# Patient Record
Sex: FEMALE | Race: WHITE | NOT HISPANIC OR LATINO | Employment: OTHER | URBAN - METROPOLITAN AREA
[De-identification: names, ages, dates, MRNs, and addresses within clinical notes are randomized per-mention and may not be internally consistent; named-entity substitution may affect disease eponyms.]

---

## 2017-01-01 ENCOUNTER — APPOINTMENT (OUTPATIENT)
Dept: GENERAL RADIOLOGY | Facility: HOSPITAL | Age: 82
End: 2017-01-01

## 2017-01-01 ENCOUNTER — HOSPITAL ENCOUNTER (OUTPATIENT)
Dept: GENERAL RADIOLOGY | Facility: HOSPITAL | Age: 82
Discharge: HOME | End: 2017-11-30
Admitting: SURGERY

## 2017-01-01 ENCOUNTER — ANESTHESIA (OUTPATIENT)
Dept: GASTROENTEROLOGY | Facility: HOSPITAL | Age: 82
End: 2017-01-01

## 2017-01-01 ENCOUNTER — APPOINTMENT (OUTPATIENT)
Dept: GENERAL RADIOLOGY | Facility: HOSPITAL | Age: 82
End: 2017-01-01
Attending: THORACIC SURGERY (CARDIOTHORACIC VASCULAR SURGERY)

## 2017-01-01 ENCOUNTER — OFFICE VISIT (OUTPATIENT)
Dept: GASTROENTEROLOGY | Facility: CLINIC | Age: 82
End: 2017-01-01

## 2017-01-01 ENCOUNTER — APPOINTMENT (OUTPATIENT)
Dept: CARDIOLOGY | Facility: HOSPITAL | Age: 82
End: 2017-01-01
Attending: INTERNAL MEDICINE

## 2017-01-01 ENCOUNTER — ANESTHESIA (OUTPATIENT)
Dept: PERIOP | Facility: HOSPITAL | Age: 82
End: 2017-01-01

## 2017-01-01 ENCOUNTER — LAB REQUISITION (OUTPATIENT)
Dept: LAB | Facility: HOSPITAL | Age: 82
End: 2017-01-01

## 2017-01-01 ENCOUNTER — TELEPHONE (OUTPATIENT)
Dept: CARDIOLOGY | Facility: CLINIC | Age: 82
End: 2017-01-01

## 2017-01-01 ENCOUNTER — TRANSCRIBE ORDERS (OUTPATIENT)
Dept: ADMINISTRATIVE | Facility: HOSPITAL | Age: 82
End: 2017-01-01

## 2017-01-01 ENCOUNTER — APPOINTMENT (OUTPATIENT)
Dept: ULTRASOUND IMAGING | Facility: HOSPITAL | Age: 82
End: 2017-01-01

## 2017-01-01 ENCOUNTER — ANESTHESIA EVENT (OUTPATIENT)
Dept: GASTROENTEROLOGY | Facility: HOSPITAL | Age: 82
End: 2017-01-01

## 2017-01-01 ENCOUNTER — APPOINTMENT (OUTPATIENT)
Dept: PREADMISSION TESTING | Facility: HOSPITAL | Age: 82
End: 2017-01-01

## 2017-01-01 ENCOUNTER — HOSPITAL ENCOUNTER (INPATIENT)
Facility: HOSPITAL | Age: 82
LOS: 4 days | Discharge: HOME | End: 2017-12-11
Attending: SURGERY | Admitting: SURGERY

## 2017-01-01 ENCOUNTER — HOSPITAL ENCOUNTER (INPATIENT)
Facility: HOSPITAL | Age: 82
LOS: 3 days | Discharge: HOME HEALTH | End: 2017-02-03
Attending: EMERGENCY MEDICINE | Admitting: INTERNAL MEDICINE

## 2017-01-01 ENCOUNTER — TELEPHONE (OUTPATIENT)
Dept: GASTROENTEROLOGY | Facility: CLINIC | Age: 82
End: 2017-01-01

## 2017-01-01 ENCOUNTER — APPOINTMENT (OUTPATIENT)
Dept: CARDIOLOGY | Facility: HOSPITAL | Age: 82
End: 2017-01-01
Attending: SURGERY

## 2017-01-01 ENCOUNTER — OFFICE VISIT (OUTPATIENT)
Dept: CARDIOLOGY | Facility: CLINIC | Age: 82
End: 2017-01-01

## 2017-01-01 ENCOUNTER — HOSPITAL ENCOUNTER (INPATIENT)
Facility: HOSPITAL | Age: 82
LOS: 27 days | Discharge: NURSING, REHAB OR LONG-TERM CARE | End: 2017-10-03
Attending: FAMILY MEDICINE | Admitting: INTERNAL MEDICINE

## 2017-01-01 ENCOUNTER — CLINICAL SUPPORT NO REQUIREMENTS (OUTPATIENT)
Dept: CARDIOLOGY | Facility: CLINIC | Age: 82
End: 2017-01-01

## 2017-01-01 ENCOUNTER — APPOINTMENT (OUTPATIENT)
Dept: CT IMAGING | Facility: HOSPITAL | Age: 82
End: 2017-01-01

## 2017-01-01 ENCOUNTER — INPATIENT HOSPITAL (OUTPATIENT)
Dept: URBAN - METROPOLITAN AREA HOSPITAL 113 | Facility: HOSPITAL | Age: 82
End: 2017-01-01

## 2017-01-01 ENCOUNTER — HOSPITAL ENCOUNTER (OUTPATIENT)
Dept: INFUSION THERAPY | Facility: HOSPITAL | Age: 82
Discharge: HOME | End: 2017-10-11
Admitting: INTERNAL MEDICINE

## 2017-01-01 ENCOUNTER — HOSPITAL ENCOUNTER (OUTPATIENT)
Dept: INFUSION THERAPY | Facility: HOSPITAL | Age: 82
Discharge: HOME | End: 2017-10-26
Admitting: INTERNAL MEDICINE

## 2017-01-01 ENCOUNTER — ANESTHESIA EVENT (OUTPATIENT)
Dept: PERIOP | Facility: HOSPITAL | Age: 82
End: 2017-01-01

## 2017-01-01 ENCOUNTER — APPOINTMENT (OUTPATIENT)
Dept: CT IMAGING | Facility: HOSPITAL | Age: 82
End: 2017-01-01
Attending: SURGERY

## 2017-01-01 ENCOUNTER — HOSPITAL ENCOUNTER (OUTPATIENT)
Dept: CARDIOLOGY | Facility: HOSPITAL | Age: 82
Discharge: HOME | End: 2017-02-09
Admitting: INTERNAL MEDICINE

## 2017-01-01 ENCOUNTER — APPOINTMENT (OUTPATIENT)
Dept: PERIOP | Facility: HOSPITAL | Age: 82
End: 2017-01-01
Attending: ANESTHESIOLOGY

## 2017-01-01 ENCOUNTER — OFFICE VISIT (OUTPATIENT)
Dept: CARDIAC SURGERY | Facility: CLINIC | Age: 82
End: 2017-01-01

## 2017-01-01 ENCOUNTER — HOSPITAL ENCOUNTER (EMERGENCY)
Facility: HOSPITAL | Age: 82
Discharge: HOME | End: 2017-02-05
Attending: EMERGENCY MEDICINE | Admitting: EMERGENCY MEDICINE

## 2017-01-01 VITALS
DIASTOLIC BLOOD PRESSURE: 62 MMHG | SYSTOLIC BLOOD PRESSURE: 136 MMHG | HEIGHT: 64 IN | TEMPERATURE: 97.7 F | WEIGHT: 179.2 LBS | BODY MASS INDEX: 30.59 KG/M2

## 2017-01-01 VITALS
WEIGHT: 211 LBS | RESPIRATION RATE: 16 BRPM | OXYGEN SATURATION: 99 % | HEART RATE: 97 BPM | SYSTOLIC BLOOD PRESSURE: 155 MMHG | BODY MASS INDEX: 36.02 KG/M2 | DIASTOLIC BLOOD PRESSURE: 76 MMHG | TEMPERATURE: 97.7 F | HEIGHT: 64 IN

## 2017-01-01 VITALS
HEIGHT: 64 IN | BODY MASS INDEX: 32.78 KG/M2 | OXYGEN SATURATION: 91 % | SYSTOLIC BLOOD PRESSURE: 163 MMHG | TEMPERATURE: 97.7 F | RESPIRATION RATE: 20 BRPM | WEIGHT: 192 LBS | DIASTOLIC BLOOD PRESSURE: 65 MMHG | HEART RATE: 84 BPM

## 2017-01-01 VITALS
WEIGHT: 189 LBS | HEART RATE: 86 BPM | TEMPERATURE: 97.1 F | SYSTOLIC BLOOD PRESSURE: 186 MMHG | OXYGEN SATURATION: 93 % | HEIGHT: 64 IN | DIASTOLIC BLOOD PRESSURE: 64 MMHG | BODY MASS INDEX: 32.27 KG/M2 | RESPIRATION RATE: 24 BRPM

## 2017-01-01 VITALS
OXYGEN SATURATION: 91 % | WEIGHT: 182 LBS | TEMPERATURE: 98.1 F | RESPIRATION RATE: 20 BRPM | BODY MASS INDEX: 31.07 KG/M2 | DIASTOLIC BLOOD PRESSURE: 64 MMHG | HEART RATE: 78 BPM | HEIGHT: 64 IN | SYSTOLIC BLOOD PRESSURE: 133 MMHG

## 2017-01-01 VITALS
HEART RATE: 68 BPM | SYSTOLIC BLOOD PRESSURE: 164 MMHG | BODY MASS INDEX: 34.83 KG/M2 | WEIGHT: 204 LBS | HEIGHT: 64 IN | DIASTOLIC BLOOD PRESSURE: 52 MMHG

## 2017-01-01 VITALS
RESPIRATION RATE: 16 BRPM | HEART RATE: 84 BPM | TEMPERATURE: 98.2 F | WEIGHT: 186.5 LBS | OXYGEN SATURATION: 95 % | DIASTOLIC BLOOD PRESSURE: 53 MMHG | BODY MASS INDEX: 31.84 KG/M2 | HEIGHT: 64 IN | SYSTOLIC BLOOD PRESSURE: 157 MMHG

## 2017-01-01 VITALS
RESPIRATION RATE: 18 BRPM | HEART RATE: 100 BPM | SYSTOLIC BLOOD PRESSURE: 163 MMHG | TEMPERATURE: 98.2 F | OXYGEN SATURATION: 95 % | WEIGHT: 194 LBS | BODY MASS INDEX: 33.12 KG/M2 | HEIGHT: 64 IN | DIASTOLIC BLOOD PRESSURE: 82 MMHG

## 2017-01-01 VITALS
TEMPERATURE: 97.8 F | RESPIRATION RATE: 22 BRPM | HEART RATE: 90 BPM | DIASTOLIC BLOOD PRESSURE: 79 MMHG | SYSTOLIC BLOOD PRESSURE: 148 MMHG | OXYGEN SATURATION: 97 %

## 2017-01-01 VITALS
HEIGHT: 55 IN | SYSTOLIC BLOOD PRESSURE: 130 MMHG | WEIGHT: 189 LBS | DIASTOLIC BLOOD PRESSURE: 60 MMHG | BODY MASS INDEX: 43.74 KG/M2 | HEART RATE: 80 BPM

## 2017-01-01 VITALS
TEMPERATURE: 97.9 F | SYSTOLIC BLOOD PRESSURE: 171 MMHG | WEIGHT: 212 LBS | HEART RATE: 96 BPM | RESPIRATION RATE: 18 BRPM | BODY MASS INDEX: 36.19 KG/M2 | OXYGEN SATURATION: 96 % | HEIGHT: 64 IN | DIASTOLIC BLOOD PRESSURE: 72 MMHG

## 2017-01-01 VITALS
WEIGHT: 199.6 LBS | HEART RATE: 79 BPM | BODY MASS INDEX: 34.08 KG/M2 | SYSTOLIC BLOOD PRESSURE: 180 MMHG | DIASTOLIC BLOOD PRESSURE: 78 MMHG | HEIGHT: 64 IN

## 2017-01-01 DIAGNOSIS — I44.7 NEW ONSET LEFT BUNDLE BRANCH BLOCK (LBBB): ICD-10-CM

## 2017-01-01 DIAGNOSIS — R06.02 SHORTNESS OF BREATH: ICD-10-CM

## 2017-01-01 DIAGNOSIS — Z98.890 S/P MVR (MITRAL VALVE REPAIR): Primary | ICD-10-CM

## 2017-01-01 DIAGNOSIS — E87.5 HYPERKALEMIA: ICD-10-CM

## 2017-01-01 DIAGNOSIS — K55.20 ANGIODYSPLASIA OF COLON WITHOUT HEMORRHAGE: ICD-10-CM

## 2017-01-01 DIAGNOSIS — Z98.890 S/P TVR (TRICUSPID VALVE REPAIR): ICD-10-CM

## 2017-01-01 DIAGNOSIS — G44.209 ACUTE NON INTRACTABLE TENSION-TYPE HEADACHE: ICD-10-CM

## 2017-01-01 DIAGNOSIS — R53.1 WEAKNESS: ICD-10-CM

## 2017-01-01 DIAGNOSIS — D50.8 OTHER IRON DEFICIENCY ANEMIA: ICD-10-CM

## 2017-01-01 DIAGNOSIS — D50.9 IRON DEFICIENCY ANEMIA, UNSPECIFIED IRON DEFICIENCY ANEMIA TYPE: ICD-10-CM

## 2017-01-01 DIAGNOSIS — D64.9 ANEMIA, UNSPECIFIED TYPE: Primary | ICD-10-CM

## 2017-01-01 DIAGNOSIS — D50.9 IRON DEFICIENCY ANEMIA, UNSPECIFIED: ICD-10-CM

## 2017-01-01 DIAGNOSIS — K92.1 HEMATOCHEZIA: ICD-10-CM

## 2017-01-01 DIAGNOSIS — I65.21 CAROTID STENOSIS, RIGHT: ICD-10-CM

## 2017-01-01 DIAGNOSIS — I48.0 PAROXYSMAL ATRIAL FIBRILLATION (HCC): Primary | ICD-10-CM

## 2017-01-01 DIAGNOSIS — I48.0 PAROXYSMAL ATRIAL FIBRILLATION (HCC): ICD-10-CM

## 2017-01-01 DIAGNOSIS — I48.91 NEW ONSET ATRIAL FIBRILLATION (HCC): Primary | ICD-10-CM

## 2017-01-01 DIAGNOSIS — D64.9 ANEMIA, UNSPECIFIED TYPE: ICD-10-CM

## 2017-01-01 DIAGNOSIS — E87.1 HYPONATREMIA: Primary | ICD-10-CM

## 2017-01-01 DIAGNOSIS — I65.29 OCCLUSION AND STENOSIS OF UNSPECIFIED CAROTID ARTERY: ICD-10-CM

## 2017-01-01 DIAGNOSIS — K55.20 GI AVM (GASTROINTESTINAL ARTERIOVENOUS VASCULAR MALFORMATION): ICD-10-CM

## 2017-01-01 DIAGNOSIS — D64.9 ANEMIA: ICD-10-CM

## 2017-01-01 DIAGNOSIS — Z00.00 ROUTINE GENERAL MEDICAL EXAMINATION AT A HEALTH CARE FACILITY: ICD-10-CM

## 2017-01-01 DIAGNOSIS — K92.2 GASTROINTESTINAL HEMORRHAGE, UNSPECIFIED GASTROINTESTINAL HEMORRHAGE TYPE: ICD-10-CM

## 2017-01-01 DIAGNOSIS — R41.82 ALTERED MENTAL STATUS, UNSPECIFIED ALTERED MENTAL STATUS TYPE: ICD-10-CM

## 2017-01-01 DIAGNOSIS — K63.5 POLYP OF COLON, UNSPECIFIED PART OF COLON, UNSPECIFIED TYPE: ICD-10-CM

## 2017-01-01 DIAGNOSIS — R53.1 GENERALIZED WEAKNESS: ICD-10-CM

## 2017-01-01 DIAGNOSIS — I10 ESSENTIAL HYPERTENSION: ICD-10-CM

## 2017-01-01 DIAGNOSIS — Z86.79 S/P MAZE OPERATION FOR ATRIAL FIBRILLATION: ICD-10-CM

## 2017-01-01 DIAGNOSIS — Z74.09 DECREASED MOBILITY AND ENDURANCE: Primary | ICD-10-CM

## 2017-01-01 DIAGNOSIS — E86.0 DEHYDRATION: ICD-10-CM

## 2017-01-01 DIAGNOSIS — I10 ESSENTIAL HYPERTENSION: Primary | ICD-10-CM

## 2017-01-01 DIAGNOSIS — Z98.890 S/P MAZE OPERATION FOR ATRIAL FIBRILLATION: ICD-10-CM

## 2017-01-01 DIAGNOSIS — I34.0 MITRAL VALVE INSUFFICIENCY, UNSPECIFIED ETIOLOGY: Primary | ICD-10-CM

## 2017-01-01 DIAGNOSIS — D50.8 OTHER IRON DEFICIENCY ANEMIA: Primary | ICD-10-CM

## 2017-01-01 DIAGNOSIS — I89.0 LYMPHEDEMA: ICD-10-CM

## 2017-01-01 DIAGNOSIS — Z87.11 PERSONAL HISTORY OF PEPTIC ULCER DISEASE: ICD-10-CM

## 2017-01-01 DIAGNOSIS — J44.9 CHRONIC OBSTRUCTIVE PULMONARY DISEASE, UNSPECIFIED COPD TYPE (HCC): ICD-10-CM

## 2017-01-01 DIAGNOSIS — D50.0 BLOOD LOSS ANEMIA: ICD-10-CM

## 2017-01-01 DIAGNOSIS — D50.0 IRON DEFICIENCY ANEMIA DUE TO CHRONIC BLOOD LOSS: Primary | ICD-10-CM

## 2017-01-01 LAB
ABO + RH BLD: NORMAL
ABO GROUP BLD: NORMAL
ACT BLD: 109 SECONDS (ref 82–152)
ACT BLD: 109 SECONDS (ref 82–152)
ACT BLD: 120 SECONDS (ref 82–152)
ACT BLD: 125 SECONDS (ref 82–152)
ACT BLD: 202 SECONDS (ref 82–152)
ACT BLD: 213 SECONDS (ref 82–152)
ACT BLD: 340 SECONDS (ref 82–152)
ACT BLD: 351 SECONDS (ref 82–152)
ACT BLD: 373 SECONDS (ref 82–152)
ACT BLD: 384 SECONDS (ref 82–152)
ACT BLD: 417 SECONDS (ref 82–152)
ALBUMIN SERPL-MCNC: 2.7 G/DL (ref 3.5–5.2)
ALBUMIN SERPL-MCNC: 3 G/DL (ref 3.5–5.2)
ALBUMIN SERPL-MCNC: 3.1 G/DL (ref 3.5–5.2)
ALBUMIN SERPL-MCNC: 3.1 G/DL (ref 3.5–5.2)
ALBUMIN SERPL-MCNC: 3.2 G/DL (ref 3.5–5.2)
ALBUMIN SERPL-MCNC: 3.3 G/DL (ref 3.5–5.2)
ALBUMIN SERPL-MCNC: 3.4 G/DL (ref 3.5–5.2)
ALBUMIN SERPL-MCNC: 3.4 G/DL (ref 3.5–5.2)
ALBUMIN SERPL-MCNC: 3.5 G/DL (ref 3.5–5.2)
ALBUMIN SERPL-MCNC: 3.5 G/DL (ref 3.5–5.2)
ALBUMIN SERPL-MCNC: 3.6 G/DL (ref 3.5–5.2)
ALBUMIN SERPL-MCNC: 3.7 G/DL (ref 3.5–5.2)
ALBUMIN SERPL-MCNC: 3.8 G/DL (ref 3.5–5.2)
ALBUMIN SERPL-MCNC: 3.8 G/DL (ref 3.5–5.2)
ALBUMIN/GLOB SERPL: 0.9 G/DL
ALBUMIN/GLOB SERPL: 1 G/DL
ALBUMIN/GLOB SERPL: 1.1 G/DL
ALBUMIN/GLOB SERPL: 1.2 G/DL
ALBUMIN/GLOB SERPL: 1.3 G/DL
ALBUMIN/GLOB SERPL: 1.3 G/DL
ALBUMIN/GLOB SERPL: 1.7 G/DL
ALP SERPL-CCNC: 103 U/L (ref 39–117)
ALP SERPL-CCNC: 107 U/L (ref 39–117)
ALP SERPL-CCNC: 110 U/L (ref 39–117)
ALP SERPL-CCNC: 112 U/L (ref 39–117)
ALP SERPL-CCNC: 41 U/L (ref 39–117)
ALP SERPL-CCNC: 47 U/L (ref 39–117)
ALP SERPL-CCNC: 48 U/L (ref 39–117)
ALP SERPL-CCNC: 52 U/L (ref 39–117)
ALP SERPL-CCNC: 53 U/L (ref 39–117)
ALP SERPL-CCNC: 56 U/L (ref 39–117)
ALP SERPL-CCNC: 82 U/L (ref 39–117)
ALP SERPL-CCNC: 82 U/L (ref 39–117)
ALP SERPL-CCNC: 99 U/L (ref 39–117)
ALT SERPL W P-5'-P-CCNC: 11 U/L (ref 1–33)
ALT SERPL W P-5'-P-CCNC: 17 U/L (ref 1–33)
ALT SERPL W P-5'-P-CCNC: 19 U/L (ref 1–33)
ALT SERPL W P-5'-P-CCNC: 21 U/L (ref 1–33)
ALT SERPL W P-5'-P-CCNC: 25 U/L (ref 1–33)
ALT SERPL W P-5'-P-CCNC: 25 U/L (ref 1–33)
ALT SERPL W P-5'-P-CCNC: 26 U/L (ref 1–33)
ALT SERPL W P-5'-P-CCNC: 31 U/L (ref 1–33)
ALT SERPL W P-5'-P-CCNC: 31 U/L (ref 1–33)
ALT SERPL W P-5'-P-CCNC: 36 U/L (ref 1–33)
ALT SERPL W P-5'-P-CCNC: 54 U/L (ref 1–33)
ALT SERPL W P-5'-P-CCNC: 9 U/L (ref 1–33)
ALT SERPL W P-5'-P-CCNC: <5 U/L (ref 1–33)
ANION GAP SERPL CALCULATED.3IONS-SCNC: 10.9 MMOL/L
ANION GAP SERPL CALCULATED.3IONS-SCNC: 11.3 MMOL/L
ANION GAP SERPL CALCULATED.3IONS-SCNC: 11.5 MMOL/L
ANION GAP SERPL CALCULATED.3IONS-SCNC: 11.6 MMOL/L
ANION GAP SERPL CALCULATED.3IONS-SCNC: 11.7 MMOL/L
ANION GAP SERPL CALCULATED.3IONS-SCNC: 12.2 MMOL/L
ANION GAP SERPL CALCULATED.3IONS-SCNC: 12.5 MMOL/L
ANION GAP SERPL CALCULATED.3IONS-SCNC: 12.6 MMOL/L
ANION GAP SERPL CALCULATED.3IONS-SCNC: 12.6 MMOL/L
ANION GAP SERPL CALCULATED.3IONS-SCNC: 12.7 MMOL/L
ANION GAP SERPL CALCULATED.3IONS-SCNC: 13.2 MMOL/L
ANION GAP SERPL CALCULATED.3IONS-SCNC: 13.4 MMOL/L
ANION GAP SERPL CALCULATED.3IONS-SCNC: 13.4 MMOL/L
ANION GAP SERPL CALCULATED.3IONS-SCNC: 13.6 MMOL/L
ANION GAP SERPL CALCULATED.3IONS-SCNC: 13.7 MMOL/L
ANION GAP SERPL CALCULATED.3IONS-SCNC: 13.8 MMOL/L
ANION GAP SERPL CALCULATED.3IONS-SCNC: 13.8 MMOL/L
ANION GAP SERPL CALCULATED.3IONS-SCNC: 14.3 MMOL/L
ANION GAP SERPL CALCULATED.3IONS-SCNC: 14.4 MMOL/L
ANION GAP SERPL CALCULATED.3IONS-SCNC: 14.6 MMOL/L
ANION GAP SERPL CALCULATED.3IONS-SCNC: 14.6 MMOL/L
ANION GAP SERPL CALCULATED.3IONS-SCNC: 14.9 MMOL/L
ANION GAP SERPL CALCULATED.3IONS-SCNC: 15.1 MMOL/L
ANION GAP SERPL CALCULATED.3IONS-SCNC: 15.2 MMOL/L
ANION GAP SERPL CALCULATED.3IONS-SCNC: 15.2 MMOL/L
ANION GAP SERPL CALCULATED.3IONS-SCNC: 15.4 MMOL/L
ANION GAP SERPL CALCULATED.3IONS-SCNC: 15.6 MMOL/L
ANION GAP SERPL CALCULATED.3IONS-SCNC: 15.9 MMOL/L
ANION GAP SERPL CALCULATED.3IONS-SCNC: 16.1 MMOL/L
ANION GAP SERPL CALCULATED.3IONS-SCNC: 16.2 MMOL/L
ANION GAP SERPL CALCULATED.3IONS-SCNC: 16.3 MMOL/L
ANION GAP SERPL CALCULATED.3IONS-SCNC: 16.7 MMOL/L
ANION GAP SERPL CALCULATED.3IONS-SCNC: 17.1 MMOL/L
ANION GAP SERPL CALCULATED.3IONS-SCNC: 18.4 MMOL/L
AORTIC ARCH: 2.5 CM
APTT PPP: 24.1 SECONDS (ref 22.7–35.4)
APTT PPP: 27.5 SECONDS (ref 22.7–35.4)
APTT PPP: 29.5 SECONDS (ref 22.7–35.4)
APTT PPP: 30.9 SECONDS (ref 22.7–35.4)
APTT PPP: 31.6 SECONDS (ref 22.7–35.4)
ARTERIAL PATENCY WRIST A: ABNORMAL
ASCENDING AORTA: 2.6 CM
AST SERPL-CCNC: 17 U/L (ref 1–32)
AST SERPL-CCNC: 17 U/L (ref 1–32)
AST SERPL-CCNC: 20 U/L (ref 1–32)
AST SERPL-CCNC: 20 U/L (ref 1–32)
AST SERPL-CCNC: 21 U/L (ref 1–32)
AST SERPL-CCNC: 22 U/L (ref 1–32)
AST SERPL-CCNC: 22 U/L (ref 1–32)
AST SERPL-CCNC: 23 U/L (ref 1–32)
AST SERPL-CCNC: 24 U/L (ref 1–32)
AST SERPL-CCNC: 24 U/L (ref 1–32)
AST SERPL-CCNC: 28 U/L (ref 1–32)
AST SERPL-CCNC: 39 U/L (ref 1–32)
AST SERPL-CCNC: 70 U/L (ref 1–32)
ATMOSPHERIC PRESS: 746.8 MMHG
ATMOSPHERIC PRESS: 747 MMHG
ATMOSPHERIC PRESS: 747 MMHG
ATMOSPHERIC PRESS: 751.1 MMHG
BACTERIA SPEC AEROBE CULT: ABNORMAL
BACTERIA SPEC AEROBE CULT: NORMAL
BACTERIA UR QL AUTO: ABNORMAL /HPF
BASE EXCESS BLDA CALC-SCNC: -1 MMOL/L (ref 0–2)
BASE EXCESS BLDA CALC-SCNC: -4 MMOL/L (ref 0–2)
BASE EXCESS BLDA CALC-SCNC: -7 MMOL/L (ref 0–2)
BASE EXCESS BLDA CALC-SCNC: 3 MMOL/L (ref -5–5)
BASE EXCESS BLDA CALC-SCNC: 3 MMOL/L (ref -5–5)
BASE EXCESS BLDA CALC-SCNC: 3.6 MMOL/L (ref 0–2)
BASE EXCESS BLDA CALC-SCNC: 4 MMOL/L (ref -5–5)
BASE EXCESS BLDA CALC-SCNC: 5 MMOL/L (ref -5–5)
BASE EXCESS BLDA CALC-SCNC: 5 MMOL/L (ref -5–5)
BASE EXCESS BLDA CALC-SCNC: 6 MMOL/L (ref -5–5)
BASOPHILS # BLD AUTO: 0.02 10*3/MM3 (ref 0–0.2)
BASOPHILS # BLD AUTO: 0.03 10*3/MM3 (ref 0–0.2)
BASOPHILS # BLD AUTO: 0.04 10*3/MM3 (ref 0–0.2)
BASOPHILS # BLD AUTO: 0.05 10*3/MM3 (ref 0–0.2)
BASOPHILS # BLD AUTO: 0.06 10*3/MM3 (ref 0–0.2)
BASOPHILS # BLD AUTO: 0.06 10*3/MM3 (ref 0–0.2)
BASOPHILS # BLD AUTO: 0.07 10*3/MM3 (ref 0–0.2)
BASOPHILS # BLD AUTO: 0.08 10*3/MM3 (ref 0–0.2)
BASOPHILS NFR BLD AUTO: 0.1 % (ref 0–1.5)
BASOPHILS NFR BLD AUTO: 0.2 % (ref 0–1.5)
BASOPHILS NFR BLD AUTO: 0.3 % (ref 0–1.5)
BASOPHILS NFR BLD AUTO: 0.4 % (ref 0–1.5)
BASOPHILS NFR BLD AUTO: 0.5 % (ref 0–1.5)
BASOPHILS NFR BLD AUTO: 0.6 % (ref 0–1.5)
BASOPHILS NFR BLD AUTO: 0.7 % (ref 0–1.5)
BASOPHILS NFR BLD AUTO: 0.8 % (ref 0–1.5)
BDY SITE: ABNORMAL
BH BB BLOOD EXPIRATION DATE: NORMAL
BH BB BLOOD TYPE BARCODE: 6200
BH BB DISPENSE STATUS: NORMAL
BH BB PRODUCT CODE: NORMAL
BH BB UNIT NUMBER: NORMAL
BH CV ECHO MEAS - ACS: 1.4 CM
BH CV ECHO MEAS - ACS: 1.6 CM
BH CV ECHO MEAS - AI DEC SLOPE: 207 CM/SEC^2
BH CV ECHO MEAS - AI DEC SLOPE: 294 CM/SEC^2
BH CV ECHO MEAS - AI MAX PG: 58.1 MMHG
BH CV ECHO MEAS - AI MAX PG: 59.9 MMHG
BH CV ECHO MEAS - AI MAX VEL: 381 CM/SEC
BH CV ECHO MEAS - AI MAX VEL: 387 CM/SEC
BH CV ECHO MEAS - AI P1/2T: 385.5 MSEC
BH CV ECHO MEAS - AI P1/2T: 539.1 MSEC
BH CV ECHO MEAS - AO MAX PG (FULL): 3.5 MMHG
BH CV ECHO MEAS - AO MAX PG (FULL): 7.9 MMHG
BH CV ECHO MEAS - AO MAX PG: 9.5 MMHG
BH CV ECHO MEAS - AO MAX PG: 9.7 MMHG
BH CV ECHO MEAS - AO MEAN PG (FULL): 2 MMHG
BH CV ECHO MEAS - AO MEAN PG (FULL): 4 MMHG
BH CV ECHO MEAS - AO MEAN PG: 5 MMHG
BH CV ECHO MEAS - AO MEAN PG: 5 MMHG
BH CV ECHO MEAS - AO ROOT AREA (BSA CORRECTED): 1.1
BH CV ECHO MEAS - AO ROOT AREA (BSA CORRECTED): 1.3
BH CV ECHO MEAS - AO ROOT AREA: 3.5 CM^2
BH CV ECHO MEAS - AO ROOT AREA: 4.9 CM^2
BH CV ECHO MEAS - AO ROOT DIAM: 2.1 CM
BH CV ECHO MEAS - AO ROOT DIAM: 2.5 CM
BH CV ECHO MEAS - AO V2 MAX: 154 CM/SEC
BH CV ECHO MEAS - AO V2 MAX: 156 CM/SEC
BH CV ECHO MEAS - AO V2 MEAN: 109 CM/SEC
BH CV ECHO MEAS - AO V2 MEAN: 99.9 CM/SEC
BH CV ECHO MEAS - AO V2 VTI: 33 CM
BH CV ECHO MEAS - AO V2 VTI: 34.4 CM
BH CV ECHO MEAS - ASC AORTA: 2.6 CM
BH CV ECHO MEAS - AVA(I,A): 1.1 CM^2
BH CV ECHO MEAS - AVA(I,A): 2 CM^2
BH CV ECHO MEAS - AVA(I,D): 1.1 CM^2
BH CV ECHO MEAS - AVA(I,D): 2 CM^2
BH CV ECHO MEAS - AVA(V,A): 1.1 CM^2
BH CV ECHO MEAS - AVA(V,A): 2 CM^2
BH CV ECHO MEAS - AVA(V,D): 1.1 CM^2
BH CV ECHO MEAS - AVA(V,D): 2 CM^2
BH CV ECHO MEAS - BSA(HAYCOCK): 2 M^2
BH CV ECHO MEAS - BSA(HAYCOCK): 2 M^2
BH CV ECHO MEAS - BSA(HAYCOCK): 2.1 M^2
BH CV ECHO MEAS - BSA: 1.9 M^2
BH CV ECHO MEAS - BSA: 1.9 M^2
BH CV ECHO MEAS - BSA: 2 M^2
BH CV ECHO MEAS - BZI_BMI: 33.6 KILOGRAMS/M^2
BH CV ECHO MEAS - BZI_BMI: 33.6 KILOGRAMS/M^2
BH CV ECHO MEAS - BZI_BMI: 34.7 KILOGRAMS/M^2
BH CV ECHO MEAS - BZI_METRIC_HEIGHT: 162.6 CM
BH CV ECHO MEAS - BZI_METRIC_WEIGHT: 88.9 KG
BH CV ECHO MEAS - BZI_METRIC_WEIGHT: 88.9 KG
BH CV ECHO MEAS - BZI_METRIC_WEIGHT: 91.6 KG
BH CV ECHO MEAS - CONTRAST EF (2CH): 23.2 ML/M^2
BH CV ECHO MEAS - CONTRAST EF (2CH): 46.1 ML/M^2
BH CV ECHO MEAS - CONTRAST EF 4CH: 22.7 ML/M^2
BH CV ECHO MEAS - CONTRAST EF 4CH: 57.6 ML/M^2
BH CV ECHO MEAS - EDV(CUBED): 110.6 ML
BH CV ECHO MEAS - EDV(CUBED): 79.5 ML
BH CV ECHO MEAS - EDV(MOD-SP2): 102 ML
BH CV ECHO MEAS - EDV(MOD-SP2): 99 ML
BH CV ECHO MEAS - EDV(MOD-SP4): 125 ML
BH CV ECHO MEAS - EDV(MOD-SP4): 75 ML
BH CV ECHO MEAS - EDV(TEICH): 107.5 ML
BH CV ECHO MEAS - EDV(TEICH): 83.1 ML
BH CV ECHO MEAS - EF(CUBED): 46.1 %
BH CV ECHO MEAS - EF(CUBED): 80.2 %
BH CV ECHO MEAS - EF(MOD-SP2): 23.2 %
BH CV ECHO MEAS - EF(MOD-SP2): 46.1 %
BH CV ECHO MEAS - EF(MOD-SP4): 22.7 %
BH CV ECHO MEAS - EF(MOD-SP4): 57.6 %
BH CV ECHO MEAS - EF(TEICH): 38.8 %
BH CV ECHO MEAS - EF(TEICH): 72.5 %
BH CV ECHO MEAS - ESV(CUBED): 22 ML
BH CV ECHO MEAS - ESV(CUBED): 42.9 ML
BH CV ECHO MEAS - ESV(MOD-SP2): 55 ML
BH CV ECHO MEAS - ESV(MOD-SP2): 76 ML
BH CV ECHO MEAS - ESV(MOD-SP4): 53 ML
BH CV ECHO MEAS - ESV(MOD-SP4): 58 ML
BH CV ECHO MEAS - ESV(TEICH): 29.6 ML
BH CV ECHO MEAS - ESV(TEICH): 50.9 ML
BH CV ECHO MEAS - FS: 18.6 %
BH CV ECHO MEAS - FS: 41.7 %
BH CV ECHO MEAS - IVS/LVPW: 0.91
BH CV ECHO MEAS - IVS/LVPW: 1.4
BH CV ECHO MEAS - IVSD: 1 CM
BH CV ECHO MEAS - IVSD: 1.1 CM
BH CV ECHO MEAS - LAT PEAK E' VEL: 10 CM/SEC
BH CV ECHO MEAS - LAT PEAK E' VEL: 5 CM/SEC
BH CV ECHO MEAS - LV DIASTOLIC VOL/BSA (35-75): 38.7 ML/M^2
BH CV ECHO MEAS - LV DIASTOLIC VOL/BSA (35-75): 63.6 ML/M^2
BH CV ECHO MEAS - LV MASS(C)D: 132.7 GRAMS
BH CV ECHO MEAS - LV MASS(C)D: 181.9 GRAMS
BH CV ECHO MEAS - LV MASS(C)DI: 68.4 GRAMS/M^2
BH CV ECHO MEAS - LV MASS(C)DI: 92.6 GRAMS/M^2
BH CV ECHO MEAS - LV MAX PG: 1.9 MMHG
BH CV ECHO MEAS - LV MAX PG: 6 MMHG
BH CV ECHO MEAS - LV MEAN PG: 1 MMHG
BH CV ECHO MEAS - LV MEAN PG: 3 MMHG
BH CV ECHO MEAS - LV SYSTOLIC VOL/BSA (12-30): 27 ML/M^2
BH CV ECHO MEAS - LV SYSTOLIC VOL/BSA (12-30): 29.9 ML/M^2
BH CV ECHO MEAS - LV V1 MAX: 122 CM/SEC
BH CV ECHO MEAS - LV V1 MAX: 68.3 CM/SEC
BH CV ECHO MEAS - LV V1 MEAN: 44.4 CM/SEC
BH CV ECHO MEAS - LV V1 MEAN: 74.7 CM/SEC
BH CV ECHO MEAS - LV V1 VTI: 15.2 CM
BH CV ECHO MEAS - LV V1 VTI: 25.6 CM
BH CV ECHO MEAS - LVIDD: 4.3 CM
BH CV ECHO MEAS - LVIDD: 4.8 CM
BH CV ECHO MEAS - LVIDS: 2.8 CM
BH CV ECHO MEAS - LVIDS: 3.5 CM
BH CV ECHO MEAS - LVLD AP2: 8.6 CM
BH CV ECHO MEAS - LVLD AP2: 8.8 CM
BH CV ECHO MEAS - LVLD AP4: 8.1 CM
BH CV ECHO MEAS - LVLD AP4: 8.6 CM
BH CV ECHO MEAS - LVLS AP2: 7.8 CM
BH CV ECHO MEAS - LVLS AP2: 8.2 CM
BH CV ECHO MEAS - LVLS AP4: 7.6 CM
BH CV ECHO MEAS - LVLS AP4: 7.8 CM
BH CV ECHO MEAS - LVOT AREA (M): 2.5 CM^2
BH CV ECHO MEAS - LVOT AREA (M): 2.5 CM^2
BH CV ECHO MEAS - LVOT AREA: 2.5 CM^2
BH CV ECHO MEAS - LVOT AREA: 2.5 CM^2
BH CV ECHO MEAS - LVOT DIAM: 1.8 CM
BH CV ECHO MEAS - LVOT DIAM: 1.8 CM
BH CV ECHO MEAS - LVPWD: 0.8 CM
BH CV ECHO MEAS - LVPWD: 1.1 CM
BH CV ECHO MEAS - MED PEAK E' VEL: 5 CM/SEC
BH CV ECHO MEAS - MED PEAK E' VEL: 7 CM/SEC
BH CV ECHO MEAS - MR MAX PG: 109 MMHG
BH CV ECHO MEAS - MR MAX PG: 32.3 MMHG
BH CV ECHO MEAS - MR MAX VEL: 284 CM/SEC
BH CV ECHO MEAS - MR MAX VEL: 522 CM/SEC
BH CV ECHO MEAS - MV A DUR: 0.12 SEC
BH CV ECHO MEAS - MV A DUR: 0.13 SEC
BH CV ECHO MEAS - MV A MAX VEL: 122 CM/SEC
BH CV ECHO MEAS - MV A MAX VEL: 49.8 CM/SEC
BH CV ECHO MEAS - MV DEC SLOPE: 800 CM/SEC^2
BH CV ECHO MEAS - MV DEC SLOPE: 904 CM/SEC^2
BH CV ECHO MEAS - MV DEC TIME: 0.14 SEC
BH CV ECHO MEAS - MV DEC TIME: 0.16 SEC
BH CV ECHO MEAS - MV E MAX VEL: 153 CM/SEC
BH CV ECHO MEAS - MV E MAX VEL: 154 CM/SEC
BH CV ECHO MEAS - MV E/A: 1.3
BH CV ECHO MEAS - MV E/A: 3.1
BH CV ECHO MEAS - MV MAX PG: 11.3 MMHG
BH CV ECHO MEAS - MV MAX PG: 9.1 MMHG
BH CV ECHO MEAS - MV MEAN PG: 4 MMHG
BH CV ECHO MEAS - MV MEAN PG: 4 MMHG
BH CV ECHO MEAS - MV P1/2T MAX VEL: 144 CM/SEC
BH CV ECHO MEAS - MV P1/2T MAX VEL: 174 CM/SEC
BH CV ECHO MEAS - MV P1/2T: 52.7 MSEC
BH CV ECHO MEAS - MV P1/2T: 56.4 MSEC
BH CV ECHO MEAS - MV V2 MAX: 151 CM/SEC
BH CV ECHO MEAS - MV V2 MAX: 168 CM/SEC
BH CV ECHO MEAS - MV V2 MEAN: 83 CM/SEC
BH CV ECHO MEAS - MV V2 MEAN: 88 CM/SEC
BH CV ECHO MEAS - MV V2 VTI: 34 CM
BH CV ECHO MEAS - MV V2 VTI: 34.7 CM
BH CV ECHO MEAS - MVA P1/2T LCG: 1.3 CM^2
BH CV ECHO MEAS - MVA P1/2T LCG: 1.5 CM^2
BH CV ECHO MEAS - MVA(P1/2T): 3.9 CM^2
BH CV ECHO MEAS - MVA(P1/2T): 4.2 CM^2
BH CV ECHO MEAS - MVA(VTI): 1.1 CM^2
BH CV ECHO MEAS - MVA(VTI): 1.9 CM^2
BH CV ECHO MEAS - PA ACC TIME: 0.07 SEC
BH CV ECHO MEAS - PA ACC TIME: 0.11 SEC
BH CV ECHO MEAS - PA MAX PG (FULL): 1.9 MMHG
BH CV ECHO MEAS - PA MAX PG (FULL): 3.7 MMHG
BH CV ECHO MEAS - PA MAX PG: 4.4 MMHG
BH CV ECHO MEAS - PA MAX PG: 5.3 MMHG
BH CV ECHO MEAS - PA PR(ACCEL): 30 MMHG
BH CV ECHO MEAS - PA PR(ACCEL): 47.5 MMHG
BH CV ECHO MEAS - PA V2 MAX: 105 CM/SEC
BH CV ECHO MEAS - PA V2 MAX: 115 CM/SEC
BH CV ECHO MEAS - PULM A REVS DUR: 0.11 SEC
BH CV ECHO MEAS - PULM A REVS VEL: 24.3 CM/SEC
BH CV ECHO MEAS - PULM DIAS VEL: 74.9 CM/SEC
BH CV ECHO MEAS - PULM DIAS VEL: 85.1 CM/SEC
BH CV ECHO MEAS - PULM S/D: 0.39
BH CV ECHO MEAS - PULM S/D: 0.82
BH CV ECHO MEAS - PULM SYS VEL: 29.3 CM/SEC
BH CV ECHO MEAS - PULM SYS VEL: 69.6 CM/SEC
BH CV ECHO MEAS - PVA(V,A): 0.89 CM^2
BH CV ECHO MEAS - PVA(V,A): 2.3 CM^2
BH CV ECHO MEAS - PVA(V,D): 0.89 CM^2
BH CV ECHO MEAS - PVA(V,D): 2.3 CM^2
BH CV ECHO MEAS - QP/QS: 0.45
BH CV ECHO MEAS - QP/QS: 0.78
BH CV ECHO MEAS - RAP SYSTOLE: 15 MMHG
BH CV ECHO MEAS - RAP SYSTOLE: 8 MMHG
BH CV ECHO MEAS - RV MAX PG: 0.69 MMHG
BH CV ECHO MEAS - RV MAX PG: 3.4 MMHG
BH CV ECHO MEAS - RV MEAN PG: 0 MMHG
BH CV ECHO MEAS - RV MEAN PG: 1 MMHG
BH CV ECHO MEAS - RV V1 MAX: 41.4 CM/SEC
BH CV ECHO MEAS - RV V1 MAX: 92.4 CM/SEC
BH CV ECHO MEAS - RV V1 MEAN: 27.8 CM/SEC
BH CV ECHO MEAS - RV V1 MEAN: 52.7 CM/SEC
BH CV ECHO MEAS - RV V1 VTI: 17.9 CM
BH CV ECHO MEAS - RV V1 VTI: 7.6 CM
BH CV ECHO MEAS - RVOT AREA: 2.3 CM^2
BH CV ECHO MEAS - RVOT AREA: 2.8 CM^2
BH CV ECHO MEAS - RVOT DIAM: 1.7 CM
BH CV ECHO MEAS - RVOT DIAM: 1.9 CM
BH CV ECHO MEAS - RVSP: 55 MMHG
BH CV ECHO MEAS - RVSP: 57 MMHG
BH CV ECHO MEAS - SI(AO): 61.4 ML/M^2
BH CV ECHO MEAS - SI(AO): 82.5 ML/M^2
BH CV ECHO MEAS - SI(CUBED): 18.9 ML/M^2
BH CV ECHO MEAS - SI(CUBED): 45.1 ML/M^2
BH CV ECHO MEAS - SI(LVOT): 19.9 ML/M^2
BH CV ECHO MEAS - SI(LVOT): 33.2 ML/M^2
BH CV ECHO MEAS - SI(MOD-SP2): 11.9 ML/M^2
BH CV ECHO MEAS - SI(MOD-SP2): 23.9 ML/M^2
BH CV ECHO MEAS - SI(MOD-SP4): 36.7 ML/M^2
BH CV ECHO MEAS - SI(MOD-SP4): 8.8 ML/M^2
BH CV ECHO MEAS - SI(TEICH): 16.6 ML/M^2
BH CV ECHO MEAS - SI(TEICH): 39.7 ML/M^2
BH CV ECHO MEAS - SUP REN AO DIAM: 1.5 CM
BH CV ECHO MEAS - SV(AO): 119.1 ML
BH CV ECHO MEAS - SV(AO): 162 ML
BH CV ECHO MEAS - SV(CUBED): 36.6 ML
BH CV ECHO MEAS - SV(CUBED): 88.6 ML
BH CV ECHO MEAS - SV(LVOT): 38.7 ML
BH CV ECHO MEAS - SV(LVOT): 65.1 ML
BH CV ECHO MEAS - SV(MOD-SP2): 23 ML
BH CV ECHO MEAS - SV(MOD-SP2): 47 ML
BH CV ECHO MEAS - SV(MOD-SP4): 17 ML
BH CV ECHO MEAS - SV(MOD-SP4): 72 ML
BH CV ECHO MEAS - SV(RVOT): 17.2 ML
BH CV ECHO MEAS - SV(RVOT): 50.8 ML
BH CV ECHO MEAS - SV(TEICH): 32.2 ML
BH CV ECHO MEAS - SV(TEICH): 78 ML
BH CV ECHO MEAS - TAPSE (>1.6): 1.3 CM2
BH CV ECHO MEAS - TAPSE (>1.6): 2.7 CM2
BH CV ECHO MEAS - TR MAX VEL: 318 CM/SEC
BH CV ECHO MEAS - TR MAX VEL: 341 CM/SEC
BH CV ECHO MEAS - TR MAX VEL: 351 CM/SEC
BH CV LOWER VASCULAR LEFT COMMON FEMORAL AUGMENT: NORMAL
BH CV LOWER VASCULAR LEFT COMMON FEMORAL COMPETENT: NORMAL
BH CV LOWER VASCULAR LEFT COMMON FEMORAL COMPRESS: NORMAL
BH CV LOWER VASCULAR LEFT COMMON FEMORAL PHASIC: NORMAL
BH CV LOWER VASCULAR LEFT COMMON FEMORAL SPONT: NORMAL
BH CV LOWER VASCULAR LEFT DISTAL FEMORAL COMPRESS: NORMAL
BH CV LOWER VASCULAR LEFT GASTRONEMIUS COMPRESS: NORMAL
BH CV LOWER VASCULAR LEFT GREATER SAPH AK COMPRESS: NORMAL
BH CV LOWER VASCULAR LEFT GREATER SAPH BK COMPRESS: NORMAL
BH CV LOWER VASCULAR LEFT LESSER SAPH COMPRESS: NORMAL
BH CV LOWER VASCULAR LEFT MID FEMORAL AUGMENT: NORMAL
BH CV LOWER VASCULAR LEFT MID FEMORAL COMPETENT: NORMAL
BH CV LOWER VASCULAR LEFT MID FEMORAL COMPRESS: NORMAL
BH CV LOWER VASCULAR LEFT MID FEMORAL PHASIC: NORMAL
BH CV LOWER VASCULAR LEFT MID FEMORAL SPONT: NORMAL
BH CV LOWER VASCULAR LEFT PERONEAL COMPRESS: NORMAL
BH CV LOWER VASCULAR LEFT POPLITEAL AUGMENT: NORMAL
BH CV LOWER VASCULAR LEFT POPLITEAL COMPETENT: NORMAL
BH CV LOWER VASCULAR LEFT POPLITEAL COMPRESS: NORMAL
BH CV LOWER VASCULAR LEFT POPLITEAL PHASIC: NORMAL
BH CV LOWER VASCULAR LEFT POPLITEAL SPONT: NORMAL
BH CV LOWER VASCULAR LEFT POSTERIOR TIBIAL COMPRESS: NORMAL
BH CV LOWER VASCULAR LEFT PROXIMAL FEMORAL COMPRESS: NORMAL
BH CV LOWER VASCULAR LEFT SAPHENOFEMORAL JUNCTION AUGMENT: NORMAL
BH CV LOWER VASCULAR LEFT SAPHENOFEMORAL JUNCTION COMPETENT: NORMAL
BH CV LOWER VASCULAR LEFT SAPHENOFEMORAL JUNCTION COMPRESS: NORMAL
BH CV LOWER VASCULAR LEFT SAPHENOFEMORAL JUNCTION PHASIC: NORMAL
BH CV LOWER VASCULAR LEFT SAPHENOFEMORAL JUNCTION SPONT: NORMAL
BH CV LOWER VASCULAR RIGHT COMMON FEMORAL AUGMENT: NORMAL
BH CV LOWER VASCULAR RIGHT COMMON FEMORAL COMPETENT: NORMAL
BH CV LOWER VASCULAR RIGHT COMMON FEMORAL COMPRESS: NORMAL
BH CV LOWER VASCULAR RIGHT COMMON FEMORAL PHASIC: NORMAL
BH CV LOWER VASCULAR RIGHT COMMON FEMORAL SPONT: NORMAL
BH CV LOWER VASCULAR RIGHT DISTAL FEMORAL COMPRESS: NORMAL
BH CV LOWER VASCULAR RIGHT GASTRONEMIUS COMPRESS: NORMAL
BH CV LOWER VASCULAR RIGHT GREATER SAPH AK COMPRESS: NORMAL
BH CV LOWER VASCULAR RIGHT GREATER SAPH BK COMPRESS: NORMAL
BH CV LOWER VASCULAR RIGHT LESSER SAPH COMPRESS: NORMAL
BH CV LOWER VASCULAR RIGHT MID FEMORAL AUGMENT: NORMAL
BH CV LOWER VASCULAR RIGHT MID FEMORAL COMPETENT: NORMAL
BH CV LOWER VASCULAR RIGHT MID FEMORAL COMPRESS: NORMAL
BH CV LOWER VASCULAR RIGHT MID FEMORAL PHASIC: NORMAL
BH CV LOWER VASCULAR RIGHT MID FEMORAL SPONT: NORMAL
BH CV LOWER VASCULAR RIGHT PERONEAL COMPRESS: NORMAL
BH CV LOWER VASCULAR RIGHT POPLITEAL AUGMENT: NORMAL
BH CV LOWER VASCULAR RIGHT POPLITEAL COMPETENT: NORMAL
BH CV LOWER VASCULAR RIGHT POPLITEAL COMPRESS: NORMAL
BH CV LOWER VASCULAR RIGHT POPLITEAL PHASIC: NORMAL
BH CV LOWER VASCULAR RIGHT POPLITEAL SPONT: NORMAL
BH CV LOWER VASCULAR RIGHT POSTERIOR TIBIAL COMPRESS: NORMAL
BH CV LOWER VASCULAR RIGHT PROXIMAL FEMORAL COMPRESS: NORMAL
BH CV LOWER VASCULAR RIGHT SAPHENOFEMORAL JUNCTION AUGMENT: NORMAL
BH CV LOWER VASCULAR RIGHT SAPHENOFEMORAL JUNCTION COMPETENT: NORMAL
BH CV LOWER VASCULAR RIGHT SAPHENOFEMORAL JUNCTION COMPRESS: NORMAL
BH CV LOWER VASCULAR RIGHT SAPHENOFEMORAL JUNCTION PHASIC: NORMAL
BH CV LOWER VASCULAR RIGHT SAPHENOFEMORAL JUNCTION SPONT: NORMAL
BH CV VAS BP RIGHT ARM: NORMAL MMHG
BH CV XLRA - RV BASE: 3.2 CM
BH CV XLRA - RV BASE: 4.2 CM
BH CV XLRA - TDI S': 10 CM/SEC
BH CV XLRA - TDI S': 6 CM/SEC
BH CV XLRA MEAS LEFT CCA RATIO VEL: -102 CM/SEC
BH CV XLRA MEAS LEFT DIST CCA EDV: -11 CM/SEC
BH CV XLRA MEAS LEFT DIST CCA PSV: -102 CM/SEC
BH CV XLRA MEAS LEFT DIST ICA EDV: -18.1 CM/SEC
BH CV XLRA MEAS LEFT DIST ICA PSV: -117 CM/SEC
BH CV XLRA MEAS LEFT ICA RATIO VEL: -251 CM/SEC
BH CV XLRA MEAS LEFT ICA/CCA RATIO: 2.5
BH CV XLRA MEAS LEFT MID ICA EDV: -14.1 CM/SEC
BH CV XLRA MEAS LEFT MID ICA PSV: -112 CM/SEC
BH CV XLRA MEAS LEFT PROX CCA EDV: 7.9 CM/SEC
BH CV XLRA MEAS LEFT PROX CCA PSV: 108 CM/SEC
BH CV XLRA MEAS LEFT PROX ECA EDV: 11.8 CM/SEC
BH CV XLRA MEAS LEFT PROX ECA PSV: 267 CM/SEC
BH CV XLRA MEAS LEFT PROX ICA EDV: -18.9 CM/SEC
BH CV XLRA MEAS LEFT PROX ICA PSV: -251 CM/SEC
BH CV XLRA MEAS LEFT PROX SCLA PSV: 214 CM/SEC
BH CV XLRA MEAS LEFT VERTEBRAL A EDV: -15.8 CM/SEC
BH CV XLRA MEAS LEFT VERTEBRAL A PSV: -89.1 CM/SEC
BH CV XLRA MEAS RIGHT CCA RATIO VEL: 76.8 CM/SEC
BH CV XLRA MEAS RIGHT DIST CCA EDV: 9 CM/SEC
BH CV XLRA MEAS RIGHT DIST CCA PSV: 76.8 CM/SEC
BH CV XLRA MEAS RIGHT DIST CCA PSV: 95 CM/SEC
BH CV XLRA MEAS RIGHT DIST ICA EDV: -3.5 CM/SEC
BH CV XLRA MEAS RIGHT DIST ICA PSV: -27.9 CM/SEC
BH CV XLRA MEAS RIGHT ICA RATIO VEL: -449 CM/SEC
BH CV XLRA MEAS RIGHT ICA/CCA RATIO: -5.8
BH CV XLRA MEAS RIGHT MID ICA PSV: -31.1 CM/SEC
BH CV XLRA MEAS RIGHT PROX CCA PSV: 82.1 CM/SEC
BH CV XLRA MEAS RIGHT PROX ECA EDV: 0 CM/SEC
BH CV XLRA MEAS RIGHT PROX ECA PSV: 158 CM/SEC
BH CV XLRA MEAS RIGHT PROX ECA PSV: 311 CM/SEC
BH CV XLRA MEAS RIGHT PROX ICA EDV: -56.1 CM/SEC
BH CV XLRA MEAS RIGHT PROX ICA EDV: 11 CM/SEC
BH CV XLRA MEAS RIGHT PROX ICA PSV: -449 CM/SEC
BH CV XLRA MEAS RIGHT PROX ICA PSV: 51 CM/SEC
BH CV XLRA MEAS RIGHT PROX SCLA PSV: 215 CM/SEC
BH CV XLRA MEAS RIGHT VERTEBRAL A EDV: 9.4 CM/SEC
BH CV XLRA MEAS RIGHT VERTEBRAL A PSV: 91.5 CM/SEC
BILIRUB SERPL-MCNC: 0.2 MG/DL (ref 0.1–1.2)
BILIRUB SERPL-MCNC: 0.3 MG/DL (ref 0.1–1.2)
BILIRUB SERPL-MCNC: 0.4 MG/DL (ref 0.1–1.2)
BILIRUB SERPL-MCNC: 0.4 MG/DL (ref 0.1–1.2)
BILIRUB SERPL-MCNC: 0.5 MG/DL (ref 0.1–1.2)
BILIRUB SERPL-MCNC: 0.5 MG/DL (ref 0.1–1.2)
BILIRUB SERPL-MCNC: 0.6 MG/DL (ref 0.1–1.2)
BILIRUB SERPL-MCNC: 0.6 MG/DL (ref 0.1–1.2)
BILIRUB SERPL-MCNC: 0.7 MG/DL (ref 0.1–1.2)
BILIRUB SERPL-MCNC: 1.5 MG/DL (ref 0.1–1.2)
BILIRUB UR QL STRIP: NEGATIVE
BLD GP AB SCN SERPL QL: NEGATIVE
BUN BLD-MCNC: 12 MG/DL (ref 8–23)
BUN BLD-MCNC: 19 MG/DL (ref 8–23)
BUN BLD-MCNC: 20 MG/DL (ref 8–23)
BUN BLD-MCNC: 21 MG/DL (ref 8–23)
BUN BLD-MCNC: 22 MG/DL (ref 8–23)
BUN BLD-MCNC: 23 MG/DL (ref 8–23)
BUN BLD-MCNC: 25 MG/DL (ref 8–23)
BUN BLD-MCNC: 25 MG/DL (ref 8–23)
BUN BLD-MCNC: 26 MG/DL (ref 8–23)
BUN BLD-MCNC: 27 MG/DL (ref 8–23)
BUN BLD-MCNC: 28 MG/DL (ref 8–23)
BUN BLD-MCNC: 28 MG/DL (ref 8–23)
BUN BLD-MCNC: 31 MG/DL (ref 8–23)
BUN BLD-MCNC: 31 MG/DL (ref 8–23)
BUN BLD-MCNC: 32 MG/DL (ref 8–23)
BUN BLD-MCNC: 33 MG/DL (ref 8–23)
BUN BLD-MCNC: 34 MG/DL (ref 8–23)
BUN BLD-MCNC: 34 MG/DL (ref 8–23)
BUN BLD-MCNC: 35 MG/DL (ref 8–23)
BUN BLD-MCNC: 35 MG/DL (ref 8–23)
BUN BLD-MCNC: 36 MG/DL (ref 8–23)
BUN BLD-MCNC: 36 MG/DL (ref 8–23)
BUN BLD-MCNC: 37 MG/DL (ref 8–23)
BUN BLD-MCNC: 37 MG/DL (ref 8–23)
BUN BLD-MCNC: 38 MG/DL (ref 8–23)
BUN BLD-MCNC: 39 MG/DL (ref 8–23)
BUN BLD-MCNC: 40 MG/DL (ref 8–23)
BUN BLD-MCNC: 40 MG/DL (ref 8–23)
BUN BLD-MCNC: 41 MG/DL (ref 8–23)
BUN BLD-MCNC: 45 MG/DL (ref 8–23)
BUN BLD-MCNC: 51 MG/DL (ref 8–23)
BUN BLD-MCNC: 54 MG/DL (ref 8–23)
BUN BLD-MCNC: 56 MG/DL (ref 8–23)
BUN BLD-MCNC: 58 MG/DL (ref 8–23)
BUN BLD-MCNC: 59 MG/DL (ref 8–23)
BUN/CREAT SERPL: 10.6 (ref 7–25)
BUN/CREAT SERPL: 11.9 (ref 7–25)
BUN/CREAT SERPL: 13.2 (ref 7–25)
BUN/CREAT SERPL: 13.7 (ref 7–25)
BUN/CREAT SERPL: 13.8 (ref 7–25)
BUN/CREAT SERPL: 15 (ref 7–25)
BUN/CREAT SERPL: 15.7 (ref 7–25)
BUN/CREAT SERPL: 15.8 (ref 7–25)
BUN/CREAT SERPL: 16.8 (ref 7–25)
BUN/CREAT SERPL: 16.8 (ref 7–25)
BUN/CREAT SERPL: 17 (ref 7–25)
BUN/CREAT SERPL: 17.5 (ref 7–25)
BUN/CREAT SERPL: 17.6 (ref 7–25)
BUN/CREAT SERPL: 18.5 (ref 7–25)
BUN/CREAT SERPL: 18.7 (ref 7–25)
BUN/CREAT SERPL: 18.8 (ref 7–25)
BUN/CREAT SERPL: 18.9 (ref 7–25)
BUN/CREAT SERPL: 19.1 (ref 7–25)
BUN/CREAT SERPL: 19.4 (ref 7–25)
BUN/CREAT SERPL: 19.8 (ref 7–25)
BUN/CREAT SERPL: 19.8 (ref 7–25)
BUN/CREAT SERPL: 20.1 (ref 7–25)
BUN/CREAT SERPL: 20.2 (ref 7–25)
BUN/CREAT SERPL: 20.3 (ref 7–25)
BUN/CREAT SERPL: 20.3 (ref 7–25)
BUN/CREAT SERPL: 20.9 (ref 7–25)
BUN/CREAT SERPL: 21.3 (ref 7–25)
BUN/CREAT SERPL: 21.4 (ref 7–25)
BUN/CREAT SERPL: 21.4 (ref 7–25)
BUN/CREAT SERPL: 22 (ref 7–25)
BUN/CREAT SERPL: 22.1 (ref 7–25)
BUN/CREAT SERPL: 23.3 (ref 7–25)
BUN/CREAT SERPL: 24.5 (ref 7–25)
BUN/CREAT SERPL: 24.8 (ref 7–25)
BUN/CREAT SERPL: 25 (ref 7–25)
BUN/CREAT SERPL: 25.6 (ref 7–25)
BUN/CREAT SERPL: 26.2 (ref 7–25)
BUN/CREAT SERPL: 28 (ref 7–25)
BUN/CREAT SERPL: 30.1 (ref 7–25)
BUN/CREAT SERPL: 41.4 (ref 7–25)
BUN/CREAT SERPL: 42.1 (ref 7–25)
BUN/CREAT SERPL: 42.5 (ref 7–25)
BUN/CREAT SERPL: 44 (ref 7–25)
BUN/CREAT SERPL: 8.9 (ref 7–25)
BUN/CREAT SERPL: 9.7 (ref 7–25)
BUN/CREAT SERPL: 9.9 (ref 7–25)
BURR CELLS BLD QL SMEAR: NORMAL
CA-I BLD-MCNC: 5.2 MG/DL (ref 4.6–5.4)
CA-I SERPL ISE-MCNC: 1.31 MMOL/L (ref 1.1–1.35)
CALCIUM SPEC-SCNC: 7.7 MG/DL (ref 8.6–10.5)
CALCIUM SPEC-SCNC: 7.8 MG/DL (ref 8.6–10.5)
CALCIUM SPEC-SCNC: 8.1 MG/DL (ref 8.6–10.5)
CALCIUM SPEC-SCNC: 8.1 MG/DL (ref 8.6–10.5)
CALCIUM SPEC-SCNC: 8.2 MG/DL (ref 8.6–10.5)
CALCIUM SPEC-SCNC: 8.2 MG/DL (ref 8.6–10.5)
CALCIUM SPEC-SCNC: 8.3 MG/DL (ref 8.6–10.5)
CALCIUM SPEC-SCNC: 8.4 MG/DL (ref 8.6–10.5)
CALCIUM SPEC-SCNC: 8.5 MG/DL (ref 8.6–10.5)
CALCIUM SPEC-SCNC: 8.6 MG/DL (ref 8.6–10.5)
CALCIUM SPEC-SCNC: 8.7 MG/DL (ref 8.6–10.5)
CALCIUM SPEC-SCNC: 8.7 MG/DL (ref 8.6–10.5)
CALCIUM SPEC-SCNC: 8.8 MG/DL (ref 8.6–10.5)
CALCIUM SPEC-SCNC: 8.9 MG/DL (ref 8.6–10.5)
CALCIUM SPEC-SCNC: 9 MG/DL (ref 8.6–10.5)
CALCIUM SPEC-SCNC: 9 MG/DL (ref 8.6–10.5)
CALCIUM SPEC-SCNC: 9.1 MG/DL (ref 8.6–10.5)
CALCIUM SPEC-SCNC: 9.2 MG/DL (ref 8.6–10.5)
CALCIUM SPEC-SCNC: 9.2 MG/DL (ref 8.6–10.5)
CALCIUM SPEC-SCNC: 9.3 MG/DL (ref 8.6–10.5)
CALCIUM SPEC-SCNC: 9.4 MG/DL (ref 8.6–10.5)
CALCIUM SPEC-SCNC: 9.5 MG/DL (ref 8.6–10.5)
CALCIUM SPEC-SCNC: 9.5 MG/DL (ref 8.6–10.5)
CALCIUM SPEC-SCNC: 9.9 MG/DL (ref 8.6–10.5)
CHLORIDE SERPL-SCNC: 100 MMOL/L (ref 98–107)
CHLORIDE SERPL-SCNC: 85 MMOL/L (ref 98–107)
CHLORIDE SERPL-SCNC: 85 MMOL/L (ref 98–107)
CHLORIDE SERPL-SCNC: 86 MMOL/L (ref 98–107)
CHLORIDE SERPL-SCNC: 87 MMOL/L (ref 98–107)
CHLORIDE SERPL-SCNC: 90 MMOL/L (ref 98–107)
CHLORIDE SERPL-SCNC: 91 MMOL/L (ref 98–107)
CHLORIDE SERPL-SCNC: 92 MMOL/L (ref 98–107)
CHLORIDE SERPL-SCNC: 93 MMOL/L (ref 98–107)
CHLORIDE SERPL-SCNC: 94 MMOL/L (ref 98–107)
CHLORIDE SERPL-SCNC: 95 MMOL/L (ref 98–107)
CHLORIDE SERPL-SCNC: 96 MMOL/L (ref 98–107)
CHLORIDE SERPL-SCNC: 97 MMOL/L (ref 98–107)
CHLORIDE SERPL-SCNC: 98 MMOL/L (ref 98–107)
CHLORIDE SERPL-SCNC: 99 MMOL/L (ref 98–107)
CHLORIDE UR-SCNC: 26 MMOL/L
CHLORIDE UR-SCNC: <20 MMOL/L
CHLORIDE UR-SCNC: <20 MMOL/L
CHOLEST SERPL-MCNC: 126 MG/DL (ref 0–200)
CHOLEST SERPL-MCNC: 162 MG/DL (ref 0–200)
CK SERPL-CCNC: 36 U/L (ref 20–180)
CLARITY UR: ABNORMAL
CLOSE TME COLL+ADP + EPINEP PNL BLD: 93 % (ref 86–100)
CLOSE TME COLL+ADP + EPINEP PNL BLD: 96 % (ref 86–100)
CO2 BLDA-SCNC: 28 MMOL/L (ref 24–29)
CO2 BLDA-SCNC: 30 MMOL/L (ref 24–29)
CO2 BLDA-SCNC: 31 MMOL/L (ref 24–29)
CO2 BLDA-SCNC: 32 MMOL/L (ref 24–29)
CO2 BLDA-SCNC: 32 MMOL/L (ref 24–29)
CO2 BLDA-SCNC: 33 MMOL/L (ref 24–29)
CO2 SERPL-SCNC: 18.9 MMOL/L (ref 22–29)
CO2 SERPL-SCNC: 18.9 MMOL/L (ref 22–29)
CO2 SERPL-SCNC: 19.8 MMOL/L (ref 22–29)
CO2 SERPL-SCNC: 20.4 MMOL/L (ref 22–29)
CO2 SERPL-SCNC: 20.6 MMOL/L (ref 22–29)
CO2 SERPL-SCNC: 20.9 MMOL/L (ref 22–29)
CO2 SERPL-SCNC: 21.2 MMOL/L (ref 22–29)
CO2 SERPL-SCNC: 21.3 MMOL/L (ref 22–29)
CO2 SERPL-SCNC: 21.6 MMOL/L (ref 22–29)
CO2 SERPL-SCNC: 21.6 MMOL/L (ref 22–29)
CO2 SERPL-SCNC: 21.7 MMOL/L (ref 22–29)
CO2 SERPL-SCNC: 21.9 MMOL/L (ref 22–29)
CO2 SERPL-SCNC: 22.6 MMOL/L (ref 22–29)
CO2 SERPL-SCNC: 22.8 MMOL/L (ref 22–29)
CO2 SERPL-SCNC: 22.8 MMOL/L (ref 22–29)
CO2 SERPL-SCNC: 22.9 MMOL/L (ref 22–29)
CO2 SERPL-SCNC: 23.6 MMOL/L (ref 22–29)
CO2 SERPL-SCNC: 23.9 MMOL/L (ref 22–29)
CO2 SERPL-SCNC: 23.9 MMOL/L (ref 22–29)
CO2 SERPL-SCNC: 24.4 MMOL/L (ref 22–29)
CO2 SERPL-SCNC: 24.6 MMOL/L (ref 22–29)
CO2 SERPL-SCNC: 24.6 MMOL/L (ref 22–29)
CO2 SERPL-SCNC: 24.7 MMOL/L (ref 22–29)
CO2 SERPL-SCNC: 24.8 MMOL/L (ref 22–29)
CO2 SERPL-SCNC: 24.8 MMOL/L (ref 22–29)
CO2 SERPL-SCNC: 25.2 MMOL/L (ref 22–29)
CO2 SERPL-SCNC: 25.4 MMOL/L (ref 22–29)
CO2 SERPL-SCNC: 25.4 MMOL/L (ref 22–29)
CO2 SERPL-SCNC: 25.5 MMOL/L (ref 22–29)
CO2 SERPL-SCNC: 25.8 MMOL/L (ref 22–29)
CO2 SERPL-SCNC: 25.8 MMOL/L (ref 22–29)
CO2 SERPL-SCNC: 25.9 MMOL/L (ref 22–29)
CO2 SERPL-SCNC: 25.9 MMOL/L (ref 22–29)
CO2 SERPL-SCNC: 26.1 MMOL/L (ref 22–29)
CO2 SERPL-SCNC: 26.4 MMOL/L (ref 22–29)
CO2 SERPL-SCNC: 26.7 MMOL/L (ref 22–29)
CO2 SERPL-SCNC: 26.9 MMOL/L (ref 22–29)
CO2 SERPL-SCNC: 27.1 MMOL/L (ref 22–29)
CO2 SERPL-SCNC: 27.3 MMOL/L (ref 22–29)
CO2 SERPL-SCNC: 27.4 MMOL/L (ref 22–29)
CO2 SERPL-SCNC: 29.3 MMOL/L (ref 22–29)
CO2 SERPL-SCNC: 32.4 MMOL/L (ref 22–29)
CO2 SERPL-SCNC: 32.6 MMOL/L (ref 22–29)
CO2 SERPL-SCNC: 34.5 MMOL/L (ref 22–29)
CO2 SERPL-SCNC: 36.3 MMOL/L (ref 22–29)
CO2 SERPL-SCNC: 37.1 MMOL/L (ref 22–29)
COLOR UR: ABNORMAL
COLOR UR: YELLOW
COLOR UR: YELLOW
CORTIS SERPL-MCNC: 15.82 MCG/DL
CREAT BLD-MCNC: 1.01 MG/DL (ref 0.57–1)
CREAT BLD-MCNC: 1.1 MG/DL (ref 0.57–1)
CREAT BLD-MCNC: 1.11 MG/DL (ref 0.57–1)
CREAT BLD-MCNC: 1.12 MG/DL (ref 0.57–1)
CREAT BLD-MCNC: 1.13 MG/DL (ref 0.57–1)
CREAT BLD-MCNC: 1.14 MG/DL (ref 0.57–1)
CREAT BLD-MCNC: 1.17 MG/DL (ref 0.57–1)
CREAT BLD-MCNC: 1.21 MG/DL (ref 0.57–1)
CREAT BLD-MCNC: 1.26 MG/DL (ref 0.57–1)
CREAT BLD-MCNC: 1.27 MG/DL (ref 0.57–1)
CREAT BLD-MCNC: 1.28 MG/DL (ref 0.57–1)
CREAT BLD-MCNC: 1.3 MG/DL (ref 0.57–1)
CREAT BLD-MCNC: 1.34 MG/DL (ref 0.57–1)
CREAT BLD-MCNC: 1.36 MG/DL (ref 0.57–1)
CREAT BLD-MCNC: 1.37 MG/DL (ref 0.57–1)
CREAT BLD-MCNC: 1.38 MG/DL (ref 0.57–1)
CREAT BLD-MCNC: 1.39 MG/DL (ref 0.57–1)
CREAT BLD-MCNC: 1.4 MG/DL (ref 0.57–1)
CREAT BLD-MCNC: 1.43 MG/DL (ref 0.57–1)
CREAT BLD-MCNC: 1.49 MG/DL (ref 0.57–1)
CREAT BLD-MCNC: 1.53 MG/DL (ref 0.57–1)
CREAT BLD-MCNC: 1.54 MG/DL (ref 0.57–1)
CREAT BLD-MCNC: 1.58 MG/DL (ref 0.57–1)
CREAT BLD-MCNC: 1.59 MG/DL (ref 0.57–1)
CREAT BLD-MCNC: 1.61 MG/DL (ref 0.57–1)
CREAT BLD-MCNC: 1.64 MG/DL (ref 0.57–1)
CREAT BLD-MCNC: 1.68 MG/DL (ref 0.57–1)
CREAT BLD-MCNC: 1.73 MG/DL (ref 0.57–1)
CREAT BLD-MCNC: 1.73 MG/DL (ref 0.57–1)
CREAT BLD-MCNC: 1.77 MG/DL (ref 0.57–1)
CREAT BLD-MCNC: 1.78 MG/DL (ref 0.57–1)
CREAT BLD-MCNC: 1.81 MG/DL (ref 0.57–1)
CREAT BLD-MCNC: 1.84 MG/DL (ref 0.57–1)
CREAT BLD-MCNC: 1.86 MG/DL (ref 0.57–1)
CREAT BLD-MCNC: 1.86 MG/DL (ref 0.57–1)
CREAT BLD-MCNC: 1.93 MG/DL (ref 0.57–1)
CREAT BLD-MCNC: 2.03 MG/DL (ref 0.57–1)
CREAT BLD-MCNC: 2.04 MG/DL (ref 0.57–1)
CREAT BLD-MCNC: 2.22 MG/DL (ref 0.57–1)
CREAT BLD-MCNC: 2.54 MG/DL (ref 0.57–1)
CREAT BLD-MCNC: 3.11 MG/DL (ref 0.57–1)
CREAT BLD-MCNC: 3.12 MG/DL (ref 0.57–1)
CREAT BLD-MCNC: 4.01 MG/DL (ref 0.57–1)
CREAT BLD-MCNC: 4.05 MG/DL (ref 0.57–1)
CREAT UR-MCNC: 119.7 MG/DL
CREAT UR-MCNC: 139 MG/DL
CREAT UR-MCNC: 176.5 MG/DL
CREAT UR-MCNC: 69.3 MG/DL
CRP SERPL-MCNC: 1.65 MG/DL (ref 0–0.5)
CYTO UR: NORMAL
CYTO UR: NORMAL
DEPRECATED RDW RBC AUTO: 47 FL (ref 37–54)
DEPRECATED RDW RBC AUTO: 48.2 FL (ref 37–54)
DEPRECATED RDW RBC AUTO: 48.4 FL (ref 37–54)
DEPRECATED RDW RBC AUTO: 50.3 FL (ref 37–54)
DEPRECATED RDW RBC AUTO: 52.4 FL (ref 37–54)
DEPRECATED RDW RBC AUTO: 53.8 FL (ref 37–54)
DEPRECATED RDW RBC AUTO: 53.9 FL (ref 37–54)
DEPRECATED RDW RBC AUTO: 55 FL (ref 37–54)
DEPRECATED RDW RBC AUTO: 55.2 FL (ref 37–54)
DEPRECATED RDW RBC AUTO: 57.2 FL (ref 37–54)
DEPRECATED RDW RBC AUTO: 58.3 FL (ref 37–54)
DEPRECATED RDW RBC AUTO: 59 FL (ref 37–54)
DEPRECATED RDW RBC AUTO: 59.3 FL (ref 37–54)
DEPRECATED RDW RBC AUTO: 59.5 FL (ref 37–54)
DEPRECATED RDW RBC AUTO: 59.6 FL (ref 37–54)
DEPRECATED RDW RBC AUTO: 60.2 FL (ref 37–54)
DEPRECATED RDW RBC AUTO: 60.2 FL (ref 37–54)
DEPRECATED RDW RBC AUTO: 60.3 FL (ref 37–54)
DEPRECATED RDW RBC AUTO: 60.4 FL (ref 37–54)
DEPRECATED RDW RBC AUTO: 60.9 FL (ref 37–54)
DEPRECATED RDW RBC AUTO: 60.9 FL (ref 37–54)
DEPRECATED RDW RBC AUTO: 61 FL (ref 37–54)
DEPRECATED RDW RBC AUTO: 61.1 FL (ref 37–54)
DEPRECATED RDW RBC AUTO: 61.1 FL (ref 37–54)
DEPRECATED RDW RBC AUTO: 62.1 FL (ref 37–54)
DEPRECATED RDW RBC AUTO: 62.3 FL (ref 37–54)
DEPRECATED RDW RBC AUTO: 62.6 FL (ref 37–54)
DEPRECATED RDW RBC AUTO: 62.7 FL (ref 37–54)
DEPRECATED RDW RBC AUTO: 63.4 FL (ref 37–54)
DEPRECATED RDW RBC AUTO: 63.4 FL (ref 37–54)
DEPRECATED RDW RBC AUTO: 63.5 FL (ref 37–54)
DEPRECATED RDW RBC AUTO: 63.8 FL (ref 37–54)
DEPRECATED RDW RBC AUTO: 63.8 FL (ref 37–54)
DEPRECATED RDW RBC AUTO: 63.9 FL (ref 37–54)
DEPRECATED RDW RBC AUTO: 64 FL (ref 37–54)
DEPRECATED RDW RBC AUTO: 64.3 FL (ref 37–54)
DEPRECATED RDW RBC AUTO: 64.4 FL (ref 37–54)
DEPRECATED RDW RBC AUTO: 64.4 FL (ref 37–54)
DEPRECATED RDW RBC AUTO: 64.7 FL (ref 37–54)
DEPRECATED RDW RBC AUTO: 64.9 FL (ref 37–54)
E/E' RATIO: 23
E/E' RATIO: 32
ELLIPTOCYTES BLD QL SMEAR: NORMAL
EOSINOPHIL # BLD AUTO: 0 10*3/MM3 (ref 0–0.7)
EOSINOPHIL # BLD AUTO: 0.06 10*3/MM3 (ref 0–0.7)
EOSINOPHIL # BLD AUTO: 0.13 10*3/MM3 (ref 0–0.7)
EOSINOPHIL # BLD AUTO: 0.13 10*3/MM3 (ref 0–0.7)
EOSINOPHIL # BLD AUTO: 0.18 10*3/MM3 (ref 0–0.7)
EOSINOPHIL # BLD AUTO: 0.18 10*3/MM3 (ref 0–0.7)
EOSINOPHIL # BLD AUTO: 0.21 10*3/MM3 (ref 0–0.7)
EOSINOPHIL # BLD AUTO: 0.24 10*3/MM3 (ref 0–0.7)
EOSINOPHIL # BLD AUTO: 0.26 10*3/MM3 (ref 0–0.7)
EOSINOPHIL # BLD AUTO: 0.29 10*3/MM3 (ref 0–0.7)
EOSINOPHIL # BLD AUTO: 0.29 10*3/MM3 (ref 0–0.7)
EOSINOPHIL # BLD AUTO: 0.3 10*3/MM3 (ref 0–0.7)
EOSINOPHIL # BLD AUTO: 0.31 10*3/MM3 (ref 0–0.7)
EOSINOPHIL # BLD AUTO: 0.32 10*3/MM3 (ref 0–0.7)
EOSINOPHIL # BLD AUTO: 0.33 10*3/MM3 (ref 0–0.7)
EOSINOPHIL # BLD AUTO: 0.34 10*3/MM3 (ref 0–0.7)
EOSINOPHIL # BLD AUTO: 0.34 10*3/MM3 (ref 0–0.7)
EOSINOPHIL # BLD AUTO: 0.35 10*3/MM3 (ref 0–0.7)
EOSINOPHIL # BLD AUTO: 0.35 10*3/MM3 (ref 0–0.7)
EOSINOPHIL # BLD AUTO: 0.37 10*3/MM3 (ref 0–0.7)
EOSINOPHIL # BLD AUTO: 0.38 10*3/MM3 (ref 0–0.7)
EOSINOPHIL # BLD AUTO: 0.43 10*3/MM3 (ref 0–0.7)
EOSINOPHIL # BLD AUTO: 0.45 10*3/MM3 (ref 0–0.7)
EOSINOPHIL # BLD AUTO: 0.45 10*3/MM3 (ref 0–0.7)
EOSINOPHIL # BLD AUTO: 0.51 10*3/MM3 (ref 0–0.7)
EOSINOPHIL # BLD AUTO: 0.56 10*3/MM3 (ref 0–0.7)
EOSINOPHIL # BLD AUTO: 0.61 10*3/MM3 (ref 0–0.7)
EOSINOPHIL NFR BLD AUTO: 0 % (ref 0.3–6.2)
EOSINOPHIL NFR BLD AUTO: 0.4 % (ref 0.3–6.2)
EOSINOPHIL NFR BLD AUTO: 0.6 % (ref 0.3–6.2)
EOSINOPHIL NFR BLD AUTO: 1 % (ref 0.3–6.2)
EOSINOPHIL NFR BLD AUTO: 1.2 % (ref 0.3–6.2)
EOSINOPHIL NFR BLD AUTO: 2 % (ref 0.3–6.2)
EOSINOPHIL NFR BLD AUTO: 2.3 % (ref 0.3–6.2)
EOSINOPHIL NFR BLD AUTO: 2.6 % (ref 0.3–6.2)
EOSINOPHIL NFR BLD AUTO: 2.7 % (ref 0.3–6.2)
EOSINOPHIL NFR BLD AUTO: 2.9 % (ref 0.3–6.2)
EOSINOPHIL NFR BLD AUTO: 3.1 % (ref 0.3–6.2)
EOSINOPHIL NFR BLD AUTO: 3.2 % (ref 0.3–6.2)
EOSINOPHIL NFR BLD AUTO: 3.3 % (ref 0.3–6.2)
EOSINOPHIL NFR BLD AUTO: 3.3 % (ref 0.3–6.2)
EOSINOPHIL NFR BLD AUTO: 3.4 % (ref 0.3–6.2)
EOSINOPHIL NFR BLD AUTO: 3.5 % (ref 0.3–6.2)
EOSINOPHIL NFR BLD AUTO: 3.8 % (ref 0.3–6.2)
EOSINOPHIL NFR BLD AUTO: 3.9 % (ref 0.3–6.2)
EOSINOPHIL NFR BLD AUTO: 4 % (ref 0.3–6.2)
EOSINOPHIL NFR BLD AUTO: 4 % (ref 0.3–6.2)
EOSINOPHIL NFR BLD AUTO: 4.1 % (ref 0.3–6.2)
EOSINOPHIL NFR BLD AUTO: 4.2 % (ref 0.3–6.2)
EOSINOPHIL NFR BLD AUTO: 4.4 % (ref 0.3–6.2)
EOSINOPHIL NFR BLD AUTO: 4.7 % (ref 0.3–6.2)
EOSINOPHIL NFR BLD AUTO: 4.7 % (ref 0.3–6.2)
EOSINOPHIL NFR BLD AUTO: 5 % (ref 0.3–6.2)
EOSINOPHIL NFR BLD AUTO: 5.1 % (ref 0.3–6.2)
EOSINOPHIL NFR BLD AUTO: 5.1 % (ref 0.3–6.2)
EOSINOPHIL NFR BLD AUTO: 5.3 % (ref 0.3–6.2)
ERYTHROCYTE [DISTWIDTH] IN BLOOD BY AUTOMATED COUNT: 15.2 % (ref 11.7–13)
ERYTHROCYTE [DISTWIDTH] IN BLOOD BY AUTOMATED COUNT: 16.4 % (ref 11.7–13)
ERYTHROCYTE [DISTWIDTH] IN BLOOD BY AUTOMATED COUNT: 16.7 % (ref 11.7–13)
ERYTHROCYTE [DISTWIDTH] IN BLOOD BY AUTOMATED COUNT: 17.1 % (ref 11.7–13)
ERYTHROCYTE [DISTWIDTH] IN BLOOD BY AUTOMATED COUNT: 17.7 % (ref 11.7–13)
ERYTHROCYTE [DISTWIDTH] IN BLOOD BY AUTOMATED COUNT: 18.2 % (ref 11.7–13)
ERYTHROCYTE [DISTWIDTH] IN BLOOD BY AUTOMATED COUNT: 18.6 % (ref 11.7–13)
ERYTHROCYTE [DISTWIDTH] IN BLOOD BY AUTOMATED COUNT: 19.2 % (ref 11.7–13)
ERYTHROCYTE [DISTWIDTH] IN BLOOD BY AUTOMATED COUNT: 19.3 % (ref 11.7–13)
ERYTHROCYTE [DISTWIDTH] IN BLOOD BY AUTOMATED COUNT: 19.6 % (ref 11.7–13)
ERYTHROCYTE [DISTWIDTH] IN BLOOD BY AUTOMATED COUNT: 19.7 % (ref 11.7–13)
ERYTHROCYTE [DISTWIDTH] IN BLOOD BY AUTOMATED COUNT: 19.9 % (ref 11.7–13)
ERYTHROCYTE [DISTWIDTH] IN BLOOD BY AUTOMATED COUNT: 20.5 % (ref 11.7–13)
ERYTHROCYTE [DISTWIDTH] IN BLOOD BY AUTOMATED COUNT: 20.6 % (ref 11.7–13)
ERYTHROCYTE [DISTWIDTH] IN BLOOD BY AUTOMATED COUNT: 20.7 % (ref 11.7–13)
ERYTHROCYTE [DISTWIDTH] IN BLOOD BY AUTOMATED COUNT: 20.8 % (ref 11.7–13)
ERYTHROCYTE [DISTWIDTH] IN BLOOD BY AUTOMATED COUNT: 20.9 % (ref 11.7–13)
ERYTHROCYTE [DISTWIDTH] IN BLOOD BY AUTOMATED COUNT: 21 % (ref 11.7–13)
ERYTHROCYTE [DISTWIDTH] IN BLOOD BY AUTOMATED COUNT: 21.1 % (ref 11.7–13)
ERYTHROCYTE [DISTWIDTH] IN BLOOD BY AUTOMATED COUNT: 21.1 % (ref 11.7–13)
ERYTHROCYTE [DISTWIDTH] IN BLOOD BY AUTOMATED COUNT: 21.2 % (ref 11.7–13)
ERYTHROCYTE [DISTWIDTH] IN BLOOD BY AUTOMATED COUNT: 21.3 % (ref 11.7–13)
ERYTHROCYTE [DISTWIDTH] IN BLOOD BY AUTOMATED COUNT: 21.4 % (ref 11.7–13)
ERYTHROCYTE [SEDIMENTATION RATE] IN BLOOD: 49 MM/HR (ref 0–30)
FERRITIN SERPL-MCNC: 302.5 NG/ML (ref 13–150)
FERRITIN SERPL-MCNC: 510.9 NG/ML (ref 13–150)
FERRITIN SERPL-MCNC: 53.89 NG/ML (ref 13–150)
GFR SERPL CREATININE-BSD FRML MDRD: 11 ML/MIN/1.73
GFR SERPL CREATININE-BSD FRML MDRD: 11 ML/MIN/1.73
GFR SERPL CREATININE-BSD FRML MDRD: 14 ML/MIN/1.73
GFR SERPL CREATININE-BSD FRML MDRD: 14 ML/MIN/1.73
GFR SERPL CREATININE-BSD FRML MDRD: 18 ML/MIN/1.73
GFR SERPL CREATININE-BSD FRML MDRD: 21 ML/MIN/1.73
GFR SERPL CREATININE-BSD FRML MDRD: 23 ML/MIN/1.73
GFR SERPL CREATININE-BSD FRML MDRD: 24 ML/MIN/1.73
GFR SERPL CREATININE-BSD FRML MDRD: 25 ML/MIN/1.73
GFR SERPL CREATININE-BSD FRML MDRD: 26 ML/MIN/1.73
GFR SERPL CREATININE-BSD FRML MDRD: 27 ML/MIN/1.73
GFR SERPL CREATININE-BSD FRML MDRD: 27 ML/MIN/1.73
GFR SERPL CREATININE-BSD FRML MDRD: 28 ML/MIN/1.73
GFR SERPL CREATININE-BSD FRML MDRD: 29 ML/MIN/1.73
GFR SERPL CREATININE-BSD FRML MDRD: 30 ML/MIN/1.73
GFR SERPL CREATININE-BSD FRML MDRD: 31 ML/MIN/1.73
GFR SERPL CREATININE-BSD FRML MDRD: 32 ML/MIN/1.73
GFR SERPL CREATININE-BSD FRML MDRD: 33 ML/MIN/1.73
GFR SERPL CREATININE-BSD FRML MDRD: 34 ML/MIN/1.73
GFR SERPL CREATININE-BSD FRML MDRD: 35 ML/MIN/1.73
GFR SERPL CREATININE-BSD FRML MDRD: 36 ML/MIN/1.73
GFR SERPL CREATININE-BSD FRML MDRD: 36 ML/MIN/1.73
GFR SERPL CREATININE-BSD FRML MDRD: 37 ML/MIN/1.73
GFR SERPL CREATININE-BSD FRML MDRD: 38 ML/MIN/1.73
GFR SERPL CREATININE-BSD FRML MDRD: 40 ML/MIN/1.73
GFR SERPL CREATININE-BSD FRML MDRD: 41 ML/MIN/1.73
GFR SERPL CREATININE-BSD FRML MDRD: 43 ML/MIN/1.73
GFR SERPL CREATININE-BSD FRML MDRD: 45 ML/MIN/1.73
GFR SERPL CREATININE-BSD FRML MDRD: 46 ML/MIN/1.73
GFR SERPL CREATININE-BSD FRML MDRD: 46 ML/MIN/1.73
GFR SERPL CREATININE-BSD FRML MDRD: 47 ML/MIN/1.73
GFR SERPL CREATININE-BSD FRML MDRD: 47 ML/MIN/1.73
GFR SERPL CREATININE-BSD FRML MDRD: 48 ML/MIN/1.73
GFR SERPL CREATININE-BSD FRML MDRD: 53 ML/MIN/1.73
GLOBULIN UR ELPH-MCNC: 2.2 GM/DL
GLOBULIN UR ELPH-MCNC: 2.3 GM/DL
GLOBULIN UR ELPH-MCNC: 2.5 GM/DL
GLOBULIN UR ELPH-MCNC: 2.6 GM/DL
GLOBULIN UR ELPH-MCNC: 2.7 GM/DL
GLOBULIN UR ELPH-MCNC: 2.7 GM/DL
GLOBULIN UR ELPH-MCNC: 2.8 GM/DL
GLOBULIN UR ELPH-MCNC: 3 GM/DL
GLOBULIN UR ELPH-MCNC: 3.1 GM/DL
GLOBULIN UR ELPH-MCNC: 3.3 GM/DL
GLOBULIN UR ELPH-MCNC: 3.5 GM/DL
GLOBULIN UR ELPH-MCNC: 3.5 GM/DL
GLOBULIN UR ELPH-MCNC: 3.6 GM/DL
GLUCOSE BLD-MCNC: 100 MG/DL (ref 65–99)
GLUCOSE BLD-MCNC: 101 MG/DL (ref 65–99)
GLUCOSE BLD-MCNC: 102 MG/DL (ref 65–99)
GLUCOSE BLD-MCNC: 103 MG/DL (ref 65–99)
GLUCOSE BLD-MCNC: 104 MG/DL (ref 65–99)
GLUCOSE BLD-MCNC: 105 MG/DL (ref 65–99)
GLUCOSE BLD-MCNC: 105 MG/DL (ref 65–99)
GLUCOSE BLD-MCNC: 107 MG/DL (ref 65–99)
GLUCOSE BLD-MCNC: 107 MG/DL (ref 65–99)
GLUCOSE BLD-MCNC: 108 MG/DL (ref 65–99)
GLUCOSE BLD-MCNC: 108 MG/DL (ref 65–99)
GLUCOSE BLD-MCNC: 109 MG/DL (ref 65–99)
GLUCOSE BLD-MCNC: 109 MG/DL (ref 65–99)
GLUCOSE BLD-MCNC: 111 MG/DL (ref 65–99)
GLUCOSE BLD-MCNC: 113 MG/DL (ref 65–99)
GLUCOSE BLD-MCNC: 114 MG/DL (ref 65–99)
GLUCOSE BLD-MCNC: 115 MG/DL (ref 65–99)
GLUCOSE BLD-MCNC: 117 MG/DL (ref 65–99)
GLUCOSE BLD-MCNC: 117 MG/DL (ref 65–99)
GLUCOSE BLD-MCNC: 119 MG/DL (ref 65–99)
GLUCOSE BLD-MCNC: 121 MG/DL (ref 65–99)
GLUCOSE BLD-MCNC: 123 MG/DL (ref 65–99)
GLUCOSE BLD-MCNC: 124 MG/DL (ref 65–99)
GLUCOSE BLD-MCNC: 125 MG/DL (ref 65–99)
GLUCOSE BLD-MCNC: 126 MG/DL (ref 65–99)
GLUCOSE BLD-MCNC: 126 MG/DL (ref 65–99)
GLUCOSE BLD-MCNC: 127 MG/DL (ref 65–99)
GLUCOSE BLD-MCNC: 127 MG/DL (ref 65–99)
GLUCOSE BLD-MCNC: 128 MG/DL (ref 65–99)
GLUCOSE BLD-MCNC: 128 MG/DL (ref 65–99)
GLUCOSE BLD-MCNC: 132 MG/DL (ref 65–99)
GLUCOSE BLD-MCNC: 255 MG/DL (ref 65–99)
GLUCOSE BLD-MCNC: 277 MG/DL (ref 65–99)
GLUCOSE BLD-MCNC: 91 MG/DL (ref 65–99)
GLUCOSE BLD-MCNC: 92 MG/DL (ref 65–99)
GLUCOSE BLD-MCNC: 96 MG/DL (ref 65–99)
GLUCOSE BLD-MCNC: 99 MG/DL (ref 65–99)
GLUCOSE BLD-MCNC: 99 MG/DL (ref 65–99)
GLUCOSE BLDC GLUCOMTR-MCNC: 104 MG/DL (ref 70–130)
GLUCOSE BLDC GLUCOMTR-MCNC: 104 MG/DL (ref 70–130)
GLUCOSE BLDC GLUCOMTR-MCNC: 108 MG/DL (ref 70–130)
GLUCOSE BLDC GLUCOMTR-MCNC: 112 MG/DL (ref 70–130)
GLUCOSE BLDC GLUCOMTR-MCNC: 112 MG/DL (ref 70–130)
GLUCOSE BLDC GLUCOMTR-MCNC: 114 MG/DL (ref 70–130)
GLUCOSE BLDC GLUCOMTR-MCNC: 115 MG/DL (ref 70–130)
GLUCOSE BLDC GLUCOMTR-MCNC: 117 MG/DL (ref 70–130)
GLUCOSE BLDC GLUCOMTR-MCNC: 117 MG/DL (ref 70–130)
GLUCOSE BLDC GLUCOMTR-MCNC: 119 MG/DL (ref 70–130)
GLUCOSE BLDC GLUCOMTR-MCNC: 120 MG/DL (ref 70–130)
GLUCOSE BLDC GLUCOMTR-MCNC: 121 MG/DL (ref 70–130)
GLUCOSE BLDC GLUCOMTR-MCNC: 121 MG/DL (ref 70–130)
GLUCOSE BLDC GLUCOMTR-MCNC: 122 MG/DL (ref 70–130)
GLUCOSE BLDC GLUCOMTR-MCNC: 123 MG/DL (ref 70–130)
GLUCOSE BLDC GLUCOMTR-MCNC: 124 MG/DL (ref 70–130)
GLUCOSE BLDC GLUCOMTR-MCNC: 125 MG/DL (ref 70–130)
GLUCOSE BLDC GLUCOMTR-MCNC: 126 MG/DL (ref 70–130)
GLUCOSE BLDC GLUCOMTR-MCNC: 126 MG/DL (ref 70–130)
GLUCOSE BLDC GLUCOMTR-MCNC: 127 MG/DL (ref 70–130)
GLUCOSE BLDC GLUCOMTR-MCNC: 128 MG/DL (ref 70–130)
GLUCOSE BLDC GLUCOMTR-MCNC: 128 MG/DL (ref 70–130)
GLUCOSE BLDC GLUCOMTR-MCNC: 129 MG/DL (ref 70–130)
GLUCOSE BLDC GLUCOMTR-MCNC: 130 MG/DL (ref 70–130)
GLUCOSE BLDC GLUCOMTR-MCNC: 132 MG/DL (ref 70–130)
GLUCOSE BLDC GLUCOMTR-MCNC: 132 MG/DL (ref 70–130)
GLUCOSE BLDC GLUCOMTR-MCNC: 135 MG/DL (ref 70–130)
GLUCOSE BLDC GLUCOMTR-MCNC: 137 MG/DL (ref 70–130)
GLUCOSE BLDC GLUCOMTR-MCNC: 139 MG/DL (ref 70–130)
GLUCOSE BLDC GLUCOMTR-MCNC: 139 MG/DL (ref 70–130)
GLUCOSE BLDC GLUCOMTR-MCNC: 140 MG/DL (ref 70–130)
GLUCOSE BLDC GLUCOMTR-MCNC: 141 MG/DL (ref 70–130)
GLUCOSE BLDC GLUCOMTR-MCNC: 144 MG/DL (ref 70–130)
GLUCOSE BLDC GLUCOMTR-MCNC: 145 MG/DL (ref 70–130)
GLUCOSE BLDC GLUCOMTR-MCNC: 157 MG/DL (ref 70–130)
GLUCOSE BLDC GLUCOMTR-MCNC: 157 MG/DL (ref 70–130)
GLUCOSE BLDC GLUCOMTR-MCNC: 162 MG/DL (ref 70–130)
GLUCOSE BLDC GLUCOMTR-MCNC: 165 MG/DL (ref 70–130)
GLUCOSE BLDC GLUCOMTR-MCNC: 166 MG/DL (ref 70–130)
GLUCOSE BLDC GLUCOMTR-MCNC: 167 MG/DL (ref 70–130)
GLUCOSE BLDC GLUCOMTR-MCNC: 182 MG/DL (ref 70–130)
GLUCOSE BLDC GLUCOMTR-MCNC: 188 MG/DL (ref 70–130)
GLUCOSE BLDC GLUCOMTR-MCNC: 195 MG/DL (ref 70–130)
GLUCOSE BLDC GLUCOMTR-MCNC: 218 MG/DL (ref 70–130)
GLUCOSE BLDC GLUCOMTR-MCNC: 219 MG/DL (ref 70–130)
GLUCOSE BLDC GLUCOMTR-MCNC: 252 MG/DL (ref 70–130)
GLUCOSE BLDC GLUCOMTR-MCNC: 271 MG/DL (ref 70–130)
GLUCOSE BLDC GLUCOMTR-MCNC: 281 MG/DL (ref 70–130)
GLUCOSE BLDC GLUCOMTR-MCNC: 73 MG/DL (ref 70–130)
GLUCOSE UR STRIP-MCNC: NEGATIVE MG/DL
HBA1C MFR BLD: 5.69 % (ref 4.8–5.6)
HCO3 BLDA-SCNC: 18.8 MMOL/L (ref 22–28)
HCO3 BLDA-SCNC: 22.3 MMOL/L (ref 22–28)
HCO3 BLDA-SCNC: 22.9 MMOL/L (ref 22–28)
HCO3 BLDA-SCNC: 27.2 MMOL/L (ref 22–26)
HCO3 BLDA-SCNC: 28.1 MMOL/L (ref 22–28)
HCO3 BLDA-SCNC: 28.7 MMOL/L (ref 22–26)
HCO3 BLDA-SCNC: 29.2 MMOL/L (ref 22–26)
HCO3 BLDA-SCNC: 30.3 MMOL/L (ref 22–26)
HCO3 BLDA-SCNC: 30.4 MMOL/L (ref 22–26)
HCO3 BLDA-SCNC: 31.4 MMOL/L (ref 22–26)
HCT VFR BLD AUTO: 14.7 % (ref 35.6–45.5)
HCT VFR BLD AUTO: 22.3 % (ref 35.6–45.5)
HCT VFR BLD AUTO: 22.4 % (ref 35.6–45.5)
HCT VFR BLD AUTO: 22.5 % (ref 35.6–45.5)
HCT VFR BLD AUTO: 23 % (ref 35.6–45.5)
HCT VFR BLD AUTO: 23.3 % (ref 35.6–45.5)
HCT VFR BLD AUTO: 23.4 % (ref 35.6–45.5)
HCT VFR BLD AUTO: 24.8 % (ref 35.6–45.5)
HCT VFR BLD AUTO: 24.8 % (ref 35.6–45.5)
HCT VFR BLD AUTO: 24.9 % (ref 35.6–45.5)
HCT VFR BLD AUTO: 26 % (ref 35.6–45.5)
HCT VFR BLD AUTO: 26.2 % (ref 35.6–45.5)
HCT VFR BLD AUTO: 26.6 % (ref 35.6–45.5)
HCT VFR BLD AUTO: 26.9 % (ref 35.6–45.5)
HCT VFR BLD AUTO: 27.1 % (ref 35.6–45.5)
HCT VFR BLD AUTO: 27.8 % (ref 35.6–45.5)
HCT VFR BLD AUTO: 27.8 % (ref 35.6–45.5)
HCT VFR BLD AUTO: 28.5 % (ref 35.6–45.5)
HCT VFR BLD AUTO: 28.7 % (ref 35.6–45.5)
HCT VFR BLD AUTO: 28.8 % (ref 35.6–45.5)
HCT VFR BLD AUTO: 29.1 % (ref 35.6–45.5)
HCT VFR BLD AUTO: 29.1 % (ref 35.6–45.5)
HCT VFR BLD AUTO: 29.4 % (ref 35.6–45.5)
HCT VFR BLD AUTO: 29.5 % (ref 35.6–45.5)
HCT VFR BLD AUTO: 29.5 % (ref 35.6–45.5)
HCT VFR BLD AUTO: 29.6 % (ref 35.6–45.5)
HCT VFR BLD AUTO: 29.6 % (ref 35.6–45.5)
HCT VFR BLD AUTO: 29.8 % (ref 35.6–45.5)
HCT VFR BLD AUTO: 30 % (ref 35.6–45.5)
HCT VFR BLD AUTO: 30.2 % (ref 35.6–45.5)
HCT VFR BLD AUTO: 30.4 % (ref 35.6–45.5)
HCT VFR BLD AUTO: 30.4 % (ref 35.6–45.5)
HCT VFR BLD AUTO: 30.6 % (ref 35.6–45.5)
HCT VFR BLD AUTO: 31.1 % (ref 35.6–45.5)
HCT VFR BLD AUTO: 31.4 % (ref 35.6–45.5)
HCT VFR BLD AUTO: 31.8 % (ref 35.6–45.5)
HCT VFR BLD AUTO: 32.1 % (ref 35.6–45.5)
HCT VFR BLD AUTO: 32.6 % (ref 35.6–45.5)
HCT VFR BLD AUTO: 32.7 % (ref 35.6–45.5)
HCT VFR BLD AUTO: 33.2 % (ref 35.6–45.5)
HCT VFR BLD AUTO: 37.1 % (ref 35.6–45.5)
HCT VFR BLDA CALC: 20 % (ref 38–51)
HCT VFR BLDA CALC: 20 % (ref 38–51)
HCT VFR BLDA CALC: 22 % (ref 38–51)
HCT VFR BLDA CALC: 22 % (ref 38–51)
HCT VFR BLDA CALC: 23 % (ref 38–51)
HCT VFR BLDA CALC: 26 % (ref 38–51)
HCT VFR BLDA CALC: 30 % (ref 38–51)
HDLC SERPL-MCNC: 42 MG/DL (ref 40–60)
HDLC SERPL-MCNC: 50 MG/DL (ref 40–60)
HEMOCCULT STL QL: POSITIVE
HGB BLD-MCNC: 10 G/DL (ref 11.9–15.5)
HGB BLD-MCNC: 10 G/DL (ref 11.9–15.5)
HGB BLD-MCNC: 10.1 G/DL (ref 11.9–15.5)
HGB BLD-MCNC: 10.4 G/DL (ref 11.9–15.5)
HGB BLD-MCNC: 10.4 G/DL (ref 11.9–15.5)
HGB BLD-MCNC: 11.9 G/DL (ref 11.9–15.5)
HGB BLD-MCNC: 4.4 G/DL (ref 11.9–15.5)
HGB BLD-MCNC: 7 G/DL (ref 11.9–15.5)
HGB BLD-MCNC: 7 G/DL (ref 11.9–15.5)
HGB BLD-MCNC: 7.3 G/DL (ref 11.9–15.5)
HGB BLD-MCNC: 7.4 G/DL (ref 11.9–15.5)
HGB BLD-MCNC: 7.4 G/DL (ref 11.9–15.5)
HGB BLD-MCNC: 7.5 G/DL (ref 11.9–15.5)
HGB BLD-MCNC: 7.6 G/DL (ref 11.9–15.5)
HGB BLD-MCNC: 7.8 G/DL (ref 11.9–15.5)
HGB BLD-MCNC: 8 G/DL (ref 11.9–15.5)
HGB BLD-MCNC: 8.2 G/DL (ref 11.9–15.5)
HGB BLD-MCNC: 8.3 G/DL (ref 11.9–15.5)
HGB BLD-MCNC: 8.4 G/DL (ref 11.9–15.5)
HGB BLD-MCNC: 8.4 G/DL (ref 11.9–15.5)
HGB BLD-MCNC: 8.6 G/DL (ref 11.9–15.5)
HGB BLD-MCNC: 8.7 G/DL (ref 11.9–15.5)
HGB BLD-MCNC: 8.9 G/DL (ref 11.9–15.5)
HGB BLD-MCNC: 8.9 G/DL (ref 11.9–15.5)
HGB BLD-MCNC: 9 G/DL (ref 11.9–15.5)
HGB BLD-MCNC: 9 G/DL (ref 11.9–15.5)
HGB BLD-MCNC: 9.1 G/DL (ref 11.9–15.5)
HGB BLD-MCNC: 9.2 G/DL (ref 11.9–15.5)
HGB BLD-MCNC: 9.3 G/DL (ref 11.9–15.5)
HGB BLD-MCNC: 9.4 G/DL (ref 11.9–15.5)
HGB BLD-MCNC: 9.5 G/DL (ref 11.9–15.5)
HGB BLD-MCNC: 9.6 G/DL (ref 11.9–15.5)
HGB BLD-MCNC: 9.8 G/DL (ref 11.9–15.5)
HGB BLD-MCNC: 9.8 G/DL (ref 11.9–15.5)
HGB BLDA-MCNC: 10.2 G/DL (ref 12–17)
HGB BLDA-MCNC: 6.8 G/DL (ref 12–17)
HGB BLDA-MCNC: 6.8 G/DL (ref 12–17)
HGB BLDA-MCNC: 7.5 G/DL (ref 12–17)
HGB BLDA-MCNC: 7.5 G/DL (ref 12–17)
HGB BLDA-MCNC: 7.8 G/DL (ref 12–17)
HGB BLDA-MCNC: 8.8 G/DL (ref 12–17)
HGB UR QL STRIP.AUTO: ABNORMAL
HGB UR QL STRIP.AUTO: NEGATIVE
HGB UR QL STRIP.AUTO: NEGATIVE
HOLD SPECIMEN: NORMAL
HOROWITZ INDEX BLD+IHG-RTO: 100 %
HOROWITZ INDEX BLD+IHG-RTO: 40 %
HOROWITZ INDEX BLD+IHG-RTO: 40 %
HYALINE CASTS UR QL AUTO: ABNORMAL /LPF
HYPOCHROMIA BLD QL: NORMAL
IMM GRANULOCYTES # BLD: 0.02 10*3/MM3 (ref 0–0.03)
IMM GRANULOCYTES # BLD: 0.03 10*3/MM3 (ref 0–0.03)
IMM GRANULOCYTES # BLD: 0.04 10*3/MM3 (ref 0–0.03)
IMM GRANULOCYTES # BLD: 0.05 10*3/MM3 (ref 0–0.03)
IMM GRANULOCYTES # BLD: 0.05 10*3/MM3 (ref 0–0.03)
IMM GRANULOCYTES # BLD: 0.06 10*3/MM3 (ref 0–0.03)
IMM GRANULOCYTES # BLD: 0.06 10*3/MM3 (ref 0–0.03)
IMM GRANULOCYTES # BLD: 0.07 10*3/MM3 (ref 0–0.03)
IMM GRANULOCYTES # BLD: 0.08 10*3/MM3 (ref 0–0.03)
IMM GRANULOCYTES # BLD: 0.08 10*3/MM3 (ref 0–0.03)
IMM GRANULOCYTES # BLD: 0.16 10*3/MM3 (ref 0–0.03)
IMM GRANULOCYTES # BLD: 0.16 10*3/MM3 (ref 0–0.03)
IMM GRANULOCYTES # BLD: 0.21 10*3/MM3 (ref 0–0.03)
IMM GRANULOCYTES # BLD: 0.32 10*3/MM3 (ref 0–0.03)
IMM GRANULOCYTES # BLD: 0.41 10*3/MM3 (ref 0–0.03)
IMM GRANULOCYTES NFR BLD: 0.2 % (ref 0–0.5)
IMM GRANULOCYTES NFR BLD: 0.3 % (ref 0–0.5)
IMM GRANULOCYTES NFR BLD: 0.4 % (ref 0–0.5)
IMM GRANULOCYTES NFR BLD: 0.5 % (ref 0–0.5)
IMM GRANULOCYTES NFR BLD: 0.7 % (ref 0–0.5)
IMM GRANULOCYTES NFR BLD: 0.7 % (ref 0–0.5)
IMM GRANULOCYTES NFR BLD: 0.8 % (ref 0–0.5)
IMM GRANULOCYTES NFR BLD: 1.1 % (ref 0–0.5)
IMM GRANULOCYTES NFR BLD: 1.1 % (ref 0–0.5)
IMM GRANULOCYTES NFR BLD: 1.6 % (ref 0–0.5)
IMM GRANULOCYTES NFR BLD: 2.1 % (ref 0–0.5)
IMM GRANULOCYTES NFR BLD: 3.6 % (ref 0–0.5)
INR PPP: 1.11 (ref 0.9–1.1)
INR PPP: 1.16 (ref 0.9–1.1)
INR PPP: 1.18 (ref 0.9–1.1)
INR PPP: 1.2 (ref 0.9–1.1)
INR PPP: 1.21 (ref 0.9–1.1)
INR PPP: 1.23 (ref 0.9–1.1)
INR PPP: 1.24 (ref 0.9–1.1)
INR PPP: 1.37 (ref 0.9–1.1)
INR PPP: 1.38 (ref 0.9–1.1)
INR PPP: 1.39 (ref 0.9–1.1)
INR PPP: 1.63 (ref 0.9–1.1)
IRON 24H UR-MRATE: 19 MCG/DL (ref 37–145)
IRON 24H UR-MRATE: 29 MCG/DL (ref 37–145)
IRON 24H UR-MRATE: 40 MCG/DL (ref 37–145)
IRON 24H UR-MRATE: 56 MCG/DL (ref 37–145)
IRON SATN MFR SERPL: 12 % (ref 20–50)
IRON SATN MFR SERPL: 13 % (ref 20–50)
IRON SATN MFR SERPL: 4 % (ref 20–50)
KETONES UR QL STRIP: NEGATIVE
LAB AP CASE REPORT: NORMAL
LAB AP CASE REPORT: NORMAL
LDLC SERPL CALC-MCNC: 62 MG/DL (ref 0–100)
LDLC SERPL CALC-MCNC: 90 MG/DL (ref 0–100)
LDLC/HDLC SERPL: 1.48 {RATIO}
LDLC/HDLC SERPL: 1.8 {RATIO}
LEFT ARM BP: NORMAL MMHG
LEFT ATRIUM VOLUME INDEX: 22 ML/M2
LEFT ATRIUM VOLUME INDEX: 32 ML/M2
LEUKOCYTE ESTERASE UR QL STRIP.AUTO: ABNORMAL
LV EF 2D ECHO EST: 25 %
LYMPHOCYTES # BLD AUTO: 0.43 10*3/MM3 (ref 0.9–4.8)
LYMPHOCYTES # BLD AUTO: 0.65 10*3/MM3 (ref 0.9–4.8)
LYMPHOCYTES # BLD AUTO: 0.66 10*3/MM3 (ref 0.9–4.8)
LYMPHOCYTES # BLD AUTO: 0.7 10*3/MM3 (ref 0.9–4.8)
LYMPHOCYTES # BLD AUTO: 0.71 10*3/MM3 (ref 0.9–4.8)
LYMPHOCYTES # BLD AUTO: 0.72 10*3/MM3 (ref 0.9–4.8)
LYMPHOCYTES # BLD AUTO: 0.73 10*3/MM3 (ref 0.9–4.8)
LYMPHOCYTES # BLD AUTO: 0.73 10*3/MM3 (ref 0.9–4.8)
LYMPHOCYTES # BLD AUTO: 0.74 10*3/MM3 (ref 0.9–4.8)
LYMPHOCYTES # BLD AUTO: 0.77 10*3/MM3 (ref 0.9–4.8)
LYMPHOCYTES # BLD AUTO: 0.81 10*3/MM3 (ref 0.9–4.8)
LYMPHOCYTES # BLD AUTO: 0.82 10*3/MM3 (ref 0.9–4.8)
LYMPHOCYTES # BLD AUTO: 0.84 10*3/MM3 (ref 0.9–4.8)
LYMPHOCYTES # BLD AUTO: 0.87 10*3/MM3 (ref 0.9–4.8)
LYMPHOCYTES # BLD AUTO: 0.89 10*3/MM3 (ref 0.9–4.8)
LYMPHOCYTES # BLD AUTO: 0.91 10*3/MM3 (ref 0.9–4.8)
LYMPHOCYTES # BLD AUTO: 0.93 10*3/MM3 (ref 0.9–4.8)
LYMPHOCYTES # BLD AUTO: 0.96 10*3/MM3 (ref 0.9–4.8)
LYMPHOCYTES # BLD AUTO: 1 10*3/MM3 (ref 0.9–4.8)
LYMPHOCYTES # BLD AUTO: 1.03 10*3/MM3 (ref 0.9–4.8)
LYMPHOCYTES # BLD AUTO: 1.1 10*3/MM3 (ref 0.9–4.8)
LYMPHOCYTES # BLD AUTO: 1.12 10*3/MM3 (ref 0.9–4.8)
LYMPHOCYTES # BLD AUTO: 1.16 10*3/MM3 (ref 0.9–4.8)
LYMPHOCYTES # BLD AUTO: 1.18 10*3/MM3 (ref 0.9–4.8)
LYMPHOCYTES # BLD AUTO: 1.2 10*3/MM3 (ref 0.9–4.8)
LYMPHOCYTES # BLD AUTO: 1.21 10*3/MM3 (ref 0.9–4.8)
LYMPHOCYTES # BLD AUTO: 1.38 10*3/MM3 (ref 0.9–4.8)
LYMPHOCYTES NFR BLD AUTO: 10.7 % (ref 19.6–45.3)
LYMPHOCYTES NFR BLD AUTO: 10.7 % (ref 19.6–45.3)
LYMPHOCYTES NFR BLD AUTO: 10.9 % (ref 19.6–45.3)
LYMPHOCYTES NFR BLD AUTO: 11.1 % (ref 19.6–45.3)
LYMPHOCYTES NFR BLD AUTO: 11.1 % (ref 19.6–45.3)
LYMPHOCYTES NFR BLD AUTO: 11.2 % (ref 19.6–45.3)
LYMPHOCYTES NFR BLD AUTO: 11.5 % (ref 19.6–45.3)
LYMPHOCYTES NFR BLD AUTO: 12.4 % (ref 19.6–45.3)
LYMPHOCYTES NFR BLD AUTO: 12.8 % (ref 19.6–45.3)
LYMPHOCYTES NFR BLD AUTO: 14.4 % (ref 19.6–45.3)
LYMPHOCYTES NFR BLD AUTO: 19.3 % (ref 19.6–45.3)
LYMPHOCYTES NFR BLD AUTO: 3 % (ref 19.6–45.3)
LYMPHOCYTES NFR BLD AUTO: 3.3 % (ref 19.6–45.3)
LYMPHOCYTES NFR BLD AUTO: 5.4 % (ref 19.6–45.3)
LYMPHOCYTES NFR BLD AUTO: 6.4 % (ref 19.6–45.3)
LYMPHOCYTES NFR BLD AUTO: 6.7 % (ref 19.6–45.3)
LYMPHOCYTES NFR BLD AUTO: 6.8 % (ref 19.6–45.3)
LYMPHOCYTES NFR BLD AUTO: 7.2 % (ref 19.6–45.3)
LYMPHOCYTES NFR BLD AUTO: 7.7 % (ref 19.6–45.3)
LYMPHOCYTES NFR BLD AUTO: 7.7 % (ref 19.6–45.3)
LYMPHOCYTES NFR BLD AUTO: 7.9 % (ref 19.6–45.3)
LYMPHOCYTES NFR BLD AUTO: 8 % (ref 19.6–45.3)
LYMPHOCYTES NFR BLD AUTO: 8.1 % (ref 19.6–45.3)
LYMPHOCYTES NFR BLD AUTO: 8.3 % (ref 19.6–45.3)
LYMPHOCYTES NFR BLD AUTO: 8.3 % (ref 19.6–45.3)
LYMPHOCYTES NFR BLD AUTO: 8.7 % (ref 19.6–45.3)
LYMPHOCYTES NFR BLD AUTO: 9.6 % (ref 19.6–45.3)
Lab: NORMAL
Lab: NORMAL
MAGNESIUM SERPL-MCNC: 1.4 MG/DL (ref 1.6–2.4)
MAGNESIUM SERPL-MCNC: 1.6 MG/DL (ref 1.6–2.4)
MAGNESIUM SERPL-MCNC: 1.6 MG/DL (ref 1.6–2.4)
MAGNESIUM SERPL-MCNC: 1.7 MG/DL (ref 1.6–2.4)
MAGNESIUM SERPL-MCNC: 1.7 MG/DL (ref 1.6–2.4)
MAGNESIUM SERPL-MCNC: 1.8 MG/DL (ref 1.6–2.4)
MAGNESIUM SERPL-MCNC: 1.8 MG/DL (ref 1.6–2.4)
MAGNESIUM SERPL-MCNC: 1.9 MG/DL (ref 1.6–2.4)
MAGNESIUM SERPL-MCNC: 2 MG/DL (ref 1.6–2.4)
MAGNESIUM SERPL-MCNC: 2.3 MG/DL (ref 1.6–2.4)
MAGNESIUM SERPL-MCNC: 2.5 MG/DL (ref 1.6–2.4)
MAXIMAL PREDICTED HEART RATE: 140 BPM
MCH RBC QN AUTO: 23.3 PG (ref 26.9–32)
MCH RBC QN AUTO: 25.1 PG (ref 26.9–32)
MCH RBC QN AUTO: 25.5 PG (ref 26.9–32)
MCH RBC QN AUTO: 25.6 PG (ref 26.9–32)
MCH RBC QN AUTO: 25.8 PG (ref 26.9–32)
MCH RBC QN AUTO: 25.9 PG (ref 26.9–32)
MCH RBC QN AUTO: 26 PG (ref 26.9–32)
MCH RBC QN AUTO: 26 PG (ref 26.9–32)
MCH RBC QN AUTO: 26.2 PG (ref 26.9–32)
MCH RBC QN AUTO: 26.3 PG (ref 26.9–32)
MCH RBC QN AUTO: 26.4 PG (ref 26.9–32)
MCH RBC QN AUTO: 26.4 PG (ref 26.9–32)
MCH RBC QN AUTO: 26.5 PG (ref 26.9–32)
MCH RBC QN AUTO: 26.7 PG (ref 26.9–32)
MCH RBC QN AUTO: 26.7 PG (ref 26.9–32)
MCH RBC QN AUTO: 26.8 PG (ref 26.9–32)
MCH RBC QN AUTO: 26.9 PG (ref 26.9–32)
MCH RBC QN AUTO: 27 PG (ref 26.9–32)
MCH RBC QN AUTO: 27 PG (ref 26.9–32)
MCH RBC QN AUTO: 27.2 PG (ref 26.9–32)
MCH RBC QN AUTO: 27.6 PG (ref 26.9–32)
MCH RBC QN AUTO: 27.6 PG (ref 26.9–32)
MCH RBC QN AUTO: 28.4 PG (ref 26.9–32)
MCH RBC QN AUTO: 28.5 PG (ref 26.9–32)
MCH RBC QN AUTO: 28.6 PG (ref 26.9–32)
MCH RBC QN AUTO: 30.9 PG (ref 26.9–32)
MCH RBC QN AUTO: 30.9 PG (ref 26.9–32)
MCH RBC QN AUTO: 31.6 PG (ref 26.9–32)
MCHC RBC AUTO-ENTMCNC: 29.9 G/DL (ref 32.4–36.3)
MCHC RBC AUTO-ENTMCNC: 30 G/DL (ref 32.4–36.3)
MCHC RBC AUTO-ENTMCNC: 30.1 G/DL (ref 32.4–36.3)
MCHC RBC AUTO-ENTMCNC: 30.2 G/DL (ref 32.4–36.3)
MCHC RBC AUTO-ENTMCNC: 30.5 G/DL (ref 32.4–36.3)
MCHC RBC AUTO-ENTMCNC: 30.6 G/DL (ref 32.4–36.3)
MCHC RBC AUTO-ENTMCNC: 30.6 G/DL (ref 32.4–36.3)
MCHC RBC AUTO-ENTMCNC: 31 G/DL (ref 32.4–36.3)
MCHC RBC AUTO-ENTMCNC: 31 G/DL (ref 32.4–36.3)
MCHC RBC AUTO-ENTMCNC: 31.3 G/DL (ref 32.4–36.3)
MCHC RBC AUTO-ENTMCNC: 31.4 G/DL (ref 32.4–36.3)
MCHC RBC AUTO-ENTMCNC: 31.4 G/DL (ref 32.4–36.3)
MCHC RBC AUTO-ENTMCNC: 31.5 G/DL (ref 32.4–36.3)
MCHC RBC AUTO-ENTMCNC: 31.6 G/DL (ref 32.4–36.3)
MCHC RBC AUTO-ENTMCNC: 31.7 G/DL (ref 32.4–36.3)
MCHC RBC AUTO-ENTMCNC: 31.8 G/DL (ref 32.4–36.3)
MCHC RBC AUTO-ENTMCNC: 31.9 G/DL (ref 32.4–36.3)
MCHC RBC AUTO-ENTMCNC: 32 G/DL (ref 32.4–36.3)
MCHC RBC AUTO-ENTMCNC: 32.1 G/DL (ref 32.4–36.3)
MCHC RBC AUTO-ENTMCNC: 32.2 G/DL (ref 32.4–36.3)
MCHC RBC AUTO-ENTMCNC: 33 G/DL (ref 32.4–36.3)
MCHC RBC AUTO-ENTMCNC: 33.1 G/DL (ref 32.4–36.3)
MCHC RBC AUTO-ENTMCNC: 33.9 G/DL (ref 32.4–36.3)
MCV RBC AUTO: 100.8 FL (ref 80.5–98.2)
MCV RBC AUTO: 101.9 FL (ref 80.5–98.2)
MCV RBC AUTO: 77.8 FL (ref 80.5–98.2)
MCV RBC AUTO: 82.2 FL (ref 80.5–98.2)
MCV RBC AUTO: 82.4 FL (ref 80.5–98.2)
MCV RBC AUTO: 82.4 FL (ref 80.5–98.2)
MCV RBC AUTO: 82.5 FL (ref 80.5–98.2)
MCV RBC AUTO: 82.6 FL (ref 80.5–98.2)
MCV RBC AUTO: 82.9 FL (ref 80.5–98.2)
MCV RBC AUTO: 83.1 FL (ref 80.5–98.2)
MCV RBC AUTO: 83.1 FL (ref 80.5–98.2)
MCV RBC AUTO: 83.7 FL (ref 80.5–98.2)
MCV RBC AUTO: 83.8 FL (ref 80.5–98.2)
MCV RBC AUTO: 83.8 FL (ref 80.5–98.2)
MCV RBC AUTO: 84 FL (ref 80.5–98.2)
MCV RBC AUTO: 84.1 FL (ref 80.5–98.2)
MCV RBC AUTO: 84.2 FL (ref 80.5–98.2)
MCV RBC AUTO: 84.4 FL (ref 80.5–98.2)
MCV RBC AUTO: 84.5 FL (ref 80.5–98.2)
MCV RBC AUTO: 84.6 FL (ref 80.5–98.2)
MCV RBC AUTO: 84.7 FL (ref 80.5–98.2)
MCV RBC AUTO: 84.9 FL (ref 80.5–98.2)
MCV RBC AUTO: 85 FL (ref 80.5–98.2)
MCV RBC AUTO: 85.1 FL (ref 80.5–98.2)
MCV RBC AUTO: 85.1 FL (ref 80.5–98.2)
MCV RBC AUTO: 85.2 FL (ref 80.5–98.2)
MCV RBC AUTO: 85.2 FL (ref 80.5–98.2)
MCV RBC AUTO: 85.7 FL (ref 80.5–98.2)
MCV RBC AUTO: 85.8 FL (ref 80.5–98.2)
MCV RBC AUTO: 86 FL (ref 80.5–98.2)
MCV RBC AUTO: 86.1 FL (ref 80.5–98.2)
MCV RBC AUTO: 88.1 FL (ref 80.5–98.2)
MCV RBC AUTO: 90 FL (ref 80.5–98.2)
MCV RBC AUTO: 96.7 FL (ref 80.5–98.2)
MODALITY: ABNORMAL
MONOCYTES # BLD AUTO: 0.6 10*3/MM3 (ref 0.2–1.2)
MONOCYTES # BLD AUTO: 0.67 10*3/MM3 (ref 0.2–1.2)
MONOCYTES # BLD AUTO: 0.77 10*3/MM3 (ref 0.2–1.2)
MONOCYTES # BLD AUTO: 0.86 10*3/MM3 (ref 0.2–1.2)
MONOCYTES # BLD AUTO: 0.88 10*3/MM3 (ref 0.2–1.2)
MONOCYTES # BLD AUTO: 0.89 10*3/MM3 (ref 0.2–1.2)
MONOCYTES # BLD AUTO: 0.89 10*3/MM3 (ref 0.2–1.2)
MONOCYTES # BLD AUTO: 0.9 10*3/MM3 (ref 0.2–1.2)
MONOCYTES # BLD AUTO: 0.93 10*3/MM3 (ref 0.2–1.2)
MONOCYTES # BLD AUTO: 0.93 10*3/MM3 (ref 0.2–1.2)
MONOCYTES # BLD AUTO: 0.95 10*3/MM3 (ref 0.2–1.2)
MONOCYTES # BLD AUTO: 0.96 10*3/MM3 (ref 0.2–1.2)
MONOCYTES # BLD AUTO: 1 10*3/MM3 (ref 0.2–1.2)
MONOCYTES # BLD AUTO: 1.01 10*3/MM3 (ref 0.2–1.2)
MONOCYTES # BLD AUTO: 1.03 10*3/MM3 (ref 0.2–1.2)
MONOCYTES # BLD AUTO: 1.05 10*3/MM3 (ref 0.2–1.2)
MONOCYTES # BLD AUTO: 1.1 10*3/MM3 (ref 0.2–1.2)
MONOCYTES # BLD AUTO: 1.19 10*3/MM3 (ref 0.2–1.2)
MONOCYTES # BLD AUTO: 1.21 10*3/MM3 (ref 0.2–1.2)
MONOCYTES # BLD AUTO: 1.23 10*3/MM3 (ref 0.2–1.2)
MONOCYTES # BLD AUTO: 1.29 10*3/MM3 (ref 0.2–1.2)
MONOCYTES # BLD AUTO: 1.34 10*3/MM3 (ref 0.2–1.2)
MONOCYTES # BLD AUTO: 1.42 10*3/MM3 (ref 0.2–1.2)
MONOCYTES # BLD AUTO: 1.46 10*3/MM3 (ref 0.2–1.2)
MONOCYTES # BLD AUTO: 1.48 10*3/MM3 (ref 0.2–1.2)
MONOCYTES # BLD AUTO: 1.52 10*3/MM3 (ref 0.2–1.2)
MONOCYTES # BLD AUTO: 1.73 10*3/MM3 (ref 0.2–1.2)
MONOCYTES NFR BLD AUTO: 10 % (ref 5–12)
MONOCYTES NFR BLD AUTO: 10.6 % (ref 5–12)
MONOCYTES NFR BLD AUTO: 10.8 % (ref 5–12)
MONOCYTES NFR BLD AUTO: 11.4 % (ref 5–12)
MONOCYTES NFR BLD AUTO: 11.5 % (ref 5–12)
MONOCYTES NFR BLD AUTO: 11.5 % (ref 5–12)
MONOCYTES NFR BLD AUTO: 11.7 % (ref 5–12)
MONOCYTES NFR BLD AUTO: 11.8 % (ref 5–12)
MONOCYTES NFR BLD AUTO: 11.9 % (ref 5–12)
MONOCYTES NFR BLD AUTO: 12 % (ref 5–12)
MONOCYTES NFR BLD AUTO: 12.6 % (ref 5–12)
MONOCYTES NFR BLD AUTO: 12.7 % (ref 5–12)
MONOCYTES NFR BLD AUTO: 13 % (ref 5–12)
MONOCYTES NFR BLD AUTO: 13 % (ref 5–12)
MONOCYTES NFR BLD AUTO: 13.3 % (ref 5–12)
MONOCYTES NFR BLD AUTO: 14.3 % (ref 5–12)
MONOCYTES NFR BLD AUTO: 14.7 % (ref 5–12)
MONOCYTES NFR BLD AUTO: 16.3 % (ref 5–12)
MONOCYTES NFR BLD AUTO: 3.5 % (ref 5–12)
MONOCYTES NFR BLD AUTO: 4.8 % (ref 5–12)
MONOCYTES NFR BLD AUTO: 6.5 % (ref 5–12)
MONOCYTES NFR BLD AUTO: 8.1 % (ref 5–12)
MONOCYTES NFR BLD AUTO: 8.3 % (ref 5–12)
MONOCYTES NFR BLD AUTO: 8.6 % (ref 5–12)
MONOCYTES NFR BLD AUTO: 9 % (ref 5–12)
MONOCYTES NFR BLD AUTO: 9.1 % (ref 5–12)
MONOCYTES NFR BLD AUTO: 9.2 % (ref 5–12)
MONOCYTES NFR BLD AUTO: 9.4 % (ref 5–12)
MONOCYTES NFR BLD AUTO: 9.5 % (ref 5–12)
MONOCYTES NFR BLD AUTO: 9.8 % (ref 5–12)
MONOCYTES NFR BLD AUTO: 9.8 % (ref 5–12)
NEUTROPHILS # BLD AUTO: 10.82 10*3/MM3 (ref 1.9–8.1)
NEUTROPHILS # BLD AUTO: 12 10*3/MM3 (ref 1.9–8.1)
NEUTROPHILS # BLD AUTO: 12.78 10*3/MM3 (ref 1.9–8.1)
NEUTROPHILS # BLD AUTO: 25.78 10*3/MM3 (ref 1.9–8.1)
NEUTROPHILS # BLD AUTO: 3.97 10*3/MM3 (ref 1.9–8.1)
NEUTROPHILS # BLD AUTO: 5.23 10*3/MM3 (ref 1.9–8.1)
NEUTROPHILS # BLD AUTO: 5.68 10*3/MM3 (ref 1.9–8.1)
NEUTROPHILS # BLD AUTO: 5.86 10*3/MM3 (ref 1.9–8.1)
NEUTROPHILS # BLD AUTO: 6.37 10*3/MM3 (ref 1.9–8.1)
NEUTROPHILS # BLD AUTO: 6.41 10*3/MM3 (ref 1.9–8.1)
NEUTROPHILS # BLD AUTO: 6.83 10*3/MM3 (ref 1.9–8.1)
NEUTROPHILS # BLD AUTO: 7.05 10*3/MM3 (ref 1.9–8.1)
NEUTROPHILS # BLD AUTO: 7.08 10*3/MM3 (ref 1.9–8.1)
NEUTROPHILS # BLD AUTO: 7.13 10*3/MM3 (ref 1.9–8.1)
NEUTROPHILS # BLD AUTO: 7.17 10*3/MM3 (ref 1.9–8.1)
NEUTROPHILS # BLD AUTO: 7.24 10*3/MM3 (ref 1.9–8.1)
NEUTROPHILS # BLD AUTO: 7.53 10*3/MM3 (ref 1.9–8.1)
NEUTROPHILS # BLD AUTO: 7.58 10*3/MM3 (ref 1.9–8.1)
NEUTROPHILS # BLD AUTO: 7.63 10*3/MM3 (ref 1.9–8.1)
NEUTROPHILS # BLD AUTO: 7.77 10*3/MM3 (ref 1.9–8.1)
NEUTROPHILS # BLD AUTO: 7.82 10*3/MM3 (ref 1.9–8.1)
NEUTROPHILS # BLD AUTO: 7.83 10*3/MM3 (ref 1.9–8.1)
NEUTROPHILS # BLD AUTO: 7.9 10*3/MM3 (ref 1.9–8.1)
NEUTROPHILS # BLD AUTO: 7.98 10*3/MM3 (ref 1.9–8.1)
NEUTROPHILS # BLD AUTO: 8.05 10*3/MM3 (ref 1.9–8.1)
NEUTROPHILS # BLD AUTO: 8.07 10*3/MM3 (ref 1.9–8.1)
NEUTROPHILS # BLD AUTO: 8.13 10*3/MM3 (ref 1.9–8.1)
NEUTROPHILS # BLD AUTO: 8.22 10*3/MM3 (ref 1.9–8.1)
NEUTROPHILS # BLD AUTO: 8.44 10*3/MM3 (ref 1.9–8.1)
NEUTROPHILS # BLD AUTO: 8.66 10*3/MM3 (ref 1.9–8.1)
NEUTROPHILS # BLD AUTO: 8.69 10*3/MM3 (ref 1.9–8.1)
NEUTROPHILS NFR BLD AUTO: 64.9 % (ref 42.7–76)
NEUTROPHILS NFR BLD AUTO: 67.3 % (ref 42.7–76)
NEUTROPHILS NFR BLD AUTO: 69.4 % (ref 42.7–76)
NEUTROPHILS NFR BLD AUTO: 69.6 % (ref 42.7–76)
NEUTROPHILS NFR BLD AUTO: 70.7 % (ref 42.7–76)
NEUTROPHILS NFR BLD AUTO: 70.9 % (ref 42.7–76)
NEUTROPHILS NFR BLD AUTO: 71.2 % (ref 42.7–76)
NEUTROPHILS NFR BLD AUTO: 71.7 % (ref 42.7–76)
NEUTROPHILS NFR BLD AUTO: 71.8 % (ref 42.7–76)
NEUTROPHILS NFR BLD AUTO: 71.8 % (ref 42.7–76)
NEUTROPHILS NFR BLD AUTO: 72.3 % (ref 42.7–76)
NEUTROPHILS NFR BLD AUTO: 72.7 % (ref 42.7–76)
NEUTROPHILS NFR BLD AUTO: 73.2 % (ref 42.7–76)
NEUTROPHILS NFR BLD AUTO: 75 % (ref 42.7–76)
NEUTROPHILS NFR BLD AUTO: 75 % (ref 42.7–76)
NEUTROPHILS NFR BLD AUTO: 75.4 % (ref 42.7–76)
NEUTROPHILS NFR BLD AUTO: 75.9 % (ref 42.7–76)
NEUTROPHILS NFR BLD AUTO: 76 % (ref 42.7–76)
NEUTROPHILS NFR BLD AUTO: 77.1 % (ref 42.7–76)
NEUTROPHILS NFR BLD AUTO: 77.9 % (ref 42.7–76)
NEUTROPHILS NFR BLD AUTO: 78.6 % (ref 42.7–76)
NEUTROPHILS NFR BLD AUTO: 78.8 % (ref 42.7–76)
NEUTROPHILS NFR BLD AUTO: 79.1 % (ref 42.7–76)
NEUTROPHILS NFR BLD AUTO: 79.4 % (ref 42.7–76)
NEUTROPHILS NFR BLD AUTO: 79.4 % (ref 42.7–76)
NEUTROPHILS NFR BLD AUTO: 80.7 % (ref 42.7–76)
NEUTROPHILS NFR BLD AUTO: 81.4 % (ref 42.7–76)
NEUTROPHILS NFR BLD AUTO: 82.2 % (ref 42.7–76)
NEUTROPHILS NFR BLD AUTO: 86.1 % (ref 42.7–76)
NEUTROPHILS NFR BLD AUTO: 90 % (ref 42.7–76)
NEUTROPHILS NFR BLD AUTO: 91.3 % (ref 42.7–76)
NITRITE UR QL STRIP: NEGATIVE
NRBC BLD MANUAL-RTO: 0 /100 WBC (ref 0–0)
NRBC BLD MANUAL-RTO: 0.8 /100 WBC (ref 0–0)
NT-PROBNP SERPL-MCNC: 4201 PG/ML (ref 0–1800)
NT-PROBNP SERPL-MCNC: 4218 PG/ML (ref 0–1800)
NT-PROBNP SERPL-MCNC: ABNORMAL PG/ML (ref 0–1800)
O2 A-A PPRESDIFF RESPIRATORY: 0.7 MMHG
O2 A-A PPRESDIFF RESPIRATORY: 0.8 MMHG
O2 A-A PPRESDIFF RESPIRATORY: 0.8 MMHG
OSMOLALITY UR: 322 MOSM/KG (ref 250–1200)
OSMOLALITY UR: 342 MOSM/KG (ref 250–1200)
PATH REPORT.FINAL DX SPEC: NORMAL
PATH REPORT.FINAL DX SPEC: NORMAL
PATH REPORT.GROSS SPEC: NORMAL
PATH REPORT.GROSS SPEC: NORMAL
PCO2 BLDA: 34 MM HG (ref 35–45)
PCO2 BLDA: 38.3 MM HG (ref 35–45)
PCO2 BLDA: 39.9 MM HG (ref 35–45)
PCO2 BLDA: 41.1 MM HG (ref 35–45)
PCO2 BLDA: 45.4 MM HG (ref 35–45)
PCO2 BLDA: 46.1 MM HG (ref 35–45)
PCO2 BLDA: 52.5 MM HG (ref 35–45)
PCO2 BLDA: 52.8 MM HG (ref 35–45)
PCO2 BLDA: 54 MM HG (ref 35–45)
PCO2 BLDA: 58.3 MM HG (ref 35–45)
PEEP RESPIRATORY: 5 CM[H2O]
PEEP RESPIRATORY: 5 CM[H2O]
PEEP RESPIRATORY: 7.5 CM[H2O]
PH BLDA: 7.3 PH UNITS (ref 7.35–7.45)
PH BLDA: 7.3 PH UNITS (ref 7.35–7.45)
PH BLDA: 7.31 PH UNITS (ref 7.35–7.6)
PH BLDA: 7.36 PH UNITS (ref 7.35–7.6)
PH BLDA: 7.37 PH UNITS (ref 7.35–7.6)
PH BLDA: 7.38 PH UNITS (ref 7.35–7.6)
PH BLDA: 7.4 PH UNITS (ref 7.35–7.6)
PH BLDA: 7.44 PH UNITS (ref 7.35–7.45)
PH BLDA: 7.44 PH UNITS (ref 7.35–7.45)
PH BLDA: 7.44 PH UNITS (ref 7.35–7.6)
PH UR STRIP.AUTO: 5.5 [PH] (ref 5–8)
PH UR STRIP.AUTO: 5.5 [PH] (ref 5–8)
PH UR STRIP.AUTO: <=5 [PH] (ref 5–8)
PHOSPHATE SERPL-MCNC: 2.5 MG/DL (ref 2.5–4.5)
PHOSPHATE SERPL-MCNC: 2.5 MG/DL (ref 2.5–4.5)
PHOSPHATE SERPL-MCNC: 2.7 MG/DL (ref 2.5–4.5)
PHOSPHATE SERPL-MCNC: 2.7 MG/DL (ref 2.5–4.5)
PHOSPHATE SERPL-MCNC: 3 MG/DL (ref 2.5–4.5)
PHOSPHATE SERPL-MCNC: 3.4 MG/DL (ref 2.5–4.5)
PHOSPHATE SERPL-MCNC: 3.6 MG/DL (ref 2.5–4.5)
PHOSPHATE SERPL-MCNC: 4.2 MG/DL (ref 2.5–4.5)
PHOSPHATE SERPL-MCNC: 4.6 MG/DL (ref 2.5–4.5)
PHOSPHATE SERPL-MCNC: 5 MG/DL (ref 2.5–4.5)
PHOSPHATE SERPL-MCNC: 5.2 MG/DL (ref 2.5–4.5)
PLAT MORPH BLD: NORMAL
PLATELET # BLD AUTO: 140 10*3/MM3 (ref 140–500)
PLATELET # BLD AUTO: 157 10*3/MM3 (ref 140–500)
PLATELET # BLD AUTO: 173 10*3/MM3 (ref 140–500)
PLATELET # BLD AUTO: 197 10*3/MM3 (ref 140–500)
PLATELET # BLD AUTO: 198 10*3/MM3 (ref 140–500)
PLATELET # BLD AUTO: 201 10*3/MM3 (ref 140–500)
PLATELET # BLD AUTO: 250 10*3/MM3 (ref 140–500)
PLATELET # BLD AUTO: 265 10*3/MM3 (ref 140–500)
PLATELET # BLD AUTO: 268 10*3/MM3 (ref 140–500)
PLATELET # BLD AUTO: 298 10*3/MM3 (ref 140–500)
PLATELET # BLD AUTO: 303 10*3/MM3 (ref 140–500)
PLATELET # BLD AUTO: 305 10*3/MM3 (ref 140–500)
PLATELET # BLD AUTO: 305 10*3/MM3 (ref 140–500)
PLATELET # BLD AUTO: 308 10*3/MM3 (ref 140–500)
PLATELET # BLD AUTO: 310 10*3/MM3 (ref 140–500)
PLATELET # BLD AUTO: 313 10*3/MM3 (ref 140–500)
PLATELET # BLD AUTO: 314 10*3/MM3 (ref 140–500)
PLATELET # BLD AUTO: 315 10*3/MM3 (ref 140–500)
PLATELET # BLD AUTO: 317 10*3/MM3 (ref 140–500)
PLATELET # BLD AUTO: 320 10*3/MM3 (ref 140–500)
PLATELET # BLD AUTO: 324 10*3/MM3 (ref 140–500)
PLATELET # BLD AUTO: 324 10*3/MM3 (ref 140–500)
PLATELET # BLD AUTO: 333 10*3/MM3 (ref 140–500)
PLATELET # BLD AUTO: 336 10*3/MM3 (ref 140–500)
PLATELET # BLD AUTO: 342 10*3/MM3 (ref 140–500)
PLATELET # BLD AUTO: 345 10*3/MM3 (ref 140–500)
PLATELET # BLD AUTO: 357 10*3/MM3 (ref 140–500)
PLATELET # BLD AUTO: 359 10*3/MM3 (ref 140–500)
PLATELET # BLD AUTO: 371 10*3/MM3 (ref 140–500)
PLATELET # BLD AUTO: 372 10*3/MM3 (ref 140–500)
PLATELET # BLD AUTO: 374 10*3/MM3 (ref 140–500)
PLATELET # BLD AUTO: 377 10*3/MM3 (ref 140–500)
PLATELET # BLD AUTO: 381 10*3/MM3 (ref 140–500)
PLATELET # BLD AUTO: 385 10*3/MM3 (ref 140–500)
PLATELET # BLD AUTO: 391 10*3/MM3 (ref 140–500)
PLATELET # BLD AUTO: 396 10*3/MM3 (ref 140–500)
PLATELET # BLD AUTO: 398 10*3/MM3 (ref 140–500)
PLATELET # BLD AUTO: 410 10*3/MM3 (ref 140–500)
PLATELET # BLD AUTO: 418 10*3/MM3 (ref 140–500)
PLATELET # BLD AUTO: 587 10*3/MM3 (ref 140–500)
PMV BLD AUTO: 10 FL (ref 6–12)
PMV BLD AUTO: 10.1 FL (ref 6–12)
PMV BLD AUTO: 8 FL (ref 6–12)
PMV BLD AUTO: 8.1 FL (ref 6–12)
PMV BLD AUTO: 8.4 FL (ref 6–12)
PMV BLD AUTO: 8.6 FL (ref 6–12)
PMV BLD AUTO: 8.7 FL (ref 6–12)
PMV BLD AUTO: 8.7 FL (ref 6–12)
PMV BLD AUTO: 8.9 FL (ref 6–12)
PMV BLD AUTO: 8.9 FL (ref 6–12)
PMV BLD AUTO: 9 FL (ref 6–12)
PMV BLD AUTO: 9.1 FL (ref 6–12)
PMV BLD AUTO: 9.2 FL (ref 6–12)
PMV BLD AUTO: 9.3 FL (ref 6–12)
PMV BLD AUTO: 9.4 FL (ref 6–12)
PMV BLD AUTO: 9.5 FL (ref 6–12)
PMV BLD AUTO: 9.5 FL (ref 6–12)
PMV BLD AUTO: 9.7 FL (ref 6–12)
PMV BLD AUTO: 9.8 FL (ref 6–12)
PO2 BLDA: 160.6 MM HG (ref 80–100)
PO2 BLDA: 204.9 MM HG (ref 80–100)
PO2 BLDA: 461 MMHG (ref 80–105)
PO2 BLDA: 489 MMHG (ref 80–105)
PO2 BLDA: 502 MMHG (ref 80–105)
PO2 BLDA: 514 MMHG (ref 80–105)
PO2 BLDA: 515 MMHG (ref 80–105)
PO2 BLDA: 579.3 MM HG (ref 80–100)
PO2 BLDA: 59 MMHG (ref 80–105)
PO2 BLDA: 67.3 MM HG (ref 80–100)
POTASSIUM BLD-SCNC: 3 MMOL/L (ref 3.5–5.2)
POTASSIUM BLD-SCNC: 3 MMOL/L (ref 3.5–5.2)
POTASSIUM BLD-SCNC: 3.1 MMOL/L (ref 3.5–5.2)
POTASSIUM BLD-SCNC: 3.2 MMOL/L (ref 3.5–5.2)
POTASSIUM BLD-SCNC: 3.3 MMOL/L (ref 3.5–5.2)
POTASSIUM BLD-SCNC: 3.4 MMOL/L (ref 3.5–5.2)
POTASSIUM BLD-SCNC: 3.5 MMOL/L (ref 3.5–5.2)
POTASSIUM BLD-SCNC: 3.5 MMOL/L (ref 3.5–5.2)
POTASSIUM BLD-SCNC: 3.6 MMOL/L (ref 3.5–5.2)
POTASSIUM BLD-SCNC: 3.7 MMOL/L (ref 3.5–5.2)
POTASSIUM BLD-SCNC: 3.8 MMOL/L (ref 3.5–5.2)
POTASSIUM BLD-SCNC: 3.9 MMOL/L (ref 3.5–5.2)
POTASSIUM BLD-SCNC: 3.9 MMOL/L (ref 3.5–5.2)
POTASSIUM BLD-SCNC: 4 MMOL/L (ref 3.5–5.2)
POTASSIUM BLD-SCNC: 4.1 MMOL/L (ref 3.5–5.2)
POTASSIUM BLD-SCNC: 4.2 MMOL/L (ref 3.5–5.2)
POTASSIUM BLD-SCNC: 4.2 MMOL/L (ref 3.5–5.2)
POTASSIUM BLD-SCNC: 4.3 MMOL/L (ref 3.5–5.2)
POTASSIUM BLD-SCNC: 4.3 MMOL/L (ref 3.5–5.2)
POTASSIUM BLD-SCNC: 4.4 MMOL/L (ref 3.5–5.2)
POTASSIUM BLD-SCNC: 4.5 MMOL/L (ref 3.5–5.2)
POTASSIUM BLD-SCNC: 4.6 MMOL/L (ref 3.5–5.2)
POTASSIUM BLD-SCNC: 4.6 MMOL/L (ref 3.5–5.2)
POTASSIUM BLD-SCNC: 4.7 MMOL/L (ref 3.5–5.2)
POTASSIUM BLD-SCNC: 4.8 MMOL/L (ref 3.5–5.2)
POTASSIUM BLD-SCNC: 5.1 MMOL/L (ref 3.5–5.2)
POTASSIUM BLD-SCNC: 5.1 MMOL/L (ref 3.5–5.2)
POTASSIUM BLD-SCNC: 5.2 MMOL/L (ref 3.5–5.2)
POTASSIUM BLD-SCNC: 5.5 MMOL/L (ref 3.5–5.2)
POTASSIUM BLD-SCNC: 6.2 MMOL/L (ref 3.5–5.2)
POTASSIUM BLDA-SCNC: 3.5 MMOL/L (ref 3.5–4.9)
POTASSIUM BLDA-SCNC: 3.7 MMOL/L (ref 3.5–4.9)
POTASSIUM BLDA-SCNC: 3.7 MMOL/L (ref 3.5–4.9)
POTASSIUM BLDA-SCNC: 3.9 MMOL/L (ref 3.5–4.9)
POTASSIUM BLDA-SCNC: 3.9 MMOL/L (ref 3.5–4.9)
POTASSIUM BLDA-SCNC: 4.5 MMOL/L (ref 3.5–4.9)
PROT SERPL-MCNC: 5.2 G/DL (ref 6–8.5)
PROT SERPL-MCNC: 5.4 G/DL (ref 6–8.5)
PROT SERPL-MCNC: 5.8 G/DL (ref 6–8.5)
PROT SERPL-MCNC: 5.8 G/DL (ref 6–8.5)
PROT SERPL-MCNC: 5.9 G/DL (ref 6–8.5)
PROT SERPL-MCNC: 5.9 G/DL (ref 6–8.5)
PROT SERPL-MCNC: 6 G/DL (ref 6–8.5)
PROT SERPL-MCNC: 6.1 G/DL (ref 6–8.5)
PROT SERPL-MCNC: 6.7 G/DL (ref 6–8.5)
PROT SERPL-MCNC: 6.7 G/DL (ref 6–8.5)
PROT SERPL-MCNC: 6.8 G/DL (ref 6–8.5)
PROT SERPL-MCNC: 6.9 G/DL (ref 6–8.5)
PROT SERPL-MCNC: 6.9 G/DL (ref 6–8.5)
PROT UR QL STRIP: ABNORMAL
PROT UR-MCNC: 45 MG/DL
PROT/CREAT UR: 649.4 MG/G CREA
PROTHROMBIN TIME: 13.8 SECONDS (ref 11.7–14.2)
PROTHROMBIN TIME: 14.4 SECONDS (ref 11.7–14.2)
PROTHROMBIN TIME: 14.5 SECONDS (ref 11.7–14.2)
PROTHROMBIN TIME: 14.7 SECONDS (ref 11.7–14.2)
PROTHROMBIN TIME: 14.8 SECONDS (ref 11.7–14.2)
PROTHROMBIN TIME: 15 SECONDS (ref 11.7–14.2)
PROTHROMBIN TIME: 15.2 SECONDS (ref 11.7–14.2)
PROTHROMBIN TIME: 16.4 SECONDS (ref 11.7–14.2)
PROTHROMBIN TIME: 16.5 SECONDS (ref 11.7–14.2)
PROTHROMBIN TIME: 16.5 SECONDS (ref 11.7–14.2)
PROTHROMBIN TIME: 18.7 SECONDS (ref 11.7–14.2)
PSV: 8 CMH2O
RBC # BLD AUTO: 1.89 10*6/MM3 (ref 3.9–5.2)
RBC # BLD AUTO: 2.46 10*6/MM3 (ref 3.9–5.2)
RBC # BLD AUTO: 2.66 10*6/MM3 (ref 3.9–5.2)
RBC # BLD AUTO: 2.72 10*6/MM3 (ref 3.9–5.2)
RBC # BLD AUTO: 2.73 10*6/MM3 (ref 3.9–5.2)
RBC # BLD AUTO: 2.75 10*6/MM3 (ref 3.9–5.2)
RBC # BLD AUTO: 2.78 10*6/MM3 (ref 3.9–5.2)
RBC # BLD AUTO: 2.91 10*6/MM3 (ref 3.9–5.2)
RBC # BLD AUTO: 3.02 10*6/MM3 (ref 3.9–5.2)
RBC # BLD AUTO: 3.07 10*6/MM3 (ref 3.9–5.2)
RBC # BLD AUTO: 3.13 10*6/MM3 (ref 3.9–5.2)
RBC # BLD AUTO: 3.18 10*6/MM3 (ref 3.9–5.2)
RBC # BLD AUTO: 3.2 10*6/MM3 (ref 3.9–5.2)
RBC # BLD AUTO: 3.29 10*6/MM3 (ref 3.9–5.2)
RBC # BLD AUTO: 3.37 10*6/MM3 (ref 3.9–5.2)
RBC # BLD AUTO: 3.38 10*6/MM3 (ref 3.9–5.2)
RBC # BLD AUTO: 3.39 10*6/MM3 (ref 3.9–5.2)
RBC # BLD AUTO: 3.43 10*6/MM3 (ref 3.9–5.2)
RBC # BLD AUTO: 3.43 10*6/MM3 (ref 3.9–5.2)
RBC # BLD AUTO: 3.44 10*6/MM3 (ref 3.9–5.2)
RBC # BLD AUTO: 3.45 10*6/MM3 (ref 3.9–5.2)
RBC # BLD AUTO: 3.46 10*6/MM3 (ref 3.9–5.2)
RBC # BLD AUTO: 3.5 10*6/MM3 (ref 3.9–5.2)
RBC # BLD AUTO: 3.51 10*6/MM3 (ref 3.9–5.2)
RBC # BLD AUTO: 3.51 10*6/MM3 (ref 3.9–5.2)
RBC # BLD AUTO: 3.54 10*6/MM3 (ref 3.9–5.2)
RBC # BLD AUTO: 3.55 10*6/MM3 (ref 3.9–5.2)
RBC # BLD AUTO: 3.6 10*6/MM3 (ref 3.9–5.2)
RBC # BLD AUTO: 3.62 10*6/MM3 (ref 3.9–5.2)
RBC # BLD AUTO: 3.62 10*6/MM3 (ref 3.9–5.2)
RBC # BLD AUTO: 3.63 10*6/MM3 (ref 3.9–5.2)
RBC # BLD AUTO: 3.63 10*6/MM3 (ref 3.9–5.2)
RBC # BLD AUTO: 3.64 10*6/MM3 (ref 3.9–5.2)
RBC # BLD AUTO: 3.65 10*6/MM3 (ref 3.9–5.2)
RBC # BLD AUTO: 3.77 10*6/MM3 (ref 3.9–5.2)
RBC # BLD AUTO: 3.82 10*6/MM3 (ref 3.9–5.2)
RBC # BLD AUTO: 3.83 10*6/MM3 (ref 3.9–5.2)
RBC # BLD AUTO: 3.84 10*6/MM3 (ref 3.9–5.2)
RBC # BLD AUTO: 3.85 10*6/MM3 (ref 3.9–5.2)
RBC # BLD AUTO: 4.31 10*6/MM3 (ref 3.9–5.2)
RBC # UR: ABNORMAL /HPF
REF LAB TEST METHOD: ABNORMAL
RH BLD: POSITIVE
RIGHT ARM BP: NORMAL MMHG
SAO2 % BLDA: 100 % (ref 95–98)
SAO2 % BLDA: 54 % (ref 95–98)
SAO2 % BLDA: 61 % (ref 95–98)
SAO2 % BLDA: 87 % (ref 95–98)
SAO2 % BLDA: 95 % (ref 95–98)
SAO2 % BLDCOA: 100 % (ref 92–99)
SAO2 % BLDCOA: 93.8 % (ref 92–99)
SAO2 % BLDCOA: 99.2 % (ref 92–99)
SAO2 % BLDCOA: 99.6 % (ref 92–99)
SET MECH RESP RATE: 12
SET MECH RESP RATE: 14
SET MECH RESP RATE: 16
SODIUM BLD-SCNC: 122 MMOL/L (ref 136–145)
SODIUM BLD-SCNC: 123 MMOL/L (ref 136–145)
SODIUM BLD-SCNC: 125 MMOL/L (ref 136–145)
SODIUM BLD-SCNC: 129 MMOL/L (ref 136–145)
SODIUM BLD-SCNC: 130 MMOL/L (ref 136–145)
SODIUM BLD-SCNC: 130 MMOL/L (ref 136–145)
SODIUM BLD-SCNC: 131 MMOL/L (ref 136–145)
SODIUM BLD-SCNC: 131 MMOL/L (ref 136–145)
SODIUM BLD-SCNC: 132 MMOL/L (ref 136–145)
SODIUM BLD-SCNC: 133 MMOL/L (ref 136–145)
SODIUM BLD-SCNC: 134 MMOL/L (ref 136–145)
SODIUM BLD-SCNC: 135 MMOL/L (ref 136–145)
SODIUM BLD-SCNC: 136 MMOL/L (ref 136–145)
SODIUM BLD-SCNC: 137 MMOL/L (ref 136–145)
SODIUM BLD-SCNC: 139 MMOL/L (ref 136–145)
SODIUM BLD-SCNC: 141 MMOL/L (ref 136–145)
SODIUM UR-SCNC: 23 MMOL/L
SODIUM UR-SCNC: 24 MMOL/L
SODIUM UR-SCNC: 51 MMOL/L
SP GR UR STRIP: 1.02 (ref 1–1.03)
SP GR UR STRIP: 1.02 (ref 1–1.03)
SP GR UR STRIP: >=1.03 (ref 1–1.03)
SQUAMOUS #/AREA URNS HPF: ABNORMAL /HPF
STRESS TARGET HR: 119 BPM
TIBC SERPL-MCNC: 237 MCG/DL (ref 298–536)
TIBC SERPL-MCNC: 425 MCG/DL
TIBC SERPL-MCNC: 435 MCG/DL (ref 298–536)
TOTAL RATE: 12 BREATHS/MINUTE
TOTAL RATE: 14 BREATHS/MINUTE
TOTAL RATE: 16 BREATHS/MINUTE
TOTAL RATE: 18 BREATHS/MINUTE
TRANS CELLS #/AREA URNS HPF: ABNORMAL /HPF
TRANSFERRIN SERPL-MCNC: 159 MG/DL (ref 200–360)
TRANSFERRIN SERPL-MCNC: 285 MG/DL (ref 200–360)
TRANSFERRIN SERPL-MCNC: 292 MG/DL (ref 200–360)
TRIGL SERPL-MCNC: 109 MG/DL (ref 0–150)
TRIGL SERPL-MCNC: 109 MG/DL (ref 0–150)
TROPONIN T SERPL-MCNC: <0.01 NG/ML (ref 0–0.03)
TSH SERPL DL<=0.05 MIU/L-ACNC: 1.25 MIU/ML (ref 0.27–4.2)
TSH SERPL DL<=0.05 MIU/L-ACNC: 1.79 MIU/ML (ref 0.27–4.2)
UNIT  ABO: NORMAL
UNIT  RH: NORMAL
URATE SERPL-MCNC: 10.9 MG/DL (ref 2.4–5.7)
URATE SERPL-MCNC: 11.1 MG/DL (ref 2.4–5.7)
URATE SERPL-MCNC: 11.2 MG/DL (ref 2.4–5.7)
URATE SERPL-MCNC: 9.7 MG/DL (ref 2.4–5.7)
UROBILINOGEN UR QL STRIP: ABNORMAL
UUN 24H UR-MCNC: 519 MG/DL
VENTILATOR MODE: ABNORMAL
VENTILATOR MODE: ABNORMAL
VENTILATOR MODE: AC
VLDLC SERPL-MCNC: 21.8 MG/DL (ref 5–40)
VLDLC SERPL-MCNC: 21.8 MG/DL (ref 5–40)
VT ON VENT VENT: 490 ML
VT ON VENT VENT: 500 ML
VT ON VENT VENT: 550 ML
WBC MORPH BLD: NORMAL
WBC NRBC COR # BLD: 10.02 10*3/MM3 (ref 4.5–10.7)
WBC NRBC COR # BLD: 10.13 10*3/MM3 (ref 4.5–10.7)
WBC NRBC COR # BLD: 10.28 10*3/MM3 (ref 4.5–10.7)
WBC NRBC COR # BLD: 10.31 10*3/MM3 (ref 4.5–10.7)
WBC NRBC COR # BLD: 10.32 10*3/MM3 (ref 4.5–10.7)
WBC NRBC COR # BLD: 10.34 10*3/MM3 (ref 4.5–10.7)
WBC NRBC COR # BLD: 10.4 10*3/MM3 (ref 4.5–10.7)
WBC NRBC COR # BLD: 10.42 10*3/MM3 (ref 4.5–10.7)
WBC NRBC COR # BLD: 10.42 10*3/MM3 (ref 4.5–10.7)
WBC NRBC COR # BLD: 10.46 10*3/MM3 (ref 4.5–10.7)
WBC NRBC COR # BLD: 10.57 10*3/MM3 (ref 4.5–10.7)
WBC NRBC COR # BLD: 10.62 10*3/MM3 (ref 4.5–10.7)
WBC NRBC COR # BLD: 10.65 10*3/MM3 (ref 4.5–10.7)
WBC NRBC COR # BLD: 10.66 10*3/MM3 (ref 4.5–10.7)
WBC NRBC COR # BLD: 11.13 10*3/MM3 (ref 4.5–10.7)
WBC NRBC COR # BLD: 11.4 10*3/MM3 (ref 4.5–10.7)
WBC NRBC COR # BLD: 11.48 10*3/MM3 (ref 4.5–10.7)
WBC NRBC COR # BLD: 12.93 10*3/MM3 (ref 4.5–10.7)
WBC NRBC COR # BLD: 13.29 10*3/MM3 (ref 4.5–10.7)
WBC NRBC COR # BLD: 13.89 10*3/MM3 (ref 4.5–10.7)
WBC NRBC COR # BLD: 13.91 10*3/MM3 (ref 4.5–10.7)
WBC NRBC COR # BLD: 13.95 10*3/MM3 (ref 4.5–10.7)
WBC NRBC COR # BLD: 14.22 10*3/MM3 (ref 4.5–10.7)
WBC NRBC COR # BLD: 16.86 10*3/MM3 (ref 4.5–10.7)
WBC NRBC COR # BLD: 21.7 10*3/MM3 (ref 4.5–10.7)
WBC NRBC COR # BLD: 28.28 10*3/MM3 (ref 4.5–10.7)
WBC NRBC COR # BLD: 5.9 10*3/MM3 (ref 4.5–10.7)
WBC NRBC COR # BLD: 6.12 10*3/MM3 (ref 4.5–10.7)
WBC NRBC COR # BLD: 7.51 10*3/MM3 (ref 4.5–10.7)
WBC NRBC COR # BLD: 7.93 10*3/MM3 (ref 4.5–10.7)
WBC NRBC COR # BLD: 8.1 10*3/MM3 (ref 4.5–10.7)
WBC NRBC COR # BLD: 8.22 10*3/MM3 (ref 4.5–10.7)
WBC NRBC COR # BLD: 8.43 10*3/MM3 (ref 4.5–10.7)
WBC NRBC COR # BLD: 8.97 10*3/MM3 (ref 4.5–10.7)
WBC NRBC COR # BLD: 8.98 10*3/MM3 (ref 4.5–10.7)
WBC NRBC COR # BLD: 9.45 10*3/MM3 (ref 4.5–10.7)
WBC NRBC COR # BLD: 9.47 10*3/MM3 (ref 4.5–10.7)
WBC NRBC COR # BLD: 9.66 10*3/MM3 (ref 4.5–10.7)
WBC NRBC COR # BLD: 9.79 10*3/MM3 (ref 4.5–10.7)
WBC NRBC COR # BLD: 9.91 10*3/MM3 (ref 4.5–10.7)
WBC UR QL AUTO: ABNORMAL /HPF
WHOLE BLOOD HOLD SPECIMEN: NORMAL

## 2017-01-01 PROCEDURE — 25010000003 CEFAZOLIN PER 500 MG: Performed by: INTERNAL MEDICINE

## 2017-01-01 PROCEDURE — 85014 HEMATOCRIT: CPT

## 2017-01-01 PROCEDURE — 82330 ASSAY OF CALCIUM: CPT | Performed by: THORACIC SURGERY (CARDIOTHORACIC VASCULAR SURGERY)

## 2017-01-01 PROCEDURE — 25010000002 FUROSEMIDE PER 20 MG: Performed by: SURGERY

## 2017-01-01 PROCEDURE — 97110 THERAPEUTIC EXERCISES: CPT

## 2017-01-01 PROCEDURE — 85025 COMPLETE CBC W/AUTO DIFF WBC: CPT | Performed by: THORACIC SURGERY (CARDIOTHORACIC VASCULAR SURGERY)

## 2017-01-01 PROCEDURE — 93005 ELECTROCARDIOGRAM TRACING: CPT | Performed by: THORACIC SURGERY (CARDIOTHORACIC VASCULAR SURGERY)

## 2017-01-01 PROCEDURE — 71010 HC CHEST PA OR AP: CPT

## 2017-01-01 PROCEDURE — 36430 TRANSFUSION BLD/BLD COMPNT: CPT

## 2017-01-01 PROCEDURE — 93325 DOPPLER ECHO COLOR FLOW MAPG: CPT

## 2017-01-01 PROCEDURE — P9016 RBC LEUKOCYTES REDUCED: HCPCS

## 2017-01-01 PROCEDURE — 93010 ELECTROCARDIOGRAM REPORT: CPT | Performed by: INTERNAL MEDICINE

## 2017-01-01 PROCEDURE — 93005 ELECTROCARDIOGRAM TRACING: CPT | Performed by: EMERGENCY MEDICINE

## 2017-01-01 PROCEDURE — 25010000002 IRON SUCROSE PER 1 MG: Performed by: HOSPITALIST

## 2017-01-01 PROCEDURE — 70498 CT ANGIOGRAPHY NECK: CPT

## 2017-01-01 PROCEDURE — 80048 BASIC METABOLIC PNL TOTAL CA: CPT | Performed by: INTERNAL MEDICINE

## 2017-01-01 PROCEDURE — 99152 MOD SED SAME PHYS/QHP 5/>YRS: CPT | Performed by: INTERNAL MEDICINE

## 2017-01-01 PROCEDURE — 84466 ASSAY OF TRANSFERRIN: CPT | Performed by: NURSE PRACTITIONER

## 2017-01-01 PROCEDURE — 02HK3JZ INSERTION OF PACEMAKER LEAD INTO RIGHT VENTRICLE, PERCUTANEOUS APPROACH: ICD-10-PCS | Performed by: INTERNAL MEDICINE

## 2017-01-01 PROCEDURE — 85652 RBC SED RATE AUTOMATED: CPT | Performed by: FAMILY MEDICINE

## 2017-01-01 PROCEDURE — 99214 OFFICE O/P EST MOD 30 MIN: CPT | Performed by: INTERNAL MEDICINE

## 2017-01-01 PROCEDURE — 94799 UNLISTED PULMONARY SVC/PX: CPT

## 2017-01-01 PROCEDURE — 25010000002 ENOXAPARIN PER 10 MG: Performed by: INTERNAL MEDICINE

## 2017-01-01 PROCEDURE — 99222 1ST HOSP IP/OBS MODERATE 55: CPT

## 2017-01-01 PROCEDURE — 85018 HEMOGLOBIN: CPT

## 2017-01-01 PROCEDURE — 99232 SBSQ HOSP IP/OBS MODERATE 35: CPT | Performed by: INTERNAL MEDICINE

## 2017-01-01 PROCEDURE — 25010000002 MIDAZOLAM PER 1 MG: Performed by: INTERNAL MEDICINE

## 2017-01-01 PROCEDURE — 0JH606Z INSERTION OF PACEMAKER, DUAL CHAMBER INTO CHEST SUBCUTANEOUS TISSUE AND FASCIA, OPEN APPROACH: ICD-10-PCS | Performed by: INTERNAL MEDICINE

## 2017-01-01 PROCEDURE — 02H63JZ INSERTION OF PACEMAKER LEAD INTO RIGHT ATRIUM, PERCUTANEOUS APPROACH: ICD-10-PCS | Performed by: INTERNAL MEDICINE

## 2017-01-01 PROCEDURE — 93318 ECHO TRANSESOPHAGEAL INTRAOP: CPT | Performed by: ANESTHESIOLOGY

## 2017-01-01 PROCEDURE — 85027 COMPLETE CBC AUTOMATED: CPT | Performed by: INTERNAL MEDICINE

## 2017-01-01 PROCEDURE — 25010000002 MAGNESIUM SULFATE IN D5W 1G/100ML (PREMIX) 1-5 GM/100ML-% SOLUTION: Performed by: NURSE PRACTITIONER

## 2017-01-01 PROCEDURE — 86901 BLOOD TYPING SEROLOGIC RH(D): CPT | Performed by: NURSE PRACTITIONER

## 2017-01-01 PROCEDURE — 85025 COMPLETE CBC W/AUTO DIFF WBC: CPT | Performed by: FAMILY MEDICINE

## 2017-01-01 PROCEDURE — 85610 PROTHROMBIN TIME: CPT | Performed by: THORACIC SURGERY (CARDIOTHORACIC VASCULAR SURGERY)

## 2017-01-01 PROCEDURE — 85025 COMPLETE CBC W/AUTO DIFF WBC: CPT | Performed by: INTERNAL MEDICINE

## 2017-01-01 PROCEDURE — 0DB68ZX EXCISION OF STOMACH, VIA NATURAL OR ARTIFICIAL OPENING ENDOSCOPIC, DIAGNOSTIC: ICD-10-PCS | Performed by: INTERNAL MEDICINE

## 2017-01-01 PROCEDURE — 87086 URINE CULTURE/COLONY COUNT: CPT | Performed by: FAMILY MEDICINE

## 2017-01-01 PROCEDURE — 25010000002 ALBUMIN HUMAN 25% PER 50 ML

## 2017-01-01 PROCEDURE — 93460 R&L HRT ART/VENTRICLE ANGIO: CPT | Performed by: INTERNAL MEDICINE

## 2017-01-01 PROCEDURE — 90661 CCIIV3 VAC ABX FR 0.5 ML IM: CPT | Performed by: THORACIC SURGERY (CARDIOTHORACIC VASCULAR SURGERY)

## 2017-01-01 PROCEDURE — 25010000002 IRON SUCROSE PER 1 MG: Performed by: INTERNAL MEDICINE

## 2017-01-01 PROCEDURE — 84100 ASSAY OF PHOSPHORUS: CPT | Performed by: INTERNAL MEDICINE

## 2017-01-01 PROCEDURE — 99284 EMERGENCY DEPT VISIT MOD MDM: CPT

## 2017-01-01 PROCEDURE — 25010000002 ENOXAPARIN PER 10 MG: Performed by: THORACIC SURGERY (CARDIOTHORACIC VASCULAR SURGERY)

## 2017-01-01 PROCEDURE — B2111ZZ FLUOROSCOPY OF MULTIPLE CORONARY ARTERIES USING LOW OSMOLAR CONTRAST: ICD-10-PCS | Performed by: INTERNAL MEDICINE

## 2017-01-01 PROCEDURE — 25010000002 PROPOFOL 10 MG/ML EMULSION: Performed by: ANESTHESIOLOGY

## 2017-01-01 PROCEDURE — 81001 URINALYSIS AUTO W/SCOPE: CPT | Performed by: SURGERY

## 2017-01-01 PROCEDURE — 25010000002 CALCIUM GLUCONATE: Performed by: EMERGENCY MEDICINE

## 2017-01-01 PROCEDURE — 82570 ASSAY OF URINE CREATININE: CPT | Performed by: INTERNAL MEDICINE

## 2017-01-01 PROCEDURE — 25010000003 CEFAZOLIN IN DEXTROSE 2-4 GM/100ML-% SOLUTION: Performed by: SURGERY

## 2017-01-01 PROCEDURE — 99232 SBSQ HOSP IP/OBS MODERATE 35: CPT | Performed by: NURSE PRACTITIONER

## 2017-01-01 PROCEDURE — 86850 RBC ANTIBODY SCREEN: CPT | Performed by: THORACIC SURGERY (CARDIOTHORACIC VASCULAR SURGERY)

## 2017-01-01 PROCEDURE — 83880 ASSAY OF NATRIURETIC PEPTIDE: CPT | Performed by: INTERNAL MEDICINE

## 2017-01-01 PROCEDURE — 83735 ASSAY OF MAGNESIUM: CPT | Performed by: INTERNAL MEDICINE

## 2017-01-01 PROCEDURE — 80053 COMPREHEN METABOLIC PANEL: CPT | Performed by: INTERNAL MEDICINE

## 2017-01-01 PROCEDURE — 25010000002 MIDAZOLAM PER 1 MG: Performed by: ANESTHESIOLOGY

## 2017-01-01 PROCEDURE — 81001 URINALYSIS AUTO W/SCOPE: CPT | Performed by: FAMILY MEDICINE

## 2017-01-01 PROCEDURE — 99231 SBSQ HOSP IP/OBS SF/LOW 25: CPT | Performed by: NURSE PRACTITIONER

## 2017-01-01 PROCEDURE — 99233 SBSQ HOSP IP/OBS HIGH 50: CPT | Performed by: INTERNAL MEDICINE

## 2017-01-01 PROCEDURE — 83540 ASSAY OF IRON: CPT | Performed by: NURSE PRACTITIONER

## 2017-01-01 PROCEDURE — 25010000002 HEPARIN (PORCINE) PER 1000 UNITS: Performed by: INTERNAL MEDICINE

## 2017-01-01 PROCEDURE — 25010000002 FENTANYL CITRATE (PF) 100 MCG/2ML SOLUTION: Performed by: INTERNAL MEDICINE

## 2017-01-01 PROCEDURE — 83735 ASSAY OF MAGNESIUM: CPT | Performed by: THORACIC SURGERY (CARDIOTHORACIC VASCULAR SURGERY)

## 2017-01-01 PROCEDURE — 25010000002 ONDANSETRON PER 1 MG: Performed by: ANESTHESIOLOGY

## 2017-01-01 PROCEDURE — 86923 COMPATIBILITY TEST ELECTRIC: CPT

## 2017-01-01 PROCEDURE — 85347 COAGULATION TIME ACTIVATED: CPT

## 2017-01-01 PROCEDURE — 84550 ASSAY OF BLOOD/URIC ACID: CPT | Performed by: INTERNAL MEDICINE

## 2017-01-01 PROCEDURE — 84100 ASSAY OF PHOSPHORUS: CPT | Performed by: FAMILY MEDICINE

## 2017-01-01 PROCEDURE — 85025 COMPLETE CBC W/AUTO DIFF WBC: CPT | Performed by: SURGERY

## 2017-01-01 PROCEDURE — 84132 ASSAY OF SERUM POTASSIUM: CPT | Performed by: INTERNAL MEDICINE

## 2017-01-01 PROCEDURE — 84300 ASSAY OF URINE SODIUM: CPT | Performed by: INTERNAL MEDICINE

## 2017-01-01 PROCEDURE — 87186 SC STD MICRODIL/AGAR DIL: CPT | Performed by: THORACIC SURGERY (CARDIOTHORACIC VASCULAR SURGERY)

## 2017-01-01 PROCEDURE — 63710000001 CARVEDILOL 25 MG TABLET: Performed by: NURSE PRACTITIONER

## 2017-01-01 PROCEDURE — 86900 BLOOD TYPING SEROLOGIC ABO: CPT

## 2017-01-01 PROCEDURE — 82962 GLUCOSE BLOOD TEST: CPT

## 2017-01-01 PROCEDURE — 25010000002 FUROSEMIDE PER 20 MG: Performed by: INTERNAL MEDICINE

## 2017-01-01 PROCEDURE — 80069 RENAL FUNCTION PANEL: CPT | Performed by: INTERNAL MEDICINE

## 2017-01-01 PROCEDURE — 85027 COMPLETE CBC AUTOMATED: CPT | Performed by: THORACIC SURGERY (CARDIOTHORACIC VASCULAR SURGERY)

## 2017-01-01 PROCEDURE — 80053 COMPREHEN METABOLIC PANEL: CPT | Performed by: FAMILY MEDICINE

## 2017-01-01 PROCEDURE — 97116 GAIT TRAINING THERAPY: CPT | Performed by: PHYSICAL THERAPIST

## 2017-01-01 PROCEDURE — 36600 WITHDRAWAL OF ARTERIAL BLOOD: CPT

## 2017-01-01 PROCEDURE — 99204 OFFICE O/P NEW MOD 45 MIN: CPT | Performed by: INTERNAL MEDICINE

## 2017-01-01 PROCEDURE — 93005 ELECTROCARDIOGRAM TRACING: CPT | Performed by: INTERNAL MEDICINE

## 2017-01-01 PROCEDURE — 80053 COMPREHEN METABOLIC PANEL: CPT | Performed by: NURSE PRACTITIONER

## 2017-01-01 PROCEDURE — 86901 BLOOD TYPING SEROLOGIC RH(D): CPT | Performed by: THORACIC SURGERY (CARDIOTHORACIC VASCULAR SURGERY)

## 2017-01-01 PROCEDURE — 4A023N8 MEASUREMENT OF CARDIAC SAMPLING AND PRESSURE, BILATERAL, PERCUTANEOUS APPROACH: ICD-10-PCS | Performed by: INTERNAL MEDICINE

## 2017-01-01 PROCEDURE — C1713 ANCHOR/SCREW BN/BN,TIS/BN: HCPCS | Performed by: THORACIC SURGERY (CARDIOTHORACIC VASCULAR SURGERY)

## 2017-01-01 PROCEDURE — 93325 DOPPLER ECHO COLOR FLOW MAPG: CPT | Performed by: INTERNAL MEDICINE

## 2017-01-01 PROCEDURE — 93880 EXTRACRANIAL BILAT STUDY: CPT

## 2017-01-01 PROCEDURE — 25010000002 NEOSTIGMINE PER 0.5 MG: Performed by: NURSE ANESTHETIST, CERTIFIED REGISTERED

## 2017-01-01 PROCEDURE — 93306 TTE W/DOPPLER COMPLETE: CPT

## 2017-01-01 PROCEDURE — C1751 CATH, INF, PER/CENT/MIDLINE: HCPCS | Performed by: ANESTHESIOLOGY

## 2017-01-01 PROCEDURE — C1785 PMKR, DUAL, RATE-RESP: HCPCS | Performed by: INTERNAL MEDICINE

## 2017-01-01 PROCEDURE — 84466 ASSAY OF TRANSFERRIN: CPT | Performed by: HOSPITALIST

## 2017-01-01 PROCEDURE — 84443 ASSAY THYROID STIM HORMONE: CPT | Performed by: FAMILY MEDICINE

## 2017-01-01 PROCEDURE — 93312 ECHO TRANSESOPHAGEAL: CPT | Performed by: INTERNAL MEDICINE

## 2017-01-01 PROCEDURE — 88312 SPECIAL STAINS GROUP 1: CPT | Performed by: INTERNAL MEDICINE

## 2017-01-01 PROCEDURE — 36415 COLL VENOUS BLD VENIPUNCTURE: CPT | Performed by: FAMILY MEDICINE

## 2017-01-01 PROCEDURE — 99153 MOD SED SAME PHYS/QHP EA: CPT | Performed by: INTERNAL MEDICINE

## 2017-01-01 PROCEDURE — G0463 HOSPITAL OUTPT CLINIC VISIT: HCPCS

## 2017-01-01 PROCEDURE — 80048 BASIC METABOLIC PNL TOTAL CA: CPT | Performed by: NURSE PRACTITIONER

## 2017-01-01 PROCEDURE — 25010000002 ALBUMIN HUMAN 5% PER 50 ML: Performed by: THORACIC SURGERY (CARDIOTHORACIC VASCULAR SURGERY)

## 2017-01-01 PROCEDURE — 80053 COMPREHEN METABOLIC PANEL: CPT | Performed by: EMERGENCY MEDICINE

## 2017-01-01 PROCEDURE — 85610 PROTHROMBIN TIME: CPT | Performed by: INTERNAL MEDICINE

## 2017-01-01 PROCEDURE — 25010000002 HEPARIN (PORCINE) PER 1000 UNITS: Performed by: NURSE ANESTHETIST, CERTIFIED REGISTERED

## 2017-01-01 PROCEDURE — 25010000002 PROPOFOL 10 MG/ML EMULSION: Performed by: NURSE ANESTHETIST, CERTIFIED REGISTERED

## 2017-01-01 PROCEDURE — 94640 AIRWAY INHALATION TREATMENT: CPT

## 2017-01-01 PROCEDURE — 0DBE8ZX EXCISION OF LARGE INTESTINE, VIA NATURAL OR ARTIFICIAL OPENING ENDOSCOPIC, DIAGNOSTIC: ICD-10-PCS | Performed by: INTERNAL MEDICINE

## 2017-01-01 PROCEDURE — 86900 BLOOD TYPING SEROLOGIC ABO: CPT | Performed by: SURGERY

## 2017-01-01 PROCEDURE — 99214 OFFICE O/P EST MOD 30 MIN: CPT | Performed by: NURSE PRACTITIONER

## 2017-01-01 PROCEDURE — 36415 COLL VENOUS BLD VENIPUNCTURE: CPT

## 2017-01-01 PROCEDURE — 86850 RBC ANTIBODY SCREEN: CPT | Performed by: SURGERY

## 2017-01-01 PROCEDURE — C1898 LEAD, PMKR, OTHER THAN TRANS: HCPCS | Performed by: INTERNAL MEDICINE

## 2017-01-01 PROCEDURE — 80053 COMPREHEN METABOLIC PANEL: CPT | Performed by: THORACIC SURGERY (CARDIOTHORACIC VASCULAR SURGERY)

## 2017-01-01 PROCEDURE — 0 IOPAMIDOL 61 % SOLUTION: Performed by: INTERNAL MEDICINE

## 2017-01-01 PROCEDURE — 63710000001 INSULIN REGULAR HUMAN PER 5 UNITS: Performed by: EMERGENCY MEDICINE

## 2017-01-01 PROCEDURE — C1769 GUIDE WIRE: HCPCS | Performed by: INTERNAL MEDICINE

## 2017-01-01 PROCEDURE — 5A1221Z PERFORMANCE OF CARDIAC OUTPUT, CONTINUOUS: ICD-10-PCS | Performed by: THORACIC SURGERY (CARDIOTHORACIC VASCULAR SURGERY)

## 2017-01-01 PROCEDURE — 97110 THERAPEUTIC EXERCISES: CPT | Performed by: PHYSICAL THERAPIST

## 2017-01-01 PROCEDURE — P9046 ALBUMIN (HUMAN), 25%, 20 ML: HCPCS

## 2017-01-01 PROCEDURE — 25010000002 HYDRALAZINE PER 20 MG: Performed by: THORACIC SURGERY (CARDIOTHORACIC VASCULAR SURGERY)

## 2017-01-01 PROCEDURE — 82436 ASSAY OF URINE CHLORIDE: CPT | Performed by: INTERNAL MEDICINE

## 2017-01-01 PROCEDURE — 97162 PT EVAL MOD COMPLEX 30 MIN: CPT

## 2017-01-01 PROCEDURE — 25010000002 HEPARIN (PORCINE) PER 1000 UNITS: Performed by: ANESTHESIOLOGY

## 2017-01-01 PROCEDURE — 82803 BLOOD GASES ANY COMBINATION: CPT

## 2017-01-01 PROCEDURE — 80069 RENAL FUNCTION PANEL: CPT | Performed by: THORACIC SURGERY (CARDIOTHORACIC VASCULAR SURGERY)

## 2017-01-01 PROCEDURE — 85610 PROTHROMBIN TIME: CPT | Performed by: NURSE PRACTITIONER

## 2017-01-01 PROCEDURE — 4A10X4G MONITORING OF CENTRAL NERVOUS ELECTRICAL ACTIVITY, INTRAOPERATIVE, EXTERNAL APPROACH: ICD-10-PCS | Performed by: ANESTHESIOLOGY

## 2017-01-01 PROCEDURE — A9270 NON-COVERED ITEM OR SERVICE: HCPCS | Performed by: NURSE PRACTITIONER

## 2017-01-01 PROCEDURE — 82272 OCCULT BLD FECES 1-3 TESTS: CPT

## 2017-01-01 PROCEDURE — 25010000002 AMIODARONE IN DEXTROSE 5% 360-4.14 MG/200ML-% SOLUTION: Performed by: ANESTHESIOLOGY

## 2017-01-01 PROCEDURE — 86140 C-REACTIVE PROTEIN: CPT | Performed by: FAMILY MEDICINE

## 2017-01-01 PROCEDURE — 93000 ELECTROCARDIOGRAM COMPLETE: CPT | Performed by: INTERNAL MEDICINE

## 2017-01-01 PROCEDURE — G0008 ADMIN INFLUENZA VIRUS VAC: HCPCS | Performed by: THORACIC SURGERY (CARDIOTHORACIC VASCULAR SURGERY)

## 2017-01-01 PROCEDURE — 94002 VENT MGMT INPAT INIT DAY: CPT

## 2017-01-01 PROCEDURE — 76536 US EXAM OF HEAD AND NECK: CPT

## 2017-01-01 PROCEDURE — 25010000002 HEPARIN (PORCINE) PER 1000 UNITS

## 2017-01-01 PROCEDURE — 33426 REPAIR OF MITRAL VALVE: CPT | Performed by: THORACIC SURGERY (CARDIOTHORACIC VASCULAR SURGERY)

## 2017-01-01 PROCEDURE — 93005 ELECTROCARDIOGRAM TRACING: CPT | Performed by: FAMILY MEDICINE

## 2017-01-01 PROCEDURE — 25010000002 PROTAMINE SULFATE PER 10 MG: Performed by: NURSE ANESTHETIST, CERTIFIED REGISTERED

## 2017-01-01 PROCEDURE — 83880 ASSAY OF NATRIURETIC PEPTIDE: CPT | Performed by: EMERGENCY MEDICINE

## 2017-01-01 PROCEDURE — 86900 BLOOD TYPING SEROLOGIC ABO: CPT | Performed by: NURSE PRACTITIONER

## 2017-01-01 PROCEDURE — 93312 ECHO TRANSESOPHAGEAL: CPT

## 2017-01-01 PROCEDURE — 80061 LIPID PANEL: CPT | Performed by: INTERNAL MEDICINE

## 2017-01-01 PROCEDURE — 85007 BL SMEAR W/DIFF WBC COUNT: CPT | Performed by: THORACIC SURGERY (CARDIOTHORACIC VASCULAR SURGERY)

## 2017-01-01 PROCEDURE — 85730 THROMBOPLASTIN TIME PARTIAL: CPT | Performed by: NURSE PRACTITIONER

## 2017-01-01 PROCEDURE — 84484 ASSAY OF TROPONIN QUANT: CPT | Performed by: EMERGENCY MEDICINE

## 2017-01-01 PROCEDURE — 99239 HOSP IP/OBS DSCHRG MGMT >30: CPT | Performed by: INTERNAL MEDICINE

## 2017-01-01 PROCEDURE — 81001 URINALYSIS AUTO W/SCOPE: CPT | Performed by: THORACIC SURGERY (CARDIOTHORACIC VASCULAR SURGERY)

## 2017-01-01 PROCEDURE — 93005 ELECTROCARDIOGRAM TRACING: CPT

## 2017-01-01 PROCEDURE — 02UG0JZ SUPPLEMENT MITRAL VALVE WITH SYNTHETIC SUBSTITUTE, OPEN APPROACH: ICD-10-PCS | Performed by: THORACIC SURGERY (CARDIOTHORACIC VASCULAR SURGERY)

## 2017-01-01 PROCEDURE — 85610 PROTHROMBIN TIME: CPT | Performed by: EMERGENCY MEDICINE

## 2017-01-01 PROCEDURE — 86900 BLOOD TYPING SEROLOGIC ABO: CPT | Performed by: THORACIC SURGERY (CARDIOTHORACIC VASCULAR SURGERY)

## 2017-01-01 PROCEDURE — C1894 INTRO/SHEATH, NON-LASER: HCPCS | Performed by: INTERNAL MEDICINE

## 2017-01-01 PROCEDURE — 25010000002 PERFLUTREN (DEFINITY) 8.476 MG IN SODIUM CHLORIDE 0.9 % 10 ML INJECTION: Performed by: SURGERY

## 2017-01-01 PROCEDURE — 99153 MOD SED SAME PHYS/QHP EA: CPT

## 2017-01-01 PROCEDURE — 99231 SBSQ HOSP IP/OBS SF/LOW 25: CPT | Performed by: INTERNAL MEDICINE

## 2017-01-01 PROCEDURE — 71020 HC CHEST PA AND LATERAL: CPT

## 2017-01-01 PROCEDURE — 87077 CULTURE AEROBIC IDENTIFY: CPT | Performed by: THORACIC SURGERY (CARDIOTHORACIC VASCULAR SURGERY)

## 2017-01-01 PROCEDURE — 82947 ASSAY GLUCOSE BLOOD QUANT: CPT

## 2017-01-01 PROCEDURE — 82728 ASSAY OF FERRITIN: CPT | Performed by: NURSE PRACTITIONER

## 2017-01-01 PROCEDURE — 85025 COMPLETE CBC W/AUTO DIFF WBC: CPT | Performed by: NURSE PRACTITIONER

## 2017-01-01 PROCEDURE — C8929 TTE W OR WO FOL WCON,DOPPLER: HCPCS

## 2017-01-01 PROCEDURE — 25010000002 INSULIN REGULAR HUMAN PER 5 UNITS: Performed by: THORACIC SURGERY (CARDIOTHORACIC VASCULAR SURGERY)

## 2017-01-01 PROCEDURE — 63710000001 HYDRALAZINE 25 MG TABLET: Performed by: NURSE PRACTITIONER

## 2017-01-01 PROCEDURE — 85025 COMPLETE CBC W/AUTO DIFF WBC: CPT | Performed by: EMERGENCY MEDICINE

## 2017-01-01 PROCEDURE — 03CM0ZZ EXTIRPATION OF MATTER FROM RIGHT EXTERNAL CAROTID ARTERY, OPEN APPROACH: ICD-10-PCS | Performed by: SURGERY

## 2017-01-01 PROCEDURE — C1729 CATH, DRAINAGE: HCPCS | Performed by: THORACIC SURGERY (CARDIOTHORACIC VASCULAR SURGERY)

## 2017-01-01 PROCEDURE — 87086 URINE CULTURE/COLONY COUNT: CPT | Performed by: THORACIC SURGERY (CARDIOTHORACIC VASCULAR SURGERY)

## 2017-01-01 PROCEDURE — 93882 EXTRACRANIAL UNI/LTD STUDY: CPT

## 2017-01-01 PROCEDURE — 84132 ASSAY OF SERUM POTASSIUM: CPT | Performed by: THORACIC SURGERY (CARDIOTHORACIC VASCULAR SURGERY)

## 2017-01-01 PROCEDURE — 94760 N-INVAS EAR/PLS OXIMETRY 1: CPT

## 2017-01-01 PROCEDURE — 86850 RBC ANTIBODY SCREEN: CPT

## 2017-01-01 PROCEDURE — 80061 LIPID PANEL: CPT | Performed by: NURSE PRACTITIONER

## 2017-01-01 PROCEDURE — 25010000002 FENTANYL CITRATE (PF) 100 MCG/2ML SOLUTION: Performed by: NURSE ANESTHETIST, CERTIFIED REGISTERED

## 2017-01-01 PROCEDURE — 33208 INSRT HEART PM ATRIAL & VENT: CPT | Performed by: INTERNAL MEDICINE

## 2017-01-01 PROCEDURE — 86901 BLOOD TYPING SEROLOGIC RH(D): CPT

## 2017-01-01 PROCEDURE — 25010000002 HEPARIN (PORCINE) PER 1000 UNITS: Performed by: SURGERY

## 2017-01-01 PROCEDURE — 86900 BLOOD TYPING SEROLOGIC ABO: CPT | Performed by: INTERNAL MEDICINE

## 2017-01-01 PROCEDURE — 45385 COLONOSCOPY W/LESION REMOVAL: CPT | Performed by: INTERNAL MEDICINE

## 2017-01-01 PROCEDURE — 25010000002 PHENYLEPHRINE PER 1 ML: Performed by: NURSE ANESTHETIST, CERTIFIED REGISTERED

## 2017-01-01 PROCEDURE — 25010000002 PERFLUTREN (DEFINITY) 8.476 MG IN SODIUM CHLORIDE 10 ML INJECTION: Performed by: INTERNAL MEDICINE

## 2017-01-01 PROCEDURE — 63710000001 BUMETANIDE 1 MG TABLET: Performed by: NURSE PRACTITIONER

## 2017-01-01 PROCEDURE — 84484 ASSAY OF TROPONIN QUANT: CPT | Performed by: INTERNAL MEDICINE

## 2017-01-01 PROCEDURE — 84540 ASSAY OF URINE/UREA-N: CPT | Performed by: INTERNAL MEDICINE

## 2017-01-01 PROCEDURE — 84156 ASSAY OF PROTEIN URINE: CPT | Performed by: INTERNAL MEDICINE

## 2017-01-01 PROCEDURE — 25010000003 CEFAZOLIN PER 500 MG: Performed by: SURGERY

## 2017-01-01 PROCEDURE — 80048 BASIC METABOLIC PNL TOTAL CA: CPT | Performed by: HOSPITALIST

## 2017-01-01 PROCEDURE — 82728 ASSAY OF FERRITIN: CPT | Performed by: HOSPITALIST

## 2017-01-01 PROCEDURE — 85730 THROMBOPLASTIN TIME PARTIAL: CPT | Performed by: THORACIC SURGERY (CARDIOTHORACIC VASCULAR SURGERY)

## 2017-01-01 PROCEDURE — 25010000002 CEFTRIAXONE PER 250 MG: Performed by: INTERNAL MEDICINE

## 2017-01-01 PROCEDURE — 93320 DOPPLER ECHO COMPLETE: CPT

## 2017-01-01 PROCEDURE — 86850 RBC ANTIBODY SCREEN: CPT | Performed by: NURSE PRACTITIONER

## 2017-01-01 PROCEDURE — 85018 HEMOGLOBIN: CPT | Performed by: SURGERY

## 2017-01-01 PROCEDURE — 25010000003 CEFAZOLIN IN DEXTROSE 2-4 GM/100ML-% SOLUTION: Performed by: ANESTHESIOLOGY

## 2017-01-01 PROCEDURE — B2151ZZ FLUOROSCOPY OF LEFT HEART USING LOW OSMOLAR CONTRAST: ICD-10-PCS | Performed by: INTERNAL MEDICINE

## 2017-01-01 PROCEDURE — 33464 VALVULOPLASTY TRICUSPID: CPT | Performed by: THORACIC SURGERY (CARDIOTHORACIC VASCULAR SURGERY)

## 2017-01-01 PROCEDURE — 88305 TISSUE EXAM BY PATHOLOGIST: CPT | Performed by: INTERNAL MEDICINE

## 2017-01-01 PROCEDURE — 83540 ASSAY OF IRON: CPT | Performed by: INTERNAL MEDICINE

## 2017-01-01 PROCEDURE — 86901 BLOOD TYPING SEROLOGIC RH(D): CPT | Performed by: INTERNAL MEDICINE

## 2017-01-01 PROCEDURE — 25010000003 CEFAZOLIN IN DEXTROSE 2-4 GM/100ML-% SOLUTION: Performed by: THORACIC SURGERY (CARDIOTHORACIC VASCULAR SURGERY)

## 2017-01-01 PROCEDURE — 85027 COMPLETE CBC AUTOMATED: CPT | Performed by: SURGERY

## 2017-01-01 PROCEDURE — 82728 ASSAY OF FERRITIN: CPT | Performed by: INTERNAL MEDICINE

## 2017-01-01 PROCEDURE — 83880 ASSAY OF NATRIURETIC PEPTIDE: CPT | Performed by: NURSE PRACTITIONER

## 2017-01-01 PROCEDURE — 93970 EXTREMITY STUDY: CPT

## 2017-01-01 PROCEDURE — 85027 COMPLETE CBC AUTOMATED: CPT | Performed by: HOSPITALIST

## 2017-01-01 PROCEDURE — 25010000002 DEXAMETHASONE PER 1 MG: Performed by: ANESTHESIOLOGY

## 2017-01-01 PROCEDURE — 5A09357 ASSISTANCE WITH RESPIRATORY VENTILATION, LESS THAN 24 CONSECUTIVE HOURS, CONTINUOUS POSITIVE AIRWAY PRESSURE: ICD-10-PCS | Performed by: INTERNAL MEDICINE

## 2017-01-01 PROCEDURE — 99024 POSTOP FOLLOW-UP VISIT: CPT | Performed by: THORACIC SURGERY (CARDIOTHORACIC VASCULAR SURGERY)

## 2017-01-01 PROCEDURE — 80048 BASIC METABOLIC PNL TOTAL CA: CPT | Performed by: SURGERY

## 2017-01-01 PROCEDURE — 25010000002 FENTANYL CITRATE (PF) 100 MCG/2ML SOLUTION: Performed by: ANESTHESIOLOGY

## 2017-01-01 PROCEDURE — 63710000001 INSULIN ASPART PER 5 UNITS: Performed by: THORACIC SURGERY (CARDIOTHORACIC VASCULAR SURGERY)

## 2017-01-01 PROCEDURE — 43239 EGD BIOPSY SINGLE/MULTIPLE: CPT | Performed by: INTERNAL MEDICINE

## 2017-01-01 PROCEDURE — 83735 ASSAY OF MAGNESIUM: CPT | Performed by: NURSE PRACTITIONER

## 2017-01-01 PROCEDURE — 25010000002 METOCLOPRAMIDE PER 10 MG: Performed by: THORACIC SURGERY (CARDIOTHORACIC VASCULAR SURGERY)

## 2017-01-01 PROCEDURE — 02QJ0ZZ REPAIR TRICUSPID VALVE, OPEN APPROACH: ICD-10-PCS | Performed by: THORACIC SURGERY (CARDIOTHORACIC VASCULAR SURGERY)

## 2017-01-01 PROCEDURE — 93000 ELECTROCARDIOGRAM COMPLETE: CPT | Performed by: NURSE PRACTITIONER

## 2017-01-01 PROCEDURE — 85610 PROTHROMBIN TIME: CPT | Performed by: SURGERY

## 2017-01-01 PROCEDURE — 33259 ABLATE ATRIA W/BYPASS ADD-ON: CPT | Performed by: THORACIC SURGERY (CARDIOTHORACIC VASCULAR SURGERY)

## 2017-01-01 PROCEDURE — 82272 OCCULT BLD FECES 1-3 TESTS: CPT | Performed by: INTERNAL MEDICINE

## 2017-01-01 PROCEDURE — 84466 ASSAY OF TRANSFERRIN: CPT | Performed by: INTERNAL MEDICINE

## 2017-01-01 PROCEDURE — 25010000003 POTASSIUM CHLORIDE PER 2 MEQ: Performed by: THORACIC SURGERY (CARDIOTHORACIC VASCULAR SURGERY)

## 2017-01-01 PROCEDURE — 83935 ASSAY OF URINE OSMOLALITY: CPT | Performed by: INTERNAL MEDICINE

## 2017-01-01 PROCEDURE — 99152 MOD SED SAME PHYS/QHP 5/>YRS: CPT

## 2017-01-01 PROCEDURE — 99024 POSTOP FOLLOW-UP VISIT: CPT | Performed by: NURSE PRACTITIONER

## 2017-01-01 PROCEDURE — 03CH0ZZ EXTIRPATION OF MATTER FROM RIGHT COMMON CAROTID ARTERY, OPEN APPROACH: ICD-10-PCS | Performed by: SURGERY

## 2017-01-01 PROCEDURE — 25010000002 INFLUENZA VAC SUBUNIT QUAD 0.5 ML SUSPENSION PREFILLED SYRINGE: Performed by: THORACIC SURGERY (CARDIOTHORACIC VASCULAR SURGERY)

## 2017-01-01 PROCEDURE — 86850 RBC ANTIBODY SCREEN: CPT | Performed by: INTERNAL MEDICINE

## 2017-01-01 PROCEDURE — 82533 TOTAL CORTISOL: CPT | Performed by: INTERNAL MEDICINE

## 2017-01-01 PROCEDURE — C1892 INTRO/SHEATH,FIXED,PEEL-AWAY: HCPCS | Performed by: INTERNAL MEDICINE

## 2017-01-01 PROCEDURE — 93320 DOPPLER ECHO COMPLETE: CPT | Performed by: INTERNAL MEDICINE

## 2017-01-01 PROCEDURE — 4A033BC MEASUREMENT OF ARTERIAL PRESSURE, CORONARY, PERCUTANEOUS APPROACH: ICD-10-PCS | Performed by: INTERNAL MEDICINE

## 2017-01-01 PROCEDURE — 25010000003 PROTAMINE SULFATE PER 10 MG: Performed by: THORACIC SURGERY (CARDIOTHORACIC VASCULAR SURGERY)

## 2017-01-01 PROCEDURE — 25010000002 MAGNESIUM SULFATE IN D5W 1G/100ML (PREMIX) 1-5 GM/100ML-% SOLUTION: Performed by: INTERNAL MEDICINE

## 2017-01-01 PROCEDURE — 85576 BLOOD PLATELET AGGREGATION: CPT | Performed by: THORACIC SURGERY (CARDIOTHORACIC VASCULAR SURGERY)

## 2017-01-01 PROCEDURE — 25010000002 FUROSEMIDE PER 20 MG: Performed by: THORACIC SURGERY (CARDIOTHORACIC VASCULAR SURGERY)

## 2017-01-01 PROCEDURE — 93280 PM DEVICE PROGR EVAL DUAL: CPT | Performed by: INTERNAL MEDICINE

## 2017-01-01 PROCEDURE — 94620 HC PULMONARY STRESS TEST SIMPLE: CPT

## 2017-01-01 PROCEDURE — 70496 CT ANGIOGRAPHY HEAD: CPT

## 2017-01-01 PROCEDURE — 85730 THROMBOPLASTIN TIME PARTIAL: CPT | Performed by: EMERGENCY MEDICINE

## 2017-01-01 PROCEDURE — 0 IOPAMIDOL PER 1 ML: Performed by: INTERNAL MEDICINE

## 2017-01-01 PROCEDURE — 02580ZZ DESTRUCTION OF CONDUCTION MECHANISM, OPEN APPROACH: ICD-10-PCS | Performed by: THORACIC SURGERY (CARDIOTHORACIC VASCULAR SURGERY)

## 2017-01-01 PROCEDURE — 36415 COLL VENOUS BLD VENIPUNCTURE: CPT | Performed by: EMERGENCY MEDICINE

## 2017-01-01 PROCEDURE — 80053 COMPREHEN METABOLIC PANEL: CPT | Performed by: SURGERY

## 2017-01-01 PROCEDURE — 25010000002 ONDANSETRON PER 1 MG: Performed by: NURSE ANESTHETIST, CERTIFIED REGISTERED

## 2017-01-01 PROCEDURE — 25010000002 MAGNESIUM SULFATE IN D5W 1G/100ML (PREMIX) 1-5 GM/100ML-% SOLUTION: Performed by: THORACIC SURGERY (CARDIOTHORACIC VASCULAR SURGERY)

## 2017-01-01 PROCEDURE — 85576 BLOOD PLATELET AGGREGATION: CPT | Performed by: NURSE PRACTITIONER

## 2017-01-01 PROCEDURE — 99222 1ST HOSP IP/OBS MODERATE 55: CPT | Performed by: INTERNAL MEDICINE

## 2017-01-01 PROCEDURE — P9041 ALBUMIN (HUMAN),5%, 50ML: HCPCS | Performed by: THORACIC SURGERY (CARDIOTHORACIC VASCULAR SURGERY)

## 2017-01-01 PROCEDURE — 86901 BLOOD TYPING SEROLOGIC RH(D): CPT | Performed by: SURGERY

## 2017-01-01 PROCEDURE — 93306 TTE W/DOPPLER COMPLETE: CPT | Performed by: INTERNAL MEDICINE

## 2017-01-01 PROCEDURE — 82550 ASSAY OF CK (CPK): CPT | Performed by: INTERNAL MEDICINE

## 2017-01-01 PROCEDURE — 85730 THROMBOPLASTIN TIME PARTIAL: CPT | Performed by: SURGERY

## 2017-01-01 PROCEDURE — 85014 HEMATOCRIT: CPT | Performed by: SURGERY

## 2017-01-01 PROCEDURE — 84443 ASSAY THYROID STIM HORMONE: CPT | Performed by: INTERNAL MEDICINE

## 2017-01-01 PROCEDURE — 25010000002 PHENYLEPHRINE PER 1 ML: Performed by: ANESTHESIOLOGY

## 2017-01-01 PROCEDURE — C1768 GRAFT, VASCULAR: HCPCS | Performed by: SURGERY

## 2017-01-01 PROCEDURE — 83036 HEMOGLOBIN GLYCOSYLATED A1C: CPT | Performed by: THORACIC SURGERY (CARDIOTHORACIC VASCULAR SURGERY)

## 2017-01-01 PROCEDURE — 83540 ASSAY OF IRON: CPT | Performed by: HOSPITALIST

## 2017-01-01 PROCEDURE — 70450 CT HEAD/BRAIN W/O DYE: CPT

## 2017-01-01 PROCEDURE — 25010000002 HYDRALAZINE PER 20 MG

## 2017-01-01 PROCEDURE — 83735 ASSAY OF MAGNESIUM: CPT | Performed by: FAMILY MEDICINE

## 2017-01-01 PROCEDURE — 84484 ASSAY OF TROPONIN QUANT: CPT | Performed by: FAMILY MEDICINE

## 2017-01-01 PROCEDURE — 99223 1ST HOSP IP/OBS HIGH 75: CPT | Performed by: INTERNAL MEDICINE

## 2017-01-01 PROCEDURE — B246ZZ4 ULTRASONOGRAPHY OF RIGHT AND LEFT HEART, TRANSESOPHAGEAL: ICD-10-PCS | Performed by: THORACIC SURGERY (CARDIOTHORACIC VASCULAR SURGERY)

## 2017-01-01 DEVICE — GEN PM ASSURITY DR RF PM2240 MERLIN: Type: IMPLANTABLE DEVICE | Status: FUNCTIONAL

## 2017-01-01 DEVICE — VASCU-GUARD PERIPHERAL VASCULAR PATCH IS PREPARED FROM BOVINE PERICARDIUM WHICH IS CROSS-LINKED WITH GLUTARALDEHYDE. THE PERICARDIUM IS PROCURED FROM CATTLE ORIGINATING IN THE UNITED STATES. VASCU-GUARD PERIPHERAL VASCULAR PATCH IS CHEMICALLY STERILIZED USING ETHANOL AND PROPYLENE OXIDE. VASCU-GUARD PERIPHERAL VASCULAR PATCH HAS BEEN TREATED WITH 1 MOLAR SODIUM HYDROXIDE FOR A MINIMUM OF 60 MINUTES AT 20 - 25°C.  VASCU-GUARD PERIPHERAL VASCULAR PATCH IS PACKAGED IN A CONTAINER FILLED WITH STERILE, NON-PYROGENIC WATER CONTAINING PROPYLENE OXIDE. THE CONTENTS OF THE UNOPENED, UNDAMAGED CONTAINER ARE STERILE.
Type: IMPLANTABLE DEVICE | Site: CAROTID | Status: FUNCTIONAL
Brand: VASCU-GUARD PERIPHERAL VASCULAR PATCH

## 2017-01-01 DEVICE — RNG ANULPLSTY PROFILE3D 26MM: Type: IMPLANTABLE DEVICE | Site: HEART | Status: FUNCTIONAL

## 2017-01-01 DEVICE — IMPLANTABLE DEVICE: Type: IMPLANTABLE DEVICE | Site: HEART | Status: FUNCTIONAL

## 2017-01-01 DEVICE — LD PM TENDRIL STS 6F46CM 2088TC46: Type: IMPLANTABLE DEVICE | Status: FUNCTIONAL

## 2017-01-01 DEVICE — IMPLANTABLE DEVICE: Type: IMPLANTABLE DEVICE | Status: FUNCTIONAL

## 2017-01-01 RX ORDER — IRON POLYSACCHARIDE COMPLEX 150 MG
150 CAPSULE ORAL DAILY
Status: DISCONTINUED | OUTPATIENT
Start: 2017-01-01 | End: 2017-01-01

## 2017-01-01 RX ORDER — MORPHINE SULFATE 2 MG/ML
1 INJECTION, SOLUTION INTRAMUSCULAR; INTRAVENOUS EVERY 4 HOURS PRN
Status: DISCONTINUED | OUTPATIENT
Start: 2017-01-01 | End: 2017-01-01 | Stop reason: HOSPADM

## 2017-01-01 RX ORDER — FUROSEMIDE 10 MG/ML
20 INJECTION INTRAMUSCULAR; INTRAVENOUS EVERY 6 HOURS
Status: COMPLETED | OUTPATIENT
Start: 2017-01-01 | End: 2017-01-01

## 2017-01-01 RX ORDER — SODIUM CHLORIDE 0.9 % (FLUSH) 0.9 %
10 SYRINGE (ML) INJECTION AS NEEDED
Status: DISCONTINUED | OUTPATIENT
Start: 2017-01-01 | End: 2017-01-01

## 2017-01-01 RX ORDER — BISACODYL 5 MG/1
10 TABLET, DELAYED RELEASE ORAL DAILY PRN
Qty: 30 TABLET | Refills: 5 | Status: ON HOLD | OUTPATIENT
Start: 2017-01-01 | End: 2017-01-01

## 2017-01-01 RX ORDER — CEFAZOLIN SODIUM 500 MG/2.2ML
2 INJECTION, POWDER, FOR SOLUTION INTRAMUSCULAR; INTRAVENOUS ONCE
Status: DISCONTINUED | OUTPATIENT
Start: 2017-01-01 | End: 2017-01-01 | Stop reason: SDUPTHER

## 2017-01-01 RX ORDER — AMLODIPINE BESYLATE 5 MG/1
5 TABLET ORAL
Status: DISCONTINUED | OUTPATIENT
Start: 2017-01-01 | End: 2017-01-01

## 2017-01-01 RX ORDER — SODIUM CHLORIDE, SODIUM LACTATE, POTASSIUM CHLORIDE, CALCIUM CHLORIDE 600; 310; 30; 20 MG/100ML; MG/100ML; MG/100ML; MG/100ML
30 INJECTION, SOLUTION INTRAVENOUS CONTINUOUS PRN
Status: DISCONTINUED | OUTPATIENT
Start: 2017-01-01 | End: 2017-01-01 | Stop reason: HOSPADM

## 2017-01-01 RX ORDER — HEPARIN SODIUM 1000 [USP'U]/ML
INJECTION, SOLUTION INTRAVENOUS; SUBCUTANEOUS AS NEEDED
Status: DISCONTINUED | OUTPATIENT
Start: 2017-01-01 | End: 2017-01-01 | Stop reason: SURG

## 2017-01-01 RX ORDER — TRIAMTERENE AND HYDROCHLOROTHIAZIDE 37.5; 25 MG/1; MG/1
1 TABLET ORAL DAILY
Status: DISCONTINUED | OUTPATIENT
Start: 2017-01-01 | End: 2017-01-01

## 2017-01-01 RX ORDER — AMLODIPINE BESYLATE 2.5 MG/1
2.5 TABLET ORAL
Status: DISCONTINUED | OUTPATIENT
Start: 2017-01-01 | End: 2017-01-01

## 2017-01-01 RX ORDER — POTASSIUM CHLORIDE 29.8 MG/ML
20 INJECTION INTRAVENOUS
Status: COMPLETED | OUTPATIENT
Start: 2017-01-01 | End: 2017-01-01

## 2017-01-01 RX ORDER — PROPOFOL 10 MG/ML
VIAL (ML) INTRAVENOUS AS NEEDED
Status: DISCONTINUED | OUTPATIENT
Start: 2017-01-01 | End: 2017-01-01 | Stop reason: SURG

## 2017-01-01 RX ORDER — SODIUM CHLORIDE 0.9 % (FLUSH) 0.9 %
1-10 SYRINGE (ML) INJECTION AS NEEDED
Status: DISCONTINUED | OUTPATIENT
Start: 2017-01-01 | End: 2017-01-01 | Stop reason: HOSPADM

## 2017-01-01 RX ORDER — HYDROCODONE BITARTRATE AND ACETAMINOPHEN 7.5; 325 MG/1; MG/1
1 TABLET ORAL ONCE AS NEEDED
Status: DISCONTINUED | OUTPATIENT
Start: 2017-01-01 | End: 2017-01-01

## 2017-01-01 RX ORDER — POTASSIUM CHLORIDE 750 MG/1
20 CAPSULE, EXTENDED RELEASE ORAL 2 TIMES DAILY WITH MEALS
Qty: 120 CAPSULE | Refills: 5 | Status: SHIPPED | OUTPATIENT
Start: 2017-01-01

## 2017-01-01 RX ORDER — NITROGLYCERIN 0.4 MG/1
0.4 TABLET SUBLINGUAL
Status: DISCONTINUED | OUTPATIENT
Start: 2017-01-01 | End: 2017-01-01

## 2017-01-01 RX ORDER — FAMOTIDINE 10 MG/ML
20 INJECTION, SOLUTION INTRAVENOUS DAILY
Status: DISCONTINUED | OUTPATIENT
Start: 2017-01-01 | End: 2017-01-01 | Stop reason: HOSPADM

## 2017-01-01 RX ORDER — HYDROCODONE BITARTRATE AND ACETAMINOPHEN 5; 325 MG/1; MG/1
2 TABLET ORAL EVERY 4 HOURS PRN
Status: DISCONTINUED | OUTPATIENT
Start: 2017-01-01 | End: 2017-01-01 | Stop reason: HOSPADM

## 2017-01-01 RX ORDER — DEXAMETHASONE SODIUM PHOSPHATE 4 MG/ML
INJECTION, SOLUTION INTRA-ARTICULAR; INTRALESIONAL; INTRAMUSCULAR; INTRAVENOUS; SOFT TISSUE AS NEEDED
Status: DISCONTINUED | OUTPATIENT
Start: 2017-01-01 | End: 2017-01-01 | Stop reason: SURG

## 2017-01-01 RX ORDER — CHLORHEXIDINE GLUCONATE 0.12 MG/ML
15 RINSE ORAL EVERY 12 HOURS
Status: DISCONTINUED | OUTPATIENT
Start: 2017-01-01 | End: 2017-01-01

## 2017-01-01 RX ORDER — NALOXONE HCL 0.4 MG/ML
0.2 VIAL (ML) INJECTION AS NEEDED
Status: DISCONTINUED | OUTPATIENT
Start: 2017-01-01 | End: 2017-01-01 | Stop reason: HOSPADM

## 2017-01-01 RX ORDER — ROCURONIUM BROMIDE 10 MG/ML
INJECTION, SOLUTION INTRAVENOUS AS NEEDED
Status: DISCONTINUED | OUTPATIENT
Start: 2017-01-01 | End: 2017-01-01 | Stop reason: SURG

## 2017-01-01 RX ORDER — ECHINACEA PURPUREA EXTRACT 125 MG
2 TABLET ORAL AS NEEDED
Status: DISCONTINUED | OUTPATIENT
Start: 2017-01-01 | End: 2017-01-01

## 2017-01-01 RX ORDER — CARVEDILOL 25 MG/1
25 TABLET ORAL EVERY 12 HOURS SCHEDULED
Status: DISCONTINUED | OUTPATIENT
Start: 2017-01-01 | End: 2017-01-01

## 2017-01-01 RX ORDER — FENTANYL CITRATE 50 UG/ML
INJECTION, SOLUTION INTRAMUSCULAR; INTRAVENOUS AS NEEDED
Status: DISCONTINUED | OUTPATIENT
Start: 2017-01-01 | End: 2017-01-01 | Stop reason: SURG

## 2017-01-01 RX ORDER — DILTIAZEM HYDROCHLORIDE 60 MG/1
60 CAPSULE, EXTENDED RELEASE ORAL EVERY 12 HOURS SCHEDULED
Status: DISCONTINUED | OUTPATIENT
Start: 2017-01-01 | End: 2017-01-01 | Stop reason: HOSPADM

## 2017-01-01 RX ORDER — CLOTRIMAZOLE AND BETAMETHASONE DIPROPIONATE 10; .64 MG/G; MG/G
CREAM TOPICAL EVERY 12 HOURS SCHEDULED
Status: DISCONTINUED | OUTPATIENT
Start: 2017-01-01 | End: 2017-01-01

## 2017-01-01 RX ORDER — LIDOCAINE HYDROCHLORIDE 20 MG/ML
INJECTION, SOLUTION INFILTRATION; PERINEURAL AS NEEDED
Status: DISCONTINUED | OUTPATIENT
Start: 2017-01-01 | End: 2017-01-01 | Stop reason: SURG

## 2017-01-01 RX ORDER — SODIUM CHLORIDE 0.9 % (FLUSH) 0.9 %
10 SYRINGE (ML) INJECTION AS NEEDED
Status: DISCONTINUED | OUTPATIENT
Start: 2017-01-01 | End: 2017-01-01 | Stop reason: HOSPADM

## 2017-01-01 RX ORDER — AMLODIPINE BESYLATE 2.5 MG/1
2.5 TABLET ORAL ONCE
Status: COMPLETED | OUTPATIENT
Start: 2017-01-01 | End: 2017-01-01

## 2017-01-01 RX ORDER — POTASSIUM CHLORIDE 750 MG/1
20 CAPSULE, EXTENDED RELEASE ORAL 2 TIMES DAILY WITH MEALS
Status: DISCONTINUED | OUTPATIENT
Start: 2017-01-01 | End: 2017-01-01 | Stop reason: HOSPADM

## 2017-01-01 RX ORDER — FUROSEMIDE 10 MG/ML
40 INJECTION INTRAMUSCULAR; INTRAVENOUS EVERY 8 HOURS
Status: DISCONTINUED | OUTPATIENT
Start: 2017-01-01 | End: 2017-01-01

## 2017-01-01 RX ORDER — POTASSIUM CHLORIDE 7.45 MG/ML
10 INJECTION INTRAVENOUS
Status: DISCONTINUED | OUTPATIENT
Start: 2017-01-01 | End: 2017-01-01 | Stop reason: HOSPADM

## 2017-01-01 RX ORDER — HYDROCODONE BITARTRATE AND ACETAMINOPHEN 5; 325 MG/1; MG/1
1 TABLET ORAL EVERY 4 HOURS PRN
Qty: 60 TABLET | Refills: 0 | Status: SHIPPED | OUTPATIENT
Start: 2017-01-01 | End: 2017-01-01

## 2017-01-01 RX ORDER — NALOXONE HCL 0.4 MG/ML
0.4 VIAL (ML) INJECTION
Status: DISCONTINUED | OUTPATIENT
Start: 2017-01-01 | End: 2017-01-01 | Stop reason: HOSPADM

## 2017-01-01 RX ORDER — ONDANSETRON 2 MG/ML
4 INJECTION INTRAMUSCULAR; INTRAVENOUS EVERY 6 HOURS PRN
Status: DISCONTINUED | OUTPATIENT
Start: 2017-01-01 | End: 2017-01-01 | Stop reason: HOSPADM

## 2017-01-01 RX ORDER — CARVEDILOL 25 MG/1
25 TABLET ORAL EVERY 12 HOURS
Status: DISCONTINUED | OUTPATIENT
Start: 2017-01-01 | End: 2017-01-01 | Stop reason: HOSPADM

## 2017-01-01 RX ORDER — NICOTINE POLACRILEX 4 MG
15 LOZENGE BUCCAL
Status: DISCONTINUED | OUTPATIENT
Start: 2017-01-01 | End: 2017-01-01 | Stop reason: HOSPADM

## 2017-01-01 RX ORDER — CARVEDILOL 25 MG/1
25 TABLET ORAL ONCE
Status: COMPLETED | OUTPATIENT
Start: 2017-01-01 | End: 2017-01-01

## 2017-01-01 RX ORDER — AMLODIPINE BESYLATE 10 MG/1
10 TABLET ORAL
Status: DISCONTINUED | OUTPATIENT
Start: 2017-01-01 | End: 2017-01-01 | Stop reason: HOSPADM

## 2017-01-01 RX ORDER — FUROSEMIDE 10 MG/ML
40 INJECTION INTRAMUSCULAR; INTRAVENOUS EVERY 12 HOURS
Status: DISCONTINUED | OUTPATIENT
Start: 2017-01-01 | End: 2017-01-01 | Stop reason: DRUGHIGH

## 2017-01-01 RX ORDER — BUPIVACAINE HYDROCHLORIDE AND EPINEPHRINE 2.5; 5 MG/ML; UG/ML
INJECTION, SOLUTION INFILTRATION; PERINEURAL AS NEEDED
Status: DISCONTINUED | OUTPATIENT
Start: 2017-01-01 | End: 2017-01-01 | Stop reason: HOSPADM

## 2017-01-01 RX ORDER — CHLORHEXIDINE GLUCONATE 500 MG/1
1 CLOTH TOPICAL EVERY 12 HOURS
Status: DISCONTINUED | OUTPATIENT
Start: 2017-01-01 | End: 2017-01-01

## 2017-01-01 RX ORDER — BUDESONIDE AND FORMOTEROL FUMARATE DIHYDRATE 160; 4.5 UG/1; UG/1
2 AEROSOL RESPIRATORY (INHALATION)
Status: DISCONTINUED | OUTPATIENT
Start: 2017-01-01 | End: 2017-01-01 | Stop reason: HOSPADM

## 2017-01-01 RX ORDER — FENTANYL CITRATE 50 UG/ML
INJECTION, SOLUTION INTRAMUSCULAR; INTRAVENOUS AS NEEDED
Status: DISCONTINUED | OUTPATIENT
Start: 2017-01-01 | End: 2017-01-01 | Stop reason: HOSPADM

## 2017-01-01 RX ORDER — SODIUM CHLORIDE 9 MG/ML
INJECTION, SOLUTION INTRAVENOUS
Status: COMPLETED | OUTPATIENT
Start: 2017-01-01 | End: 2017-01-01

## 2017-01-01 RX ORDER — HYDRALAZINE HYDROCHLORIDE 25 MG/1
25 TABLET, FILM COATED ORAL ONCE
Status: COMPLETED | OUTPATIENT
Start: 2017-01-01 | End: 2017-01-01

## 2017-01-01 RX ORDER — ASPIRIN 81 MG/1
81 TABLET ORAL DAILY
Qty: 30 TABLET | Refills: 5 | Status: SHIPPED | OUTPATIENT
Start: 2017-01-01

## 2017-01-01 RX ORDER — DIPHENHYDRAMINE HYDROCHLORIDE 50 MG/ML
12.5 INJECTION INTRAMUSCULAR; INTRAVENOUS
Status: DISCONTINUED | OUTPATIENT
Start: 2017-01-01 | End: 2017-01-01 | Stop reason: HOSPADM

## 2017-01-01 RX ORDER — PROMETHAZINE HYDROCHLORIDE 25 MG/1
25 TABLET ORAL ONCE AS NEEDED
Status: DISCONTINUED | OUTPATIENT
Start: 2017-01-01 | End: 2017-01-01

## 2017-01-01 RX ORDER — ONDANSETRON 4 MG/1
4 TABLET, ORALLY DISINTEGRATING ORAL EVERY 6 HOURS PRN
Status: DISCONTINUED | OUTPATIENT
Start: 2017-01-01 | End: 2017-01-01 | Stop reason: HOSPADM

## 2017-01-01 RX ORDER — LABETALOL HYDROCHLORIDE 5 MG/ML
5 INJECTION, SOLUTION INTRAVENOUS
Status: DISCONTINUED | OUTPATIENT
Start: 2017-01-01 | End: 2017-01-01

## 2017-01-01 RX ORDER — FUROSEMIDE 10 MG/ML
20 INJECTION INTRAMUSCULAR; INTRAVENOUS EVERY 6 HOURS
Status: DISCONTINUED | OUTPATIENT
Start: 2017-01-01 | End: 2017-01-01

## 2017-01-01 RX ORDER — MIDAZOLAM HYDROCHLORIDE 1 MG/ML
1 INJECTION INTRAMUSCULAR; INTRAVENOUS
Status: DISCONTINUED | OUTPATIENT
Start: 2017-01-01 | End: 2017-01-01

## 2017-01-01 RX ORDER — HYDRALAZINE HYDROCHLORIDE 10 MG/1
10 TABLET, FILM COATED ORAL EVERY 8 HOURS SCHEDULED
Status: DISCONTINUED | OUTPATIENT
Start: 2017-01-01 | End: 2017-01-01

## 2017-01-01 RX ORDER — DEXTROSE MONOHYDRATE 25 G/50ML
25 INJECTION, SOLUTION INTRAVENOUS
Status: DISCONTINUED | OUTPATIENT
Start: 2017-01-01 | End: 2017-01-01 | Stop reason: HOSPADM

## 2017-01-01 RX ORDER — ALPRAZOLAM 0.25 MG/1
0.25 TABLET ORAL EVERY 8 HOURS PRN
Status: DISCONTINUED | OUTPATIENT
Start: 2017-01-01 | End: 2017-01-01 | Stop reason: HOSPADM

## 2017-01-01 RX ORDER — BUMETANIDE 0.25 MG/ML
2 INJECTION INTRAMUSCULAR; INTRAVENOUS ONCE
Status: COMPLETED | OUTPATIENT
Start: 2017-01-01 | End: 2017-01-01

## 2017-01-01 RX ORDER — SODIUM CHLORIDE 0.9 % (FLUSH) 0.9 %
1-10 SYRINGE (ML) INJECTION AS NEEDED
Status: DISCONTINUED | OUTPATIENT
Start: 2017-01-01 | End: 2017-01-01

## 2017-01-01 RX ORDER — CARVEDILOL 6.25 MG/1
6.25 TABLET ORAL EVERY 12 HOURS
Status: DISCONTINUED | OUTPATIENT
Start: 2017-01-01 | End: 2017-01-01

## 2017-01-01 RX ORDER — POLYETHYLENE GLYCOL 3350, SODIUM CHLORIDE, POTASSIUM CHLORIDE, SODIUM BICARBONATE, AND SODIUM SULFATE 240; 5.84; 2.98; 6.72; 22.72 G/4L; G/4L; G/4L; G/4L; G/4L
2000 POWDER, FOR SOLUTION ORAL ONCE
Status: COMPLETED | OUTPATIENT
Start: 2017-01-01 | End: 2017-01-01

## 2017-01-01 RX ORDER — PANTOPRAZOLE SODIUM 40 MG/1
40 TABLET, DELAYED RELEASE ORAL
Status: DISCONTINUED | OUTPATIENT
Start: 2017-01-01 | End: 2017-01-01

## 2017-01-01 RX ORDER — HYDROMORPHONE HYDROCHLORIDE 1 MG/ML
0.25 INJECTION, SOLUTION INTRAMUSCULAR; INTRAVENOUS; SUBCUTANEOUS
Status: DISCONTINUED | OUTPATIENT
Start: 2017-01-01 | End: 2017-01-01 | Stop reason: HOSPADM

## 2017-01-01 RX ORDER — HYDROMORPHONE HYDROCHLORIDE 1 MG/ML
0.5 INJECTION, SOLUTION INTRAMUSCULAR; INTRAVENOUS; SUBCUTANEOUS
Status: DISCONTINUED | OUTPATIENT
Start: 2017-01-01 | End: 2017-01-01

## 2017-01-01 RX ORDER — ASPIRIN 81 MG/1
81 TABLET ORAL DAILY
Status: DISCONTINUED | OUTPATIENT
Start: 2017-01-01 | End: 2017-01-01 | Stop reason: HOSPADM

## 2017-01-01 RX ORDER — POTASSIUM CHLORIDE 1.5 G/1.77G
40 POWDER, FOR SOLUTION ORAL AS NEEDED
Status: DISCONTINUED | OUTPATIENT
Start: 2017-01-01 | End: 2017-01-01

## 2017-01-01 RX ORDER — MIDAZOLAM HYDROCHLORIDE 1 MG/ML
2 INJECTION INTRAMUSCULAR; INTRAVENOUS
Status: DISCONTINUED | OUTPATIENT
Start: 2017-01-01 | End: 2017-01-01 | Stop reason: HOSPADM

## 2017-01-01 RX ORDER — POTASSIUM CHLORIDE 750 MG/1
20 CAPSULE, EXTENDED RELEASE ORAL DAILY
Status: DISCONTINUED | OUTPATIENT
Start: 2017-01-01 | End: 2017-01-01

## 2017-01-01 RX ORDER — PANTOPRAZOLE SODIUM 40 MG/1
40 TABLET, DELAYED RELEASE ORAL EVERY MORNING
Status: DISCONTINUED | OUTPATIENT
Start: 2017-01-01 | End: 2017-01-01 | Stop reason: HOSPADM

## 2017-01-01 RX ORDER — MAGNESIUM SULFATE HEPTAHYDRATE 40 MG/ML
2 INJECTION, SOLUTION INTRAVENOUS AS NEEDED
Status: DISCONTINUED | OUTPATIENT
Start: 2017-01-01 | End: 2017-01-01

## 2017-01-01 RX ORDER — MAGNESIUM SULFATE 1 G/100ML
1 INJECTION INTRAVENOUS ONCE
Status: COMPLETED | OUTPATIENT
Start: 2017-01-01 | End: 2017-01-01

## 2017-01-01 RX ORDER — SODIUM CHLORIDE 9 MG/ML
INJECTION, SOLUTION INTRAVENOUS CONTINUOUS PRN
Status: DISCONTINUED | OUTPATIENT
Start: 2017-01-01 | End: 2017-01-01 | Stop reason: HOSPADM

## 2017-01-01 RX ORDER — SODIUM CHLORIDE, SODIUM LACTATE, POTASSIUM CHLORIDE, CALCIUM CHLORIDE 600; 310; 30; 20 MG/100ML; MG/100ML; MG/100ML; MG/100ML
9 INJECTION, SOLUTION INTRAVENOUS CONTINUOUS
Status: DISCONTINUED | OUTPATIENT
Start: 2017-01-01 | End: 2017-01-01 | Stop reason: HOSPADM

## 2017-01-01 RX ORDER — NALOXONE HCL 0.4 MG/ML
0.2 VIAL (ML) INJECTION AS NEEDED
Status: DISCONTINUED | OUTPATIENT
Start: 2017-01-01 | End: 2017-01-01

## 2017-01-01 RX ORDER — POTASSIUM CHLORIDE 750 MG/1
40 CAPSULE, EXTENDED RELEASE ORAL ONCE
Status: COMPLETED | OUTPATIENT
Start: 2017-01-01 | End: 2017-01-01

## 2017-01-01 RX ORDER — POLYETHYLENE GLYCOL 3350, SODIUM CHLORIDE, POTASSIUM CHLORIDE, SODIUM BICARBONATE, AND SODIUM SULFATE 240; 5.84; 2.98; 6.72; 22.72 G/4L; G/4L; G/4L; G/4L; G/4L
2000 POWDER, FOR SOLUTION ORAL EVERY 8 HOURS SCHEDULED
Status: DISCONTINUED | OUTPATIENT
Start: 2017-01-01 | End: 2017-01-01

## 2017-01-01 RX ORDER — CEFTRIAXONE SODIUM 1 G/50ML
1 INJECTION, SOLUTION INTRAVENOUS EVERY 24 HOURS
Status: DISCONTINUED | OUTPATIENT
Start: 2017-01-01 | End: 2017-01-01

## 2017-01-01 RX ORDER — POTASSIUM CHLORIDE 1.5 G/1.77G
40 POWDER, FOR SOLUTION ORAL AS NEEDED
Status: DISCONTINUED | OUTPATIENT
Start: 2017-01-01 | End: 2017-01-01 | Stop reason: HOSPADM

## 2017-01-01 RX ORDER — LIDOCAINE HYDROCHLORIDE 10 MG/ML
INJECTION, SOLUTION INFILTRATION; PERINEURAL AS NEEDED
Status: DISCONTINUED | OUTPATIENT
Start: 2017-01-01 | End: 2017-01-01 | Stop reason: HOSPADM

## 2017-01-01 RX ORDER — OXYCODONE AND ACETAMINOPHEN 7.5; 325 MG/1; MG/1
1 TABLET ORAL ONCE AS NEEDED
Status: DISCONTINUED | OUTPATIENT
Start: 2017-01-01 | End: 2017-01-01

## 2017-01-01 RX ORDER — MIDAZOLAM HYDROCHLORIDE 1 MG/ML
1 INJECTION INTRAMUSCULAR; INTRAVENOUS
Status: DISCONTINUED | OUTPATIENT
Start: 2017-01-01 | End: 2017-01-01 | Stop reason: HOSPADM

## 2017-01-01 RX ORDER — CITALOPRAM 40 MG/1
40 TABLET ORAL NIGHTLY
Status: DISCONTINUED | OUTPATIENT
Start: 2017-01-01 | End: 2017-01-01 | Stop reason: HOSPADM

## 2017-01-01 RX ORDER — BUMETANIDE 2 MG/1
2 TABLET ORAL 2 TIMES DAILY
Status: DISCONTINUED | OUTPATIENT
Start: 2017-01-01 | End: 2017-01-01 | Stop reason: HOSPADM

## 2017-01-01 RX ORDER — SODIUM CHLORIDE 9 MG/ML
30 INJECTION, SOLUTION INTRAVENOUS CONTINUOUS PRN
Status: DISCONTINUED | OUTPATIENT
Start: 2017-01-01 | End: 2017-01-01 | Stop reason: HOSPADM

## 2017-01-01 RX ORDER — CARVEDILOL 3.12 MG/1
3.12 TABLET ORAL EVERY 12 HOURS SCHEDULED
Status: DISCONTINUED | OUTPATIENT
Start: 2017-01-01 | End: 2017-01-01

## 2017-01-01 RX ORDER — NITROGLYCERIN 0.4 MG/1
0.4 TABLET SUBLINGUAL
Status: DISCONTINUED | OUTPATIENT
Start: 2017-01-01 | End: 2017-01-01 | Stop reason: HOSPADM

## 2017-01-01 RX ORDER — BUMETANIDE 1 MG/1
1 TABLET ORAL ONCE
Status: COMPLETED | OUTPATIENT
Start: 2017-01-01 | End: 2017-01-01

## 2017-01-01 RX ORDER — HYDRALAZINE HYDROCHLORIDE 50 MG/1
50 TABLET, FILM COATED ORAL EVERY 8 HOURS SCHEDULED
Status: DISCONTINUED | OUTPATIENT
Start: 2017-01-01 | End: 2017-01-01 | Stop reason: HOSPADM

## 2017-01-01 RX ORDER — FUROSEMIDE 10 MG/ML
40 INJECTION INTRAMUSCULAR; INTRAVENOUS EVERY 6 HOURS PRN
Status: DISCONTINUED | OUTPATIENT
Start: 2017-01-01 | End: 2017-01-01 | Stop reason: HOSPADM

## 2017-01-01 RX ORDER — MIDAZOLAM HYDROCHLORIDE 1 MG/ML
INJECTION INTRAMUSCULAR; INTRAVENOUS AS NEEDED
Status: DISCONTINUED | OUTPATIENT
Start: 2017-01-01 | End: 2017-01-01 | Stop reason: HOSPADM

## 2017-01-01 RX ORDER — POTASSIUM CHLORIDE 750 MG/1
40 CAPSULE, EXTENDED RELEASE ORAL AS NEEDED
Status: DISCONTINUED | OUTPATIENT
Start: 2017-01-01 | End: 2017-01-01

## 2017-01-01 RX ORDER — CEFAZOLIN SODIUM 2 G/100ML
2 INJECTION, SOLUTION INTRAVENOUS EVERY 8 HOURS
Status: COMPLETED | OUTPATIENT
Start: 2017-01-01 | End: 2017-01-01

## 2017-01-01 RX ORDER — CEFAZOLIN SODIUM 2 G/100ML
2 INJECTION, SOLUTION INTRAVENOUS
Status: DISCONTINUED | OUTPATIENT
Start: 2017-01-01 | End: 2017-01-01 | Stop reason: HOSPADM

## 2017-01-01 RX ORDER — CEFAZOLIN SODIUM 1 G/3ML
INJECTION, POWDER, FOR SOLUTION INTRAMUSCULAR; INTRAVENOUS AS NEEDED
Status: DISCONTINUED | OUTPATIENT
Start: 2017-01-01 | End: 2017-01-01 | Stop reason: HOSPADM

## 2017-01-01 RX ORDER — FAMOTIDINE 10 MG/ML
20 INJECTION, SOLUTION INTRAVENOUS ONCE
Status: COMPLETED | OUTPATIENT
Start: 2017-01-01 | End: 2017-01-01

## 2017-01-01 RX ORDER — LIDOCAINE HYDROCHLORIDE 20 MG/ML
INJECTION, SOLUTION INFILTRATION; PERINEURAL AS NEEDED
Status: DISCONTINUED | OUTPATIENT
Start: 2017-01-01 | End: 2017-01-01 | Stop reason: HOSPADM

## 2017-01-01 RX ORDER — DIPHENHYDRAMINE HYDROCHLORIDE 50 MG/ML
12.5 INJECTION INTRAMUSCULAR; INTRAVENOUS
Status: DISCONTINUED | OUTPATIENT
Start: 2017-01-01 | End: 2017-01-01

## 2017-01-01 RX ORDER — PROMETHAZINE HYDROCHLORIDE 25 MG/ML
12.5 INJECTION, SOLUTION INTRAMUSCULAR; INTRAVENOUS EVERY 6 HOURS PRN
Status: DISCONTINUED | OUTPATIENT
Start: 2017-01-01 | End: 2017-01-01 | Stop reason: HOSPADM

## 2017-01-01 RX ORDER — MELOXICAM 15 MG/1
15 TABLET ORAL DAILY
COMMUNITY
End: 2017-01-01 | Stop reason: HOSPADM

## 2017-01-01 RX ORDER — OXYCODONE HYDROCHLORIDE 5 MG/1
10 TABLET ORAL EVERY 4 HOURS PRN
Status: DISCONTINUED | OUTPATIENT
Start: 2017-01-01 | End: 2017-01-01 | Stop reason: HOSPADM

## 2017-01-01 RX ORDER — ONDANSETRON 4 MG/1
4 TABLET, FILM COATED ORAL EVERY 6 HOURS PRN
Status: DISCONTINUED | OUTPATIENT
Start: 2017-01-01 | End: 2017-01-01 | Stop reason: HOSPADM

## 2017-01-01 RX ORDER — HYDROCODONE BITARTRATE AND ACETAMINOPHEN 5; 325 MG/1; MG/1
1 TABLET ORAL EVERY 4 HOURS PRN
Status: DISCONTINUED | OUTPATIENT
Start: 2017-01-01 | End: 2017-01-01 | Stop reason: HOSPADM

## 2017-01-01 RX ORDER — ALBUTEROL SULFATE 2.5 MG/3ML
2.5 SOLUTION RESPIRATORY (INHALATION) ONCE
Status: COMPLETED | OUTPATIENT
Start: 2017-01-01 | End: 2017-01-01

## 2017-01-01 RX ORDER — MILRINONE LACTATE 0.2 MG/ML
.25-.75 INJECTION, SOLUTION INTRAVENOUS CONTINUOUS PRN
Status: DISCONTINUED | OUTPATIENT
Start: 2017-01-01 | End: 2017-01-01

## 2017-01-01 RX ORDER — POTASSIUM CHLORIDE 750 MG/1
40 CAPSULE, EXTENDED RELEASE ORAL 2 TIMES DAILY WITH MEALS
Status: DISCONTINUED | OUTPATIENT
Start: 2017-01-01 | End: 2017-01-01

## 2017-01-01 RX ORDER — HYDRALAZINE HYDROCHLORIDE 20 MG/ML
5 INJECTION INTRAMUSCULAR; INTRAVENOUS
Status: DISCONTINUED | OUTPATIENT
Start: 2017-01-01 | End: 2017-01-01 | Stop reason: HOSPADM

## 2017-01-01 RX ORDER — ATORVASTATIN CALCIUM 20 MG/1
40 TABLET, FILM COATED ORAL NIGHTLY
Status: DISCONTINUED | OUTPATIENT
Start: 2017-01-01 | End: 2017-01-01 | Stop reason: HOSPADM

## 2017-01-01 RX ORDER — CITALOPRAM 40 MG/1
40 TABLET ORAL DAILY
Status: DISCONTINUED | OUTPATIENT
Start: 2017-01-01 | End: 2017-01-01 | Stop reason: HOSPADM

## 2017-01-01 RX ORDER — FUROSEMIDE 10 MG/ML
40 INJECTION INTRAMUSCULAR; INTRAVENOUS
Status: DISCONTINUED | OUTPATIENT
Start: 2017-01-01 | End: 2017-01-01

## 2017-01-01 RX ORDER — BISACODYL 5 MG/1
10 TABLET, DELAYED RELEASE ORAL DAILY PRN
Status: DISCONTINUED | OUTPATIENT
Start: 2017-01-01 | End: 2017-01-01 | Stop reason: HOSPADM

## 2017-01-01 RX ORDER — POTASSIUM CHLORIDE 1.5 G/1.77G
40 POWDER, FOR SOLUTION ORAL AS NEEDED
Status: DISCONTINUED | OUTPATIENT
Start: 2017-01-01 | End: 2017-01-01 | Stop reason: CLARIF

## 2017-01-01 RX ORDER — ALBUMIN, HUMAN INJ 5% 5 %
1500 SOLUTION INTRAVENOUS AS NEEDED
Status: DISPENSED | OUTPATIENT
Start: 2017-01-01 | End: 2017-01-01

## 2017-01-01 RX ORDER — CARVEDILOL 12.5 MG/1
12.5 TABLET ORAL EVERY 12 HOURS SCHEDULED
Status: DISCONTINUED | OUTPATIENT
Start: 2017-01-01 | End: 2017-01-01

## 2017-01-01 RX ORDER — BUMETANIDE 0.25 MG/ML
2 INJECTION INTRAMUSCULAR; INTRAVENOUS 2 TIMES DAILY
Status: DISCONTINUED | OUTPATIENT
Start: 2017-01-01 | End: 2017-01-01

## 2017-01-01 RX ORDER — FUROSEMIDE 20 MG/1
20 TABLET ORAL DAILY
Status: DISCONTINUED | OUTPATIENT
Start: 2017-01-01 | End: 2017-01-01

## 2017-01-01 RX ORDER — ACETAMINOPHEN 325 MG/1
650 TABLET ORAL EVERY 4 HOURS PRN
Status: DISCONTINUED | OUTPATIENT
Start: 2017-01-01 | End: 2017-01-01 | Stop reason: SDUPTHER

## 2017-01-01 RX ORDER — TEMAZEPAM 7.5 MG/1
7.5 CAPSULE ORAL NIGHTLY PRN
Status: DISCONTINUED | OUTPATIENT
Start: 2017-01-01 | End: 2017-01-01

## 2017-01-01 RX ORDER — SODIUM CHLORIDE 9 MG/ML
125 INJECTION, SOLUTION INTRAVENOUS CONTINUOUS
Status: DISCONTINUED | OUTPATIENT
Start: 2017-01-01 | End: 2017-01-01

## 2017-01-01 RX ORDER — BUMETANIDE 0.25 MG/ML
4 INJECTION INTRAMUSCULAR; INTRAVENOUS EVERY 8 HOURS
Status: COMPLETED | OUTPATIENT
Start: 2017-01-01 | End: 2017-01-01

## 2017-01-01 RX ORDER — IRON ASPGLY,PS/C/B12/FA/CA/SUC 150-25-1
1 CAPSULE ORAL 2 TIMES DAILY WITH MEALS
Status: DISCONTINUED | OUTPATIENT
Start: 2017-01-01 | End: 2017-01-01 | Stop reason: HOSPADM

## 2017-01-01 RX ORDER — SENNA AND DOCUSATE SODIUM 50; 8.6 MG/1; MG/1
2 TABLET, FILM COATED ORAL NIGHTLY
Status: DISCONTINUED | OUTPATIENT
Start: 2017-01-01 | End: 2017-01-01 | Stop reason: HOSPADM

## 2017-01-01 RX ORDER — CHLORHEXIDINE GLUCONATE 0.12 MG/ML
15 RINSE ORAL EVERY 12 HOURS SCHEDULED
Status: DISCONTINUED | OUTPATIENT
Start: 2017-01-01 | End: 2017-01-01

## 2017-01-01 RX ORDER — METOCLOPRAMIDE HYDROCHLORIDE 5 MG/ML
10 INJECTION INTRAMUSCULAR; INTRAVENOUS EVERY 6 HOURS
Status: DISCONTINUED | OUTPATIENT
Start: 2017-01-01 | End: 2017-01-01

## 2017-01-01 RX ORDER — NYSTATIN 100000 [USP'U]/G
POWDER TOPICAL EVERY 8 HOURS SCHEDULED
Status: DISCONTINUED | OUTPATIENT
Start: 2017-01-01 | End: 2017-01-01

## 2017-01-01 RX ORDER — HYDROMORPHONE HYDROCHLORIDE 1 MG/ML
0.25 INJECTION, SOLUTION INTRAMUSCULAR; INTRAVENOUS; SUBCUTANEOUS
Status: DISCONTINUED | OUTPATIENT
Start: 2017-01-01 | End: 2017-01-01

## 2017-01-01 RX ORDER — FUROSEMIDE 20 MG/1
20 TABLET ORAL DAILY
Qty: 30 TABLET | Refills: 3 | Status: SHIPPED | OUTPATIENT
Start: 2017-01-01 | End: 2017-01-01 | Stop reason: HOSPADM

## 2017-01-01 RX ORDER — METOPROLOL TARTRATE 50 MG/1
50 TABLET, FILM COATED ORAL EVERY 12 HOURS SCHEDULED
Status: DISCONTINUED | OUTPATIENT
Start: 2017-01-01 | End: 2017-01-01

## 2017-01-01 RX ORDER — ACETAMINOPHEN 325 MG/1
650 TABLET ORAL EVERY 4 HOURS PRN
Status: DISCONTINUED | OUTPATIENT
Start: 2017-01-01 | End: 2017-01-01 | Stop reason: HOSPADM

## 2017-01-01 RX ORDER — ONDANSETRON 2 MG/ML
4 INJECTION INTRAMUSCULAR; INTRAVENOUS ONCE AS NEEDED
Status: DISCONTINUED | OUTPATIENT
Start: 2017-01-01 | End: 2017-01-01 | Stop reason: HOSPADM

## 2017-01-01 RX ORDER — HYDRALAZINE HYDROCHLORIDE 25 MG/1
25 TABLET, FILM COATED ORAL EVERY 8 HOURS SCHEDULED
Status: DISCONTINUED | OUTPATIENT
Start: 2017-01-01 | End: 2017-01-01 | Stop reason: HOSPADM

## 2017-01-01 RX ORDER — ATORVASTATIN CALCIUM 20 MG/1
20 TABLET, FILM COATED ORAL DAILY
Refills: 4 | COMMUNITY
Start: 2017-01-01

## 2017-01-01 RX ORDER — IPRATROPIUM BROMIDE AND ALBUTEROL SULFATE 2.5; .5 MG/3ML; MG/3ML
3 SOLUTION RESPIRATORY (INHALATION)
Status: DISCONTINUED | OUTPATIENT
Start: 2017-01-01 | End: 2017-01-01 | Stop reason: HOSPADM

## 2017-01-01 RX ORDER — LABETALOL HYDROCHLORIDE 5 MG/ML
5 INJECTION, SOLUTION INTRAVENOUS
Status: DISCONTINUED | OUTPATIENT
Start: 2017-01-01 | End: 2017-01-01 | Stop reason: HOSPADM

## 2017-01-01 RX ORDER — DEXMEDETOMIDINE HYDROCHLORIDE 4 UG/ML
.2-1.5 INJECTION, SOLUTION INTRAVENOUS
Status: DISCONTINUED | OUTPATIENT
Start: 2017-01-01 | End: 2017-01-01

## 2017-01-01 RX ORDER — POTASSIUM CHLORIDE 750 MG/1
40 CAPSULE, EXTENDED RELEASE ORAL AS NEEDED
Status: DISCONTINUED | OUTPATIENT
Start: 2017-01-01 | End: 2017-01-01 | Stop reason: HOSPADM

## 2017-01-01 RX ORDER — PROPOFOL 10 MG/ML
VIAL (ML) INTRAVENOUS CONTINUOUS PRN
Status: DISCONTINUED | OUTPATIENT
Start: 2017-01-01 | End: 2017-01-01 | Stop reason: SURG

## 2017-01-01 RX ORDER — MIDAZOLAM HYDROCHLORIDE 1 MG/ML
2 INJECTION INTRAMUSCULAR; INTRAVENOUS
Status: DISCONTINUED | OUTPATIENT
Start: 2017-01-01 | End: 2017-01-01

## 2017-01-01 RX ORDER — PANTOPRAZOLE SODIUM 40 MG/1
40 TABLET, DELAYED RELEASE ORAL DAILY
Status: DISCONTINUED | OUTPATIENT
Start: 2017-01-01 | End: 2017-01-01 | Stop reason: HOSPADM

## 2017-01-01 RX ORDER — METOPROLOL TARTRATE 100 MG/1
50 TABLET ORAL 2 TIMES DAILY
Qty: 60 TABLET | Refills: 11 | COMMUNITY
Start: 2017-01-01 | End: 2017-01-01 | Stop reason: HOSPADM

## 2017-01-01 RX ORDER — CEFAZOLIN SODIUM 2 G/100ML
2 INJECTION, SOLUTION INTRAVENOUS ONCE
Status: COMPLETED | OUTPATIENT
Start: 2017-01-01 | End: 2017-01-01

## 2017-01-01 RX ORDER — LANOLIN ALCOHOL/MO/W.PET/CERES
50 CREAM (GRAM) TOPICAL DAILY
Status: DISCONTINUED | OUTPATIENT
Start: 2017-01-01 | End: 2017-01-01 | Stop reason: CLARIF

## 2017-01-01 RX ORDER — MAGNESIUM SULFATE 1 G/100ML
1 INJECTION INTRAVENOUS EVERY 8 HOURS
Status: DISCONTINUED | OUTPATIENT
Start: 2017-01-01 | End: 2017-01-01

## 2017-01-01 RX ORDER — NITROGLYCERIN 20 MG/100ML
5-200 INJECTION INTRAVENOUS CONTINUOUS PRN
Status: DISCONTINUED | OUTPATIENT
Start: 2017-01-01 | End: 2017-01-01

## 2017-01-01 RX ORDER — MIDAZOLAM HYDROCHLORIDE 1 MG/ML
INJECTION INTRAMUSCULAR; INTRAVENOUS AS NEEDED
Status: DISCONTINUED | OUTPATIENT
Start: 2017-01-01 | End: 2017-01-01 | Stop reason: SURG

## 2017-01-01 RX ORDER — MORPHINE SULFATE 2 MG/ML
2 INJECTION, SOLUTION INTRAMUSCULAR; INTRAVENOUS EVERY 4 HOURS PRN
Status: ACTIVE | OUTPATIENT
Start: 2017-01-01 | End: 2017-01-01

## 2017-01-01 RX ORDER — PROMETHAZINE HYDROCHLORIDE 25 MG/ML
12.5 INJECTION, SOLUTION INTRAMUSCULAR; INTRAVENOUS ONCE AS NEEDED
Status: DISCONTINUED | OUTPATIENT
Start: 2017-01-01 | End: 2017-01-01

## 2017-01-01 RX ORDER — DOPAMINE HYDROCHLORIDE 160 MG/100ML
2-20 INJECTION, SOLUTION INTRAVENOUS CONTINUOUS PRN
Status: DISCONTINUED | OUTPATIENT
Start: 2017-01-01 | End: 2017-01-01

## 2017-01-01 RX ORDER — HYDROXYZINE HYDROCHLORIDE 25 MG/1
25 TABLET, FILM COATED ORAL 3 TIMES DAILY PRN
Status: DISCONTINUED | OUTPATIENT
Start: 2017-01-01 | End: 2017-01-01

## 2017-01-01 RX ORDER — CHLORHEXIDINE GLUCONATE 0.12 MG/ML
15 RINSE ORAL EVERY 12 HOURS SCHEDULED
Status: DISPENSED | OUTPATIENT
Start: 2017-01-01 | End: 2017-01-01

## 2017-01-01 RX ORDER — ATORVASTATIN CALCIUM 20 MG/1
20 TABLET, FILM COATED ORAL DAILY
Status: DISCONTINUED | OUTPATIENT
Start: 2017-01-01 | End: 2017-01-01 | Stop reason: HOSPADM

## 2017-01-01 RX ORDER — SODIUM CHLORIDE 9 MG/ML
75 INJECTION, SOLUTION INTRAVENOUS CONTINUOUS
Status: DISCONTINUED | OUTPATIENT
Start: 2017-01-01 | End: 2017-01-01

## 2017-01-01 RX ORDER — SODIUM CHLORIDE, SODIUM LACTATE, POTASSIUM CHLORIDE, CALCIUM CHLORIDE 600; 310; 30; 20 MG/100ML; MG/100ML; MG/100ML; MG/100ML
9 INJECTION, SOLUTION INTRAVENOUS CONTINUOUS
Status: DISCONTINUED | OUTPATIENT
Start: 2017-01-01 | End: 2017-01-01

## 2017-01-01 RX ORDER — SODIUM CHLORIDE 9 MG/ML
100 INJECTION, SOLUTION INTRAVENOUS CONTINUOUS
Status: DISCONTINUED | OUTPATIENT
Start: 2017-01-01 | End: 2017-01-01

## 2017-01-01 RX ORDER — LANOLIN ALCOHOL/MO/W.PET/CERES
100 CREAM (GRAM) TOPICAL DAILY
Status: DISCONTINUED | OUTPATIENT
Start: 2017-01-01 | End: 2017-01-01 | Stop reason: CLARIF

## 2017-01-01 RX ORDER — BUMETANIDE 0.25 MG/ML
2 INJECTION INTRAMUSCULAR; INTRAVENOUS 3 TIMES DAILY
Status: DISCONTINUED | OUTPATIENT
Start: 2017-01-01 | End: 2017-01-01

## 2017-01-01 RX ORDER — ACETAMINOPHEN 650 MG/1
650 SUPPOSITORY RECTAL EVERY 4 HOURS PRN
Status: DISCONTINUED | OUTPATIENT
Start: 2017-01-01 | End: 2017-01-01 | Stop reason: HOSPADM

## 2017-01-01 RX ORDER — POTASSIUM CHLORIDE 29.8 MG/ML
20 INJECTION INTRAVENOUS
Status: DISCONTINUED | OUTPATIENT
Start: 2017-01-01 | End: 2017-01-01 | Stop reason: HOSPADM

## 2017-01-01 RX ORDER — SODIUM CHLORIDE 9 MG/ML
30 INJECTION, SOLUTION INTRAVENOUS CONTINUOUS
Status: DISCONTINUED | OUTPATIENT
Start: 2017-01-01 | End: 2017-01-01

## 2017-01-01 RX ORDER — IRON ASPGLY,PS/C/B12/FA/CA/SUC 150-25-1
1 CAPSULE ORAL 2 TIMES DAILY WITH MEALS
Qty: 60 EACH | Refills: 5 | Status: SHIPPED | OUTPATIENT
Start: 2017-01-01

## 2017-01-01 RX ORDER — CARVEDILOL 3.12 MG/1
3.12 TABLET ORAL EVERY 12 HOURS
Status: DISCONTINUED | OUTPATIENT
Start: 2017-01-01 | End: 2017-01-01

## 2017-01-01 RX ORDER — LOSARTAN POTASSIUM 50 MG/1
50 TABLET ORAL DAILY
Status: DISCONTINUED | OUTPATIENT
Start: 2017-01-01 | End: 2017-01-01

## 2017-01-01 RX ORDER — BUDESONIDE AND FORMOTEROL FUMARATE DIHYDRATE 160; 4.5 UG/1; UG/1
2 AEROSOL RESPIRATORY (INHALATION)
Status: DISCONTINUED | OUTPATIENT
Start: 2017-01-01 | End: 2017-01-01

## 2017-01-01 RX ORDER — POTASSIUM CHLORIDE 750 MG/1
80 CAPSULE, EXTENDED RELEASE ORAL 2 TIMES DAILY WITH MEALS
Status: DISCONTINUED | OUTPATIENT
Start: 2017-01-01 | End: 2017-01-01

## 2017-01-01 RX ORDER — MULTIVITAMIN WITH IRON
100 TABLET ORAL DAILY
COMMUNITY

## 2017-01-01 RX ORDER — FENTANYL CITRATE 50 UG/ML
12.5 INJECTION, SOLUTION INTRAMUSCULAR; INTRAVENOUS
Status: DISCONTINUED | OUTPATIENT
Start: 2017-01-01 | End: 2017-01-01 | Stop reason: HOSPADM

## 2017-01-01 RX ORDER — CARVEDILOL 12.5 MG/1
25 TABLET ORAL EVERY 12 HOURS
Qty: 60 TABLET | Refills: 5 | Status: SHIPPED | OUTPATIENT
Start: 2017-01-01

## 2017-01-01 RX ORDER — PROTAMINE SULFATE 10 MG/ML
INJECTION, SOLUTION INTRAVENOUS AS NEEDED
Status: DISCONTINUED | OUTPATIENT
Start: 2017-01-01 | End: 2017-01-01 | Stop reason: HOSPADM

## 2017-01-01 RX ORDER — SODIUM CHLORIDE 0.9 % (FLUSH) 0.9 %
30 SYRINGE (ML) INJECTION ONCE AS NEEDED
Status: DISCONTINUED | OUTPATIENT
Start: 2017-01-01 | End: 2017-01-01 | Stop reason: HOSPADM

## 2017-01-01 RX ORDER — LABETALOL HYDROCHLORIDE 5 MG/ML
20 INJECTION, SOLUTION INTRAVENOUS EVERY 4 HOURS PRN
Status: DISCONTINUED | OUTPATIENT
Start: 2017-01-01 | End: 2017-01-01 | Stop reason: HOSPADM

## 2017-01-01 RX ORDER — POTASSIUM CHLORIDE 750 MG/1
40 CAPSULE, EXTENDED RELEASE ORAL DAILY
Status: DISCONTINUED | OUTPATIENT
Start: 2017-01-01 | End: 2017-01-01

## 2017-01-01 RX ORDER — ASPIRIN 325 MG
325 TABLET ORAL ONCE
Status: DISCONTINUED | OUTPATIENT
Start: 2017-01-01 | End: 2017-01-01

## 2017-01-01 RX ORDER — GLYCOPYRROLATE 0.2 MG/ML
INJECTION INTRAMUSCULAR; INTRAVENOUS AS NEEDED
Status: DISCONTINUED | OUTPATIENT
Start: 2017-01-01 | End: 2017-01-01 | Stop reason: SURG

## 2017-01-01 RX ORDER — ALBUTEROL SULFATE 2.5 MG/3ML
2.5 SOLUTION RESPIRATORY (INHALATION) ONCE AS NEEDED
Status: DISCONTINUED | OUTPATIENT
Start: 2017-01-01 | End: 2017-01-01 | Stop reason: HOSPADM

## 2017-01-01 RX ORDER — LISINOPRIL 5 MG/1
5 TABLET ORAL
Status: DISCONTINUED | OUTPATIENT
Start: 2017-01-01 | End: 2017-01-01

## 2017-01-01 RX ORDER — ONDANSETRON 2 MG/ML
4 INJECTION INTRAMUSCULAR; INTRAVENOUS EVERY 4 HOURS PRN
Status: DISCONTINUED | OUTPATIENT
Start: 2017-01-01 | End: 2017-01-01

## 2017-01-01 RX ORDER — BUMETANIDE 2 MG/1
3 TABLET ORAL 2 TIMES DAILY
Qty: 270 TABLET | Refills: 3 | Status: SHIPPED | OUTPATIENT
Start: 2017-01-01

## 2017-01-01 RX ORDER — ONDANSETRON 2 MG/ML
INJECTION INTRAMUSCULAR; INTRAVENOUS AS NEEDED
Status: DISCONTINUED | OUTPATIENT
Start: 2017-01-01 | End: 2017-01-01 | Stop reason: SURG

## 2017-01-01 RX ORDER — ATORVASTATIN CALCIUM 20 MG/1
20 TABLET, FILM COATED ORAL DAILY
COMMUNITY
End: 2017-01-01 | Stop reason: HOSPADM

## 2017-01-01 RX ORDER — FUROSEMIDE 20 MG/1
20 TABLET ORAL 2 TIMES DAILY
Status: DISCONTINUED | OUTPATIENT
Start: 2017-01-01 | End: 2017-01-01 | Stop reason: HOSPADM

## 2017-01-01 RX ORDER — FLUMAZENIL 0.1 MG/ML
0.2 INJECTION INTRAVENOUS AS NEEDED
Status: DISCONTINUED | OUTPATIENT
Start: 2017-01-01 | End: 2017-01-01 | Stop reason: HOSPADM

## 2017-01-01 RX ORDER — BUMETANIDE 0.25 MG/ML
4 INJECTION INTRAMUSCULAR; INTRAVENOUS EVERY 8 HOURS
Status: DISCONTINUED | OUTPATIENT
Start: 2017-01-01 | End: 2017-01-01 | Stop reason: SDUPTHER

## 2017-01-01 RX ORDER — FUROSEMIDE 10 MG/ML
40 INJECTION INTRAMUSCULAR; INTRAVENOUS ONCE
Status: COMPLETED | OUTPATIENT
Start: 2017-01-01 | End: 2017-01-01

## 2017-01-01 RX ORDER — SODIUM CHLORIDE, SODIUM LACTATE, POTASSIUM CHLORIDE, CALCIUM CHLORIDE 600; 310; 30; 20 MG/100ML; MG/100ML; MG/100ML; MG/100ML
30 INJECTION, SOLUTION INTRAVENOUS CONTINUOUS
Status: CANCELLED | OUTPATIENT
Start: 2017-01-01

## 2017-01-01 RX ORDER — BUMETANIDE 0.25 MG/ML
4 INJECTION INTRAMUSCULAR; INTRAVENOUS EVERY 6 HOURS
Status: COMPLETED | OUTPATIENT
Start: 2017-01-01 | End: 2017-01-01

## 2017-01-01 RX ORDER — SUFENTANIL CITRATE 50 UG/ML
INJECTION EPIDURAL; INTRAVENOUS AS NEEDED
Status: DISCONTINUED | OUTPATIENT
Start: 2017-01-01 | End: 2017-01-01 | Stop reason: SURG

## 2017-01-01 RX ORDER — POTASSIUM CHLORIDE 750 MG/1
20 CAPSULE, EXTENDED RELEASE ORAL ONCE
Status: COMPLETED | OUTPATIENT
Start: 2017-01-01 | End: 2017-01-01

## 2017-01-01 RX ORDER — FENTANYL CITRATE 50 UG/ML
50 INJECTION, SOLUTION INTRAMUSCULAR; INTRAVENOUS
Status: DISCONTINUED | OUTPATIENT
Start: 2017-01-01 | End: 2017-01-01 | Stop reason: HOSPADM

## 2017-01-01 RX ORDER — METOLAZONE 5 MG/1
10 TABLET ORAL ONCE
Status: COMPLETED | OUTPATIENT
Start: 2017-01-01 | End: 2017-01-01

## 2017-01-01 RX ORDER — FUROSEMIDE 10 MG/ML
20 INJECTION INTRAMUSCULAR; INTRAVENOUS ONCE
Status: COMPLETED | OUTPATIENT
Start: 2017-01-01 | End: 2017-01-01

## 2017-01-01 RX ORDER — POTASSIUM CHLORIDE 1.5 G/1.77G
20 POWDER, FOR SOLUTION ORAL ONCE
Status: COMPLETED | OUTPATIENT
Start: 2017-01-01 | End: 2017-01-01

## 2017-01-01 RX ORDER — CHLORHEXIDINE GLUCONATE 0.12 MG/ML
15 RINSE ORAL EVERY 12 HOURS SCHEDULED
Status: COMPLETED | OUTPATIENT
Start: 2017-01-01 | End: 2017-01-01

## 2017-01-01 RX ORDER — BUMETANIDE 0.25 MG/ML
4 INJECTION INTRAMUSCULAR; INTRAVENOUS EVERY 12 HOURS
Status: COMPLETED | OUTPATIENT
Start: 2017-01-01 | End: 2017-01-01

## 2017-01-01 RX ORDER — FENTANYL CITRATE 50 UG/ML
50 INJECTION, SOLUTION INTRAMUSCULAR; INTRAVENOUS
Status: DISCONTINUED | OUTPATIENT
Start: 2017-01-01 | End: 2017-01-01

## 2017-01-01 RX ORDER — HYDRALAZINE HYDROCHLORIDE 25 MG/1
25 TABLET, FILM COATED ORAL EVERY 8 HOURS SCHEDULED
Qty: 90 TABLET | Refills: 5 | Status: SHIPPED | OUTPATIENT
Start: 2017-01-01

## 2017-01-01 RX ORDER — DOPAMINE HYDROCHLORIDE 160 MG/100ML
2-20 INJECTION, SOLUTION INTRAVENOUS
Status: DISCONTINUED | OUTPATIENT
Start: 2017-01-01 | End: 2017-01-01 | Stop reason: HOSPADM

## 2017-01-01 RX ORDER — PROMETHAZINE HYDROCHLORIDE 25 MG/1
12.5 TABLET ORAL ONCE AS NEEDED
Status: DISCONTINUED | OUTPATIENT
Start: 2017-01-01 | End: 2017-01-01

## 2017-01-01 RX ORDER — ACETAMINOPHEN 325 MG/1
650 TABLET ORAL EVERY 4 HOURS PRN
Status: DISCONTINUED | OUTPATIENT
Start: 2017-01-01 | End: 2017-01-01

## 2017-01-01 RX ORDER — TRIAMTERENE AND HYDROCHLOROTHIAZIDE 37.5; 25 MG/1; MG/1
1 TABLET ORAL DAILY
COMMUNITY
End: 2017-01-01 | Stop reason: HOSPADM

## 2017-01-01 RX ORDER — AMLODIPINE BESYLATE 10 MG/1
10 TABLET ORAL
Status: DISCONTINUED | OUTPATIENT
Start: 2017-01-01 | End: 2017-01-01

## 2017-01-01 RX ORDER — HYDRALAZINE HYDROCHLORIDE 20 MG/ML
INJECTION INTRAMUSCULAR; INTRAVENOUS
Status: COMPLETED
Start: 2017-01-01 | End: 2017-01-01

## 2017-01-01 RX ORDER — CHLORHEXIDINE GLUCONATE 500 MG/1
1 CLOTH TOPICAL EVERY 12 HOURS
Status: ACTIVE | OUTPATIENT
Start: 2017-01-01 | End: 2017-01-01

## 2017-01-01 RX ORDER — CEFAZOLIN SODIUM 2 G/100ML
2 INJECTION, SOLUTION INTRAVENOUS
Status: ACTIVE | OUTPATIENT
Start: 2017-01-01 | End: 2017-01-01

## 2017-01-01 RX ORDER — POTASSIUM CHLORIDE 600 MG/1
8 TABLET, FILM COATED, EXTENDED RELEASE ORAL DAILY
Qty: 30 TABLET | Refills: 3 | Status: SHIPPED | OUTPATIENT
Start: 2017-01-01 | End: 2017-01-01 | Stop reason: HOSPADM

## 2017-01-01 RX ORDER — BISACODYL 10 MG
10 SUPPOSITORY, RECTAL RECTAL DAILY PRN
Status: DISCONTINUED | OUTPATIENT
Start: 2017-01-01 | End: 2017-01-01 | Stop reason: HOSPADM

## 2017-01-01 RX ORDER — PANTOPRAZOLE SODIUM 40 MG/1
40 TABLET, DELAYED RELEASE ORAL EVERY MORNING
Status: DISCONTINUED | OUTPATIENT
Start: 2017-01-01 | End: 2017-01-01

## 2017-01-01 RX ORDER — HEPARIN SODIUM 5000 [USP'U]/ML
5000 INJECTION, SOLUTION INTRAVENOUS; SUBCUTANEOUS EVERY 12 HOURS
Status: DISCONTINUED | OUTPATIENT
Start: 2017-01-01 | End: 2017-01-01

## 2017-01-01 RX ORDER — SODIUM CHLORIDE, SODIUM LACTATE, POTASSIUM CHLORIDE, CALCIUM CHLORIDE 600; 310; 30; 20 MG/100ML; MG/100ML; MG/100ML; MG/100ML
9 INJECTION, SOLUTION INTRAVENOUS CONTINUOUS PRN
Status: DISCONTINUED | OUTPATIENT
Start: 2017-01-01 | End: 2017-01-01 | Stop reason: HOSPADM

## 2017-01-01 RX ORDER — LIDOCAINE HYDROCHLORIDE 10 MG/ML
INJECTION, SOLUTION EPIDURAL; INFILTRATION; INTRACAUDAL; PERINEURAL AS NEEDED
Status: DISCONTINUED | OUTPATIENT
Start: 2017-01-01 | End: 2017-01-01 | Stop reason: HOSPADM

## 2017-01-01 RX ORDER — LANOLIN ALCOHOL/MO/W.PET/CERES
50 CREAM (GRAM) TOPICAL DAILY
Status: DISCONTINUED | OUTPATIENT
Start: 2017-01-01 | End: 2017-01-01

## 2017-01-01 RX ORDER — CARVEDILOL 25 MG/1
25 TABLET ORAL
Status: DISPENSED | OUTPATIENT
Start: 2017-01-01 | End: 2017-01-01

## 2017-01-01 RX ORDER — CLONIDINE HYDROCHLORIDE 0.1 MG/1
0.1 TABLET ORAL ONCE
Status: COMPLETED | OUTPATIENT
Start: 2017-01-01 | End: 2017-01-01

## 2017-01-01 RX ORDER — HYDRALAZINE HYDROCHLORIDE 20 MG/ML
5 INJECTION INTRAMUSCULAR; INTRAVENOUS
Status: DISCONTINUED | OUTPATIENT
Start: 2017-01-01 | End: 2017-01-01

## 2017-01-01 RX ORDER — CARVEDILOL 12.5 MG/1
12.5 TABLET ORAL EVERY 12 HOURS
Status: DISCONTINUED | OUTPATIENT
Start: 2017-01-01 | End: 2017-01-01 | Stop reason: HOSPADM

## 2017-01-01 RX ORDER — ALPRAZOLAM 0.25 MG/1
0.25 TABLET ORAL EVERY 8 HOURS PRN
Status: DISCONTINUED | OUTPATIENT
Start: 2017-01-01 | End: 2017-01-01

## 2017-01-01 RX ORDER — DEXTROSE MONOHYDRATE 25 G/50ML
25 INJECTION, SOLUTION INTRAVENOUS ONCE
Status: COMPLETED | OUTPATIENT
Start: 2017-01-01 | End: 2017-01-01

## 2017-01-01 RX ORDER — CARVEDILOL 6.25 MG/1
6.25 TABLET ORAL EVERY 12 HOURS SCHEDULED
Status: DISCONTINUED | OUTPATIENT
Start: 2017-01-01 | End: 2017-01-01

## 2017-01-01 RX ORDER — CEFAZOLIN SODIUM 2 G/100ML
INJECTION, SOLUTION INTRAVENOUS AS NEEDED
Status: DISCONTINUED | OUTPATIENT
Start: 2017-01-01 | End: 2017-01-01 | Stop reason: SURG

## 2017-01-01 RX ORDER — BUMETANIDE 0.25 MG/ML
2 INJECTION INTRAMUSCULAR; INTRAVENOUS 2 TIMES DAILY
Status: COMPLETED | OUTPATIENT
Start: 2017-01-01 | End: 2017-01-01

## 2017-01-01 RX ORDER — PROMETHAZINE HYDROCHLORIDE 25 MG/1
25 SUPPOSITORY RECTAL ONCE AS NEEDED
Status: DISCONTINUED | OUTPATIENT
Start: 2017-01-01 | End: 2017-01-01

## 2017-01-01 RX ORDER — BUMETANIDE 2 MG/1
2 TABLET ORAL 2 TIMES DAILY
Qty: 60 TABLET | Refills: 5 | Status: SHIPPED | OUTPATIENT
Start: 2017-01-01 | End: 2017-01-01

## 2017-01-01 RX ORDER — CARVEDILOL 12.5 MG/1
12.5 TABLET ORAL
Status: COMPLETED | OUTPATIENT
Start: 2017-01-01 | End: 2017-01-01

## 2017-01-01 RX ORDER — IRON POLYSACCHARIDE COMPLEX 150 MG
CAPSULE ORAL
Refills: 0 | COMMUNITY
Start: 2017-01-01 | End: 2017-01-01 | Stop reason: HOSPADM

## 2017-01-01 RX ORDER — PANTOPRAZOLE SODIUM 40 MG/1
40 TABLET, DELAYED RELEASE ORAL
Status: DISCONTINUED | OUTPATIENT
Start: 2017-01-01 | End: 2017-01-01 | Stop reason: HOSPADM

## 2017-01-01 RX ORDER — HYDRALAZINE HYDROCHLORIDE 20 MG/ML
10 INJECTION INTRAMUSCULAR; INTRAVENOUS EVERY 6 HOURS PRN
Status: DISCONTINUED | OUTPATIENT
Start: 2017-01-01 | End: 2017-01-01 | Stop reason: HOSPADM

## 2017-01-01 RX ORDER — FUROSEMIDE 10 MG/ML
40 INJECTION INTRAMUSCULAR; INTRAVENOUS EVERY 12 HOURS
Status: DISCONTINUED | OUTPATIENT
Start: 2017-01-01 | End: 2017-01-01

## 2017-01-01 RX ORDER — LOSARTAN POTASSIUM 50 MG/1
50 TABLET ORAL DAILY
Qty: 90 TABLET | Refills: 3 | Status: SHIPPED | OUTPATIENT
Start: 2017-01-01 | End: 2017-01-01 | Stop reason: HOSPADM

## 2017-01-01 RX ORDER — ATORVASTATIN CALCIUM 40 MG/1
40 TABLET, FILM COATED ORAL NIGHTLY
Qty: 30 TABLET | Refills: 5 | Status: SHIPPED | OUTPATIENT
Start: 2017-01-01 | End: 2017-01-01 | Stop reason: DRUGHIGH

## 2017-01-01 RX ORDER — MIDAZOLAM HYDROCHLORIDE 1 MG/ML
INJECTION INTRAMUSCULAR; INTRAVENOUS
Status: COMPLETED | OUTPATIENT
Start: 2017-01-01 | End: 2017-01-01

## 2017-01-01 RX ORDER — FUROSEMIDE 10 MG/ML
60 INJECTION INTRAMUSCULAR; INTRAVENOUS EVERY 12 HOURS
Status: DISCONTINUED | OUTPATIENT
Start: 2017-01-01 | End: 2017-01-01

## 2017-01-01 RX ORDER — NOREPINEPHRINE BITARTRATE 1 MG/ML
INJECTION, SOLUTION INTRAVENOUS CONTINUOUS PRN
Status: DISCONTINUED | OUTPATIENT
Start: 2017-01-01 | End: 2017-01-01 | Stop reason: SURG

## 2017-01-01 RX ORDER — FLUMAZENIL 0.1 MG/ML
0.2 INJECTION INTRAVENOUS AS NEEDED
Status: DISCONTINUED | OUTPATIENT
Start: 2017-01-01 | End: 2017-01-01

## 2017-01-01 RX ORDER — PROTAMINE SULFATE 10 MG/ML
INJECTION, SOLUTION INTRAVENOUS AS NEEDED
Status: DISCONTINUED | OUTPATIENT
Start: 2017-01-01 | End: 2017-01-01 | Stop reason: SURG

## 2017-01-01 RX ORDER — AMLODIPINE BESYLATE 10 MG/1
10 TABLET ORAL
Qty: 30 TABLET | Refills: 3 | Status: SHIPPED | OUTPATIENT
Start: 2017-01-01 | End: 2017-01-01 | Stop reason: HOSPADM

## 2017-01-01 RX ORDER — SODIUM CHLORIDE 9 MG/ML
INJECTION, SOLUTION INTRAVENOUS CONTINUOUS PRN
Status: DISCONTINUED | OUTPATIENT
Start: 2017-01-01 | End: 2017-01-01 | Stop reason: SURG

## 2017-01-01 RX ORDER — CITALOPRAM 40 MG/1
40 TABLET ORAL NIGHTLY
Status: DISCONTINUED | OUTPATIENT
Start: 2017-01-01 | End: 2017-01-01

## 2017-01-01 RX ORDER — ONDANSETRON 2 MG/ML
4 INJECTION INTRAMUSCULAR; INTRAVENOUS ONCE AS NEEDED
Status: DISCONTINUED | OUTPATIENT
Start: 2017-01-01 | End: 2017-01-01

## 2017-01-01 RX ORDER — TORSEMIDE 20 MG/1
40 TABLET ORAL 2 TIMES DAILY
Status: DISCONTINUED | OUTPATIENT
Start: 2017-01-01 | End: 2017-01-01

## 2017-01-01 RX ORDER — SODIUM POLYSTYRENE SULFONATE 15 G/60ML
15 SUSPENSION ORAL; RECTAL ONCE
Status: COMPLETED | OUTPATIENT
Start: 2017-01-01 | End: 2017-01-01

## 2017-01-01 RX ORDER — HYDROCODONE BITARTRATE AND ACETAMINOPHEN 5; 325 MG/1; MG/1
1 TABLET ORAL EVERY 4 HOURS PRN
Status: DISCONTINUED | OUTPATIENT
Start: 2017-01-01 | End: 2017-01-01

## 2017-01-01 RX ORDER — ATORVASTATIN CALCIUM 20 MG/1
20 TABLET, FILM COATED ORAL DAILY
Status: DISCONTINUED | OUTPATIENT
Start: 2017-01-01 | End: 2017-01-01

## 2017-01-01 RX ORDER — SODIUM CHLORIDE, SODIUM LACTATE, POTASSIUM CHLORIDE, CALCIUM CHLORIDE 600; 310; 30; 20 MG/100ML; MG/100ML; MG/100ML; MG/100ML
100 INJECTION, SOLUTION INTRAVENOUS CONTINUOUS
Status: DISCONTINUED | OUTPATIENT
Start: 2017-01-01 | End: 2017-01-01

## 2017-01-01 RX ORDER — AMINOCAPROIC ACID 250 MG/ML
INJECTION, SOLUTION INTRAVENOUS AS NEEDED
Status: DISCONTINUED | OUTPATIENT
Start: 2017-01-01 | End: 2017-01-01 | Stop reason: SURG

## 2017-01-01 RX ORDER — CARVEDILOL 25 MG/1
25 TABLET ORAL EVERY 12 HOURS
Status: CANCELLED | OUTPATIENT
Start: 2017-01-01

## 2017-01-01 RX ORDER — EPHEDRINE SULFATE 50 MG/ML
INJECTION, SOLUTION INTRAVENOUS AS NEEDED
Status: DISCONTINUED | OUTPATIENT
Start: 2017-01-01 | End: 2017-01-01 | Stop reason: SURG

## 2017-01-01 RX ORDER — LANOLIN ALCOHOL/MO/W.PET/CERES
50 CREAM (GRAM) TOPICAL EVERY MORNING
Status: ON HOLD | COMMUNITY
End: 2017-01-01

## 2017-01-01 RX ORDER — EPHEDRINE SULFATE 50 MG/ML
5 INJECTION, SOLUTION INTRAVENOUS ONCE AS NEEDED
Status: DISCONTINUED | OUTPATIENT
Start: 2017-01-01 | End: 2017-01-01 | Stop reason: HOSPADM

## 2017-01-01 RX ORDER — CYCLOBENZAPRINE HCL 10 MG
10 TABLET ORAL EVERY 8 HOURS PRN
Status: DISCONTINUED | OUTPATIENT
Start: 2017-01-01 | End: 2017-01-01 | Stop reason: HOSPADM

## 2017-01-01 RX ORDER — FENTANYL CITRATE 50 UG/ML
INJECTION, SOLUTION INTRAMUSCULAR; INTRAVENOUS
Status: COMPLETED | OUTPATIENT
Start: 2017-01-01 | End: 2017-01-01

## 2017-01-01 RX ORDER — PROMETHAZINE HYDROCHLORIDE 25 MG/1
12.5 TABLET ORAL EVERY 6 HOURS PRN
Status: DISCONTINUED | OUTPATIENT
Start: 2017-01-01 | End: 2017-01-01 | Stop reason: HOSPADM

## 2017-01-01 RX ORDER — BUMETANIDE 0.25 MG/ML
2 INJECTION INTRAMUSCULAR; INTRAVENOUS EVERY 8 HOURS
Status: COMPLETED | OUTPATIENT
Start: 2017-01-01 | End: 2017-01-01

## 2017-01-01 RX ORDER — MAGNESIUM SULFATE HEPTAHYDRATE 40 MG/ML
4 INJECTION, SOLUTION INTRAVENOUS AS NEEDED
Status: DISCONTINUED | OUTPATIENT
Start: 2017-01-01 | End: 2017-01-01

## 2017-01-01 RX ADMIN — IPRATROPIUM BROMIDE 0.5 MG: 0.5 SOLUTION RESPIRATORY (INHALATION) at 15:23

## 2017-01-01 RX ADMIN — Medication 150 MG: at 08:21

## 2017-01-01 RX ADMIN — IPRATROPIUM BROMIDE 0.5 MG: 0.5 SOLUTION RESPIRATORY (INHALATION) at 15:53

## 2017-01-01 RX ADMIN — HYDRALAZINE HYDROCHLORIDE 25 MG: 25 TABLET, FILM COATED ORAL at 06:30

## 2017-01-01 RX ADMIN — IPRATROPIUM BROMIDE 0.5 MG: 0.5 SOLUTION RESPIRATORY (INHALATION) at 19:56

## 2017-01-01 RX ADMIN — IRON SUCROSE 200 MG: 20 INJECTION, SOLUTION INTRAVENOUS at 18:30

## 2017-01-01 RX ADMIN — CITALOPRAM 40 MG: 40 TABLET, FILM COATED ORAL at 21:07

## 2017-01-01 RX ADMIN — BUDESONIDE AND FORMOTEROL FUMARATE DIHYDRATE 2 PUFF: 160; 4.5 AEROSOL RESPIRATORY (INHALATION) at 07:50

## 2017-01-01 RX ADMIN — ENOXAPARIN SODIUM 30 MG: 30 INJECTION SUBCUTANEOUS at 17:29

## 2017-01-01 RX ADMIN — POTASSIUM CHLORIDE 40 MEQ: 750 CAPSULE, EXTENDED RELEASE ORAL at 06:34

## 2017-01-01 RX ADMIN — FENTANYL CITRATE 75 MCG: 50 INJECTION INTRAMUSCULAR; INTRAVENOUS at 07:35

## 2017-01-01 RX ADMIN — LISINOPRIL 5 MG: 5 TABLET ORAL at 08:46

## 2017-01-01 RX ADMIN — PANTOPRAZOLE SODIUM 40 MG: 40 TABLET, DELAYED RELEASE ORAL at 17:07

## 2017-01-01 RX ADMIN — HYDRALAZINE HYDROCHLORIDE 25 MG: 25 TABLET, FILM COATED ORAL at 06:20

## 2017-01-01 RX ADMIN — BUDESONIDE AND FORMOTEROL FUMARATE DIHYDRATE 2 PUFF: 160; 4.5 AEROSOL RESPIRATORY (INHALATION) at 23:03

## 2017-01-01 RX ADMIN — PROPOFOL 140 MCG/KG/MIN: 10 INJECTION, EMULSION INTRAVENOUS at 13:43

## 2017-01-01 RX ADMIN — CLOTRIMAZOLE AND BETAMETHASONE DIPROPIONATE: 10; .5 CREAM TOPICAL at 08:42

## 2017-01-01 RX ADMIN — HYDROCODONE BITARTRATE AND ACETAMINOPHEN 2 TABLET: 5; 325 TABLET ORAL at 13:38

## 2017-01-01 RX ADMIN — HYDRALAZINE HYDROCHLORIDE 10 MG: 20 INJECTION INTRAMUSCULAR; INTRAVENOUS at 16:24

## 2017-01-01 RX ADMIN — FUROSEMIDE 20 MG: 10 INJECTION, SOLUTION INTRAMUSCULAR; INTRAVENOUS at 03:06

## 2017-01-01 RX ADMIN — POTASSIUM CHLORIDE 40 MEQ: 750 CAPSULE, EXTENDED RELEASE ORAL at 18:48

## 2017-01-01 RX ADMIN — LISINOPRIL 5 MG: 5 TABLET ORAL at 10:00

## 2017-01-01 RX ADMIN — POTASSIUM CHLORIDE 20 MEQ: 750 CAPSULE, EXTENDED RELEASE ORAL at 08:58

## 2017-01-01 RX ADMIN — IPRATROPIUM BROMIDE 0.5 MG: 0.5 SOLUTION RESPIRATORY (INHALATION) at 14:43

## 2017-01-01 RX ADMIN — CITALOPRAM 40 MG: 40 TABLET, FILM COATED ORAL at 21:33

## 2017-01-01 RX ADMIN — FUROSEMIDE 40 MG: 10 INJECTION, SOLUTION INTRAMUSCULAR; INTRAVENOUS at 03:53

## 2017-01-01 RX ADMIN — ATORVASTATIN CALCIUM 20 MG: 20 TABLET, FILM COATED ORAL at 10:08

## 2017-01-01 RX ADMIN — Medication 1 CAPSULE: at 18:38

## 2017-01-01 RX ADMIN — POTASSIUM CHLORIDE 80 MEQ: 750 CAPSULE, EXTENDED RELEASE ORAL at 09:11

## 2017-01-01 RX ADMIN — ATORVASTATIN CALCIUM 20 MG: 20 TABLET, FILM COATED ORAL at 08:59

## 2017-01-01 RX ADMIN — HYDRALAZINE HYDROCHLORIDE 10 MG: 20 INJECTION INTRAMUSCULAR; INTRAVENOUS at 16:30

## 2017-01-01 RX ADMIN — CARVEDILOL 3.12 MG: 3.12 TABLET, FILM COATED ORAL at 16:43

## 2017-01-01 RX ADMIN — CLOTRIMAZOLE AND BETAMETHASONE DIPROPIONATE: 10; .5 CREAM TOPICAL at 20:38

## 2017-01-01 RX ADMIN — Medication 1 MG: at 10:29

## 2017-01-01 RX ADMIN — BUDESONIDE AND FORMOTEROL FUMARATE DIHYDRATE 2 PUFF: 160; 4.5 AEROSOL RESPIRATORY (INHALATION) at 08:39

## 2017-01-01 RX ADMIN — BUDESONIDE AND FORMOTEROL FUMARATE DIHYDRATE 2 PUFF: 160; 4.5 AEROSOL RESPIRATORY (INHALATION) at 07:09

## 2017-01-01 RX ADMIN — CARVEDILOL 25 MG: 25 TABLET, FILM COATED ORAL at 21:07

## 2017-01-01 RX ADMIN — INSULIN ASPART 2 UNITS: 100 INJECTION, SOLUTION INTRAVENOUS; SUBCUTANEOUS at 12:14

## 2017-01-01 RX ADMIN — IPRATROPIUM BROMIDE 0.5 MG: 0.5 SOLUTION RESPIRATORY (INHALATION) at 09:03

## 2017-01-01 RX ADMIN — SENNOSIDES AND DOCUSATE SODIUM 2 TABLET: 8.6; 5 TABLET ORAL at 20:37

## 2017-01-01 RX ADMIN — CEFAZOLIN SODIUM 2 G: 2 INJECTION, SOLUTION INTRAVENOUS at 03:50

## 2017-01-01 RX ADMIN — HYDRALAZINE HYDROCHLORIDE 25 MG: 25 TABLET, FILM COATED ORAL at 00:26

## 2017-01-01 RX ADMIN — FENTANYL CITRATE 25 MCG: 50 INJECTION INTRAMUSCULAR; INTRAVENOUS at 13:05

## 2017-01-01 RX ADMIN — CARVEDILOL 6.25 MG: 6.25 TABLET, FILM COATED ORAL at 18:26

## 2017-01-01 RX ADMIN — CEFAZOLIN SODIUM 2 G: 2 INJECTION, SOLUTION INTRAVENOUS at 20:34

## 2017-01-01 RX ADMIN — HYDRALAZINE HYDROCHLORIDE 25 MG: 25 TABLET, FILM COATED ORAL at 09:55

## 2017-01-01 RX ADMIN — PROPOFOL 50 MG: 10 INJECTION, EMULSION INTRAVENOUS at 16:53

## 2017-01-01 RX ADMIN — AMLODIPINE BESYLATE 10 MG: 10 TABLET ORAL at 17:43

## 2017-01-01 RX ADMIN — CARVEDILOL 25 MG: 25 TABLET, FILM COATED ORAL at 21:47

## 2017-01-01 RX ADMIN — HYDRALAZINE HYDROCHLORIDE 25 MG: 25 TABLET, FILM COATED ORAL at 13:42

## 2017-01-01 RX ADMIN — BUDESONIDE AND FORMOTEROL FUMARATE DIHYDRATE 2 PUFF: 160; 4.5 AEROSOL RESPIRATORY (INHALATION) at 11:12

## 2017-01-01 RX ADMIN — POTASSIUM CHLORIDE 20 MEQ: 1.5 POWDER, FOR SOLUTION ORAL at 16:19

## 2017-01-01 RX ADMIN — CEFAZOLIN SODIUM 2 G: 2 INJECTION, SOLUTION INTRAVENOUS at 11:49

## 2017-01-01 RX ADMIN — CLOTRIMAZOLE AND BETAMETHASONE DIPROPIONATE: 10; .5 CREAM TOPICAL at 20:30

## 2017-01-01 RX ADMIN — CARVEDILOL 6.25 MG: 6.25 TABLET, FILM COATED ORAL at 18:01

## 2017-01-01 RX ADMIN — CARVEDILOL 25 MG: 25 TABLET, FILM COATED ORAL at 22:18

## 2017-01-01 RX ADMIN — CITALOPRAM 40 MG: 40 TABLET, FILM COATED ORAL at 21:29

## 2017-01-01 RX ADMIN — POTASSIUM CHLORIDE 20 MEQ: 750 CAPSULE, EXTENDED RELEASE ORAL at 09:08

## 2017-01-01 RX ADMIN — POTASSIUM CHLORIDE 20 MEQ: 750 CAPSULE, EXTENDED RELEASE ORAL at 08:45

## 2017-01-01 RX ADMIN — HYDRALAZINE HYDROCHLORIDE 50 MG: 50 TABLET, FILM COATED ORAL at 14:17

## 2017-01-01 RX ADMIN — ATORVASTATIN CALCIUM 20 MG: 20 TABLET, FILM COATED ORAL at 09:08

## 2017-01-01 RX ADMIN — PANTOPRAZOLE SODIUM 40 MG: 40 TABLET, DELAYED RELEASE ORAL at 05:46

## 2017-01-01 RX ADMIN — FUROSEMIDE 40 MG: 10 INJECTION, SOLUTION INTRAMUSCULAR; INTRAVENOUS at 06:05

## 2017-01-01 RX ADMIN — CLOTRIMAZOLE AND BETAMETHASONE DIPROPIONATE: 10; .5 CREAM TOPICAL at 08:56

## 2017-01-01 RX ADMIN — BUDESONIDE AND FORMOTEROL FUMARATE DIHYDRATE 2 PUFF: 160; 4.5 AEROSOL RESPIRATORY (INHALATION) at 07:20

## 2017-01-01 RX ADMIN — IPRATROPIUM BROMIDE 0.5 MG: 0.5 SOLUTION RESPIRATORY (INHALATION) at 15:40

## 2017-01-01 RX ADMIN — CARVEDILOL 25 MG: 25 TABLET, FILM COATED ORAL at 09:06

## 2017-01-01 RX ADMIN — BUDESONIDE AND FORMOTEROL FUMARATE DIHYDRATE 2 PUFF: 160; 4.5 AEROSOL RESPIRATORY (INHALATION) at 08:42

## 2017-01-01 RX ADMIN — POTASSIUM CHLORIDE 40 MEQ: 29.8 INJECTION, SOLUTION INTRAVENOUS at 04:07

## 2017-01-01 RX ADMIN — ASPIRIN 81 MG: 81 TABLET ORAL at 08:26

## 2017-01-01 RX ADMIN — CEFAZOLIN SODIUM 2 G: 2 INJECTION, SOLUTION INTRAVENOUS at 22:22

## 2017-01-01 RX ADMIN — POTASSIUM CHLORIDE 40 MEQ: 750 CAPSULE, EXTENDED RELEASE ORAL at 13:58

## 2017-01-01 RX ADMIN — ENOXAPARIN SODIUM 30 MG: 30 INJECTION SUBCUTANEOUS at 13:58

## 2017-01-01 RX ADMIN — POTASSIUM CHLORIDE 40 MEQ: 750 CAPSULE, EXTENDED RELEASE ORAL at 08:59

## 2017-01-01 RX ADMIN — LIDOCAINE HYDROCHLORIDE 100 MG: 20 INJECTION, SOLUTION INFILTRATION; PERINEURAL at 16:53

## 2017-01-01 RX ADMIN — HYDRALAZINE HYDROCHLORIDE 25 MG: 50 TABLET, FILM COATED ORAL at 20:38

## 2017-01-01 RX ADMIN — CARVEDILOL 25 MG: 25 TABLET, FILM COATED ORAL at 08:53

## 2017-01-01 RX ADMIN — CITALOPRAM 40 MG: 40 TABLET, FILM COATED ORAL at 08:35

## 2017-01-01 RX ADMIN — ENOXAPARIN SODIUM 40 MG: 40 INJECTION SUBCUTANEOUS at 12:25

## 2017-01-01 RX ADMIN — CEFTRIAXONE SODIUM 1 G: 1 INJECTION, SOLUTION INTRAVENOUS at 14:23

## 2017-01-01 RX ADMIN — HYDRALAZINE HYDROCHLORIDE 25 MG: 25 TABLET, FILM COATED ORAL at 06:57

## 2017-01-01 RX ADMIN — CARVEDILOL 25 MG: 25 TABLET, FILM COATED ORAL at 22:07

## 2017-01-01 RX ADMIN — CITALOPRAM 40 MG: 40 TABLET, FILM COATED ORAL at 20:24

## 2017-01-01 RX ADMIN — POTASSIUM CHLORIDE 80 MEQ: 750 CAPSULE, EXTENDED RELEASE ORAL at 17:33

## 2017-01-01 RX ADMIN — POTASSIUM CHLORIDE 20 MEQ: 750 CAPSULE, EXTENDED RELEASE ORAL at 10:09

## 2017-01-01 RX ADMIN — PANTOPRAZOLE SODIUM 40 MG: 40 TABLET, DELAYED RELEASE ORAL at 10:26

## 2017-01-01 RX ADMIN — HYDRALAZINE HYDROCHLORIDE 10 MG: 10 TABLET, FILM COATED ORAL at 20:39

## 2017-01-01 RX ADMIN — FUROSEMIDE 40 MG: 10 INJECTION, SOLUTION INTRAMUSCULAR; INTRAVENOUS at 17:45

## 2017-01-01 RX ADMIN — SODIUM BICARBONATE 100 MEQ: 84 INJECTION, SOLUTION INTRAVENOUS at 01:20

## 2017-01-01 RX ADMIN — POTASSIUM CHLORIDE 80 MEQ: 750 CAPSULE, EXTENDED RELEASE ORAL at 09:06

## 2017-01-01 RX ADMIN — CEFAZOLIN SODIUM 2 G: 2 INJECTION, SOLUTION INTRAVENOUS at 06:12

## 2017-01-01 RX ADMIN — PANTOPRAZOLE SODIUM 40 MG: 40 TABLET, DELAYED RELEASE ORAL at 06:00

## 2017-01-01 RX ADMIN — BUDESONIDE AND FORMOTEROL FUMARATE DIHYDRATE 2 PUFF: 160; 4.5 AEROSOL RESPIRATORY (INHALATION) at 20:07

## 2017-01-01 RX ADMIN — HYDROCODONE BITARTRATE AND ACETAMINOPHEN 2 TABLET: 5; 325 TABLET ORAL at 21:06

## 2017-01-01 RX ADMIN — POTASSIUM CHLORIDE 40 MEQ: 1.5 POWDER, FOR SOLUTION ORAL at 07:01

## 2017-01-01 RX ADMIN — POLYETHYLENE GLYCOL 3350, SODIUM CHLORIDE, POTASSIUM CHLORIDE, SODIUM BICARBONATE, AND SODIUM SULFATE 2000 ML: 240; 5.84; 2.98; 6.72; 22.72 POWDER, FOR SOLUTION ORAL at 05:02

## 2017-01-01 RX ADMIN — ATORVASTATIN CALCIUM 20 MG: 20 TABLET, FILM COATED ORAL at 09:19

## 2017-01-01 RX ADMIN — BUDESONIDE AND FORMOTEROL FUMARATE DIHYDRATE 2 PUFF: 160; 4.5 AEROSOL RESPIRATORY (INHALATION) at 19:38

## 2017-01-01 RX ADMIN — DEXTROSE MONOHYDRATE 25 G: 25 INJECTION, SOLUTION INTRAVENOUS at 13:53

## 2017-01-01 RX ADMIN — CLOTRIMAZOLE AND BETAMETHASONE DIPROPIONATE: 10; .5 CREAM TOPICAL at 08:46

## 2017-01-01 RX ADMIN — MUPIROCIN 1 APPLICATION: 20 OINTMENT TOPICAL at 23:29

## 2017-01-01 RX ADMIN — IPRATROPIUM BROMIDE 0.5 MG: 0.5 SOLUTION RESPIRATORY (INHALATION) at 11:09

## 2017-01-01 RX ADMIN — BUMETANIDE 4 MG: 0.25 INJECTION INTRAMUSCULAR; INTRAVENOUS at 13:51

## 2017-01-01 RX ADMIN — HYDRALAZINE HYDROCHLORIDE 25 MG: 25 TABLET, FILM COATED ORAL at 20:32

## 2017-01-01 RX ADMIN — ATORVASTATIN CALCIUM 20 MG: 20 TABLET, FILM COATED ORAL at 08:46

## 2017-01-01 RX ADMIN — IPRATROPIUM BROMIDE 0.5 MG: 0.5 SOLUTION RESPIRATORY (INHALATION) at 15:48

## 2017-01-01 RX ADMIN — IPRATROPIUM BROMIDE 0.5 MG: 0.5 SOLUTION RESPIRATORY (INHALATION) at 08:59

## 2017-01-01 RX ADMIN — ATORVASTATIN CALCIUM 20 MG: 20 TABLET, FILM COATED ORAL at 08:53

## 2017-01-01 RX ADMIN — HYDRALAZINE HYDROCHLORIDE 25 MG: 50 TABLET, FILM COATED ORAL at 05:07

## 2017-01-01 RX ADMIN — CARVEDILOL 12.5 MG: 12.5 TABLET, FILM COATED ORAL at 20:03

## 2017-01-01 RX ADMIN — IPRATROPIUM BROMIDE 0.5 MG: 0.5 SOLUTION RESPIRATORY (INHALATION) at 15:36

## 2017-01-01 RX ADMIN — MIDAZOLAM 1 MG: 1 INJECTION INTRAMUSCULAR; INTRAVENOUS at 10:19

## 2017-01-01 RX ADMIN — PANTOPRAZOLE SODIUM 40 MG: 40 TABLET, DELAYED RELEASE ORAL at 09:53

## 2017-01-01 RX ADMIN — PROPOFOL 100 MCG/KG/MIN: 10 INJECTION, EMULSION INTRAVENOUS at 16:55

## 2017-01-01 RX ADMIN — MUPIROCIN: 20 OINTMENT TOPICAL at 10:28

## 2017-01-01 RX ADMIN — MAGNESIUM SULFATE HEPTAHYDRATE 1 G: 1 INJECTION, SOLUTION INTRAVENOUS at 17:33

## 2017-01-01 RX ADMIN — CARVEDILOL 25 MG: 25 TABLET, FILM COATED ORAL at 09:55

## 2017-01-01 RX ADMIN — IPRATROPIUM BROMIDE 0.5 MG: 0.5 SOLUTION RESPIRATORY (INHALATION) at 11:12

## 2017-01-01 RX ADMIN — CARVEDILOL 25 MG: 25 TABLET, FILM COATED ORAL at 08:18

## 2017-01-01 RX ADMIN — METOCLOPRAMIDE 10 MG: 5 INJECTION, SOLUTION INTRAMUSCULAR; INTRAVENOUS at 15:52

## 2017-01-01 RX ADMIN — HYDRALAZINE HYDROCHLORIDE 10 MG: 20 INJECTION INTRAMUSCULAR; INTRAVENOUS at 16:51

## 2017-01-01 RX ADMIN — PANTOPRAZOLE SODIUM 40 MG: 40 TABLET, DELAYED RELEASE ORAL at 06:30

## 2017-01-01 RX ADMIN — IPRATROPIUM BROMIDE 0.5 MG: 0.5 SOLUTION RESPIRATORY (INHALATION) at 07:35

## 2017-01-01 RX ADMIN — IPRATROPIUM BROMIDE 0.5 MG: 0.5 SOLUTION RESPIRATORY (INHALATION) at 19:38

## 2017-01-01 RX ADMIN — HYDRALAZINE HYDROCHLORIDE 25 MG: 50 TABLET, FILM COATED ORAL at 15:49

## 2017-01-01 RX ADMIN — Medication 150 MG: at 09:02

## 2017-01-01 RX ADMIN — ATORVASTATIN CALCIUM 40 MG: 20 TABLET, FILM COATED ORAL at 20:51

## 2017-01-01 RX ADMIN — AMLODIPINE BESYLATE 10 MG: 10 TABLET ORAL at 09:00

## 2017-01-01 RX ADMIN — FUROSEMIDE 20 MG: 20 TABLET ORAL at 11:06

## 2017-01-01 RX ADMIN — ATORVASTATIN CALCIUM 40 MG: 20 TABLET, FILM COATED ORAL at 20:36

## 2017-01-01 RX ADMIN — BUDESONIDE AND FORMOTEROL FUMARATE DIHYDRATE 2 PUFF: 160; 4.5 AEROSOL RESPIRATORY (INHALATION) at 07:45

## 2017-01-01 RX ADMIN — CLOTRIMAZOLE AND BETAMETHASONE DIPROPIONATE 1 APPLICATION: 10; .5 CREAM TOPICAL at 09:40

## 2017-01-01 RX ADMIN — CARVEDILOL 25 MG: 25 TABLET, FILM COATED ORAL at 21:44

## 2017-01-01 RX ADMIN — POTASSIUM CHLORIDE 40 MEQ: 750 CAPSULE, EXTENDED RELEASE ORAL at 08:41

## 2017-01-01 RX ADMIN — BUDESONIDE AND FORMOTEROL FUMARATE DIHYDRATE 2 PUFF: 160; 4.5 AEROSOL RESPIRATORY (INHALATION) at 08:59

## 2017-01-01 RX ADMIN — Medication 150 MG: at 09:31

## 2017-01-01 RX ADMIN — CLOTRIMAZOLE AND BETAMETHASONE DIPROPIONATE: 10; .5 CREAM TOPICAL at 09:23

## 2017-01-01 RX ADMIN — POTASSIUM CHLORIDE 20 MEQ: 750 CAPSULE, EXTENDED RELEASE ORAL at 08:01

## 2017-01-01 RX ADMIN — HEPARIN SODIUM 5000 UNITS: 5000 INJECTION, SOLUTION INTRAVENOUS; SUBCUTANEOUS at 00:11

## 2017-01-01 RX ADMIN — ASPIRIN 81 MG: 81 TABLET ORAL at 08:58

## 2017-01-01 RX ADMIN — SODIUM CHLORIDE, POTASSIUM CHLORIDE, SODIUM LACTATE AND CALCIUM CHLORIDE 9 ML/HR: 600; 310; 30; 20 INJECTION, SOLUTION INTRAVENOUS at 09:36

## 2017-01-01 RX ADMIN — BUMETANIDE 4 MG: 0.25 INJECTION INTRAMUSCULAR; INTRAVENOUS at 08:27

## 2017-01-01 RX ADMIN — IPRATROPIUM BROMIDE 0.5 MG: 0.5 SOLUTION RESPIRATORY (INHALATION) at 14:26

## 2017-01-01 RX ADMIN — PANTOPRAZOLE SODIUM 40 MG: 40 TABLET, DELAYED RELEASE ORAL at 06:12

## 2017-01-01 RX ADMIN — BUMETANIDE 2 MG: 2 TABLET ORAL at 09:53

## 2017-01-01 RX ADMIN — POTASSIUM CHLORIDE 40 MEQ: 750 CAPSULE, EXTENDED RELEASE ORAL at 10:41

## 2017-01-01 RX ADMIN — BUMETANIDE 2 MG: 0.25 INJECTION INTRAMUSCULAR; INTRAVENOUS at 09:45

## 2017-01-01 RX ADMIN — POTASSIUM CHLORIDE 80 MEQ: 750 CAPSULE, EXTENDED RELEASE ORAL at 17:09

## 2017-01-01 RX ADMIN — HEPARIN SODIUM 25000 UNITS: 1000 INJECTION, SOLUTION INTRAVENOUS; SUBCUTANEOUS at 12:05

## 2017-01-01 RX ADMIN — ROCURONIUM BROMIDE 50 MG: 10 INJECTION INTRAVENOUS at 11:30

## 2017-01-01 RX ADMIN — BUDESONIDE AND FORMOTEROL FUMARATE DIHYDRATE 2 PUFF: 160; 4.5 AEROSOL RESPIRATORY (INHALATION) at 08:06

## 2017-01-01 RX ADMIN — METOLAZONE 10 MG: 5 TABLET ORAL at 13:51

## 2017-01-01 RX ADMIN — FUROSEMIDE 40 MG: 10 INJECTION, SOLUTION INTRAMUSCULAR; INTRAVENOUS at 06:49

## 2017-01-01 RX ADMIN — HYDRALAZINE HYDROCHLORIDE 10 MG: 10 TABLET, FILM COATED ORAL at 15:19

## 2017-01-01 RX ADMIN — CARVEDILOL 25 MG: 25 TABLET, FILM COATED ORAL at 09:12

## 2017-01-01 RX ADMIN — FUROSEMIDE 40 MG: 10 INJECTION, SOLUTION INTRAMUSCULAR; INTRAVENOUS at 19:44

## 2017-01-01 RX ADMIN — CITALOPRAM 40 MG: 40 TABLET, FILM COATED ORAL at 20:38

## 2017-01-01 RX ADMIN — Medication 1 CAPSULE: at 08:58

## 2017-01-01 RX ADMIN — CLOTRIMAZOLE AND BETAMETHASONE DIPROPIONATE: 10; .5 CREAM TOPICAL at 22:17

## 2017-01-01 RX ADMIN — FUROSEMIDE 40 MG: 10 INJECTION, SOLUTION INTRAMUSCULAR; INTRAVENOUS at 10:00

## 2017-01-01 RX ADMIN — BUDESONIDE AND FORMOTEROL FUMARATE DIHYDRATE 2 PUFF: 160; 4.5 AEROSOL RESPIRATORY (INHALATION) at 20:12

## 2017-01-01 RX ADMIN — BUMETANIDE 2 MG: 0.25 INJECTION, SOLUTION INTRAMUSCULAR; INTRAVENOUS at 16:01

## 2017-01-01 RX ADMIN — HEPARIN SODIUM 5000 UNITS: 1000 INJECTION, SOLUTION INTRAVENOUS; SUBCUTANEOUS at 12:14

## 2017-01-01 RX ADMIN — PANTOPRAZOLE SODIUM 40 MG: 40 TABLET, DELAYED RELEASE ORAL at 06:28

## 2017-01-01 RX ADMIN — IPRATROPIUM BROMIDE 0.5 MG: 0.5 SOLUTION RESPIRATORY (INHALATION) at 07:08

## 2017-01-01 RX ADMIN — CARVEDILOL 3.12 MG: 3.12 TABLET, FILM COATED ORAL at 05:58

## 2017-01-01 RX ADMIN — CITALOPRAM 40 MG: 40 TABLET, FILM COATED ORAL at 08:01

## 2017-01-01 RX ADMIN — PANTOPRAZOLE SODIUM 40 MG: 40 TABLET, DELAYED RELEASE ORAL at 06:25

## 2017-01-01 RX ADMIN — CITALOPRAM 40 MG: 40 TABLET, FILM COATED ORAL at 20:30

## 2017-01-01 RX ADMIN — MAGNESIUM SULFATE HEPTAHYDRATE 1 G: 1 INJECTION, SOLUTION INTRAVENOUS at 08:41

## 2017-01-01 RX ADMIN — BUDESONIDE AND FORMOTEROL FUMARATE DIHYDRATE 2 PUFF: 160; 4.5 AEROSOL RESPIRATORY (INHALATION) at 19:57

## 2017-01-01 RX ADMIN — NYSTATIN: 100000 POWDER TOPICAL at 21:35

## 2017-01-01 RX ADMIN — BUDESONIDE AND FORMOTEROL FUMARATE DIHYDRATE 2 PUFF: 160; 4.5 AEROSOL RESPIRATORY (INHALATION) at 06:30

## 2017-01-01 RX ADMIN — DILTIAZEM HYDROCHLORIDE 60 MG: 60 CAPSULE, EXTENDED RELEASE ORAL at 09:17

## 2017-01-01 RX ADMIN — CARVEDILOL 12.5 MG: 12.5 TABLET, FILM COATED ORAL at 08:34

## 2017-01-01 RX ADMIN — BUDESONIDE AND FORMOTEROL FUMARATE DIHYDRATE 2 PUFF: 160; 4.5 AEROSOL RESPIRATORY (INHALATION) at 21:12

## 2017-01-01 RX ADMIN — BUDESONIDE AND FORMOTEROL FUMARATE DIHYDRATE 2 PUFF: 160; 4.5 AEROSOL RESPIRATORY (INHALATION) at 09:08

## 2017-01-01 RX ADMIN — ENOXAPARIN SODIUM 30 MG: 30 INJECTION SUBCUTANEOUS at 12:25

## 2017-01-01 RX ADMIN — CEFAZOLIN SODIUM 2 G: 2 INJECTION, SOLUTION INTRAVENOUS at 08:22

## 2017-01-01 RX ADMIN — PANTOPRAZOLE SODIUM 40 MG: 40 TABLET, DELAYED RELEASE ORAL at 08:21

## 2017-01-01 RX ADMIN — IPRATROPIUM BROMIDE 0.5 MG: 0.5 SOLUTION RESPIRATORY (INHALATION) at 13:09

## 2017-01-01 RX ADMIN — SENNOSIDES AND DOCUSATE SODIUM 2 TABLET: 8.6; 5 TABLET ORAL at 22:46

## 2017-01-01 RX ADMIN — FUROSEMIDE 40 MG: 10 INJECTION, SOLUTION INTRAMUSCULAR; INTRAVENOUS at 17:23

## 2017-01-01 RX ADMIN — BUMETANIDE 2 MG: 0.25 INJECTION INTRAMUSCULAR; INTRAVENOUS at 15:27

## 2017-01-01 RX ADMIN — Medication 100 MEQ: at 01:20

## 2017-01-01 RX ADMIN — ATORVASTATIN CALCIUM 20 MG: 20 TABLET, FILM COATED ORAL at 09:23

## 2017-01-01 RX ADMIN — CARVEDILOL 25 MG: 25 TABLET, FILM COATED ORAL at 06:25

## 2017-01-01 RX ADMIN — BUMETANIDE 2 MG: 0.25 INJECTION, SOLUTION INTRAMUSCULAR; INTRAVENOUS at 15:24

## 2017-01-01 RX ADMIN — IPRATROPIUM BROMIDE 0.5 MG: 0.5 SOLUTION RESPIRATORY (INHALATION) at 15:52

## 2017-01-01 RX ADMIN — BUMETANIDE 2 MG: 2 TABLET ORAL at 18:26

## 2017-01-01 RX ADMIN — ROCURONIUM BROMIDE 20 MG: 10 INJECTION INTRAVENOUS at 13:30

## 2017-01-01 RX ADMIN — HYDROCODONE BITARTRATE AND ACETAMINOPHEN 1 TABLET: 5; 325 TABLET ORAL at 06:26

## 2017-01-01 RX ADMIN — IPRATROPIUM BROMIDE 0.5 MG: 0.5 SOLUTION RESPIRATORY (INHALATION) at 19:23

## 2017-01-01 RX ADMIN — IPRATROPIUM BROMIDE 0.5 MG: 0.5 SOLUTION RESPIRATORY (INHALATION) at 16:02

## 2017-01-01 RX ADMIN — CALCIUM GLUCONATE 1 G: 94 INJECTION, SOLUTION INTRAVENOUS at 14:04

## 2017-01-01 RX ADMIN — BUDESONIDE AND FORMOTEROL FUMARATE DIHYDRATE 2 PUFF: 160; 4.5 AEROSOL RESPIRATORY (INHALATION) at 21:03

## 2017-01-01 RX ADMIN — NOREPINEPHRINE BITARTRATE 0.03 MCG/KG/MIN: 1 INJECTION, SOLUTION, CONCENTRATE INTRAVENOUS at 12:45

## 2017-01-01 RX ADMIN — NEOSTIGMINE METHYLSULFATE 3 MG: 1 INJECTION INTRAMUSCULAR; INTRAVENOUS; SUBCUTANEOUS at 12:51

## 2017-01-01 RX ADMIN — CLOTRIMAZOLE AND BETAMETHASONE DIPROPIONATE: 10; .5 CREAM TOPICAL at 20:45

## 2017-01-01 RX ADMIN — Medication 1 CAPSULE: at 08:00

## 2017-01-01 RX ADMIN — BUMETANIDE 2 MG: 0.25 INJECTION, SOLUTION INTRAMUSCULAR; INTRAVENOUS at 18:38

## 2017-01-01 RX ADMIN — AMLODIPINE BESYLATE 5 MG: 5 TABLET ORAL at 08:45

## 2017-01-01 RX ADMIN — MUPIROCIN 1 APPLICATION: 20 OINTMENT TOPICAL at 08:13

## 2017-01-01 RX ADMIN — CARVEDILOL 6.25 MG: 6.25 TABLET, FILM COATED ORAL at 05:05

## 2017-01-01 RX ADMIN — AMLODIPINE BESYLATE 10 MG: 10 TABLET ORAL at 09:06

## 2017-01-01 RX ADMIN — IRON SUCROSE 200 MG: 20 INJECTION, SOLUTION INTRAVENOUS at 12:33

## 2017-01-01 RX ADMIN — POTASSIUM CHLORIDE 20 MEQ: 750 CAPSULE, EXTENDED RELEASE ORAL at 17:28

## 2017-01-01 RX ADMIN — POTASSIUM CHLORIDE 20 MEQ: 750 CAPSULE, EXTENDED RELEASE ORAL at 09:53

## 2017-01-01 RX ADMIN — CITALOPRAM 40 MG: 40 TABLET, FILM COATED ORAL at 20:19

## 2017-01-01 RX ADMIN — BUDESONIDE AND FORMOTEROL FUMARATE DIHYDRATE 2 PUFF: 160; 4.5 AEROSOL RESPIRATORY (INHALATION) at 07:30

## 2017-01-01 RX ADMIN — PANTOPRAZOLE SODIUM 40 MG: 40 TABLET, DELAYED RELEASE ORAL at 09:12

## 2017-01-01 RX ADMIN — BUDESONIDE AND FORMOTEROL FUMARATE DIHYDRATE 2 PUFF: 160; 4.5 AEROSOL RESPIRATORY (INHALATION) at 23:44

## 2017-01-01 RX ADMIN — POTASSIUM CHLORIDE 20 MEQ: 750 CAPSULE, EXTENDED RELEASE ORAL at 13:38

## 2017-01-01 RX ADMIN — BUDESONIDE AND FORMOTEROL FUMARATE DIHYDRATE 2 PUFF: 160; 4.5 AEROSOL RESPIRATORY (INHALATION) at 21:23

## 2017-01-01 RX ADMIN — MUPIROCIN: 20 OINTMENT TOPICAL at 10:00

## 2017-01-01 RX ADMIN — HYDROCODONE BITARTRATE AND ACETAMINOPHEN 2 TABLET: 5; 325 TABLET ORAL at 13:26

## 2017-01-01 RX ADMIN — AMLODIPINE BESYLATE 2.5 MG: 2.5 TABLET ORAL at 10:40

## 2017-01-01 RX ADMIN — LIDOCAINE HYDROCHLORIDE 40 MG: 20 INJECTION, SOLUTION INFILTRATION; PERINEURAL at 11:29

## 2017-01-01 RX ADMIN — BUMETANIDE 2 MG: 2 TABLET ORAL at 08:35

## 2017-01-01 RX ADMIN — CITALOPRAM 40 MG: 40 TABLET, FILM COATED ORAL at 21:01

## 2017-01-01 RX ADMIN — PANTOPRAZOLE SODIUM 40 MG: 40 TABLET, DELAYED RELEASE ORAL at 07:02

## 2017-01-01 RX ADMIN — CLOTRIMAZOLE AND BETAMETHASONE DIPROPIONATE: 10; .5 CREAM TOPICAL at 22:50

## 2017-01-01 RX ADMIN — BUDESONIDE AND FORMOTEROL FUMARATE DIHYDRATE 2 PUFF: 160; 4.5 AEROSOL RESPIRATORY (INHALATION) at 09:10

## 2017-01-01 RX ADMIN — SODIUM CHLORIDE 4.2 UNITS/HR: 9 INJECTION, SOLUTION INTRAVENOUS at 19:45

## 2017-01-01 RX ADMIN — CARVEDILOL 25 MG: 25 TABLET, FILM COATED ORAL at 20:30

## 2017-01-01 RX ADMIN — HYDRALAZINE HYDROCHLORIDE 10 MG: 10 TABLET, FILM COATED ORAL at 05:04

## 2017-01-01 RX ADMIN — AMLODIPINE BESYLATE 5 MG: 5 TABLET ORAL at 08:17

## 2017-01-01 RX ADMIN — AMLODIPINE BESYLATE 10 MG: 10 TABLET ORAL at 09:20

## 2017-01-01 RX ADMIN — BUDESONIDE AND FORMOTEROL FUMARATE DIHYDRATE 2 PUFF: 160; 4.5 AEROSOL RESPIRATORY (INHALATION) at 07:16

## 2017-01-01 RX ADMIN — ENOXAPARIN SODIUM 30 MG: 30 INJECTION SUBCUTANEOUS at 18:01

## 2017-01-01 RX ADMIN — Medication 1 CAPSULE: at 08:35

## 2017-01-01 RX ADMIN — BUMETANIDE 2 MG: 0.25 INJECTION INTRAMUSCULAR; INTRAVENOUS at 10:40

## 2017-01-01 RX ADMIN — CITALOPRAM 40 MG: 40 TABLET, FILM COATED ORAL at 23:12

## 2017-01-01 RX ADMIN — IOPAMIDOL 95 ML: 612 INJECTION, SOLUTION INTRAVENOUS at 13:30

## 2017-01-01 RX ADMIN — CEFAZOLIN SODIUM 2 G: 2 INJECTION, SOLUTION INTRAVENOUS at 14:12

## 2017-01-01 RX ADMIN — BUDESONIDE AND FORMOTEROL FUMARATE DIHYDRATE 2 PUFF: 160; 4.5 AEROSOL RESPIRATORY (INHALATION) at 19:32

## 2017-01-01 RX ADMIN — POTASSIUM CHLORIDE 40 MEQ: 750 CAPSULE, EXTENDED RELEASE ORAL at 18:04

## 2017-01-01 RX ADMIN — POTASSIUM CHLORIDE 40 MEQ: 750 CAPSULE, EXTENDED RELEASE ORAL at 08:34

## 2017-01-01 RX ADMIN — AMLODIPINE BESYLATE 10 MG: 10 TABLET ORAL at 08:52

## 2017-01-01 RX ADMIN — IPRATROPIUM BROMIDE 0.5 MG: 0.5 SOLUTION RESPIRATORY (INHALATION) at 16:22

## 2017-01-01 RX ADMIN — METOPROLOL TARTRATE 50 MG: 50 TABLET, FILM COATED ORAL at 08:48

## 2017-01-01 RX ADMIN — INSULIN HUMAN 8 UNITS: 100 INJECTION, SOLUTION PARENTERAL at 13:51

## 2017-01-01 RX ADMIN — CLOTRIMAZOLE AND BETAMETHASONE DIPROPIONATE 1 APPLICATION: 10; .5 CREAM TOPICAL at 21:31

## 2017-01-01 RX ADMIN — HYDRALAZINE HYDROCHLORIDE 50 MG: 50 TABLET, FILM COATED ORAL at 05:40

## 2017-01-01 RX ADMIN — IPRATROPIUM BROMIDE 0.5 MG: 0.5 SOLUTION RESPIRATORY (INHALATION) at 20:09

## 2017-01-01 RX ADMIN — CLOTRIMAZOLE AND BETAMETHASONE DIPROPIONATE: 10; .5 CREAM TOPICAL at 08:59

## 2017-01-01 RX ADMIN — AMLODIPINE BESYLATE 2.5 MG: 2.5 TABLET ORAL at 13:58

## 2017-01-01 RX ADMIN — Medication 1 CAPSULE: at 18:05

## 2017-01-01 RX ADMIN — METOCLOPRAMIDE 10 MG: 5 INJECTION, SOLUTION INTRAMUSCULAR; INTRAVENOUS at 04:06

## 2017-01-01 RX ADMIN — ATORVASTATIN CALCIUM 20 MG: 20 TABLET, FILM COATED ORAL at 08:18

## 2017-01-01 RX ADMIN — ENOXAPARIN SODIUM 30 MG: 30 INJECTION SUBCUTANEOUS at 12:43

## 2017-01-01 RX ADMIN — ENOXAPARIN SODIUM 30 MG: 30 INJECTION SUBCUTANEOUS at 18:43

## 2017-01-01 RX ADMIN — CARVEDILOL 25 MG: 25 TABLET, FILM COATED ORAL at 17:37

## 2017-01-01 RX ADMIN — MUPIROCIN: 20 OINTMENT TOPICAL at 08:27

## 2017-01-01 RX ADMIN — ASPIRIN 81 MG: 81 TABLET ORAL at 08:51

## 2017-01-01 RX ADMIN — FUROSEMIDE 40 MG: 10 INJECTION, SOLUTION INTRAMUSCULAR; INTRAVENOUS at 18:16

## 2017-01-01 RX ADMIN — ASPIRIN 81 MG: 81 TABLET ORAL at 09:53

## 2017-01-01 RX ADMIN — IPRATROPIUM BROMIDE 0.5 MG: 0.5 SOLUTION RESPIRATORY (INHALATION) at 15:01

## 2017-01-01 RX ADMIN — SODIUM CHLORIDE 30 ML/HR: 9 INJECTION, SOLUTION INTRAVENOUS at 19:45

## 2017-01-01 RX ADMIN — IPRATROPIUM BROMIDE 0.5 MG: 0.5 SOLUTION RESPIRATORY (INHALATION) at 06:53

## 2017-01-01 RX ADMIN — AMLODIPINE BESYLATE 2.5 MG: 2.5 TABLET ORAL at 10:00

## 2017-01-01 RX ADMIN — CARVEDILOL 25 MG: 25 TABLET, FILM COATED ORAL at 20:55

## 2017-01-01 RX ADMIN — PERFLUTREN 2 ML: 6.52 INJECTION, SUSPENSION INTRAVENOUS at 11:50

## 2017-01-01 RX ADMIN — CITALOPRAM 40 MG: 40 TABLET, FILM COATED ORAL at 22:45

## 2017-01-01 RX ADMIN — IPRATROPIUM BROMIDE 0.5 MG: 0.5 SOLUTION RESPIRATORY (INHALATION) at 10:36

## 2017-01-01 RX ADMIN — IPRATROPIUM BROMIDE 0.5 MG: 0.5 SOLUTION RESPIRATORY (INHALATION) at 20:50

## 2017-01-01 RX ADMIN — HYDRALAZINE HYDROCHLORIDE 10 MG: 20 INJECTION INTRAMUSCULAR; INTRAVENOUS at 14:57

## 2017-01-01 RX ADMIN — AMLODIPINE BESYLATE 10 MG: 10 TABLET ORAL at 10:08

## 2017-01-01 RX ADMIN — ENOXAPARIN SODIUM 30 MG: 30 INJECTION SUBCUTANEOUS at 11:46

## 2017-01-01 RX ADMIN — ATORVASTATIN CALCIUM 20 MG: 20 TABLET, FILM COATED ORAL at 08:34

## 2017-01-01 RX ADMIN — SENNOSIDES AND DOCUSATE SODIUM 2 TABLET: 8.6; 5 TABLET ORAL at 20:51

## 2017-01-01 RX ADMIN — HYDROCODONE BITARTRATE AND ACETAMINOPHEN 1 TABLET: 5; 325 TABLET ORAL at 20:34

## 2017-01-01 RX ADMIN — HYDRALAZINE HYDROCHLORIDE 25 MG: 25 TABLET, FILM COATED ORAL at 15:03

## 2017-01-01 RX ADMIN — IPRATROPIUM BROMIDE 0.5 MG: 0.5 SOLUTION RESPIRATORY (INHALATION) at 10:48

## 2017-01-01 RX ADMIN — POTASSIUM CHLORIDE 40 MEQ: 750 CAPSULE, EXTENDED RELEASE ORAL at 18:12

## 2017-01-01 RX ADMIN — FUROSEMIDE 40 MG: 10 INJECTION, SOLUTION INTRAMUSCULAR; INTRAVENOUS at 18:12

## 2017-01-01 RX ADMIN — PANTOPRAZOLE SODIUM 40 MG: 40 TABLET, DELAYED RELEASE ORAL at 06:29

## 2017-01-01 RX ADMIN — BUDESONIDE AND FORMOTEROL FUMARATE DIHYDRATE 2 PUFF: 160; 4.5 AEROSOL RESPIRATORY (INHALATION) at 19:50

## 2017-01-01 RX ADMIN — CHLORHEXIDINE GLUCONATE 15 ML: 1.2 RINSE ORAL at 23:29

## 2017-01-01 RX ADMIN — METOPROLOL TARTRATE 50 MG: 50 TABLET, FILM COATED ORAL at 20:38

## 2017-01-01 RX ADMIN — BUMETANIDE 4 MG: 0.25 INJECTION INTRAMUSCULAR; INTRAVENOUS at 22:30

## 2017-01-01 RX ADMIN — IPRATROPIUM BROMIDE 0.5 MG: 0.5 SOLUTION RESPIRATORY (INHALATION) at 16:54

## 2017-01-01 RX ADMIN — IPRATROPIUM BROMIDE 0.5 MG: 0.5 SOLUTION RESPIRATORY (INHALATION) at 07:50

## 2017-01-01 RX ADMIN — CARVEDILOL 25 MG: 25 TABLET, FILM COATED ORAL at 09:23

## 2017-01-01 RX ADMIN — BUMETANIDE 4 MG: 0.25 INJECTION INTRAMUSCULAR; INTRAVENOUS at 15:05

## 2017-01-01 RX ADMIN — CARVEDILOL 6.25 MG: 6.25 TABLET, FILM COATED ORAL at 21:29

## 2017-01-01 RX ADMIN — CITALOPRAM 40 MG: 40 TABLET, FILM COATED ORAL at 20:59

## 2017-01-01 RX ADMIN — PROPOFOL 50 MG: 10 INJECTION, EMULSION INTRAVENOUS at 11:29

## 2017-01-01 RX ADMIN — ATORVASTATIN CALCIUM 20 MG: 20 TABLET, FILM COATED ORAL at 08:23

## 2017-01-01 RX ADMIN — AMLODIPINE BESYLATE 5 MG: 5 TABLET ORAL at 10:27

## 2017-01-01 RX ADMIN — HYDRALAZINE HYDROCHLORIDE 50 MG: 50 TABLET, FILM COATED ORAL at 06:38

## 2017-01-01 RX ADMIN — CLOTRIMAZOLE AND BETAMETHASONE DIPROPIONATE: 10; .5 CREAM TOPICAL at 20:03

## 2017-01-01 RX ADMIN — BUDESONIDE AND FORMOTEROL FUMARATE DIHYDRATE 2 PUFF: 160; 4.5 AEROSOL RESPIRATORY (INHALATION) at 06:53

## 2017-01-01 RX ADMIN — ENOXAPARIN SODIUM 30 MG: 30 INJECTION SUBCUTANEOUS at 17:41

## 2017-01-01 RX ADMIN — HYDROCODONE BITARTRATE AND ACETAMINOPHEN 2 TABLET: 5; 325 TABLET ORAL at 08:21

## 2017-01-01 RX ADMIN — MAGNESIUM SULFATE HEPTAHYDRATE 1 G: 1 INJECTION, SOLUTION INTRAVENOUS at 16:14

## 2017-01-01 RX ADMIN — MUPIROCIN: 20 OINTMENT TOPICAL at 09:02

## 2017-01-01 RX ADMIN — HEPARIN SODIUM 7500 UNITS: 1000 INJECTION, SOLUTION INTRAVENOUS; SUBCUTANEOUS at 11:53

## 2017-01-01 RX ADMIN — BUDESONIDE AND FORMOTEROL FUMARATE DIHYDRATE 2 PUFF: 160; 4.5 AEROSOL RESPIRATORY (INHALATION) at 07:33

## 2017-01-01 RX ADMIN — CARVEDILOL 6.25 MG: 6.25 TABLET, FILM COATED ORAL at 05:21

## 2017-01-01 RX ADMIN — PANTOPRAZOLE SODIUM 40 MG: 40 TABLET, DELAYED RELEASE ORAL at 06:23

## 2017-01-01 RX ADMIN — BUMETANIDE 2 MG: 2 TABLET ORAL at 18:04

## 2017-01-01 RX ADMIN — BUDESONIDE AND FORMOTEROL FUMARATE DIHYDRATE 2 PUFF: 160; 4.5 AEROSOL RESPIRATORY (INHALATION) at 07:22

## 2017-01-01 RX ADMIN — BUMETANIDE 4 MG: 0.25 INJECTION INTRAMUSCULAR; INTRAVENOUS at 00:41

## 2017-01-01 RX ADMIN — TORSEMIDE 40 MG: 20 TABLET ORAL at 19:08

## 2017-01-01 RX ADMIN — AMLODIPINE BESYLATE 10 MG: 10 TABLET ORAL at 08:48

## 2017-01-01 RX ADMIN — CITALOPRAM 40 MG: 40 TABLET, FILM COATED ORAL at 20:28

## 2017-01-01 RX ADMIN — MIDAZOLAM HYDROCHLORIDE 2 MG: 1 INJECTION, SOLUTION INTRAMUSCULAR; INTRAVENOUS at 13:30

## 2017-01-01 RX ADMIN — IPRATROPIUM BROMIDE 0.5 MG: 0.5 SOLUTION RESPIRATORY (INHALATION) at 14:55

## 2017-01-01 RX ADMIN — CITALOPRAM 40 MG: 40 TABLET, FILM COATED ORAL at 20:03

## 2017-01-01 RX ADMIN — ASPIRIN 81 MG: 81 TABLET ORAL at 08:35

## 2017-01-01 RX ADMIN — TORSEMIDE 40 MG: 20 TABLET ORAL at 12:43

## 2017-01-01 RX ADMIN — Medication 150 MG: at 09:45

## 2017-01-01 RX ADMIN — IRON SUCROSE 200 MG: 20 INJECTION, SOLUTION INTRAVENOUS at 14:38

## 2017-01-01 RX ADMIN — ATORVASTATIN CALCIUM 20 MG: 20 TABLET, FILM COATED ORAL at 08:48

## 2017-01-01 RX ADMIN — CITALOPRAM 40 MG: 40 TABLET, FILM COATED ORAL at 21:31

## 2017-01-01 RX ADMIN — PROPOFOL 50 MCG/KG/MIN: 10 INJECTION, EMULSION INTRAVENOUS at 12:20

## 2017-01-01 RX ADMIN — BUDESONIDE AND FORMOTEROL FUMARATE DIHYDRATE 2 PUFF: 160; 4.5 AEROSOL RESPIRATORY (INHALATION) at 10:42

## 2017-01-01 RX ADMIN — BUMETANIDE 2 MG: 0.25 INJECTION, SOLUTION INTRAMUSCULAR; INTRAVENOUS at 08:02

## 2017-01-01 RX ADMIN — SODIUM CHLORIDE 100 ML/HR: 9 INJECTION, SOLUTION INTRAVENOUS at 17:24

## 2017-01-01 RX ADMIN — MIDAZOLAM 1 MG: 1 INJECTION INTRAMUSCULAR; INTRAVENOUS at 07:35

## 2017-01-01 RX ADMIN — CITALOPRAM 40 MG: 40 TABLET, FILM COATED ORAL at 20:51

## 2017-01-01 RX ADMIN — CARVEDILOL 25 MG: 25 TABLET, FILM COATED ORAL at 08:38

## 2017-01-01 RX ADMIN — PANTOPRAZOLE SODIUM 40 MG: 40 TABLET, DELAYED RELEASE ORAL at 10:00

## 2017-01-01 RX ADMIN — AMLODIPINE BESYLATE 7.5 MG: 5 TABLET ORAL at 08:41

## 2017-01-01 RX ADMIN — ATORVASTATIN CALCIUM 40 MG: 20 TABLET, FILM COATED ORAL at 23:11

## 2017-01-01 RX ADMIN — PANTOPRAZOLE SODIUM 40 MG: 40 TABLET, DELAYED RELEASE ORAL at 06:05

## 2017-01-01 RX ADMIN — HYDRALAZINE HYDROCHLORIDE 25 MG: 25 TABLET, FILM COATED ORAL at 21:06

## 2017-01-01 RX ADMIN — ENOXAPARIN SODIUM 40 MG: 40 INJECTION SUBCUTANEOUS at 11:52

## 2017-01-01 RX ADMIN — PANTOPRAZOLE SODIUM 40 MG: 40 TABLET, DELAYED RELEASE ORAL at 06:57

## 2017-01-01 RX ADMIN — CLOTRIMAZOLE AND BETAMETHASONE DIPROPIONATE: 10; .5 CREAM TOPICAL at 09:07

## 2017-01-01 RX ADMIN — MIDAZOLAM 2 MG: 1 INJECTION INTRAMUSCULAR; INTRAVENOUS at 10:05

## 2017-01-01 RX ADMIN — PHENYLEPHRINE HYDROCHLORIDE 0.3 MCG/KG/MIN: 10 INJECTION, SOLUTION INTRAMUSCULAR; INTRAVENOUS; SUBCUTANEOUS at 11:48

## 2017-01-01 RX ADMIN — PANTOPRAZOLE SODIUM 40 MG: 40 TABLET, DELAYED RELEASE ORAL at 05:47

## 2017-01-01 RX ADMIN — BUDESONIDE AND FORMOTEROL FUMARATE DIHYDRATE 2 PUFF: 160; 4.5 AEROSOL RESPIRATORY (INHALATION) at 09:35

## 2017-01-01 RX ADMIN — ASPIRIN 81 MG: 81 TABLET ORAL at 09:31

## 2017-01-01 RX ADMIN — BUDESONIDE AND FORMOTEROL FUMARATE DIHYDRATE 2 PUFF: 160; 4.5 AEROSOL RESPIRATORY (INHALATION) at 07:46

## 2017-01-01 RX ADMIN — BUDESONIDE AND FORMOTEROL FUMARATE DIHYDRATE 2 PUFF: 160; 4.5 AEROSOL RESPIRATORY (INHALATION) at 19:43

## 2017-01-01 RX ADMIN — CHLORHEXIDINE GLUCONATE 15 ML: 1.2 RINSE ORAL at 15:45

## 2017-01-01 RX ADMIN — PANTOPRAZOLE SODIUM 40 MG: 40 TABLET, DELAYED RELEASE ORAL at 08:27

## 2017-01-01 RX ADMIN — CARVEDILOL 25 MG: 25 TABLET, FILM COATED ORAL at 08:41

## 2017-01-01 RX ADMIN — HYDRALAZINE HYDROCHLORIDE 10 MG: 20 INJECTION INTRAMUSCULAR; INTRAVENOUS at 15:55

## 2017-01-01 RX ADMIN — SENNOSIDES AND DOCUSATE SODIUM 2 TABLET: 8.6; 5 TABLET ORAL at 20:19

## 2017-01-01 RX ADMIN — HYDRALAZINE HYDROCHLORIDE 10 MG: 20 INJECTION INTRAMUSCULAR; INTRAVENOUS at 23:45

## 2017-01-01 RX ADMIN — SODIUM CHLORIDE, POTASSIUM CHLORIDE, SODIUM LACTATE AND CALCIUM CHLORIDE: 600; 310; 30; 20 INJECTION, SOLUTION INTRAVENOUS at 16:45

## 2017-01-01 RX ADMIN — CARVEDILOL 25 MG: 25 TABLET, FILM COATED ORAL at 20:45

## 2017-01-01 RX ADMIN — FUROSEMIDE 40 MG: 10 INJECTION, SOLUTION INTRAMUSCULAR; INTRAVENOUS at 16:46

## 2017-01-01 RX ADMIN — HYDROCODONE BITARTRATE AND ACETAMINOPHEN 1 TABLET: 5; 325 TABLET ORAL at 03:06

## 2017-01-01 RX ADMIN — IPRATROPIUM BROMIDE 0.5 MG: 0.5 SOLUTION RESPIRATORY (INHALATION) at 06:30

## 2017-01-01 RX ADMIN — IPRATROPIUM BROMIDE 0.5 MG: 0.5 SOLUTION RESPIRATORY (INHALATION) at 07:20

## 2017-01-01 RX ADMIN — PROPOFOL 70 MG: 10 INJECTION, EMULSION INTRAVENOUS at 11:29

## 2017-01-01 RX ADMIN — SODIUM CHLORIDE: 9 INJECTION, SOLUTION INTRAVENOUS at 11:36

## 2017-01-01 RX ADMIN — BUMETANIDE 2 MG: 0.25 INJECTION INTRAMUSCULAR; INTRAVENOUS at 20:37

## 2017-01-01 RX ADMIN — TRIAMTERENE AND HYDROCHLOROTHIAZIDE 1 TABLET: 37.5; 25 TABLET ORAL at 08:47

## 2017-01-01 RX ADMIN — GLYCOPYRROLATE 0.5 MG: 0.2 INJECTION INTRAMUSCULAR; INTRAVENOUS at 12:51

## 2017-01-01 RX ADMIN — IPRATROPIUM BROMIDE 0.5 MG: 0.5 SOLUTION RESPIRATORY (INHALATION) at 23:43

## 2017-01-01 RX ADMIN — IPRATROPIUM BROMIDE 0.5 MG: 0.5 SOLUTION RESPIRATORY (INHALATION) at 08:06

## 2017-01-01 RX ADMIN — AMLODIPINE BESYLATE 5 MG: 5 TABLET ORAL at 08:27

## 2017-01-01 RX ADMIN — RIVAROXABAN 20 MG: 20 TABLET, FILM COATED ORAL at 19:18

## 2017-01-01 RX ADMIN — BUDESONIDE AND FORMOTEROL FUMARATE DIHYDRATE 2 PUFF: 160; 4.5 AEROSOL RESPIRATORY (INHALATION) at 20:50

## 2017-01-01 RX ADMIN — BUMETANIDE 4 MG: 0.25 INJECTION INTRAMUSCULAR; INTRAVENOUS at 02:14

## 2017-01-01 RX ADMIN — HYDROCODONE BITARTRATE AND ACETAMINOPHEN 2 TABLET: 5; 325 TABLET ORAL at 17:29

## 2017-01-01 RX ADMIN — BUDESONIDE AND FORMOTEROL FUMARATE DIHYDRATE 2 PUFF: 160; 4.5 AEROSOL RESPIRATORY (INHALATION) at 08:30

## 2017-01-01 RX ADMIN — POTASSIUM CHLORIDE 40 MEQ: 750 CAPSULE, EXTENDED RELEASE ORAL at 06:46

## 2017-01-01 RX ADMIN — FUROSEMIDE 20 MG: 10 INJECTION, SOLUTION INTRAMUSCULAR; INTRAVENOUS at 12:35

## 2017-01-01 RX ADMIN — BUDESONIDE AND FORMOTEROL FUMARATE DIHYDRATE 2 PUFF: 160; 4.5 AEROSOL RESPIRATORY (INHALATION) at 20:49

## 2017-01-01 RX ADMIN — SODIUM CHLORIDE 50 ML/HR: 9 INJECTION, SOLUTION INTRAVENOUS at 07:15

## 2017-01-01 RX ADMIN — POTASSIUM CHLORIDE 20 MEQ: 750 CAPSULE, EXTENDED RELEASE ORAL at 08:35

## 2017-01-01 RX ADMIN — IRON SUCROSE 300 MG: 20 INJECTION, SOLUTION INTRAVENOUS at 11:38

## 2017-01-01 RX ADMIN — Medication 150 MG: at 10:00

## 2017-01-01 RX ADMIN — PANTOPRAZOLE SODIUM 40 MG: 40 TABLET, DELAYED RELEASE ORAL at 06:20

## 2017-01-01 RX ADMIN — SODIUM CHLORIDE: 9 INJECTION, SOLUTION INTRAVENOUS at 11:22

## 2017-01-01 RX ADMIN — FUROSEMIDE 40 MG: 10 INJECTION, SOLUTION INTRAMUSCULAR; INTRAVENOUS at 18:00

## 2017-01-01 RX ADMIN — SODIUM CHLORIDE, POTASSIUM CHLORIDE, SODIUM LACTATE AND CALCIUM CHLORIDE: 600; 310; 30; 20 INJECTION, SOLUTION INTRAVENOUS at 11:17

## 2017-01-01 RX ADMIN — IPRATROPIUM BROMIDE 0.5 MG: 0.5 SOLUTION RESPIRATORY (INHALATION) at 20:07

## 2017-01-01 RX ADMIN — PANTOPRAZOLE SODIUM 40 MG: 40 TABLET, DELAYED RELEASE ORAL at 09:06

## 2017-01-01 RX ADMIN — POTASSIUM CHLORIDE 20 MEQ: 750 CAPSULE, EXTENDED RELEASE ORAL at 08:23

## 2017-01-01 RX ADMIN — NYSTATIN: 100000 POWDER TOPICAL at 15:11

## 2017-01-01 RX ADMIN — CITALOPRAM 40 MG: 40 TABLET, FILM COATED ORAL at 01:15

## 2017-01-01 RX ADMIN — CITALOPRAM 40 MG: 40 TABLET, FILM COATED ORAL at 21:47

## 2017-01-01 RX ADMIN — CLOTRIMAZOLE AND BETAMETHASONE DIPROPIONATE: 10; .5 CREAM TOPICAL at 08:17

## 2017-01-01 RX ADMIN — NYSTATIN: 100000 POWDER TOPICAL at 06:34

## 2017-01-01 RX ADMIN — PANTOPRAZOLE SODIUM 40 MG: 40 TABLET, DELAYED RELEASE ORAL at 06:37

## 2017-01-01 RX ADMIN — IPRATROPIUM BROMIDE 0.5 MG: 0.5 SOLUTION RESPIRATORY (INHALATION) at 19:32

## 2017-01-01 RX ADMIN — BUDESONIDE AND FORMOTEROL FUMARATE DIHYDRATE 2 PUFF: 160; 4.5 AEROSOL RESPIRATORY (INHALATION) at 20:40

## 2017-01-01 RX ADMIN — LOSARTAN POTASSIUM 50 MG: 50 TABLET, FILM COATED ORAL at 08:48

## 2017-01-01 RX ADMIN — DILTIAZEM HYDROCHLORIDE 60 MG: 60 CAPSULE, EXTENDED RELEASE ORAL at 02:23

## 2017-01-01 RX ADMIN — CITALOPRAM 40 MG: 40 TABLET, FILM COATED ORAL at 22:17

## 2017-01-01 RX ADMIN — ENOXAPARIN SODIUM 40 MG: 40 INJECTION SUBCUTANEOUS at 14:01

## 2017-01-01 RX ADMIN — NYSTATIN: 100000 POWDER TOPICAL at 16:50

## 2017-01-01 RX ADMIN — CARVEDILOL 6.25 MG: 6.25 TABLET, FILM COATED ORAL at 08:24

## 2017-01-01 RX ADMIN — FAMOTIDINE 20 MG: 10 INJECTION, SOLUTION INTRAVENOUS at 10:04

## 2017-01-01 RX ADMIN — METOPROLOL TARTRATE 50 MG: 50 TABLET, FILM COATED ORAL at 09:08

## 2017-01-01 RX ADMIN — AMLODIPINE BESYLATE 10 MG: 10 TABLET ORAL at 08:38

## 2017-01-01 RX ADMIN — HYDRALAZINE HYDROCHLORIDE 10 MG: 20 INJECTION INTRAMUSCULAR; INTRAVENOUS at 01:17

## 2017-01-01 RX ADMIN — BUDESONIDE AND FORMOTEROL FUMARATE DIHYDRATE 2 PUFF: 160; 4.5 AEROSOL RESPIRATORY (INHALATION) at 20:04

## 2017-01-01 RX ADMIN — CARVEDILOL 25 MG: 25 TABLET, FILM COATED ORAL at 08:27

## 2017-01-01 RX ADMIN — CITALOPRAM 40 MG: 40 TABLET, FILM COATED ORAL at 20:46

## 2017-01-01 RX ADMIN — PROTAMINE SULFATE 50 MG: 10 INJECTION, SOLUTION INTRAVENOUS at 12:46

## 2017-01-01 RX ADMIN — CARVEDILOL 25 MG: 25 TABLET, FILM COATED ORAL at 20:38

## 2017-01-01 RX ADMIN — CARVEDILOL 12.5 MG: 12.5 TABLET, FILM COATED ORAL at 05:07

## 2017-01-01 RX ADMIN — CLOTRIMAZOLE AND BETAMETHASONE DIPROPIONATE: 10; .5 CREAM TOPICAL at 20:24

## 2017-01-01 RX ADMIN — POTASSIUM CHLORIDE 40 MEQ: 750 CAPSULE, EXTENDED RELEASE ORAL at 14:16

## 2017-01-01 RX ADMIN — IPRATROPIUM BROMIDE 0.5 MG: 0.5 SOLUTION RESPIRATORY (INHALATION) at 06:39

## 2017-01-01 RX ADMIN — SUFENTANIL CITRATE 100 MCG: 50 INJECTION, SOLUTION EPIDURAL; INTRAVENOUS at 14:11

## 2017-01-01 RX ADMIN — SODIUM CHLORIDE 75 ML/HR: 9 INJECTION, SOLUTION INTRAVENOUS at 00:27

## 2017-01-01 RX ADMIN — PANTOPRAZOLE SODIUM 40 MG: 40 TABLET, DELAYED RELEASE ORAL at 09:02

## 2017-01-01 RX ADMIN — IPRATROPIUM BROMIDE 0.5 MG: 0.5 SOLUTION RESPIRATORY (INHALATION) at 19:31

## 2017-01-01 RX ADMIN — HYDRALAZINE HYDROCHLORIDE 10 MG: 10 TABLET, FILM COATED ORAL at 23:11

## 2017-01-01 RX ADMIN — ATORVASTATIN CALCIUM 20 MG: 20 TABLET, FILM COATED ORAL at 08:01

## 2017-01-01 RX ADMIN — CITALOPRAM 40 MG: 40 TABLET, FILM COATED ORAL at 08:58

## 2017-01-01 RX ADMIN — CLOTRIMAZOLE AND BETAMETHASONE DIPROPIONATE: 10; .5 CREAM TOPICAL at 21:00

## 2017-01-01 RX ADMIN — AMLODIPINE BESYLATE 10 MG: 10 TABLET ORAL at 09:17

## 2017-01-01 RX ADMIN — HYDROCODONE BITARTRATE AND ACETAMINOPHEN 1 TABLET: 5; 325 TABLET ORAL at 20:32

## 2017-01-01 RX ADMIN — POTASSIUM CHLORIDE 20 MEQ: 29.8 INJECTION, SOLUTION INTRAVENOUS at 16:32

## 2017-01-01 RX ADMIN — BUMETANIDE 2 MG: 2 TABLET ORAL at 12:12

## 2017-01-01 RX ADMIN — HYDRALAZINE HYDROCHLORIDE 25 MG: 50 TABLET, FILM COATED ORAL at 14:37

## 2017-01-01 RX ADMIN — FUROSEMIDE 20 MG: 10 INJECTION, SOLUTION INTRAMUSCULAR; INTRAVENOUS at 15:09

## 2017-01-01 RX ADMIN — ALBUTEROL SULFATE 2.5 MG: 2.5 SOLUTION RESPIRATORY (INHALATION) at 13:44

## 2017-01-01 RX ADMIN — FENTANYL CITRATE 50 MCG: 50 INJECTION INTRAMUSCULAR; INTRAVENOUS at 10:05

## 2017-01-01 RX ADMIN — ALBUMIN HUMAN 250 ML: 0.05 INJECTION, SOLUTION INTRAVENOUS at 17:38

## 2017-01-01 RX ADMIN — ENOXAPARIN SODIUM 30 MG: 30 INJECTION SUBCUTANEOUS at 11:33

## 2017-01-01 RX ADMIN — AMIODARONE HYDROCHLORIDE 1 MG/MIN: 1.8 INJECTION, SOLUTION INTRAVENOUS at 12:03

## 2017-01-01 RX ADMIN — IPRATROPIUM BROMIDE 0.5 MG: 0.5 SOLUTION RESPIRATORY (INHALATION) at 19:45

## 2017-01-01 RX ADMIN — LIDOCAINE HYDROCHLORIDE 50 MG: 20 INJECTION, SOLUTION INFILTRATION; PERINEURAL at 13:43

## 2017-01-01 RX ADMIN — ATORVASTATIN CALCIUM 40 MG: 20 TABLET, FILM COATED ORAL at 22:46

## 2017-01-01 RX ADMIN — POTASSIUM CHLORIDE 40 MEQ: 750 CAPSULE, EXTENDED RELEASE ORAL at 08:53

## 2017-01-01 RX ADMIN — IPRATROPIUM BROMIDE 0.5 MG: 0.5 SOLUTION RESPIRATORY (INHALATION) at 09:47

## 2017-01-01 RX ADMIN — FUROSEMIDE 20 MG: 20 TABLET ORAL at 08:46

## 2017-01-01 RX ADMIN — FUROSEMIDE 40 MG: 10 INJECTION, SOLUTION INTRAMUSCULAR; INTRAVENOUS at 05:46

## 2017-01-01 RX ADMIN — BUMETANIDE 2 MG: 2 TABLET ORAL at 17:27

## 2017-01-01 RX ADMIN — SENNOSIDES AND DOCUSATE SODIUM 2 TABLET: 8.6; 5 TABLET ORAL at 21:06

## 2017-01-01 RX ADMIN — CARVEDILOL 12.5 MG: 12.5 TABLET, FILM COATED ORAL at 17:27

## 2017-01-01 RX ADMIN — PANTOPRAZOLE SODIUM 40 MG: 40 TABLET, DELAYED RELEASE ORAL at 07:07

## 2017-01-01 RX ADMIN — FUROSEMIDE 40 MG: 10 INJECTION, SOLUTION INTRAMUSCULAR; INTRAVENOUS at 05:47

## 2017-01-01 RX ADMIN — NICARDIPINE HYDROCHLORIDE 5 MG/HR: 2.5 INJECTION INTRAVENOUS at 16:23

## 2017-01-01 RX ADMIN — CLOTRIMAZOLE AND BETAMETHASONE DIPROPIONATE: 10; .5 CREAM TOPICAL at 23:47

## 2017-01-01 RX ADMIN — ATORVASTATIN CALCIUM 20 MG: 20 TABLET, FILM COATED ORAL at 08:29

## 2017-01-01 RX ADMIN — FENTANYL CITRATE 50 MCG: 50 INJECTION INTRAMUSCULAR; INTRAVENOUS at 10:19

## 2017-01-01 RX ADMIN — BUMETANIDE 4 MG: 0.25 INJECTION INTRAMUSCULAR; INTRAVENOUS at 00:44

## 2017-01-01 RX ADMIN — ASPIRIN 81 MG: 81 TABLET ORAL at 08:01

## 2017-01-01 RX ADMIN — FUROSEMIDE 40 MG: 10 INJECTION, SOLUTION INTRAMUSCULAR; INTRAVENOUS at 18:10

## 2017-01-01 RX ADMIN — BUMETANIDE 4 MG: 0.25 INJECTION INTRAMUSCULAR; INTRAVENOUS at 14:04

## 2017-01-01 RX ADMIN — LABETALOL HYDROCHLORIDE 20 MG: 5 INJECTION, SOLUTION INTRAVENOUS at 03:59

## 2017-01-01 RX ADMIN — AMLODIPINE BESYLATE 10 MG: 10 TABLET ORAL at 08:46

## 2017-01-01 RX ADMIN — METOCLOPRAMIDE 10 MG: 5 INJECTION, SOLUTION INTRAMUSCULAR; INTRAVENOUS at 22:23

## 2017-01-01 RX ADMIN — AMLODIPINE BESYLATE 2.5 MG: 2.5 TABLET ORAL at 09:02

## 2017-01-01 RX ADMIN — HYDRALAZINE HYDROCHLORIDE 50 MG: 50 TABLET, FILM COATED ORAL at 15:18

## 2017-01-01 RX ADMIN — CARVEDILOL 25 MG: 25 TABLET, FILM COATED ORAL at 09:38

## 2017-01-01 RX ADMIN — ONDANSETRON 4 MG: 2 INJECTION INTRAMUSCULAR; INTRAVENOUS at 12:51

## 2017-01-01 RX ADMIN — ENOXAPARIN SODIUM 40 MG: 40 INJECTION SUBCUTANEOUS at 11:55

## 2017-01-01 RX ADMIN — CLOTRIMAZOLE AND BETAMETHASONE DIPROPIONATE: 10; .5 CREAM TOPICAL at 08:45

## 2017-01-01 RX ADMIN — BUDESONIDE AND FORMOTEROL FUMARATE DIHYDRATE 2 PUFF: 160; 4.5 AEROSOL RESPIRATORY (INHALATION) at 06:44

## 2017-01-01 RX ADMIN — BUDESONIDE AND FORMOTEROL FUMARATE DIHYDRATE 2 PUFF: 160; 4.5 AEROSOL RESPIRATORY (INHALATION) at 20:24

## 2017-01-01 RX ADMIN — FAMOTIDINE 20 MG: 10 INJECTION, SOLUTION INTRAVENOUS at 10:29

## 2017-01-01 RX ADMIN — IPRATROPIUM BROMIDE AND ALBUTEROL SULFATE 3 ML: .5; 3 SOLUTION RESPIRATORY (INHALATION) at 11:14

## 2017-01-01 RX ADMIN — MUPIROCIN: 20 OINTMENT TOPICAL at 09:54

## 2017-01-01 RX ADMIN — BUDESONIDE AND FORMOTEROL FUMARATE DIHYDRATE 2 PUFF: 160; 4.5 AEROSOL RESPIRATORY (INHALATION) at 19:46

## 2017-01-01 RX ADMIN — HYDRALAZINE HYDROCHLORIDE 50 MG: 50 TABLET, FILM COATED ORAL at 20:45

## 2017-01-01 RX ADMIN — FENTANYL CITRATE 25 MCG: 50 INJECTION INTRAMUSCULAR; INTRAVENOUS at 11:23

## 2017-01-01 RX ADMIN — MIDAZOLAM 2 MG: 1 INJECTION INTRAMUSCULAR; INTRAVENOUS at 07:32

## 2017-01-01 RX ADMIN — ENOXAPARIN SODIUM 30 MG: 30 INJECTION SUBCUTANEOUS at 12:44

## 2017-01-01 RX ADMIN — Medication 1 CAPSULE: at 15:50

## 2017-01-01 RX ADMIN — PANTOPRAZOLE SODIUM 40 MG: 40 TABLET, DELAYED RELEASE ORAL at 06:26

## 2017-01-01 RX ADMIN — FUROSEMIDE 40 MG: 10 INJECTION, SOLUTION INTRAMUSCULAR; INTRAVENOUS at 14:16

## 2017-01-01 RX ADMIN — POTASSIUM CHLORIDE 20 MEQ: 750 CAPSULE, EXTENDED RELEASE ORAL at 18:04

## 2017-01-01 RX ADMIN — CARVEDILOL 25 MG: 25 TABLET, FILM COATED ORAL at 21:31

## 2017-01-01 RX ADMIN — CARVEDILOL 3.12 MG: 3.12 TABLET, FILM COATED ORAL at 08:27

## 2017-01-01 RX ADMIN — HYDRALAZINE HYDROCHLORIDE 50 MG: 50 TABLET, FILM COATED ORAL at 21:41

## 2017-01-01 RX ADMIN — ATORVASTATIN CALCIUM 20 MG: 20 TABLET, FILM COATED ORAL at 08:44

## 2017-01-01 RX ADMIN — SODIUM CHLORIDE, POTASSIUM CHLORIDE, SODIUM LACTATE AND CALCIUM CHLORIDE 30 ML/HR: 600; 310; 30; 20 INJECTION, SOLUTION INTRAVENOUS at 12:56

## 2017-01-01 RX ADMIN — SENNOSIDES AND DOCUSATE SODIUM 2 TABLET: 8.6; 5 TABLET ORAL at 20:36

## 2017-01-01 RX ADMIN — CLOTRIMAZOLE AND BETAMETHASONE DIPROPIONATE: 10; .5 CREAM TOPICAL at 21:51

## 2017-01-01 RX ADMIN — FENTANYL CITRATE 75 MCG: 50 INJECTION INTRAMUSCULAR; INTRAVENOUS at 07:32

## 2017-01-01 RX ADMIN — FUROSEMIDE 20 MG: 10 INJECTION, SOLUTION INTRAMUSCULAR; INTRAVENOUS at 22:07

## 2017-01-01 RX ADMIN — POTASSIUM CHLORIDE 40 MEQ: 750 CAPSULE, EXTENDED RELEASE ORAL at 13:26

## 2017-01-01 RX ADMIN — Medication 1 CAPSULE: at 17:37

## 2017-01-01 RX ADMIN — BUMETANIDE 4 MG: 0.25 INJECTION INTRAMUSCULAR; INTRAVENOUS at 13:58

## 2017-01-01 RX ADMIN — HYDROCODONE BITARTRATE AND ACETAMINOPHEN 2 TABLET: 5; 325 TABLET ORAL at 23:41

## 2017-01-01 RX ADMIN — CITALOPRAM 40 MG: 40 TABLET, FILM COATED ORAL at 20:36

## 2017-01-01 RX ADMIN — IPRATROPIUM BROMIDE 0.5 MG: 0.5 SOLUTION RESPIRATORY (INHALATION) at 11:30

## 2017-01-01 RX ADMIN — HYDRALAZINE HYDROCHLORIDE 50 MG: 50 TABLET, FILM COATED ORAL at 09:17

## 2017-01-01 RX ADMIN — BUMETANIDE 3 MG: 2 TABLET ORAL at 08:29

## 2017-01-01 RX ADMIN — CARVEDILOL 25 MG: 25 TABLET, FILM COATED ORAL at 06:30

## 2017-01-01 RX ADMIN — CITALOPRAM 40 MG: 40 TABLET, FILM COATED ORAL at 08:29

## 2017-01-01 RX ADMIN — FUROSEMIDE 40 MG: 10 INJECTION, SOLUTION INTRAMUSCULAR; INTRAVENOUS at 02:31

## 2017-01-01 RX ADMIN — MUPIROCIN: 20 OINTMENT TOPICAL at 09:31

## 2017-01-01 RX ADMIN — PANTOPRAZOLE SODIUM 40 MG: 40 TABLET, DELAYED RELEASE ORAL at 08:51

## 2017-01-01 RX ADMIN — HYDRALAZINE HYDROCHLORIDE 25 MG: 25 TABLET, FILM COATED ORAL at 22:24

## 2017-01-01 RX ADMIN — DILTIAZEM HYDROCHLORIDE 60 MG: 60 CAPSULE, EXTENDED RELEASE ORAL at 08:59

## 2017-01-01 RX ADMIN — IPRATROPIUM BROMIDE 0.5 MG: 0.5 SOLUTION RESPIRATORY (INHALATION) at 15:16

## 2017-01-01 RX ADMIN — IPRATROPIUM BROMIDE 0.5 MG: 0.5 SOLUTION RESPIRATORY (INHALATION) at 20:08

## 2017-01-01 RX ADMIN — FUROSEMIDE 20 MG: 20 TABLET ORAL at 08:59

## 2017-01-01 RX ADMIN — IPRATROPIUM BROMIDE 0.5 MG: 0.5 SOLUTION RESPIRATORY (INHALATION) at 11:18

## 2017-01-01 RX ADMIN — BUDESONIDE AND FORMOTEROL FUMARATE DIHYDRATE 2 PUFF: 160; 4.5 AEROSOL RESPIRATORY (INHALATION) at 07:10

## 2017-01-01 RX ADMIN — IPRATROPIUM BROMIDE 0.5 MG: 0.5 SOLUTION RESPIRATORY (INHALATION) at 10:09

## 2017-01-01 RX ADMIN — METOPROLOL TARTRATE 50 MG: 50 TABLET, FILM COATED ORAL at 21:01

## 2017-01-01 RX ADMIN — IPRATROPIUM BROMIDE 0.5 MG: 0.5 SOLUTION RESPIRATORY (INHALATION) at 14:47

## 2017-01-01 RX ADMIN — HYDRALAZINE HYDROCHLORIDE 10 MG: 10 TABLET, FILM COATED ORAL at 14:16

## 2017-01-01 RX ADMIN — FUROSEMIDE 20 MG: 20 TABLET ORAL at 17:07

## 2017-01-01 RX ADMIN — CITALOPRAM 40 MG: 40 TABLET, FILM COATED ORAL at 20:45

## 2017-01-01 RX ADMIN — CARVEDILOL 12.5 MG: 12.5 TABLET, FILM COATED ORAL at 09:19

## 2017-01-01 RX ADMIN — POTASSIUM CHLORIDE 20 MEQ: 750 CAPSULE, EXTENDED RELEASE ORAL at 09:19

## 2017-01-01 RX ADMIN — BUDESONIDE AND FORMOTEROL FUMARATE DIHYDRATE 2 PUFF: 160; 4.5 AEROSOL RESPIRATORY (INHALATION) at 19:56

## 2017-01-01 RX ADMIN — MUPIROCIN: 20 OINTMENT TOPICAL at 08:51

## 2017-01-01 RX ADMIN — ENOXAPARIN SODIUM 30 MG: 30 INJECTION SUBCUTANEOUS at 12:03

## 2017-01-01 RX ADMIN — HYDRALAZINE HYDROCHLORIDE 10 MG: 10 TABLET, FILM COATED ORAL at 05:21

## 2017-01-01 RX ADMIN — POTASSIUM CHLORIDE 40 MEQ: 750 CAPSULE, EXTENDED RELEASE ORAL at 10:24

## 2017-01-01 RX ADMIN — AMLODIPINE BESYLATE 10 MG: 10 TABLET ORAL at 08:59

## 2017-01-01 RX ADMIN — BUMETANIDE 4 MG: 0.25 INJECTION INTRAMUSCULAR; INTRAVENOUS at 18:15

## 2017-01-01 RX ADMIN — ENOXAPARIN SODIUM 30 MG: 30 INJECTION SUBCUTANEOUS at 18:26

## 2017-01-01 RX ADMIN — PANTOPRAZOLE SODIUM 40 MG: 40 TABLET, DELAYED RELEASE ORAL at 18:18

## 2017-01-01 RX ADMIN — SODIUM CHLORIDE, POTASSIUM CHLORIDE, SODIUM LACTATE AND CALCIUM CHLORIDE 30 ML/HR: 600; 310; 30; 20 INJECTION, SOLUTION INTRAVENOUS at 15:04

## 2017-01-01 RX ADMIN — AMINOCAPROIC ACID 10 G: 250 INJECTION, SOLUTION INTRAVENOUS at 11:54

## 2017-01-01 RX ADMIN — ASPIRIN 81 MG: 81 TABLET ORAL at 10:27

## 2017-01-01 RX ADMIN — CLOTRIMAZOLE AND BETAMETHASONE DIPROPIONATE: 10; .5 CREAM TOPICAL at 10:41

## 2017-01-01 RX ADMIN — SODIUM CHLORIDE 100 ML/HR: 9 INJECTION, SOLUTION INTRAVENOUS at 03:46

## 2017-01-01 RX ADMIN — POTASSIUM CHLORIDE 20 MEQ: 750 CAPSULE, EXTENDED RELEASE ORAL at 08:29

## 2017-01-01 RX ADMIN — BUMETANIDE 4 MG: 0.25 INJECTION INTRAMUSCULAR; INTRAVENOUS at 14:53

## 2017-01-01 RX ADMIN — CLOTRIMAZOLE AND BETAMETHASONE DIPROPIONATE: 10; .5 CREAM TOPICAL at 21:53

## 2017-01-01 RX ADMIN — CLOTRIMAZOLE AND BETAMETHASONE DIPROPIONATE: 10; .5 CREAM TOPICAL at 09:12

## 2017-01-01 RX ADMIN — BUDESONIDE AND FORMOTEROL FUMARATE DIHYDRATE 2 PUFF: 160; 4.5 AEROSOL RESPIRATORY (INHALATION) at 07:51

## 2017-01-01 RX ADMIN — POTASSIUM CHLORIDE 20 MEQ: 750 CAPSULE, EXTENDED RELEASE ORAL at 08:51

## 2017-01-01 RX ADMIN — ASPIRIN 81 MG: 81 TABLET ORAL at 08:21

## 2017-01-01 RX ADMIN — BUDESONIDE AND FORMOTEROL FUMARATE DIHYDRATE 2 PUFF: 160; 4.5 AEROSOL RESPIRATORY (INHALATION) at 08:01

## 2017-01-01 RX ADMIN — ALBUMIN HUMAN 250 ML: 0.05 INJECTION, SOLUTION INTRAVENOUS at 16:04

## 2017-01-01 RX ADMIN — AMINOCAPROIC ACID 10 G: 250 INJECTION, SOLUTION INTRAVENOUS at 14:06

## 2017-01-01 RX ADMIN — IPRATROPIUM BROMIDE 0.5 MG: 0.5 SOLUTION RESPIRATORY (INHALATION) at 20:28

## 2017-01-01 RX ADMIN — SUFENTANIL CITRATE 25 MCG: 50 INJECTION, SOLUTION EPIDURAL; INTRAVENOUS at 11:29

## 2017-01-01 RX ADMIN — IPRATROPIUM BROMIDE 0.5 MG: 0.5 SOLUTION RESPIRATORY (INHALATION) at 19:54

## 2017-01-01 RX ADMIN — CARVEDILOL 12.5 MG: 12.5 TABLET, FILM COATED ORAL at 21:33

## 2017-01-01 RX ADMIN — AMLODIPINE BESYLATE 10 MG: 10 TABLET ORAL at 08:34

## 2017-01-01 RX ADMIN — FAMOTIDINE 20 MG: 10 INJECTION, SOLUTION INTRAVENOUS at 15:52

## 2017-01-01 RX ADMIN — BUDESONIDE AND FORMOTEROL FUMARATE DIHYDRATE 2 PUFF: 160; 4.5 AEROSOL RESPIRATORY (INHALATION) at 20:37

## 2017-01-01 RX ADMIN — BUDESONIDE AND FORMOTEROL FUMARATE DIHYDRATE 2 PUFF: 160; 4.5 AEROSOL RESPIRATORY (INHALATION) at 12:40

## 2017-01-01 RX ADMIN — MIDAZOLAM HYDROCHLORIDE 2 MG: 1 INJECTION, SOLUTION INTRAMUSCULAR; INTRAVENOUS at 11:25

## 2017-01-01 RX ADMIN — BUDESONIDE AND FORMOTEROL FUMARATE DIHYDRATE 2 PUFF: 160; 4.5 AEROSOL RESPIRATORY (INHALATION) at 20:09

## 2017-01-01 RX ADMIN — HYDRALAZINE HYDROCHLORIDE 50 MG: 50 TABLET, FILM COATED ORAL at 23:24

## 2017-01-01 RX ADMIN — AMLODIPINE BESYLATE 10 MG: 10 TABLET ORAL at 08:22

## 2017-01-01 RX ADMIN — CARVEDILOL 25 MG: 25 TABLET, FILM COATED ORAL at 06:57

## 2017-01-01 RX ADMIN — HYDROCODONE BITARTRATE AND ACETAMINOPHEN 2 TABLET: 5; 325 TABLET ORAL at 04:02

## 2017-01-01 RX ADMIN — HYDRALAZINE HYDROCHLORIDE 25 MG: 25 TABLET, FILM COATED ORAL at 06:26

## 2017-01-01 RX ADMIN — PANTOPRAZOLE SODIUM 40 MG: 40 TABLET, DELAYED RELEASE ORAL at 17:35

## 2017-01-01 RX ADMIN — AMLODIPINE BESYLATE 10 MG: 10 TABLET ORAL at 09:08

## 2017-01-01 RX ADMIN — BUMETANIDE 4 MG: 0.25 INJECTION INTRAMUSCULAR; INTRAVENOUS at 16:46

## 2017-01-01 RX ADMIN — IPRATROPIUM BROMIDE 0.5 MG: 0.5 SOLUTION RESPIRATORY (INHALATION) at 11:42

## 2017-01-01 RX ADMIN — CHLORHEXIDINE GLUCONATE 15 ML: 1.2 RINSE ORAL at 08:13

## 2017-01-01 RX ADMIN — IPRATROPIUM BROMIDE 0.5 MG: 0.5 SOLUTION RESPIRATORY (INHALATION) at 15:43

## 2017-01-01 RX ADMIN — AMLODIPINE BESYLATE 10 MG: 10 TABLET ORAL at 09:38

## 2017-01-01 RX ADMIN — EPHEDRINE SULFATE 5 MG: 50 INJECTION INTRAMUSCULAR; INTRAVENOUS; SUBCUTANEOUS at 14:05

## 2017-01-01 RX ADMIN — IPRATROPIUM BROMIDE 0.5 MG: 0.5 SOLUTION RESPIRATORY (INHALATION) at 11:26

## 2017-01-01 RX ADMIN — CARVEDILOL 25 MG: 25 TABLET, FILM COATED ORAL at 08:46

## 2017-01-01 RX ADMIN — TIOTROPIUM BROMIDE 1 CAPSULE: 18 CAPSULE ORAL; RESPIRATORY (INHALATION) at 07:20

## 2017-01-01 RX ADMIN — BUDESONIDE AND FORMOTEROL FUMARATE DIHYDRATE 2 PUFF: 160; 4.5 AEROSOL RESPIRATORY (INHALATION) at 07:35

## 2017-01-01 RX ADMIN — BUDESONIDE AND FORMOTEROL FUMARATE DIHYDRATE 2 PUFF: 160; 4.5 AEROSOL RESPIRATORY (INHALATION) at 20:10

## 2017-01-01 RX ADMIN — AMLODIPINE BESYLATE 10 MG: 10 TABLET ORAL at 08:23

## 2017-01-01 RX ADMIN — CARVEDILOL 25 MG: 25 TABLET, FILM COATED ORAL at 08:59

## 2017-01-01 RX ADMIN — SODIUM BICARBONATE 50 MEQ: 84 INJECTION, SOLUTION INTRAVENOUS at 13:55

## 2017-01-01 RX ADMIN — ATORVASTATIN CALCIUM 20 MG: 20 TABLET, FILM COATED ORAL at 08:58

## 2017-01-01 RX ADMIN — HEPARIN SODIUM 5000 UNITS: 5000 INJECTION, SOLUTION INTRAVENOUS; SUBCUTANEOUS at 12:25

## 2017-01-01 RX ADMIN — BUDESONIDE AND FORMOTEROL FUMARATE DIHYDRATE 2 PUFF: 160; 4.5 AEROSOL RESPIRATORY (INHALATION) at 09:30

## 2017-01-01 RX ADMIN — HYDRALAZINE HYDROCHLORIDE 10 MG: 10 TABLET, FILM COATED ORAL at 05:58

## 2017-01-01 RX ADMIN — CLOTRIMAZOLE AND BETAMETHASONE DIPROPIONATE: 10; .5 CREAM TOPICAL at 08:58

## 2017-01-01 RX ADMIN — ONDANSETRON 4 MG: 2 INJECTION INTRAMUSCULAR; INTRAVENOUS at 14:39

## 2017-01-01 RX ADMIN — IPRATROPIUM BROMIDE 0.5 MG: 0.5 SOLUTION RESPIRATORY (INHALATION) at 11:16

## 2017-01-01 RX ADMIN — HYDRALAZINE HYDROCHLORIDE 10 MG: 10 TABLET, FILM COATED ORAL at 22:46

## 2017-01-01 RX ADMIN — IPRATROPIUM BROMIDE 0.5 MG: 0.5 SOLUTION RESPIRATORY (INHALATION) at 21:23

## 2017-01-01 RX ADMIN — POTASSIUM CHLORIDE 40 MEQ: 750 CAPSULE, EXTENDED RELEASE ORAL at 11:51

## 2017-01-01 RX ADMIN — IPRATROPIUM BROMIDE 0.5 MG: 0.5 SOLUTION RESPIRATORY (INHALATION) at 07:33

## 2017-01-01 RX ADMIN — NYSTATIN: 100000 POWDER TOPICAL at 07:43

## 2017-01-01 RX ADMIN — CARVEDILOL 25 MG: 25 TABLET, FILM COATED ORAL at 20:46

## 2017-01-01 RX ADMIN — POLYETHYLENE GLYCOL 3350, SODIUM CHLORIDE, POTASSIUM CHLORIDE, SODIUM BICARBONATE, AND SODIUM SULFATE 2000 ML: 240; 5.84; 2.98; 6.72; 22.72 POWDER, FOR SOLUTION ORAL at 20:29

## 2017-01-01 RX ADMIN — IPRATROPIUM BROMIDE AND ALBUTEROL SULFATE 3 ML: .5; 3 SOLUTION RESPIRATORY (INHALATION) at 15:40

## 2017-01-01 RX ADMIN — FUROSEMIDE 40 MG: 10 INJECTION, SOLUTION INTRAMUSCULAR; INTRAVENOUS at 06:12

## 2017-01-01 RX ADMIN — BUMETANIDE 4 MG: 0.25 INJECTION INTRAMUSCULAR; INTRAVENOUS at 18:20

## 2017-01-01 RX ADMIN — IPRATROPIUM BROMIDE 0.5 MG: 0.5 SOLUTION RESPIRATORY (INHALATION) at 07:10

## 2017-01-01 RX ADMIN — CARVEDILOL 25 MG: 25 TABLET, FILM COATED ORAL at 20:59

## 2017-01-01 RX ADMIN — CEFAZOLIN SODIUM 2 G: 2 INJECTION, SOLUTION INTRAVENOUS at 16:38

## 2017-01-01 RX ADMIN — PERFLUTREN 3 ML: 6.52 INJECTION, SUSPENSION INTRAVENOUS at 09:24

## 2017-01-01 RX ADMIN — POTASSIUM CHLORIDE 80 MEQ: 750 CAPSULE, EXTENDED RELEASE ORAL at 09:39

## 2017-01-01 RX ADMIN — CLOTRIMAZOLE AND BETAMETHASONE DIPROPIONATE: 10; .5 CREAM TOPICAL at 08:34

## 2017-01-01 RX ADMIN — FUROSEMIDE 40 MG: 10 INJECTION, SOLUTION INTRAMUSCULAR; INTRAVENOUS at 10:49

## 2017-01-01 RX ADMIN — CARVEDILOL 25 MG: 25 TABLET, FILM COATED ORAL at 18:38

## 2017-01-01 RX ADMIN — CEFAZOLIN SODIUM 2 G: 2 INJECTION, SOLUTION INTRAVENOUS at 11:22

## 2017-01-01 RX ADMIN — SENNOSIDES AND DOCUSATE SODIUM 2 TABLET: 8.6; 5 TABLET ORAL at 23:12

## 2017-01-01 RX ADMIN — PANTOPRAZOLE SODIUM 40 MG: 40 TABLET, DELAYED RELEASE ORAL at 07:30

## 2017-01-01 RX ADMIN — BUMETANIDE 2 MG: 2 TABLET ORAL at 18:01

## 2017-01-01 RX ADMIN — PANTOPRAZOLE SODIUM 40 MG: 40 TABLET, DELAYED RELEASE ORAL at 06:34

## 2017-01-01 RX ADMIN — ENOXAPARIN SODIUM 30 MG: 30 INJECTION SUBCUTANEOUS at 12:37

## 2017-01-01 RX ADMIN — HEPARIN SODIUM 5000 UNITS: 5000 INJECTION, SOLUTION INTRAVENOUS; SUBCUTANEOUS at 14:23

## 2017-01-01 RX ADMIN — CARVEDILOL 25 MG: 25 TABLET, FILM COATED ORAL at 20:28

## 2017-01-01 RX ADMIN — ENOXAPARIN SODIUM 30 MG: 30 INJECTION SUBCUTANEOUS at 20:36

## 2017-01-01 RX ADMIN — POTASSIUM CHLORIDE 40 MEQ: 750 CAPSULE, EXTENDED RELEASE ORAL at 06:21

## 2017-01-01 RX ADMIN — IPRATROPIUM BROMIDE 0.5 MG: 0.5 SOLUTION RESPIRATORY (INHALATION) at 08:41

## 2017-01-01 RX ADMIN — HYDROCODONE BITARTRATE AND ACETAMINOPHEN 2 TABLET: 5; 325 TABLET ORAL at 02:55

## 2017-01-01 RX ADMIN — BUDESONIDE AND FORMOTEROL FUMARATE DIHYDRATE 2 PUFF: 160; 4.5 AEROSOL RESPIRATORY (INHALATION) at 20:27

## 2017-01-01 RX ADMIN — BUDESONIDE AND FORMOTEROL FUMARATE DIHYDRATE 2 PUFF: 160; 4.5 AEROSOL RESPIRATORY (INHALATION) at 07:23

## 2017-01-01 RX ADMIN — CARVEDILOL 25 MG: 25 TABLET, FILM COATED ORAL at 18:04

## 2017-01-01 RX ADMIN — POTASSIUM CHLORIDE 40 MEQ: 750 CAPSULE, EXTENDED RELEASE ORAL at 06:49

## 2017-01-01 RX ADMIN — POTASSIUM CHLORIDE 40 MEQ: 750 CAPSULE, EXTENDED RELEASE ORAL at 05:00

## 2017-01-01 RX ADMIN — POTASSIUM CHLORIDE 40 MEQ: 750 CAPSULE, EXTENDED RELEASE ORAL at 18:00

## 2017-01-01 RX ADMIN — HYDRALAZINE HYDROCHLORIDE 25 MG: 25 TABLET, FILM COATED ORAL at 14:34

## 2017-01-01 RX ADMIN — HEPARIN SODIUM 5000 UNITS: 5000 INJECTION, SOLUTION INTRAVENOUS; SUBCUTANEOUS at 00:45

## 2017-01-01 RX ADMIN — BUMETANIDE 2 MG: 0.25 INJECTION INTRAMUSCULAR; INTRAVENOUS at 17:40

## 2017-01-01 RX ADMIN — HYDROCODONE BITARTRATE AND ACETAMINOPHEN 2 TABLET: 5; 325 TABLET ORAL at 06:41

## 2017-01-01 RX ADMIN — CLONIDINE HYDROCHLORIDE 0.1 MG: 0.1 TABLET ORAL at 17:20

## 2017-01-01 RX ADMIN — BUMETANIDE 2 MG: 2 TABLET ORAL at 08:51

## 2017-01-01 RX ADMIN — BUMETANIDE 2 MG: 0.25 INJECTION INTRAMUSCULAR; INTRAVENOUS at 17:30

## 2017-01-01 RX ADMIN — SODIUM CHLORIDE 0.25 MCG/KG/MIN: 0.9 INJECTION, SOLUTION INTRAVENOUS at 13:25

## 2017-01-01 RX ADMIN — FUROSEMIDE 40 MG: 10 INJECTION, SOLUTION INTRAMUSCULAR; INTRAVENOUS at 11:55

## 2017-01-01 RX ADMIN — AMLODIPINE BESYLATE 10 MG: 10 TABLET ORAL at 09:12

## 2017-01-01 RX ADMIN — ENOXAPARIN SODIUM 40 MG: 40 INJECTION SUBCUTANEOUS at 10:49

## 2017-01-01 RX ADMIN — CEFAZOLIN SODIUM 2 G: 2 INJECTION, SOLUTION INTRAVENOUS at 01:12

## 2017-01-01 RX ADMIN — IPRATROPIUM BROMIDE 0.5 MG: 0.5 SOLUTION RESPIRATORY (INHALATION) at 07:22

## 2017-01-01 RX ADMIN — HYDRALAZINE HYDROCHLORIDE 25 MG: 25 TABLET, FILM COATED ORAL at 13:53

## 2017-01-01 RX ADMIN — CLOTRIMAZOLE AND BETAMETHASONE DIPROPIONATE: 10; .5 CREAM TOPICAL at 08:38

## 2017-01-01 RX ADMIN — CARVEDILOL 12.5 MG: 25 TABLET, FILM COATED ORAL at 08:12

## 2017-01-01 RX ADMIN — ATORVASTATIN CALCIUM 40 MG: 20 TABLET, FILM COATED ORAL at 20:19

## 2017-01-01 RX ADMIN — HEPARIN SODIUM 2500 UNITS: 1000 INJECTION, SOLUTION INTRAVENOUS; SUBCUTANEOUS at 12:03

## 2017-01-01 RX ADMIN — PANTOPRAZOLE SODIUM 40 MG: 40 TABLET, DELAYED RELEASE ORAL at 09:31

## 2017-01-01 RX ADMIN — IPRATROPIUM BROMIDE 0.5 MG: 0.5 SOLUTION RESPIRATORY (INHALATION) at 12:14

## 2017-01-01 RX ADMIN — DILTIAZEM HYDROCHLORIDE 60 MG: 60 CAPSULE, EXTENDED RELEASE ORAL at 20:45

## 2017-01-01 RX ADMIN — FUROSEMIDE 20 MG: 20 TABLET ORAL at 18:18

## 2017-01-01 RX ADMIN — PANTOPRAZOLE SODIUM 40 MG: 40 TABLET, DELAYED RELEASE ORAL at 06:24

## 2017-01-01 RX ADMIN — IPRATROPIUM BROMIDE 0.5 MG: 0.5 SOLUTION RESPIRATORY (INHALATION) at 11:36

## 2017-01-01 RX ADMIN — Medication 1 CAPSULE: at 08:30

## 2017-01-01 RX ADMIN — IPRATROPIUM BROMIDE 0.5 MG: 0.5 SOLUTION RESPIRATORY (INHALATION) at 07:45

## 2017-01-01 RX ADMIN — IPRATROPIUM BROMIDE 0.5 MG: 0.5 SOLUTION RESPIRATORY (INHALATION) at 21:13

## 2017-01-01 RX ADMIN — ENOXAPARIN SODIUM 30 MG: 30 INJECTION SUBCUTANEOUS at 12:27

## 2017-01-01 RX ADMIN — FUROSEMIDE 40 MG: 10 INJECTION, SOLUTION INTRAMUSCULAR; INTRAVENOUS at 06:34

## 2017-01-01 RX ADMIN — PHENYLEPHRINE HYDROCHLORIDE 100 MCG: 10 INJECTION INTRAVENOUS at 14:27

## 2017-01-01 RX ADMIN — PROPOFOL 150 MG: 10 INJECTION, EMULSION INTRAVENOUS at 13:43

## 2017-01-01 RX ADMIN — HYDROCODONE BITARTRATE AND ACETAMINOPHEN 1 TABLET: 5; 325 TABLET ORAL at 21:06

## 2017-01-01 RX ADMIN — ATORVASTATIN CALCIUM 40 MG: 20 TABLET, FILM COATED ORAL at 21:06

## 2017-01-01 RX ADMIN — BENZOCAINE, BUTAMBEN, AND TETRACAINE HYDROCHLORIDE 3 SPRAY: .028; .004; .004 AEROSOL, SPRAY TOPICAL at 07:32

## 2017-01-01 RX ADMIN — PHENYLEPHRINE HYDROCHLORIDE 0.5 MCG/KG/MIN: 10 INJECTION, SOLUTION INTRAMUSCULAR; INTRAVENOUS; SUBCUTANEOUS at 12:33

## 2017-01-01 RX ADMIN — CARVEDILOL 25 MG: 25 TABLET, FILM COATED ORAL at 08:45

## 2017-01-01 RX ADMIN — DEXAMETHASONE SODIUM PHOSPHATE 4 MG: 4 INJECTION, SOLUTION INTRAMUSCULAR; INTRAVENOUS at 11:51

## 2017-01-01 RX ADMIN — CLOTRIMAZOLE AND BETAMETHASONE DIPROPIONATE: 10; .5 CREAM TOPICAL at 21:33

## 2017-01-01 RX ADMIN — METOPROLOL TARTRATE 50 MG: 50 TABLET, FILM COATED ORAL at 10:09

## 2017-01-01 RX ADMIN — IPRATROPIUM BROMIDE 0.5 MG: 0.5 SOLUTION RESPIRATORY (INHALATION) at 10:38

## 2017-01-01 RX ADMIN — BUDESONIDE AND FORMOTEROL FUMARATE DIHYDRATE 2 PUFF: 160; 4.5 AEROSOL RESPIRATORY (INHALATION) at 19:23

## 2017-01-01 RX ADMIN — PANTOPRAZOLE SODIUM 40 MG: 40 TABLET, DELAYED RELEASE ORAL at 06:38

## 2017-01-01 RX ADMIN — IPRATROPIUM BROMIDE 0.5 MG: 0.5 SOLUTION RESPIRATORY (INHALATION) at 07:23

## 2017-01-01 RX ADMIN — SUFENTANIL CITRATE 75 MCG: 50 INJECTION, SOLUTION EPIDURAL; INTRAVENOUS at 12:01

## 2017-01-01 RX ADMIN — HYDRALAZINE HYDROCHLORIDE 10 MG: 20 INJECTION INTRAMUSCULAR; INTRAVENOUS at 06:58

## 2017-01-01 RX ADMIN — ATORVASTATIN CALCIUM 20 MG: 20 TABLET, FILM COATED ORAL at 08:35

## 2017-01-01 RX ADMIN — POTASSIUM CHLORIDE 20 MEQ: 750 CAPSULE, EXTENDED RELEASE ORAL at 18:38

## 2017-01-01 RX ADMIN — ASPIRIN 81 MG: 81 TABLET ORAL at 09:02

## 2017-01-01 RX ADMIN — HYDROCODONE BITARTRATE AND ACETAMINOPHEN 1 TABLET: 5; 325 TABLET ORAL at 06:30

## 2017-01-01 RX ADMIN — INSULIN ASPART 2 UNITS: 100 INJECTION, SOLUTION INTRAVENOUS; SUBCUTANEOUS at 21:06

## 2017-01-01 RX ADMIN — SODIUM POLYSTYRENE SULFONATE 15 G: 15 SUSPENSION ORAL; RECTAL at 12:25

## 2017-01-01 RX ADMIN — CARVEDILOL 25 MG: 25 TABLET, FILM COATED ORAL at 06:20

## 2017-01-01 RX ADMIN — IPRATROPIUM BROMIDE 0.5 MG: 0.5 SOLUTION RESPIRATORY (INHALATION) at 19:49

## 2017-01-01 RX ADMIN — IPRATROPIUM BROMIDE 0.5 MG: 0.5 SOLUTION RESPIRATORY (INHALATION) at 23:03

## 2017-01-01 RX ADMIN — BUMETANIDE 1 MG: 1 TABLET ORAL at 09:54

## 2017-01-01 RX ADMIN — CARVEDILOL 25 MG: 25 TABLET, FILM COATED ORAL at 20:24

## 2017-01-01 RX ADMIN — MUPIROCIN: 20 OINTMENT TOPICAL at 08:22

## 2017-01-01 RX ADMIN — BUMETANIDE 2 MG: 0.25 INJECTION, SOLUTION INTRAMUSCULAR; INTRAVENOUS at 17:36

## 2017-01-01 RX ADMIN — ASPIRIN 81 MG: 81 TABLET ORAL at 08:29

## 2017-01-01 RX ADMIN — BUMETANIDE 2 MG: 2 TABLET ORAL at 10:27

## 2017-01-01 RX ADMIN — ALBUMIN HUMAN 250 ML: 0.05 INJECTION, SOLUTION INTRAVENOUS at 16:50

## 2017-01-01 RX ADMIN — HYDRALAZINE HYDROCHLORIDE 10 MG: 10 TABLET, FILM COATED ORAL at 16:42

## 2017-01-01 RX ADMIN — PANTOPRAZOLE SODIUM 40 MG: 40 TABLET, DELAYED RELEASE ORAL at 06:49

## 2017-01-01 RX ADMIN — A/SINGAPORE/GP1908/2015 IVR-180 (H1N1) (AN A/MICHIGAN/45/2015-LIKE VIRUS), A/SINGAPORE/GP2050/2015 (H3N2) (AN A/HONG KONG/4801/2014 - LIKE VIRUS), B/UTAH/9/2014 (A B/PHUKET/3073/2013-LIKE VIRUS), B/HONG KONG/259/2010 (A B/BRISBANE/60/08-LIKE VIRUS) 0.5 ML: 15; 15; 15; 15 INJECTION, SUSPENSION INTRAMUSCULAR at 17:08

## 2017-01-01 RX ADMIN — POTASSIUM CHLORIDE 20 MEQ: 750 CAPSULE, EXTENDED RELEASE ORAL at 17:37

## 2017-01-01 RX ADMIN — AMLODIPINE BESYLATE 5 MG: 5 TABLET ORAL at 09:23

## 2017-01-01 RX ADMIN — CEFTRIAXONE SODIUM 1 G: 1 INJECTION, SOLUTION INTRAVENOUS at 12:25

## 2017-01-01 RX ADMIN — AMLODIPINE BESYLATE 5 MG: 5 TABLET ORAL at 10:07

## 2017-01-01 RX ADMIN — CITALOPRAM 40 MG: 40 TABLET, FILM COATED ORAL at 20:55

## 2017-01-01 RX ADMIN — Medication 150 MG: at 08:47

## 2017-01-01 RX ADMIN — ROCURONIUM BROMIDE 30 MG: 10 INJECTION INTRAVENOUS at 11:29

## 2017-01-01 RX ADMIN — CLOTRIMAZOLE AND BETAMETHASONE DIPROPIONATE: 10; .5 CREAM TOPICAL at 21:10

## 2017-01-31 PROBLEM — E87.1 HYPONATREMIA: Status: ACTIVE | Noted: 2017-01-01

## 2017-02-01 NOTE — PROGRESS NOTES
Daily Progress Note.     Raquel Deluna  81 y.o.  female  2/1/2017    Brief problem (summary)  This is a 81-year-old female was brought to the hospital with a history of having weakness for the past couple of days and also passing some dark stool. She has also had decreased level of consciousness in the emergency room she was found to be anemic with a hemoglobin of 4.4 and sodium of 122 and potassium of 6.2 and creatinine of 1.6. She also takes NSAIDs as an outpatient for her pain and also aspirin. She has a history of GI bleed in the past and had a scope but could not find the source of. Her GI physician is at the [facility] and also has seen a hematologist at [facility] for chronic anemia. She had a right hip replacement in October. 2016 and at that time her hemoglobin was 8.5. At present patient is awake and alert does not have any chest pain does not have any shortness breath. She also denies nausea vomiting and hematemesis.  She also has a history of COPD for which she takes to dose on Symbicort in addition patient has sleep apnea and uses CPAP      Today, shew had a good night, used cpap and had 4 units of PRBC    PMS/FH/SH: reviewed and unchanged.  Medications:     amLODIPine 10 mg Oral Q24H   budesonide-formoterol 2 puff Inhalation BID - RT   hydrALAZINE 50 mg Oral Q8H   pantoprazole 40 mg Oral Q AM   tiotropium 1 capsule Inhalation Daily - RT       sodium chloride 125 mL/hr Last Rate: Stopped (01/31/17 1536)       ROS:  Respiratory ROS: no cough, shortness of breath, or wheezing    Objective:  Temp:  [97.2 °F (36.2 °C)-98.4 °F (36.9 °C)] 98 °F (36.7 °C)  I/O last 3 completed shifts:  In: 2120 [P.O.:900; Blood:1220]  Out: 3075 [Urine:3075]       Physical Exam   Constitutional: She is oriented to person, place, and time. She appears well-developed and well-nourished.   HENT:   Head: Atraumatic.   Eyes: Pupils are equal, round, and reactive to light. No scleral icterus.   Cardiovascular: Normal rate and  regular rhythm.    Pulmonary/Chest: No accessory muscle usage. No respiratory distress. She has no decreased breath sounds. She has no wheezes.   Abdominal: Soft. Normal appearance and bowel sounds are normal. She exhibits no ascites. There is no hepatosplenomegaly.   Neurological: She is alert and oriented to person, place, and time.   Skin: No laceration and no petechiae noted. No cyanosis. Nails show no clubbing.   Psychiatric: She has a normal mood and affect. Her behavior is normal.   Vitals reviewed.          Results from last 7 days  Lab Units 02/01/17  0516 01/31/17  1236   WBC 10*3/mm3 13.91* 13.95*   HEMOGLOBIN g/dL 10.0* 4.4*   HEMATOCRIT % 29.5* 14.7*   PLATELETS 10*3/mm3 410 587*       Results from last 7 days  Lab Units 02/01/17  0516 01/31/17  1840 01/31/17  1236   SODIUM mmol/L 125* 123* 122*   POTASSIUM mmol/L 3.8 4.6 6.2*   CHLORIDE mmol/L 86* 85* 87*   TOTAL CO2 mmol/L 22.9 21.3* 18.9*   BUN mg/dL 34* 40* 40*   CREATININE mg/dL 1.30* 1.43* 1.61*   GLUCOSE mg/dL 107* 128* 132*   CALCIUM mg/dL 8.6 8.8 8.3*       Results from last 7 days  Lab Units 01/31/17  1236   INR  1.21*           ASSESSMENT/ PLAN:  · Acute on chronic anemia, patient has a history of chronic anemia her baseline hemoglobin is around 8.5-9. However her hemoglobin at admission was 4.4.Now it is 10.   · History of GI bleed in the past.  At present there is no obvious GI bleed but the however she was taking NSAIDs which going to be held. She'll continue Protonix for the time being and she may need GI workup   · Acute kidney injury secondary to hypovolemia and poor renal perfusion. Renal service is following the patient  · Hyperkalemia, secondary to NSAIDs, sulfa and AK I. resolved  · Hyponatremia, improving   · History of hypertension  · History of sleep apnea patient is on CPAP family is going to bring the CPAP  · History of COPD, continue bronchodilators      · Transfer out of ICU        Cele Mata MD,FCCP  Pulmonary,  Critical Care and Sleep Medicine.  [facility]    2/1/2017    EMR Dragon/Transcription disclaimer:   Much of this encounter note is an electronic transcription/translation of spoken language to printed text. The electronic translation of spoken language may permit erroneous, or at times, nonsensical words or phrases to be inadvertently transcribed; Although I have reviewed the note for such errors, some may still exist.

## 2017-02-01 NOTE — PLAN OF CARE
Problem: Patient Care Overview (Adult)  Goal: Plan of Care Review  Outcome: Ongoing (interventions implemented as appropriate)    01/31/17 2000 02/01/17 0741   Coping/Psychosocial Response Interventions   Plan Of Care Reviewed With patient;son --    Patient Care Overview   Progress --  progress towards functional goals is fair   Outcome Evaluation   Outcome Summary/Follow up Plan --  remains dypneic upon exertion. labs improving. denies any other complaints.          Problem: Fall Risk (Adult)  Goal: Absence of Falls  Outcome: Ongoing (interventions implemented as appropriate)    01/31/17 1812   Fall Risk (Adult)   Absence of Falls making progress toward outcome

## 2017-02-01 NOTE — CONSULTS
[facility], [place], operated by [facility]  Initial Inpatient Consult Note    Referring Provider: Adolfo    Reason for Consultation: anemia, dark stool    Subjective     History of present illness:  Pleasant 82 yo W with a PMH of chronic iron deficiency anemia (baseline Hb 8-9), stage III CKD,  hypertension, and arthritis admitted with lethargy, fatigue, and AMS and found to be anemic with a hemoglobin of 4.4 for which gastroenterology has been consulted.  She is previously unknown to our practice.  Her symptoms have been ongoing for several days prior to admission and getting progressively worse with regard to the fatigue and lethargy.  She also endorses some dark stools that have been occurring intermittently over the last several weeks.  Emergency room workup notable for findings of hemoglobin of 4.4 as well as patient being hyponatremic and hyperkalemic.  Prior Hgb documented at 8.5 in October 2016 when she had right knee replacement surgery.  She reports that she has been taking a 325 mg aspirin daily.  Occasional additional NSAIDs    She was subsequently admitted to the ICU for further treatment and evaluation.  She has been transfused with 4 units of pack red blood cells with significant improvement in her hemoglobin.  At this time she is complaining of no abdominal pain, nausea, vomiting, chest pain, or dizziness.  Only complaint is mild dyspnea with any exertion.    Her iron deficiency anemia has been a chronic issue for at least 5 years. Prior GI workup has consisted of a colonoscopy as well as an EGD performed at [facility] in April 2016 by Dr. Allison.  EGD with findings of Bella's esophagus and colonoscopy with 1 adenomatous polyp and 1 sessile serrated polyp.  Follow up endoscopy was recommended in two years.  Patient was to have small bowel follow-through and capsule study for further evaluation of her anemia per Dr. Allison's notes but this had not been done.  Additionally, she has been  followed regularly for a number of years by oncologist  and has had IV iron infusions intermittently for her anemia.    Family history of colon cancer or irritable bowel disease is unknown as patient is adopted.      Past Medical History:  Past Medical History   Diagnosis Date   • Anxiety    • Arthritis    • Cellulitis      left leg hx about a month ago   • Chronic bronchitis    • Diarrhea    • Hiatal hernia    • High cholesterol    • History of goiter    • History of transfusion      had reaction to 2nd unit after receiving the 1st   • Hypertension    • Internal bleeding      losing blood can't find out where so has to get iron infusions   • Iron deficiency      receives iron infusion   • Migraine    • Osteoporosis    • PONV (postoperative nausea and vomiting)    • Reflux esophagitis    • Sleep apnea      cpap   • Stress incontinence      wears pads       Past Surgical History:  Past Surgical History   Procedure Laterality Date   • Thyroid surgery       for goiter   • Colonoscopy     • Tonsillectomy     • Appendectomy     • Hysterectomy     • Cervical disc surgery     • Pr total knee arthroplasty Right 10/25/2016     Procedure: RT TOTAL KNEE ARTHROPLASTY;  Surgeon: Ricky Dsouza MD;  Location: [facility];  Service: Orthopedics        Social History:   Social History   Substance Use Topics   • Smoking status: Former Smoker     Packs/day: 4.00     Years: 33.00     Types: Cigarettes     Quit date: 1980   • Smokeless tobacco: Never Used   • Alcohol use Yes      Comment: occcasionally        Family History:  History reviewed. No pertinent family history.    Home Meds:  Prescriptions Prior to Admission   Medication Sig Dispense Refill Last Dose   • aclidinium bromide (TUDORZA PRESSAIR) 400 MCG/ACT aerosol powder  powder for inhalation Inhale 1 puff 2 (Two) Times a Day.   10/24/2016 at 1800   • amlodipine-olmesartan (DIEUDONNE) 10-40 MG per tablet Take 1 tablet by mouth Every Morning.   10/25/2016 at 0600   •  atorvastatin (LIPITOR) 20 MG tablet Take 20 mg by mouth Daily.      • B Complex Vitamins (VITAMIN B COMPLEX PO) Take 1 tablet by mouth Daily.   10/24/2016 at 1000   • budesonide-formoterol (SYMBICORT) 160-4.5 MCG/ACT inhaler Inhale 2 puffs 2 (Two) Times a Day.   10/24/2016 at 1800   • CALCIUM CARBONATE-VITAMIN D PO Take 1 tablet by mouth Daily.   10/24/2016 at 1000   • citalopram (CeleXA) 40 MG tablet Take 40 mg by mouth Every Night.   10/24/2016 at 2000   • esomeprazole (NexIUM) 40 MG capsule Take 40 mg by mouth Daily Before Supper.   Past Week at Unknown time   • meloxicam (MOBIC) 15 MG tablet Take 15 mg by mouth Daily.      • metoprolol tartrate (LOPRESSOR) 50 MG tablet Take 50 mg by mouth 2 (Two) Times a Day.   10/24/2016 at 1000   • mometasone (NASONEX) 50 MCG/ACT nasal spray 2 sprays into each nostril Daily.   10/24/2016 at Unknown time   • Multiple Vitamin (MULTIVITAMIN) capsule Take 1 capsule by mouth Daily.   10/24/2016 at 1000   • NON FORMULARY Take 1 tablet by mouth Daily. instaflex      • triamterene-hydrochlorothiazide (MAXZIDE-25) 37.5-25 MG per tablet Take 2 tablets by mouth Daily.   10/24/2016 at 1000       Current Meds:     amLODIPine 10 mg Oral Q24H   budesonide-formoterol 2 puff Inhalation BID - RT   hydrALAZINE 50 mg Oral Q8H   pantoprazole 40 mg Oral BID AC   tiotropium 1 capsule Inhalation Daily - RT       Allergies:  Allergies   Allergen Reactions   • Sulfa Antibiotics Other (See Comments)     Yeast infections       Review of Systems  All systems were reviewed and negative except for:  Respiratory: positive for  shortness of air     Objective     Vital Signs  Temp:  [97.3 °F (36.3 °C)-98.4 °F (36.9 °C)] 98.1 °F (36.7 °C)  Heart Rate:  [59-85] 81  Resp:  [16-20] 20  BP: (147-186)/(55-82) 186/70    Physical Exam:  General Appearance:    Alert, cooperative, in no acute distress, appears stated age    Head:    Normocephalic, without obvious abnormality, atraumatic   Eyes:            Lids and  lashes normal, conjunctivae normal, no icterus, pale sclera    Throat:   No oral lesions, no thrush, oral mucosa moist   Neck:   No adenopathy, supple, trachea midline, no thyromegaly   Lungs:     Clear to auscultation,respirations regular, even and                   Unlabored on room air     Heart:    Regular rhythm and normal rate, normal S1 and S2, no            murmur, no gallop   Chest Wall:    No abnormalities observed   Abdomen:     obese, soft, nontender to deep palpation, nondistended, no palpable organomegaly but limited by habitus    Rectal:     Deferred   Extremities:   no edema, no cyanosis, no redness   Skin:   No bleeding, bruising or rash   Lymph nodes:   No palpable adenopathy   Psychiatric:  Judgement and insight: normal   Orientation to person place and time: normal   Mood and affect: normal     Results Review:   I reviewed the patient's new clinical results.      Results from last 7 days  Lab Units 02/01/17  0516 01/31/17  1236   WBC 10*3/mm3 13.91* 13.95*   HEMOGLOBIN g/dL 10.0* 4.4*   HEMATOCRIT % 29.5* 14.7*   PLATELETS 10*3/mm3 410 587*         Results from last 7 days  Lab Units 02/01/17  0516 01/31/17  1840 01/31/17  1236   SODIUM mmol/L 125* 123* 122*   POTASSIUM mmol/L 3.8 4.6 6.2*   CHLORIDE mmol/L 86* 85* 87*   TOTAL CO2 mmol/L 22.9 21.3* 18.9*   BUN mg/dL 34* 40* 40*   CREATININE mg/dL 1.30* 1.43* 1.61*   CALCIUM mg/dL 8.6 8.8 8.3*   BILIRUBIN mg/dL 1.5*  --  0.2   ALK PHOS U/L 53  --  52   ALT (SGPT) U/L 25  --  19   AST (SGOT) U/L 22  --  17   GLUCOSE mg/dL 107* 128* 132*         Results from last 7 days  Lab Units 01/31/17  1236   INR  1.21*       No results found for: LIPASE    Radiology:  Imaging Results (last 72 hours)     Procedure Component Value Units Date/Time    XR Chest 2 View [[id number]] Collected:  01/31/17 1303     Updated:  01/31/17 1307    Narrative:       XR CHEST 2 VW-     Clinical: Shortness of breath, chest pain     COMPARISON 10/12/2016     FINDINGS: There is  cardiac enlargement. There is prominence of the  pulmonary vasculature consistent with congestion. Small pleural  effusions. There is atherosclerotic calcification of the aorta.  Mediastinum is otherwise satisfactory in appearance. The remainder is  unremarkable.     This report was finalized on 1/31/2017 1:04 PM by Dr. Darrel Siegel MD.       CT Head Without Contrast [[id number]] Collected:  01/31/17 1538     Updated:  01/31/17 1637    Narrative:       CT SCAN OF THE HEAD WITHOUT CONTRAST      CLINICAL HISTORY:  Confused.      CT scan of the head was obtained with 3 mm axial images. No intravenous  contrast was administered.     FINDINGS:      There are extensive changes of chronic small vessel ischemic phenomena.  The ventricles, sulci, and cisterns are age appropriate. The basal  ganglia and thalami are unremarkable in appearance. Atherosclerotic  changes are seen within the intracranial vasculature.     The extracranial structures are incidentally notable for some  subcentimeter indeterminate lucencies within the calvarium. A lesion is  noted within the right lateral parietal bone measuring up to 6 mm in  diameter and another 5 mm lesion is seen within the posterior aspect of  the left parietal bone. Other smaller lesions are identified within the  occipital bone.        Impression:          Extensive changes of chronic small vessel ischemic phenomena.     No evidence for acute intracranial pathology.     Indeterminate lucencies within the calvarium which may represent benign  entity such as venous lakes or hemangiomas. However, the possibility of  either metastatic disease or myeloma could not be excluded. Further  evaluation could be obtained with a whole-body bone scan, if clinically  indicated.     These findings and recommendations were discussed with Dr. Pierce on  01/31/2017 at approximately 2:50 PM. The findings of the calvarial  lucencies were discussed with Dr. Mata later in the afternoon  of  01/31/2017.     This report was finalized on 1/31/2017 4:34 PM by Dr. Riaz Galdamez MD.             Assessment/Plan     Active Problems:    Hyponatremia    1)severe anemia: Acute on chronic anemia.  She has a long-standing iron deficiency anemia and now presents with significant worsening with hemoglobin of 4.4 on presentation.  She has responded appropriately to 4 units packed red blood cells    2)hyponatriemia: Likely related to altered mental status and malaise.  She is slowly being corrected.    3)dark stools: intermittent, highly concerning for melena given strikingly low Hb    4) dyspnea on exertion: Progressive and likely related to #1    5)history of GERD: stable on PPI    6)history of colon polyps: 4/2016    Recommendations:  -Daily hemoglobin  -Will need endoscopic evaluation with EGD and colonoscopy, however would like to wait until her hyponatremia has resolved  -Likely to need a small bowel capsule endoscopy in the future  -Consideration of other causes of severe anemia such as multiple myeloma given the findings on her CT head  -Daily PPI  -recommend avoidance of NSAIDs including aspirin if possible    I discussed the patients findings and my recommendations with patient and nursing staff    Claudine Bentley MD  [facility], [place], operated by [facility]  02/01/17      EMR Dragon/Transcription disclaimer:       Much of this encounter note is an electronic transcription/translation of spoken language to printed text. The electronic translation of spoken language may permit erroneous, or at times, nonsensical words or phrases to be inadvertently transcribed; Although I have reviewed the note for such errors, some may still exist.

## 2017-02-01 NOTE — PROGRESS NOTES
Discharge Planning Assessment  [facility]     Patient Name: Raquel Deluna  MRN: [id number]  Today's Date: 2/1/2017    Admit Date: 1/31/2017          Discharge Needs Assessment       02/01/17 1450    Living Environment    Lives With child(indira), adult    Living Arrangements house    Provides Primary Care For no one, unable/limited ability to care for self    Quality Of Family Relationships supportive    Able to Return to Prior Living Arrangements yes    Living Arrangement Comments Lives with adult son Michael    Discharge Needs Assessment    Concerns To Be Addressed discharge planning concerns    Concerns Comments Would like referral to [facility]    Readmission Within The Last 30 Days no previous admission in last 30 days    Anticipated Changes Related to Illness none    Equipment Currently Used at Home walker, rolling;respiratory supplies   home cpap    Transportation Available family or friend will provide    Discharge Disposition still a patient            Discharge Plan       02/01/17 1451    Case Management/Social Work Plan    Plan Home with family and [facility]    Patient/Family In Agreement With Plan yes    Additional Comments Spoke with pt at bedside.  Introduced self and explained role.  CCP contact information provided.  IMM received.  Pt has had a hx of rehab at [facility].  She states that her plan after this admission is home with [facility] for PT.  Call placed to [facility] and spoke with Analisa.   CCP will follow.        Discharge Placement     Facility/Agency Request Status Selected? Address Phone Number Fax Number    [facility] Pending - No Request Sent     [address] [address] [phone]        Lona Gamboa RN 2/1/2017 14:55    Spoke with Analisa                           Demographic Summary       02/01/17 1449    Referral Information    Admission Type inpatient    Arrived From home or self-care    Contact  Information    Comments permission granted to speak with her son Michael or her friend Allie Mathews            Functional Status       02/01/17 1449    Functional Status Prior    Ambulation 1-->assistive equipment    Transferring 0-->independent    Toileting 0-->independent    Bathing 0-->independent    Dressing 0-->independent    Eating 0-->independent    Communication 0-->understands/communicates without difficulty    Swallowing 0-->swallows foods/liquids without difficulty    IADL    Medications independent    Meal Preparation assistive person    Housekeeping assistive person    Laundry assistive person    Shopping assistive person    Oral Care independent            Psychosocial     None            Abuse/Neglect     None            Legal     None            Substance Abuse     None            Patient Forms     None          Lona Gamboa RN

## 2017-02-01 NOTE — PROGRESS NOTES
NEPHROLOGY PROGRESS NOTE    PATIENT IDENTIFICATION:   Name:  Raquel Deluna      MRN:  [id number]     81 y.o.  female             Reason for visit: KESHIA    SUBJECTIVE:   Seen and examined. Feeling much better. S/P 4 units PRBC. BP is not well controlled. No new events.  OBJECTIVE:  Vitals:    02/01/17 0449 02/01/17 0505 02/01/17 0638 02/01/17 0700   BP:  172/62 (!) 186/70    BP Location:       Patient Position:       Pulse:  81     Resp:       Temp: 98.1 °F (36.7 °C)   98 °F (36.7 °C)   TempSrc: Oral      SpO2:  96%     Weight:    206 lb 2.1 oz (93.5 kg)   Height:               Body mass index is 35.38 kg/(m^2).    Intake/Output Summary (Last 24 hours) at 02/01/17 0824  Last data filed at 02/01/17 0449   Gross per 24 hour   Intake   2120 ml   Output   3075 ml   Net   -955 ml         Exam:  GEN:  No distress, appears stated age  EYES:   Anicteric sclera  ENT:    External ears/nose normal, MM are   NECK:  No adenopathy, JVP   LUNGS: Normal chest on inspection; not labored  CV:  Normal S1S2, without murmur  ABD:  Non-tender, non-distended, no hepatosplenomegaly, +BS  EXT:  ++ edema; no cyanosis; clubbing    Scheduled meds:    amLODIPine 10 mg Oral Q24H   aspirin 325 mg Oral Once   hydrALAZINE 50 mg Oral Q8H   pantoprazole 40 mg Oral Q AM     IV meds:                        sodium chloride 125 mL/hr Last Rate: Stopped (01/31/17 1536)       Data Review:      Results from last 7 days  Lab Units 02/01/17  0516 01/31/17  1840 01/31/17  1236   SODIUM mmol/L 125* 123* 122*   POTASSIUM mmol/L 3.8 4.6 6.2*   CHLORIDE mmol/L 86* 85* 87*   TOTAL CO2 mmol/L 22.9 21.3* 18.9*   BUN mg/dL 34* 40* 40*   CREATININE mg/dL 1.30* 1.43* 1.61*   CALCIUM mg/dL 8.6 8.8 8.3*   BILIRUBIN mg/dL 1.5*  --  0.2   ALK PHOS U/L 53  --  52   ALT (SGPT) U/L 25  --  19   AST (SGOT) U/L 22  --  17   GLUCOSE mg/dL 107* 128* 132*       Estimated Creatinine Clearance: 38.9 mL/min (by C-G formula based on Cr of 1.3).            Results from last 7  days  Lab Units 02/01/17  0516 01/31/17  1236   WBC 10*3/mm3 13.91* 13.95*   HEMOGLOBIN g/dL 10.0* 4.4*   PLATELETS 10*3/mm3 410 587*         Results from last 7 days  Lab Units 01/31/17  1236   INR  1.21*             ASSESSMENT:   Active Problems:    Hyponatremia    1. KESHIA likely due to severe decrease in renal perfusion and severe anemia: Improving  2. Hyperkalemia: Due to NSAID's, Sulfa and KESHIA. Improved  3. Hyponatremia: Likely due to intravascular volume depletion. Improving  4. Anemia: Likely due to GI bleed. S/P 4 units of PRBC on 01/31  5. HTN: On metoprolol, amlodipine and hydralazine  6. Edema: Likely due to Rt heart failure.   7. S/P Rt Hip replacement 10/2016  8. Hx of AMBER      Plan:    CR is improving and so is NA.  TSH, cortisol and lipid profile are all wnl  I don't think patient will need to be started on IVF. Encouraged her to increase oral intake. I expect her kidney function and NA to continue to improve.  Will repeat myles brown  GI consult  Please avoid nephrotoxic meds  Daily labs    Ivan Herring MD  2/1/2017    8:24 AM

## 2017-02-02 NOTE — ANESTHESIA PREPROCEDURE EVALUATION
Anesthesia Evaluation     Patient summary reviewed    Airway   Mallampati: III  TM distance: >3 FB  Neck ROM: full  no difficulty expected  Dental - normal exam     Pulmonary     breath sounds clear to auscultation  Cardiovascular   Exercise tolerance: poor (<4 METS)  (+) hypertension well controlled,     Rhythm: regular  Rate: normal    Neuro/Psych  GI/Hepatic/Renal/Endo      Musculoskeletal     Abdominal    Substance History      OB/GYN          Other                             Anesthesia Plan    ASA 3     MAC     intravenous induction

## 2017-02-02 NOTE — PROGRESS NOTES
Daily Progress Note.     Raquel Deluna  81 y.o.  female  2/2/2017    Brief problem (summary)  This is a 81-year-old female was brought to the hospital with a history of having weakness for the past couple of days and also passing some dark stool. She has also had decreased level of consciousness in the emergency room she was found to be anemic with a hemoglobin of 4.4 and sodium of 122 and potassium of 6.2 and creatinine of 1.6. She also takes NSAIDs as an outpatient for her pain and also aspirin. She has a history of GI bleed in the past and had a scope but could not find the source of. Her GI physician is at the [facility] and also has seen a hematologist at [facility] for chronic anemia. She had a right hip replacement in October. 2016 and at that time her hemoglobin was 8.5. At present patient is awake and alert does not have any chest pain does not have any shortness breath. She also denies nausea vomiting and hematemesis.  She also has a history of COPD for which she takes to dose on Symbicort in addition patient has sleep apnea and uses CPAP      Today, shew had a good night, used cpap and had 4 units of PRBC    PMS/FH/SH: reviewed and unchanged.  Medications:     amLODIPine 10 mg Oral Q24H   budesonide-formoterol 2 puff Inhalation BID - RT   diltiaZEM SR 60 mg Oral Q12H   furosemide 20 mg Oral BID   hydrALAZINE 50 mg Oral Q8H   pantoprazole 40 mg Oral BID AC   [START ON 2/3/2017] polyethylene glycol with electrolytes 2,000 mL Oral Once   polyethylene glycol with electrolytes 2,000 mL Oral Once   tiotropium 1 capsule Inhalation Daily - RT       lactated ringers 30 mL/hr Last Rate: Stopped (02/02/17 1421)       ROS:  Respiratory ROS: no cough, shortness of breath, or wheezing    Objective:  Temp:  [97.1 °F (36.2 °C)-98.6 °F (37 °C)] 97.1 °F (36.2 °C)  I/O last 3 completed shifts:  In: 2280 [P.O.:1380; Blood:900]  Out: 3600 [Urine:3600]  I/O this shift:  In: 465 [P.O.:120; I.V.:345]  Out: -     Physical  Exam   Constitutional: She is oriented to person, place, and time. She appears well-developed and well-nourished.   HENT:   Head: Atraumatic.   Eyes: Pupils are equal, round, and reactive to light. No scleral icterus.   Cardiovascular: Normal rate and regular rhythm.    Pulmonary/Chest: No accessory muscle usage. No respiratory distress. She has no decreased breath sounds. She has no wheezes.   Abdominal: Soft. Normal appearance and bowel sounds are normal. She exhibits no ascites. There is no hepatosplenomegaly.   Neurological: She is alert and oriented to person, place, and time.   Skin: No laceration and no petechiae noted. No cyanosis. Nails show no clubbing.   Psychiatric: She has a normal mood and affect. Her behavior is normal.   Vitals reviewed.          Results from last 7 days  Lab Units 02/02/17  0727 02/01/17  0516 01/31/17  1236   WBC 10*3/mm3 13.29* 13.91* 13.95*   HEMOGLOBIN g/dL 9.8* 10.0* 4.4*   HEMATOCRIT % 29.6* 29.5* 14.7*   PLATELETS 10*3/mm3 398 410 587*       Results from last 7 days  Lab Units 02/02/17  0727 02/01/17  0516 01/31/17  1840   SODIUM mmol/L 129* 125* 123*   POTASSIUM mmol/L 3.9 3.8 4.6   CHLORIDE mmol/L 91* 86* 85*   TOTAL CO2 mmol/L 22.8 22.9 21.3*   BUN mg/dL 27* 34* 40*   CREATININE mg/dL 1.10* 1.30* 1.43*   GLUCOSE mg/dL 126* 107* 128*   CALCIUM mg/dL 8.4* 8.6 8.8       Results from last 7 days  Lab Units 02/02/17  0727   INR  1.18*           ASSESSMENT/ PLAN:  · Acute on chronic anemia, patient has a history of chronic anemia her baseline hemoglobin is around 8.5-9. However her hemoglobin at admission was 4.4.Now it is 10.   · History of GI bleed in the past.  At present there is no obvious GI bleed but the however she was taking NSAIDs which going to be held. She'll continue Protonix for the time being and she may need GI workup   · Acute kidney injury secondary to hypovolemia and poor renal perfusion. Renal service is following the patient  · Hyperkalemia,  resolved  · Hyponatremia, improving   · History of hypertension  · History of sleep apnea patient is on CPAP family is going to bring the CPAP  · History of COPD, continue bronchodilators  · Iron def anemia: she normally gets iron infusions as she has failed oral iron therapy. Please see note from stoner        Plan; Infuse ferumoxytol 510 mg ( Iron)       Notes from care everywhere  Recurrent iron deficiency anemia, having failed to respond to oral iron replacement, but responding repeatedly to parenteral iron administration.   Esvin Flores MD  [facility] [place]      Cele Mata MD,[facility]  Pulmonary, Critical Care and Sleep Medicine.  [facility]    2/2/2017    EMR Dragon/Transcription disclaimer:   Much of this encounter note is an electronic transcription/translation of spoken language to printed text. The electronic translation of spoken language may permit erroneous, or at times, nonsensical words or phrases to be inadvertently transcribed; Although I have reviewed the note for such errors, some may still exist.

## 2017-02-02 NOTE — H&P (VIEW-ONLY)
[facility]  Initial Inpatient Consult Note    Referring Provider: Adolfo    Reason for Consultation: anemia, dark stool    Subjective     History of present illness:  Pleasant 80 yo W with a PMH of chronic iron deficiency anemia (baseline Hb 8-9), stage III CKD,  hypertension, and arthritis admitted with lethargy, fatigue, and AMS and found to be anemic with a hemoglobin of 4.4 for which gastroenterology has been consulted.  She is previously unknown to our practice.  Her symptoms have been ongoing for several days prior to admission and getting progressively worse with regard to the fatigue and lethargy.  She also endorses some dark stools that have been occurring intermittently over the last several weeks.  Emergency room workup notable for findings of hemoglobin of 4.4 as well as patient being hyponatremic and hyperkalemic.  Prior Hgb documented at 8.5 in October 2016 when she had right knee replacement surgery.  She reports that she has been taking a 325 mg aspirin daily.  Occasional additional NSAIDs    She was subsequently admitted to the ICU for further treatment and evaluation.  She has been transfused with 4 units of pack red blood cells with significant improvement in her hemoglobin.  At this time she is complaining of no abdominal pain, nausea, vomiting, chest pain, or dizziness.  Only complaint is mild dyspnea with any exertion.    Her iron deficiency anemia has been a chronic issue for at least 5 years. Prior GI workup has consisted of a colonoscopy as well as an EGD performed at [facility] in April 2016 by Dr. Allison.  EGD with findings of Bella's esophagus and colonoscopy with 1 adenomatous polyp and 1 sessile serrated polyp.  Follow up endoscopy was recommended in two years.  Patient was to have small bowel follow-through and capsule study for further evaluation of her anemia per Dr. Allison's notes but this had not been done.  Additionally, she has been  followed regularly for a number of years by oncologist  and has had IV iron infusions intermittently for her anemia.    Family history of colon cancer or irritable bowel disease is unknown as patient is adopted.      Past Medical History:  Past Medical History   Diagnosis Date   • Anxiety    • Arthritis    • Cellulitis      left leg hx about a month ago   • Chronic bronchitis    • Diarrhea    • Hiatal hernia    • High cholesterol    • History of goiter    • History of transfusion      had reaction to 2nd unit after receiving the 1st   • Hypertension    • Internal bleeding      losing blood can't find out where so has to get iron infusions   • Iron deficiency      receives iron infusion   • Migraine    • Osteoporosis    • PONV (postoperative nausea and vomiting)    • Reflux esophagitis    • Sleep apnea      cpap   • Stress incontinence      wears pads       Past Surgical History:  Past Surgical History   Procedure Laterality Date   • Thyroid surgery       for goiter   • Colonoscopy     • Tonsillectomy     • Appendectomy     • Hysterectomy     • Cervical disc surgery     • Pr total knee arthroplasty Right 10/25/2016     Procedure: RT TOTAL KNEE ARTHROPLASTY;  Surgeon: Ricky Dsouza MD;  [facility];  Service: Orthopedics        Social History:   Social History   Substance Use Topics   • Smoking status: Former Smoker     Packs/day: 4.00     Years: 33.00     Types: Cigarettes     Quit date: 1980   • Smokeless tobacco: Never Used   • Alcohol use Yes      Comment: occcasionally        Family History:  History reviewed. No pertinent family history.    Home Meds:  Prescriptions Prior to Admission   Medication Sig Dispense Refill Last Dose   • aclidinium bromide (TUDORZA PRESSAIR) 400 MCG/ACT aerosol powder  powder for inhalation Inhale 1 puff 2 (Two) Times a Day.   10/24/2016 at 1800   • amlodipine-olmesartan (DIEUDONNE) 10-40 MG per tablet Take 1 tablet by mouth Every Morning.   10/25/2016 at 0600   •  atorvastatin (LIPITOR) 20 MG tablet Take 20 mg by mouth Daily.      • B Complex Vitamins (VITAMIN B COMPLEX PO) Take 1 tablet by mouth Daily.   10/24/2016 at 1000   • budesonide-formoterol (SYMBICORT) 160-4.5 MCG/ACT inhaler Inhale 2 puffs 2 (Two) Times a Day.   10/24/2016 at 1800   • CALCIUM CARBONATE-VITAMIN D PO Take 1 tablet by mouth Daily.   10/24/2016 at 1000   • citalopram (CeleXA) 40 MG tablet Take 40 mg by mouth Every Night.   10/24/2016 at 2000   • esomeprazole (NexIUM) 40 MG capsule Take 40 mg by mouth Daily Before Supper.   Past Week at Unknown time   • meloxicam (MOBIC) 15 MG tablet Take 15 mg by mouth Daily.      • metoprolol tartrate (LOPRESSOR) 50 MG tablet Take 50 mg by mouth 2 (Two) Times a Day.   10/24/2016 at 1000   • mometasone (NASONEX) 50 MCG/ACT nasal spray 2 sprays into each nostril Daily.   10/24/2016 at Unknown time   • Multiple Vitamin (MULTIVITAMIN) capsule Take 1 capsule by mouth Daily.   10/24/2016 at 1000   • NON FORMULARY Take 1 tablet by mouth Daily. instaflex      • triamterene-hydrochlorothiazide (MAXZIDE-25) 37.5-25 MG per tablet Take 2 tablets by mouth Daily.   10/24/2016 at 1000       Current Meds:     amLODIPine 10 mg Oral Q24H   budesonide-formoterol 2 puff Inhalation BID - RT   hydrALAZINE 50 mg Oral Q8H   pantoprazole 40 mg Oral BID AC   tiotropium 1 capsule Inhalation Daily - RT       Allergies:  Allergies   Allergen Reactions   • Sulfa Antibiotics Other (See Comments)     Yeast infections       Review of Systems  All systems were reviewed and negative except for:  Respiratory: positive for  shortness of air     Objective     Vital Signs  Temp:  [97.3 °F (36.3 °C)-98.4 °F (36.9 °C)] 98.1 °F (36.7 °C)  Heart Rate:  [59-85] 81  Resp:  [16-20] 20  BP: (147-186)/(55-82) 186/70    Physical Exam:  General Appearance:    Alert, cooperative, in no acute distress, appears stated age    Head:    Normocephalic, without obvious abnormality, atraumatic   Eyes:            Lids and  lashes normal, conjunctivae normal, no icterus, pale sclera    Throat:   No oral lesions, no thrush, oral mucosa moist   Neck:   No adenopathy, supple, trachea midline, no thyromegaly   Lungs:     Clear to auscultation,respirations regular, even and                   Unlabored on room air     Heart:    Regular rhythm and normal rate, normal S1 and S2, no            murmur, no gallop   Chest Wall:    No abnormalities observed   Abdomen:     obese, soft, nontender to deep palpation, nondistended, no palpable organomegaly but limited by habitus    Rectal:     Deferred   Extremities:   no edema, no cyanosis, no redness   Skin:   No bleeding, bruising or rash   Lymph nodes:   No palpable adenopathy   Psychiatric:  Judgement and insight: normal   Orientation to person place and time: normal   Mood and affect: normal     Results Review:   I reviewed the patient's new clinical results.      Results from last 7 days  Lab Units 02/01/17  0516 01/31/17  1236   WBC 10*3/mm3 13.91* 13.95*   HEMOGLOBIN g/dL 10.0* 4.4*   HEMATOCRIT % 29.5* 14.7*   PLATELETS 10*3/mm3 410 587*         Results from last 7 days  Lab Units 02/01/17  0516 01/31/17  1840 01/31/17  1236   SODIUM mmol/L 125* 123* 122*   POTASSIUM mmol/L 3.8 4.6 6.2*   CHLORIDE mmol/L 86* 85* 87*   TOTAL CO2 mmol/L 22.9 21.3* 18.9*   BUN mg/dL 34* 40* 40*   CREATININE mg/dL 1.30* 1.43* 1.61*   CALCIUM mg/dL 8.6 8.8 8.3*   BILIRUBIN mg/dL 1.5*  --  0.2   ALK PHOS U/L 53  --  52   ALT (SGPT) U/L 25  --  19   AST (SGOT) U/L 22  --  17   GLUCOSE mg/dL 107* 128* 132*         Results from last 7 days  Lab Units 01/31/17  1236   INR  1.21*       No results found for: LIPASE    Radiology:  Imaging Results (last 72 hours)     Procedure Component Value Units Date/Time    XR Chest 2 View [[id number]] Collected:  01/31/17 1303     Updated:  01/31/17 1307    Narrative:       XR CHEST 2 VW-     Clinical: Shortness of breath, chest pain     COMPARISON 10/12/2016     FINDINGS: There is  cardiac enlargement. There is prominence of the  pulmonary vasculature consistent with congestion. Small pleural  effusions. There is atherosclerotic calcification of the aorta.  Mediastinum is otherwise satisfactory in appearance. The remainder is  unremarkable.     This report was finalized on 1/31/2017 1:04 PM by Dr. Darrel Siegel MD.       CT Head Without Contrast [[id number]] Collected:  01/31/17 1538     Updated:  01/31/17 1637    Narrative:       CT SCAN OF THE HEAD WITHOUT CONTRAST      CLINICAL HISTORY:  Confused.      CT scan of the head was obtained with 3 mm axial images. No intravenous  contrast was administered.     FINDINGS:      There are extensive changes of chronic small vessel ischemic phenomena.  The ventricles, sulci, and cisterns are age appropriate. The basal  ganglia and thalami are unremarkable in appearance. Atherosclerotic  changes are seen within the intracranial vasculature.     The extracranial structures are incidentally notable for some  subcentimeter indeterminate lucencies within the calvarium. A lesion is  noted within the right lateral parietal bone measuring up to 6 mm in  diameter and another 5 mm lesion is seen within the posterior aspect of  the left parietal bone. Other smaller lesions are identified within the  occipital bone.        Impression:          Extensive changes of chronic small vessel ischemic phenomena.     No evidence for acute intracranial pathology.     Indeterminate lucencies within the calvarium which may represent benign  entity such as venous lakes or hemangiomas. However, the possibility of  either metastatic disease or myeloma could not be excluded. Further  evaluation could be obtained with a whole-body bone scan, if clinically  indicated.     These findings and recommendations were discussed with Dr. Pierce on  01/31/2017 at approximately 2:50 PM. The findings of the calvarial  lucencies were discussed with Dr. Mata later in the afternoon  of  01/31/2017.     This report was finalized on 1/31/2017 4:34 PM by Dr. Riaz Galdamez MD.             Assessment/Plan     Active Problems:    Hyponatremia    1)severe anemia: Acute on chronic anemia.  She has a long-standing iron deficiency anemia and now presents with significant worsening with hemoglobin of 4.4 on presentation.  She has responded appropriately to 4 units packed red blood cells    2)hyponatriemia: Likely related to altered mental status and malaise.  She is slowly being corrected.    3)dark stools: intermittent, highly concerning for melena given strikingly low Hb    4) dyspnea on exertion: Progressive and likely related to #1    5)history of GERD: stable on PPI    6)history of colon polyps: 4/2016    Recommendations:  -Daily hemoglobin  -Will need endoscopic evaluation with EGD and colonoscopy, however would like to wait until her hyponatremia has resolved  -Likely to need a small bowel capsule endoscopy in the future  -Consideration of other causes of severe anemia such as multiple myeloma given the findings on her CT head  -Daily PPI  -recommend avoidance of NSAIDs including aspirin if possible    I discussed the patients findings and my recommendations with patient and nursing staff    Claudine Bentley MD  [facility]  02/01/17      EMR Dragon/Transcription disclaimer:       Much of this encounter note is an electronic transcription/translation of spoken language to printed text. The electronic translation of spoken language may permit erroneous, or at times, nonsensical words or phrases to be inadvertently transcribed; Although I have reviewed the note for such errors, some may still exist.

## 2017-02-02 NOTE — ANESTHESIA POSTPROCEDURE EVALUATION
Patient: Raquel Deluna    Procedure Summary     Date Anesthesia Start Anesthesia Stop Room / Location    02/02/17 1339 1352  [facility] /  [facility]       Procedure Diagnosis Surgeon Provider    ESOPHAGOGASTRODUODENOSCOPY with biopsies (N/A Esophagus) Anemia, unspecified type  (Anemia, unspecified type [D64.9]) MD Baron Scruggs MD          Anesthesia Type: MAC  Last vitals  /59 (02/02/17 1414)    Temp      Pulse 79 (02/02/17 1414)   Resp 16 (02/02/17 1414)    SpO2 95 % (02/02/17 1414)      Post Anesthesia Care and Evaluation    Patient location during evaluation: PACU  Patient participation: complete - patient participated  Level of consciousness: awake and alert  Pain score: 0  Pain management: adequate  Airway patency: patent  Anesthetic complications: No anesthetic complications    Cardiovascular status: acceptable  Respiratory status: acceptable  Hydration status: acceptable

## 2017-02-02 NOTE — PLAN OF CARE
Problem: Patient Care Overview (Adult)  Goal: Plan of Care Review  Outcome: Ongoing (interventions implemented as appropriate)    02/02/17 5296   Coping/Psychosocial Response Interventions   Plan Of Care Reviewed With patient   Patient Care Overview   Progress progress towards functional goals is fair   Outcome Evaluation   Outcome Summary/Follow up Plan 1L O2 for comfort, no complaints of SOB, denies pain. HR elevated overnight, intermittent A-fib, Dr Arellano aware, BP elevated. PRN labetolol along with current scheduled regimen. No other complaints overnight. F/C remains in place. plan for EGD , NPO since midnight.          Problem: Fluid Volume Deficit (Adult)  Goal: Comfort/Well Being  Outcome: Ongoing (interventions implemented as appropriate)    Problem: Fall Risk (Adult)  Goal: Absence of Falls  Outcome: Ongoing (interventions implemented as appropriate)

## 2017-02-02 NOTE — PLAN OF CARE
Problem: GI Endoscopy (Adult)  Goal: Signs and Symptoms of Listed Potential Problems Will be Absent or Manageable (GI Endoscopy)  Outcome: Ongoing (interventions implemented as appropriate)    02/02/17 1220   GI Endoscopy   Problems Assessed (GI Endoscopy) all   Problems Present (GI Endoscopy) none

## 2017-02-02 NOTE — PROGRESS NOTES
NEPHROLOGY PROGRESS NOTE    PATIENT IDENTIFICATION:   Name:  Raquel Deluna      MRN:  [id number]     81 y.o.  female             Reason for visit: KESHIA    SUBJECTIVE:   Seen and examined. Feeling much better but felt somewhat SOA  At night. Doing well on nasal canula.    OBJECTIVE:  Vitals:    02/02/17 1255 02/02/17 1354 02/02/17 1404 02/02/17 1414   BP: 163/46 169/77 169/62 161/59   BP Location: Left arm      Patient Position: Lying      Pulse: 75 69 80 79   Resp: 16 20 16 16   Temp:       TempSrc:       SpO2: 95% 94% 94% 95%   Weight:       Height:               Body mass index is 36.22 kg/(m^2).    Intake/Output Summary (Last 24 hours) at 02/02/17 1436  Last data filed at 02/02/17 1421   Gross per 24 hour   Intake    465 ml   Output   1500 ml   Net  -1035 ml         Exam:  GEN:  No distress, appears stated age  EYES:   Anicteric sclera  ENT:    External ears/nose normal, MM are   NECK:  No adenopathy, JVP   LUNGS: Normal chest on inspection; not labored  CV:  Normal S1S2, without murmur  ABD:  Non-tender, non-distended, no hepatosplenomegaly, +BS  EXT:  ++ edema; no cyanosis; clubbing    Scheduled meds:      amLODIPine 10 mg Oral Q24H   budesonide-formoterol 2 puff Inhalation BID - RT   diltiaZEM SR 60 mg Oral Q12H   furosemide 20 mg Oral BID   hydrALAZINE 50 mg Oral Q8H   pantoprazole 40 mg Oral BID AC   [START ON 2/3/2017] polyethylene glycol with electrolytes 2,000 mL Oral Once   polyethylene glycol with electrolytes 2,000 mL Oral Once   tiotropium 1 capsule Inhalation Daily - RT     IV meds:                          lactated ringers 30 mL/hr Last Rate: Stopped (02/02/17 1421)       Data Review:      Results from last 7 days  Lab Units 02/02/17  0727 02/01/17  0516 01/31/17  1840 01/31/17  1236   SODIUM mmol/L 129* 125* 123* 122*   POTASSIUM mmol/L 3.9 3.8 4.6 6.2*   CHLORIDE mmol/L 91* 86* 85* 87*   TOTAL CO2 mmol/L 22.8 22.9 21.3* 18.9*   BUN mg/dL 27* 34* 40* 40*   CREATININE mg/dL 1.10* 1.30* 1.43*  1.61*   CALCIUM mg/dL 8.4* 8.6 8.8 8.3*   BILIRUBIN mg/dL  --  1.5*  --  0.2   ALK PHOS U/L  --  53  --  52   ALT (SGPT) U/L  --  25  --  19   AST (SGOT) U/L  --  22  --  17   GLUCOSE mg/dL 126* 107* 128* 132*       Estimated Creatinine Clearance: 46.5 mL/min (by C-G formula based on Cr of 1.1).            Results from last 7 days  Lab Units 02/02/17  0727 02/01/17  0516 01/31/17  1236   WBC 10*3/mm3 13.29* 13.91* 13.95*   HEMOGLOBIN g/dL 9.8* 10.0* 4.4*   PLATELETS 10*3/mm3 398 410 587*         Results from last 7 days  Lab Units 02/02/17 0727 01/31/17  1236   INR  1.18* 1.21*             ASSESSMENT:   Active Problems:    Hyponatremia    1. KESHIA likely due to severe decrease in renal perfusion and severe anemia: Improving  2. Hyperkalemia: Due to NSAID's, Sulfa and KESHIA. Improved  3. Hyponatremia: Likely due to intravascular volume depletion. Improving  4. Anemia: Likely due to GI bleed. S/P 4 units of PRBC on 01/31  5. HTN: On metoprolol, amlodipine and hydralazine  6. Edema: Likely due to Rt heart failure.   7. S/P Rt Hip replacement 10/2016  8. Hx of AMBER      Plan:    CR is improving and so is NA.  TSH, cortisol and lipid profile are all wnl  Will repeat myles oliveirakeily. Start her on diuretics ( was SOA earlier)  OK for Colonoscopy/EGD at AM as NA has improved  Please avoid nephrotoxic meds  Daily labs    Ivan Herring MD  2/2/2017    2:36 PM

## 2017-02-03 PROBLEM — D64.9 ANEMIA: Status: ACTIVE | Noted: 2017-01-01

## 2017-02-03 PROBLEM — E87.5 HYPERKALEMIA: Status: ACTIVE | Noted: 2017-01-01

## 2017-02-03 NOTE — PLAN OF CARE
Problem: Patient Care Overview (Adult)  Goal: Plan of Care Review  Outcome: Ongoing (interventions implemented as appropriate)    02/02/17 2121   Coping/Psychosocial Response Interventions   Plan Of Care Reviewed With patient   Patient Care Overview   Progress progress toward functional goals as expected   Outcome Evaluation   Outcome Summary/Follow up Plan pt is drinking 2000 ml of golytly for colonoscopy in the am no c/o this shift         Problem: Fluid Volume Deficit (Adult)  Goal: Fluid/Electrolyte Balance  Outcome: Ongoing (interventions implemented as appropriate)  Goal: Comfort/Well Being  Outcome: Ongoing (interventions implemented as appropriate)    Problem: Fall Risk (Adult)  Goal: Absence of Falls  Outcome: Ongoing (interventions implemented as appropriate)

## 2017-02-03 NOTE — DISCHARGE SUMMARY
DISCHARGE SUMMARY    Date of Admission: 1/31/2017  Date of Discharge:  2/3/2017    Discharge Diagnosis:  · Acute on chronic anemia, patient has a history of chronic anemia her baseline hemoglobin is around 8.5-9. However her hemoglobin at admission was 4.4.   · History of GI bleed in the past. At present there is no obvious GI bleed but the however she was taking NSAIDs which is discontinued.  · Acute kidney injury secondary to hypovolemia and poor renal perfusion. resolved  · Hyperkalemia, resolved  · Hyponatremia, improving   · History of hypertension  · History of sleep apnea patient is on CPAP family is going to bring the CPAP  · History of COPD, continue bronchodilators  · Iron def anemia: she normally gets iron infusions as she has failed oral iron therapy. Please see note from georgiana  · Colonoscopy showed diverticulosis of the sigmoid and descending colon. In 9 mm polyp in the hepatic flexure was removed multiple nonbleeding colonic angiectasias seen      Hospital Course (brief)  This is a 81-year-old female was admitted to the hospital intensive care unit with a hemoglobin of 4.4 and also hyperkalemia and hyponatremia.  She was given blood transfusions 4 units.  She also has a history of chronic iron deficiency anemia.  She was given iron infusion as she is intolerant to oral iron supplements.  Renal service also was consulted along with GI.  Her hyper kalemia has resolved and dehydration is been corrected.  Hyper anemia is improving.  She underwent both EGD and colonoscopy.  The colonoscopy showed diverticular disease and sigmoid and descending colon along with multiple colonic angiectasias.  She has no obvious bleeding.  She also has a history of sleep apnea and she uses CPAP on a regular basis.  She also has a history of COPD who is on bronchodilators.    Procedures Performed:  · ICU monitoring  · Blood transfusion  · EGD  · Colonoscopy    Consults:  Consults     Date and Time Order Name Status  Description    2017 0915 Inpatient Consult to Gastroenterology Completed     2017 1410 IP Consult to Nephrology Completed     2017 1347 Pulmonology (on-call MD unless specified) Completed           Pertinent Test Results:    Results from last 7 days  Lab Units 17  0503 17  0727 17  0516   WBC 10*3/mm3 10.32 13.29* 13.91*   HEMOGLOBIN g/dL 9.4* 9.8* 10.0*   HEMATOCRIT % 29.6* 29.6* 29.5*   PLATELETS 10*3/mm3 371 398 410       Results from last 7 days  Lab Units 17  1551 17  0503   SODIUM mmol/L  --  133*   POTASSIUM mmol/L 4.5 3.4*   CHLORIDE mmol/L  --  94*   TOTAL CO2 mmol/L  --  27.4   BUN mg/dL  --  21   CREATININE mg/dL  --  1.11*   GLUCOSE mg/dL  --  103*   CALCIUM mg/dL  --  8.1*         Condition on Discharge:   Stable.    Vital Signs past 24hrs  BP range: BP: (142-174)/(44-79) 155/76  Pulse range: Heart Rate:  [] 97  Resp rate range: Resp:  [12-20] 16  Temp range: Temp (24hrs), Av.1 °F (36.7 °C), Min:97.7 °F (36.5 °C), Max:98.2 °F (36.8 °C)    O2 Device: room airFlow (L/min):  [2-3] 2  Oxygen range:SpO2:  [93 %-99 %] 99 %   211 lb (95.7 kg); Body mass index is 36.22 kg/(m^2).    Intake/Output Summary (Last 24 hours) at 17  Last data filed at 17 1708   Gross per 24 hour   Intake    200 ml   Output      0 ml   Net    200 ml         Discharge Disposition  Home or Self Care    Discharge Medications   Rauqel Deluna   Home Medication Instructions BENOIT:[id number]    Printed on:17   Medication Information                      aclidinium bromide (TUDORZA PRESSAIR) 400 MCG/ACT aerosol powder  powder for inhalation  Inhale 1 puff 2 (Two) Times a Day.             amLODIPine (NORVASC) 10 MG tablet  Take 1 tablet by mouth Daily.             atorvastatin (LIPITOR) 20 MG tablet  Take 20 mg by mouth Daily.             B Complex Vitamins (VITAMIN B COMPLEX PO)  Take 1 tablet by mouth Daily.             budesonide-formoterol (SYMBICORT) 160-4.5  MCG/ACT inhaler  Inhale 2 puffs 2 (Two) Times a Day.             CALCIUM CARBONATE-VITAMIN D PO  Take 1 tablet by mouth Daily.             citalopram (CeleXA) 40 MG tablet  Take 40 mg by mouth Every Night.             esomeprazole (NexIUM) 40 MG capsule  Take 40 mg by mouth Daily Before Supper.             furosemide (LASIX) 20 MG tablet  Take 1 tablet by mouth Daily.             metoprolol tartrate (LOPRESSOR) 50 MG tablet  Take 50 mg by mouth 2 (Two) Times a Day.             mometasone (NASONEX) 50 MCG/ACT nasal spray  2 sprays into each nostril Daily.             Multiple Vitamin (MULTIVITAMIN) capsule  Take 1 capsule by mouth Daily.             NON FORMULARY  Take 1 tablet by mouth Daily. instaflex             potassium chloride (KLOR-CON) 8 MEQ CR tablet  Take 1 tablet by mouth Daily.                 Discharge Diet:  Diet Order   Procedures   • Diet Regular       Activity at Discharge:  As tolerated     Follow-up Appointments  Follow-up Information     Follow up with Ricky Hoyos III, MD Follow up in 4 month(s).    Specialty:  Internal Medicine    Contact information:    [address] [address]  [phone]          Follow up with Josef Mccoy MD Follow up in 2 week(s).    Specialty:  Gastroenterology    Why:   Please call my office for pathology results in 2 weeks if we have not called you to review results.    Contact information:    [address]  [place] [id number] [phone]          Follow up with Cele Mata MD Follow up in 2 month(s).    Specialties:  Pulmonary Disease, Sleep Medicine    Why:  for COPD and sleep apnea    Contact information:    [address]  [place] [id number] [phone]              Cele Mata MD  02/03/17  6:47 PM    Time: I spent more than 30 min in the discharge planning of this patient.    EMR Dragon/Transcription disclaimer:   Much of this encounter note is an electronic transcription/translation of spoken  language to printed text. The electronic translation of spoken language may permit erroneous, or at times, nonsensical words or phrases to be inadvertently transcribed; Although I have reviewed the note for such errors, some may still exist.

## 2017-02-03 NOTE — PLAN OF CARE
Problem: Patient Care Overview (Adult)  Goal: Plan of Care Review  Outcome: Ongoing (interventions implemented as appropriate)  Goal: Adult Individualization and Mutuality  Outcome: Ongoing (interventions implemented as appropriate)    Problem: Fluid Volume Deficit (Adult)  Goal: Fluid/Electrolyte Balance  Outcome: Ongoing (interventions implemented as appropriate)  Goal: Comfort/Well Being  Outcome: Ongoing (interventions implemented as appropriate)    Problem: Fall Risk (Adult)  Goal: Absence of Falls  Outcome: Ongoing (interventions implemented as appropriate)

## 2017-02-03 NOTE — PROGRESS NOTES
Daily Progress Note.     Raquel Deluna  81 y.o.  female  2/3/2017    Brief problem (summary)  This is a 81-year-old female was brought to the hospital with a history of having weakness for the past couple of days and also passing some dark stool. She has also had decreased level of consciousness in the emergency room she was found to be anemic with a hemoglobin of 4.4 and sodium of 122 and potassium of 6.2 and creatinine of 1.6. She also takes NSAIDs as an outpatient for her pain and also aspirin. She has a history of GI bleed in the past and had a scope but could not find the source of. Her GI physician is at the [facility] and also has seen a hematologist at [facility] for chronic anemia. She had a right hip replacement in October. 2016 and at that time her hemoglobin was 8.5. At present patient is awake and alert does not have any chest pain does not have any shortness breath. She also denies nausea vomiting and hematemesis.  She also has a history of COPD for which she takes to dose on Symbicort in addition patient has sleep apnea and uses CPAP      Today, EGD and coloscopy completed. She had iron infusion     PMS/FH/SH: reviewed and unchanged.  Medications:     amLODIPine 10 mg Oral Q24H   budesonide-formoterol 2 puff Inhalation BID - RT   diltiaZEM SR 60 mg Oral Q12H   furosemide 20 mg Oral BID   hydrALAZINE 50 mg Oral Q8H   pantoprazole 40 mg Oral BID AC   tiotropium 1 capsule Inhalation Daily - RT       lactated ringers 30 mL/hr Last Rate: Stopped (02/03/17 1708)   lactated ringers 9 mL/hr Last Rate: Stopped (02/03/17 1615)       ROS:  Respiratory ROS: no cough, shortness of breath, or wheezing    Objective:  Temp:  [97.7 °F (36.5 °C)-98.2 °F (36.8 °C)] (P) 97.7 °F (36.5 °C)  I/O last 3 completed shifts:  In: 465 [P.O.:120; I.V.:345]  Out: 1500 [Urine:1500]  I/O this shift:  In: 200 [I.V.:200]  Out: -     Physical Exam   Constitutional: She is oriented to person, place, and time. She appears  well-developed and well-nourished.   HENT:   Head: Atraumatic.   Eyes: Pupils are equal, round, and reactive to light. No scleral icterus.   Cardiovascular: Normal rate and regular rhythm.    Pulmonary/Chest: No accessory muscle usage. No respiratory distress. She has no decreased breath sounds. She has no wheezes.   Abdominal: Soft. Normal appearance and bowel sounds are normal. She exhibits no ascites. There is no hepatosplenomegaly.   Neurological: She is alert and oriented to person, place, and time.   Skin: No laceration and no petechiae noted. No cyanosis. Nails show no clubbing.   Psychiatric: She has a normal mood and affect. Her behavior is normal.   Vitals reviewed.          Results from last 7 days  Lab Units 02/03/17  0503 02/02/17  0727 02/01/17  0516   WBC 10*3/mm3 10.32 13.29* 13.91*   HEMOGLOBIN g/dL 9.4* 9.8* 10.0*   HEMATOCRIT % 29.6* 29.6* 29.5*   PLATELETS 10*3/mm3 371 398 410       Results from last 7 days  Lab Units 02/03/17  1551 02/03/17  0503 02/02/17  0727 02/01/17  0516   SODIUM mmol/L  --  133* 129* 125*   POTASSIUM mmol/L 4.5 3.4* 3.9 3.8   CHLORIDE mmol/L  --  94* 91* 86*   TOTAL CO2 mmol/L  --  27.4 22.8 22.9   BUN mg/dL  --  21 27* 34*   CREATININE mg/dL  --  1.11* 1.10* 1.30*   GLUCOSE mg/dL  --  103* 126* 107*   CALCIUM mg/dL  --  8.1* 8.4* 8.6       Results from last 7 days  Lab Units 02/03/17  0503   INR  1.11*           ASSESSMENT/ PLAN:  · Acute on chronic anemia, patient has a history of chronic anemia her baseline hemoglobin is around 8.5-9. However her hemoglobin at admission was 4.4.Now it is 10.   · History of GI bleed in the past.  At present there is no obvious GI bleed but the however she was taking NSAIDs which going to be held. She'll continue Protonix for the time being and she may need GI workup   · Acute kidney injury secondary to hypovolemia and poor renal perfusion. Renal service is following the patient  · Hyperkalemia, resolved  · Hyponatremia, improving    · History of hypertension  · History of sleep apnea patient is on CPAP family is going to bring the CPAP  · History of COPD, continue bronchodilators  · Iron def anemia: she normally gets iron infusions as she has failed oral iron therapy. Please see note from georgiana  · Colonoscopy showed diverticulosis of the sigmoid and descending colon.  In 9 mm polyp in the hepatic flexure was removed multiple nonbleeding colonic angiectasias seen          Cele Mata MD,[facility]  Pulmonary, Critical Care and Sleep Medicine.  [facility]    2/3/2017    EMR Dragon/Transcription disclaimer:   Much of this encounter note is an electronic transcription/translation of spoken language to printed text. The electronic translation of spoken language may permit erroneous, or at times, nonsensical words or phrases to be inadvertently transcribed; Although I have reviewed the note for such errors, some may still exist.

## 2017-02-03 NOTE — PLAN OF CARE
Problem: GI Endoscopy (Adult)  Goal: Signs and Symptoms of Listed Potential Problems Will be Absent or Manageable (GI Endoscopy)  Outcome: Outcome(s) achieved Date Met:  02/03/17 02/03/17 6183   GI Endoscopy   Problems Assessed (GI Endoscopy) all

## 2017-02-03 NOTE — PROGRESS NOTES
Continued Stay Note  [facility]     Patient Name: Raquel Deluna  MRN: [id number]  Today's Date: 2/3/2017    Admit Date: 1/31/2017          Discharge Plan       02/03/17 1022    Case Management/Social Work Plan    Plan Home with family and [facility] to follow.    Patient/Family In Agreement With Plan yes    Additional Comments Spoke with patient at bedside.  She states she is still hoping to return home at [place] with [facility] to follow.  She states her son is a quadriplegic and his girlfriend takes care of him and also helps the patient some.  She states she is very weak - request for PT eval on sticky note.  [facility] will continue to follow.              Discharge Codes     None            Becky S. [place], RN

## 2017-02-03 NOTE — ANESTHESIA POSTPROCEDURE EVALUATION
Patient: Raquel Deluna    Procedure Summary     Date Anesthesia Start Anesthesia Stop Room / Location    02/03/17 1638 1706  [facility] /  [facility]       Procedure Diagnosis Surgeon Provider    COLONOSCOPY TO CECUM AND TERMINAL ILEUM WITH COLD AND HOT SNARE POLYPECTOMIES (N/A ) Anemia, unspecified type  (Anemia, unspecified type [D64.9]) MD Baron Scruggs MD          Anesthesia Type: No value filed.  Last vitals  /59 (02/03/17 1705)    Temp      Pulse 81 (02/03/17 1705)   Resp 14 (02/03/17 1705)    SpO2 95 % (02/03/17 1705)      Post Anesthesia Care and Evaluation    Patient location during evaluation: bedside  Patient participation: complete - patient participated  Level of consciousness: sleepy but conscious  Pain management: adequate  Airway patency: patent  Anesthetic complications: No anesthetic complications    Cardiovascular status: acceptable  Respiratory status: acceptable  Hydration status: acceptable

## 2017-02-03 NOTE — ANESTHESIA PREPROCEDURE EVALUATION
Anesthesia Evaluation     Patient summary reviewed and Nursing notes reviewed    History of anesthetic complications   Airway   Mallampati: II  TM distance: >3 FB  Neck ROM: full  no difficulty expected  Dental - normal exam     Pulmonary - normal exam   (+) sleep apnea on CPAP,   Cardiovascular - normal exam  (+) hypertension,     Neuro/Psych  (+) headaches, psychiatric history,    GI/Hepatic/Renal/Endo    (+) obesity,  hiatal hernia,     Musculoskeletal     Abdominal  - normal exam   Substance History      OB/GYN          Other   (+) arthritis                        Anesthesia Plan    ASA 3     MAC     intravenous induction   Anesthetic plan and risks discussed with patient.

## 2017-02-03 NOTE — PROGRESS NOTES
NEPHROLOGY PROGRESS NOTE    PATIENT IDENTIFICATION:   Name:  Raquel Deluna      MRN:  [id number]     81 y.o.  female             Reason for visit: KESHIA    SUBJECTIVE:   Seen and examined. SOA is better. Difficult night due to coloscopy prep . No new complaints.    OBJECTIVE:  Vitals:    02/02/17 2345 02/03/17 0540 02/03/17 0720 02/03/17 0900   BP: 157/44 148/77  159/53   BP Location: Left arm   Left arm   Patient Position: Lying   Sitting   Pulse:   98 96   Resp: 18  18 20   Temp: 98.2 °F (36.8 °C)      TempSrc: Oral      SpO2:   97% 93%   Weight:       Height:               Body mass index is 36.22 kg/(m^2).    Intake/Output Summary (Last 24 hours) at 02/03/17 1204  Last data filed at 02/02/17 1421   Gross per 24 hour   Intake    465 ml   Output      0 ml   Net    465 ml         Exam:  GEN:  No distress, appears stated age  EYES:   Anicteric sclera  ENT:    External ears/nose normal, MM are   NECK:  No adenopathy, JVP   LUNGS: Normal chest on inspection; not labored  CV:  Normal S1S2, without murmur  ABD:  Non-tender, non-distended, no hepatosplenomegaly, +BS  EXT:  ++ edema; no cyanosis; clubbing    Scheduled meds:      amLODIPine 10 mg Oral Q24H   budesonide-formoterol 2 puff Inhalation BID - RT   diltiaZEM SR 60 mg Oral Q12H   furosemide 20 mg Oral BID   hydrALAZINE 50 mg Oral Q8H   pantoprazole 40 mg Oral BID AC   tiotropium 1 capsule Inhalation Daily - RT     IV meds:                          lactated ringers 30 mL/hr Last Rate: Stopped (02/02/17 1421)       Data Review:      Results from last 7 days  Lab Units 02/03/17  0503 02/02/17  0727 02/01/17  0516  01/31/17  1236   SODIUM mmol/L 133* 129* 125*  < > 122*   POTASSIUM mmol/L 3.4* 3.9 3.8  < > 6.2*   CHLORIDE mmol/L 94* 91* 86*  < > 87*   TOTAL CO2 mmol/L 27.4 22.8 22.9  < > 18.9*   BUN mg/dL 21 27* 34*  < > 40*   CREATININE mg/dL 1.11* 1.10* 1.30*  < > 1.61*   CALCIUM mg/dL 8.1* 8.4* 8.6  < > 8.3*   BILIRUBIN mg/dL 0.6  --  1.5*  --  0.2   ALK PHOS U/L  48  --  53  --  52   ALT (SGPT) U/L 17  --  25  --  19   AST (SGOT) U/L 17  --  22  --  17   GLUCOSE mg/dL 103* 126* 107*  < > 132*   < > = values in this interval not displayed.    Estimated Creatinine Clearance: 46.1 mL/min (by C-G formula based on Cr of 1.11).            Results from last 7 days  Lab Units 02/03/17  0503 02/02/17  0727 02/01/17  0516 01/31/17  1236   WBC 10*3/mm3 10.32 13.29* 13.91* 13.95*   HEMOGLOBIN g/dL 9.4* 9.8* 10.0* 4.4*   PLATELETS 10*3/mm3 371 398 410 587*         Results from last 7 days  Lab Units 02/03/17  0503 02/02/17  0727 01/31/17  1236   INR  1.11* 1.18* 1.21*             ASSESSMENT:   Active Problems:    Hyponatremia    1. KESHIA likely due to severe decrease in renal perfusion and severe anemia: Improving  2. Hyperkalemia: Due to NSAID's, Sulfa and KESHIA. Improved  3. Hyponatremia: Likely due to intravascular volume depletion. Improving  4. Anemia: Likely due to GI bleed. S/P 4 units of PRBC on 01/31  5. HTN: On cardiazem, amlodipine and hydralazine  6. Edema: Likely due to Rt heart failure.   7. S/P Rt Hip replacement 10/2016  8. Hx of AMBER      Plan:    NA is improving  Continue  diuretics ( was SOA earlier)   BP is better. I think we can continue to monitor for now.  Replete potassium as needed  Awaiting  Colonoscopy/EGD   Please avoid nephrotoxic meds  Daily labs    Ivan Herring MD  2/3/2017    12:04 PM

## 2017-02-03 NOTE — PLAN OF CARE
Problem: Patient Care Overview (Adult)  Goal: Plan of Care Review  Outcome: Ongoing (interventions implemented as appropriate)    02/02/17 1383   Coping/Psychosocial Response Interventions   Plan Of Care Reviewed With patient   Patient Care Overview   Progress progress towards functional goals is fair         Problem: Fluid Volume Deficit (Adult)  Goal: Fluid/Electrolyte Balance  Outcome: Ongoing (interventions implemented as appropriate)  Goal: Comfort/Well Being  Outcome: Ongoing (interventions implemented as appropriate)    Problem: Fall Risk (Adult)  Goal: Absence of Falls  Outcome: Ongoing (interventions implemented as appropriate)    Problem: GI Endoscopy (Adult)  Goal: Signs and Symptoms of Listed Potential Problems Will be Absent or Manageable (GI Endoscopy)  Outcome: Ongoing (interventions implemented as appropriate)

## 2017-02-05 NOTE — DISCHARGE INSTRUCTIONS
Check and record your blood pressure regularly.  Called Dr. Martínez's office tomorrow to schedule a follow-up appointment.  Return to the emergency department for worsening headache, vomiting, dizziness, chest pain, shortness of breath, blood in your stool, fainting, rotations, or other concern.  Take Xarelto as prescribed.

## 2017-02-05 NOTE — ED PROVIDER NOTES
" EMERGENCY DEPARTMENT ENCOUNTER    CHIEF COMPLAINT  Chief Complaint: Hypertension  History given by: Patient  History limited by: N/A  Room Number: 16/16  PMD: Ricky Hyoos III, MD      HPI:  Pt is a 81 y.o. female who presents complaining of hypertension onset around 1600 today. Pt states that her home health nurse checked her blood pressure. Pt reports a frontal HA, congestion, and SOA. Pt denies N/V, numbness/tingling, CP, visual disturbances. Pt reports a hx of COPD. Pt states that she has not yet taken her blood pressure medication today.    Duration:  1 hour  Onset: Gradual  Timing: Constant  Location: N/A  Radiation: N/A  Quality: \"hypertension\"  Intensity/Severity: Moderate  Progression: Worsening  Associated Symptoms: HA, congestion, and SOA  Aggravating Factors: None  Alleviating Factors: None  Previous Episodes: Hx of hypertension   Treatment before arrival: None    PAST MEDICAL HISTORY  Active Ambulatory Problems     Diagnosis Date Noted   • OA (osteoarthritis) of knee 10/25/2016   • Hyponatremia 01/31/2017   • Anemia 02/03/2017   • Hyperkalemia 02/03/2017     Resolved Ambulatory Problems     Diagnosis Date Noted   • No Resolved Ambulatory Problems     Past Medical History   Diagnosis Date   • Anxiety    • Arthritis    • Cellulitis    • Chronic bronchitis    • Diarrhea    • Hiatal hernia    • High cholesterol    • History of goiter    • History of transfusion    • Hypertension    • Internal bleeding    • Iron deficiency    • Migraine    • Osteoporosis    • PONV (postoperative nausea and vomiting)    • Reflux esophagitis    • Sleep apnea    • Stress incontinence        PAST SURGICAL HISTORY  Past Surgical History   Procedure Laterality Date   • Thyroid surgery       for goiter   • Colonoscopy     • Tonsillectomy     • Appendectomy     • Hysterectomy     • Cervical disc surgery     • Pr total knee arthroplasty Right 10/25/2016     Procedure: RT TOTAL KNEE ARTHROPLASTY;  Surgeon: Ricky Dsouza MD;  " Location: [facility] MAIN OR;  Service: Orthopedics   • Endoscopy N/A 2/2/2017     Procedure: ESOPHAGOGASTRODUODENOSCOPY with biopsies;  Surgeon: Josef Mccoy MD;  Location: [facility] ENDOSCOPY;  Service:    • Colonoscopy N/A 2/3/2017     Procedure: COLONOSCOPY TO CECUM AND TERMINAL ILEUM WITH COLD AND HOT SNARE POLYPECTOMIES;  Surgeon: Josef Mccoy MD;  Location: [facility] ENDOSCOPY;  Service:        FAMILY HISTORY  No family history on file.    SOCIAL HISTORY  Social History     Social History   • Marital status:      Spouse name: N/A   • Number of children: N/A   • Years of education: N/A     Occupational History   • Not on file.     Social History Main Topics   • Smoking status: Former Smoker     Packs/day: 4.00     Years: 33.00     Types: Cigarettes     Quit date: 1980   • Smokeless tobacco: Never Used   • Alcohol use Yes      Comment: occcasionally   • Drug use: No   • Sexual activity: Not on file     Other Topics Concern   • Not on file     Social History Narrative       ALLERGIES  Sulfa antibiotics    REVIEW OF SYSTEMS  Review of Systems   Constitutional: Negative for chills and fever.   HENT: Positive for congestion. Negative for sore throat.    Respiratory: Positive for shortness of breath. Negative for cough.    Cardiovascular: Negative for chest pain.   Gastrointestinal: Negative for abdominal pain, diarrhea and vomiting.   Genitourinary: Negative for difficulty urinating and dysuria.   Musculoskeletal: Negative for neck pain.   Skin: Negative for pallor and rash.   Neurological: Positive for headaches. Negative for weakness and numbness.   All other systems reviewed and are negative.      PHYSICAL EXAM  ED Triage Vitals   Temp Heart Rate Resp BP SpO2   02/05/17 1631 02/05/17 1631 02/05/17 1631 02/05/17 1631 02/05/17 1631   98.4 °F (36.9 °C) 103 18 190/71 93 %      Temp src Heart Rate Source Patient Position BP Location FiO2 (%)   -- -- -- 02/05/17 1631 --      Right arm        Physical Exam    Constitutional: She is oriented to person, place, and time and well-developed, well-nourished, and in no distress. No distress.   HENT:   Head: Normocephalic and atraumatic.   Eyes: EOM are normal. Pupils are equal, round, and reactive to light.   Neck: Normal range of motion. Neck supple.   Cardiovascular: Normal rate, regular rhythm and normal heart sounds.    Pulmonary/Chest: Effort normal and breath sounds normal. No respiratory distress.   Abdominal: Soft. There is no tenderness. There is no rebound and no guarding.   Musculoskeletal: Normal range of motion. She exhibits edema (1+ bilateral ankles).   Neurological: She is alert and oriented to person, place, and time. She has normal sensation and normal strength.   Skin: Skin is warm and dry. No rash noted.   Psychiatric: Mood and affect normal.   Nursing note and vitals reviewed.      LAB RESULTS  Lab Results (last 24 hours)     Procedure Component Value Units Date/Time    CBC & Differential [[id number]] Collected:  02/05/17 1724    Specimen:  Blood Updated:  02/05/17 1747    Narrative:       The following orders were created for panel order CBC & Differential.  Procedure                               Abnormality         Status                     ---------                               -----------         ------                     CBC Auto Differential[[id number]]         Abnormal            Final result                 Please view results for these tests on the individual orders.    Comprehensive Metabolic Panel [[id number]]  (Abnormal) Collected:  02/05/17 1724    Specimen:  Blood Updated:  02/05/17 1805     Glucose 111 (H) mg/dL      BUN 12 mg/dL      Creatinine 1.01 (H) mg/dL      Sodium 134 (L) mmol/L      Potassium 3.8 mmol/L      Chloride 94 (L) mmol/L      CO2 24.8 mmol/L      Calcium 8.6 mg/dL      Total Protein 5.9 (L) g/dL      Albumin 3.20 (L) g/dL      ALT (SGPT) 25 U/L      AST (SGOT) 24 U/L      Alkaline Phosphatase 56 U/L      Total Bilirubin 0.3  mg/dL      eGFR Non  Amer 53 (L) mL/min/1.73      Globulin 2.7 gm/dL      A/G Ratio 1.2 g/dL      BUN/Creatinine Ratio 11.9      Anion Gap 15.2 mmol/L     Narrative:       The MDRD GFR formula is only valid for adults with stable renal function between ages 18 and 70.    CBC Auto Differential [[id number]]  (Abnormal) Collected:  02/05/17 1724    Specimen:  Blood Updated:  02/05/17 1747     WBC 6.12 10*3/mm3      RBC 3.77 (L) 10*6/mm3      Hemoglobin 10.4 (L) g/dL      Hematocrit 33.2 (L) %      MCV 88.1 fL      MCH 27.6 pg      MCHC 31.3 (L) g/dL      RDW 18.6 (H) %      RDW-SD 60.2 (H) fl      MPV 8.1 fL      Platelets 391 10*3/mm3      Neutrophil % 64.9 %      Lymphocyte % 19.3 (L) %      Monocyte % 9.8 %      Eosinophil % 4.7 %      Basophil % 0.8 %      Immature Grans % 0.5 %      Neutrophils, Absolute 3.97 10*3/mm3      Lymphocytes, Absolute 1.18 10*3/mm3      Monocytes, Absolute 0.60 10*3/mm3      Eosinophils, Absolute 0.29 10*3/mm3      Basophils, Absolute 0.05 10*3/mm3      Immature Grans, Absolute 0.03 10*3/mm3           I ordered the above labs and reviewed the results.    RADIOLOGY  CT Head Without Contrast   Final Result         CT Head - shows NAD. Interpreted by radiologist and discussed with me.    I ordered the above noted radiological studies. Interpreted by radiologist. Reviewed by me in PACS.     EKG         EKG time: 1831  Rhythm/Rate: AFIB at 105 bpm  P waves and AK: Irregular P waves, varying GUILLERMO  QRS, axis: Normal  ST and T waves: Nonspecific ST changes  Interpreted contemporaneously by me and independently viewed.  Changed compared to prior (1/31/17), NSR present at that time, but no new ischemic changes.      PROCEDURES  Procedures      PROGRESS AND CONSULTS  ED Course   Value Comment By Time   Hemoglobin: (!) 10.4 9.4 two days ago Dagoberto Hsu MD 02/05 1759   Creatinine: (!) 1.01 1.11 two days ago Dagoberto Hsu MD 02/05 1818     4:59 PM:  Vitals: BP: (!) 190/71 HR: 103 Temp:  98.4 °F (36.9 °C) O2 sat: 93%  D/w pt plan for Clonidine, head CT, and labs for further evaluation.    6:21 PM:  Vitals: BP: 141/46 HR: 90 Temp: 98.4 °F (36.9 °C) O2 sat: 93%  Rechecked pt. Pt is resting comfortably and her vitals are stable. Discussed with pt normal test results.    6:28 PM:  Pt's heart rate looks irregular on the monitor. Will order an EKG.    6:37 PM:  Vitals: BP: 141/46 HR: 103 Temp: 98.4 °F (36.9 °C) O2 sat: 93%  Rechecked pt. Pt is resting comfortably. Discussed with pt EKG results showing AFIB. Pt denies CP. Discussed with pt plan to consult cardiologist for further plan.    6:41 PM:  Order placed for call to cardiology.     Time 1849  Discussed case with Dr. Martínez   Reviewed history, exam, results and treatments. Discussed concerns and plan of care. Dr. Martínez requests that the pt begin taking Xarelto  and that the pt continue taking her metoprolol. He states that the pt can call the office tomorrow to make a follow up appointment.     6:52 PM:  Vitals: BP: 141/46 HR: 103 Temp: 98.4 °F (36.9 °C) O2 sat: 93%  Rechecked pt. Pt is resting comfortably. Discussed with pt my conversation with Dr. Martínez and the plan to begin taking Xarelto. Pt will be discharged. Pt understands and agrees with the plan, all questions answered.      MEDICAL DECISION MAKING  Results were reviewed/discussed with the patient and they were also made aware of online access. Pt also made aware that some labs, such as cultures, will not be resulted during ER visit and follow up with PMD is necessary.     MDM  Number of Diagnoses or Management Options  Acute non intractable tension-type headache:   Essential hypertension:   New onset atrial fibrillation:   Diagnosis management comments: Patient presented to the ER due to hypertension.  She did not take her blood pressure medicine this morning.  She did complain of a mild headache but denied any focal numbness or weakness.  Head CT was negative.  Labs were stable  from recent admission.  Patient was given clonidine.  Her blood pressure improved.  EKG was obtained which showed atrial fibrillation.  This was new.  Patient's heart rate is ranging from .  Case was discussed with Dr. Martínez who recommended starting the patient on Xarelto.  He will see her in the office in follow-up.  The patient is already on metoprolol.  Patient was given a dose of Xarelto in the ER and given a prescription as well.       Amount and/or Complexity of Data Reviewed  Clinical lab tests: ordered and reviewed  Tests in the radiology section of CPT®: ordered and reviewed  Decide to obtain previous medical records or to obtain history from someone other than the patient: yes  Review and summarize past medical records: yes (Admitted 1/31/17-2/3/17 for acute on chronic anemia, acute kidney injury, and hyperkalemia   Pt was transfused 4 units and given IV iron  Pt had an EGD and colonoscopy performed that showed diverticulosis and multiple colonic angiectasias but no obvious bleeding)           DIAGNOSIS  Final diagnoses:   Essential hypertension   Acute non intractable tension-type headache   New onset atrial fibrillation       DISPOSITION  1857- DISCHARGE    Patient discharged in stable condition.    Reviewed implications of results, diagnosis, meds, responsibility to follow up, warning signs and symptoms of possible worsening, potential complications and reasons to return to ER including, bloody stools, hematuria, or dizziness.    Patient/Family voiced understanding of above instructions.    Discussed plan for discharge, as there is no emergent indication for admission.  Pt/family is agreeable and understands need for follow up and repeat testing.  Pt is aware that discharge does not mean that nothing is wrong but it indicates no emergency is present that requires admission and they must continue care with follow-up as given below or physician of their choice.     FOLLOW-UP  Benji Martínez,  MD  [address] [address]  [phone]    Call in 1 day  new onset atrial fibrillation         Medication List      New Prescriptions          rivaroxaban 20 MG tablet   Commonly known as:  XARELTO   Take 1 tablet by mouth Daily.             Latest Documented Vital Signs:  As of 10:37 PM  BP- 171/72 HR- 96 Temp- 97.9 °F (36.6 °C) (Tympanic) O2 sat- 96%    --  Documentation assistance provided by doug Jacob for Dagoberto Hsu MD.  Information recorded by the scribe was done at my direction and has been verified and validated by me.          Alexandro Jacob  02/05/17 [id number]       Dagoberto Hsu MD  02/05/17 [id number]

## 2017-02-09 PROBLEM — D64.9 ANEMIA: Status: RESOLVED | Noted: 2017-01-01 | Resolved: 2017-01-01

## 2017-02-09 PROBLEM — K92.2 GASTROINTESTINAL HEMORRHAGE: Status: ACTIVE | Noted: 2017-01-01

## 2017-02-09 PROBLEM — K21.9 GASTROESOPHAGEAL REFLUX DISEASE: Status: ACTIVE | Noted: 2017-01-01

## 2017-02-09 PROBLEM — I48.0 PAROXYSMAL ATRIAL FIBRILLATION (HCC): Status: ACTIVE | Noted: 2017-01-01

## 2017-02-09 PROBLEM — I10 HYPERTENSION: Status: RESOLVED | Noted: 2017-01-01 | Resolved: 2017-01-01

## 2017-02-09 PROBLEM — E78.5 HYPERLIPIDEMIA: Status: ACTIVE | Noted: 2017-01-01

## 2017-02-09 PROBLEM — I10 HYPERTENSION: Status: ACTIVE | Noted: 2017-01-01

## 2017-02-09 NOTE — PROGRESS NOTES
Date of Office Visit: 2017  Encounter Provider: Ladonna Mclaughlin MD  Place of Service: [facility] CARDIOLOGY  Patient Name: Raquel Deluna  :1935      Patient ID:  Raquel Deluna is a 81 y.o. female is here for  followup for         History of Present Illness    Raquel Deluna is a 81 y.o. female who presents today for evaluation of hypertension after her recent emergency department visit on 17. The patient presented to the ED after home health nurse checked her blood pressure and was told that it was elevated. She was recommended to present to the ED for further evaluation. The patient reported a frontal headache, congestion, and shortness of breath. She told the ED physician that she had not had her blood pressure medication that day. Upon review of the ED records, her blood pressure was elevated at 190/71 and heart rate 103. She was afebrile. Comprehensive metabolic panel showed many abnormalities and her CBC revealed that she was anemic. She had a CT scan of the head which showed no acute process. EKG showed atrial fibrillation with a heart rate of 105 bpm. The plan of care was discussed with Dr. Kamaljit Martínez. The patient was recommended to start Xarelto 20 mg 1 tablet daily for stroke prevention. She was then recommended to follow-up in our office for further evaluation.     Upon review of the patient's records, the patient was recently admitted to [facility] on 2017 and discharged on 2/3/2017. She was diagnosed with acute on chronic anemia with a baseline hemoglobin of 8.5-9.0. She was admitted to the hospital with a hemoglobin of 4.4 and was given blood transfusion 4 units. She has a history of GI bleed in the past, but there was no obvious GI bleed during her hospitalization. She was taking NSAIDs and these were discontinued. She also has iron deficiency anemia and received iron infusions. She recently was noted to have dark stools which were  concerning for melena. She underwent colonoscopy which showed diverticular disease, sigmoid and descending colon along with multiple colonic angiectasias. Gastroenterology, Dr. Claudine Bentley recommended the patient avoid NSAIDs and aspirin if possible. She was felt to have acute kidney injury secondary to hypovolemia and poor renal perfusion. She was also noted to be hyperkalemic and hyponatremic. She is to followup with Dr. Mccoy in 2 weeks.     She has a history of sleep apnea and uses a BiPAP and COPD.  She is to f/u with Dr. Mata.       I reviewed the patient's records from [facility]. She had a 2-D echocardiogram at [facility] on 1/14/16 with the following results: EF 55-60%, borderline concentric left ventricular hypertrophy, mitral annular calcification, moderate mitral regurgitation, mild tricuspid regurgitation, and mild to moderate pulmonary hypertension. She had a nuclear stress test completed 8/28/2014 which was normal showing no evidence of ischemia or infarction.     I was also able to review gastroenterology notes from April 2016 from Dr. Bryan Allison. He noted the patient has a history of intestinal malabsorption, iron deficiency anemia due to chronic blood loss, Hemoccult-positive stools, Bella's esophagus, erosive esophagitis, colon polyps, and hemoglobin in the past at 7 g. He noted that she had been on oral iron and had blackish stools.     Ms. Deluna is here today for followup.  It does not appear that Dr. Martínez knew that she had had recent gastrointestinal bleeding.  She relates that today she had black tarry diarrhea and did not yesterday.  She also says that she feels a bit more fatigued over the last couple of days.  She is also mildly more short-winded.  She denies any chest pain or pressure.  She has had no tachycardia, dizziness, or syncope.  She had never prior had atrial fibrillation.  She does have some risks however for it, including hypertension,  obesity, obstructive sleep apnea and underlying COPD and asthma.  She is supposed to be following with Dr. Mata as well.  She has been taking her medications as directed.  Her history includes chronic slow gastrointestinal bleeding for years.  She has had some mild lower extremity edema.  She has had no fevers.  She denies nausea and/or vomiting.  She does say that when she takes a deep breath, the upper part of her back hurts.          Past Medical History   Diagnosis Date   • Anemia      iron def   • Anxiety    • Arthritis    • Atrial fibrillation    • Bella's esophagus 3/14/2016   • Cellulitis      left leg hx about a month ago   • Chronic bronchitis    • Colon polyp 3/14/2016   • Diarrhea    • OBANDO (dyspnea on exertion)    • Essential hypertension 8/24/2013     Overview:  2015 IMO UPDATE   • Gastroesophageal reflux disease 2/9/2017   • Hiatal hernia    • High cholesterol    • History of goiter    • History of transfusion      had reaction to 2nd unit after receiving the 1st   • Hyperkalemia    • Hyperlipidemia    • Hypertension    • Hyponatremia    • Internal bleeding      losing blood can't find out where so has to get iron infusions   • Intestinal malabsorption 3/31/2016   • Iron deficiency      receives iron infusion   • Iron deficiency anemia 3/14/2016   • Migraine    • OA (osteoarthritis)      knees   • OA (osteoarthritis) of knee 10/25/2016   • Osteoporosis    • PONV (postoperative nausea and vomiting)    • Reflux esophagitis    • Sleep apnea      cpap   • SOB (shortness of breath)    • Stress incontinence      wears pads         Past Surgical History   Procedure Laterality Date   • Thyroid surgery       for goiter   • Colonoscopy     • Tonsillectomy     • Appendectomy     • Hysterectomy     • Cervical disc surgery     • Pr total knee arthroplasty Right 10/25/2016     Procedure: RT TOTAL KNEE ARTHROPLASTY;  Surgeon: Ricky Dsouza MD;  Location: [facility] OR;  Service: Orthopedics   • Endoscopy  N/A 2/2/2017     Procedure: ESOPHAGOGASTRODUODENOSCOPY with biopsies;  Surgeon: Josef Mccoy MD;  Location: [facility] ENDOSCOPY;  Service:    • Colonoscopy N/A 2/3/2017     Procedure: COLONOSCOPY TO CECUM AND TERMINAL ILEUM WITH COLD AND HOT SNARE POLYPECTOMIES;  Surgeon: Josef Mccoy MD;  Location: [facility] ENDOSCOPY;  Service:        Current Outpatient Prescriptions on File Prior to Visit   Medication Sig Dispense Refill   • aclidinium bromide (TUDORZA PRESSAIR) 400 MCG/ACT aerosol powder  powder for inhalation Inhale 1 puff 2 (Two) Times a Day.     • amLODIPine (NORVASC) 10 MG tablet Take 1 tablet by mouth Daily. 30 tablet 3   • atorvastatin (LIPITOR) 20 MG tablet Take 20 mg by mouth Daily.     • B Complex Vitamins (VITAMIN B COMPLEX PO) Take 1 tablet by mouth Daily.     • budesonide-formoterol (SYMBICORT) 160-4.5 MCG/ACT inhaler Inhale 2 puffs 2 (Two) Times a Day.     • CALCIUM CARBONATE-VITAMIN D PO Take 1 tablet by mouth Daily.     • citalopram (CeleXA) 40 MG tablet Take 40 mg by mouth Every Night.     • esomeprazole (NexIUM) 40 MG capsule Take 40 mg by mouth Daily Before Supper.     • furosemide (LASIX) 20 MG tablet Take 1 tablet by mouth Daily. 30 tablet 3   • mometasone (NASONEX) 50 MCG/ACT nasal spray 2 sprays into each nostril Daily.     • Multiple Vitamin (MULTIVITAMIN) capsule Take 1 capsule by mouth Daily.     • potassium chloride (KLOR-CON) 8 MEQ CR tablet Take 1 tablet by mouth Daily. 30 tablet 3   • [DISCONTINUED] metoprolol tartrate (LOPRESSOR) 50 MG tablet Take 50 mg by mouth 2 (Two) Times a Day.     • [DISCONTINUED] rivaroxaban (XARELTO) 20 MG tablet Take 1 tablet by mouth Daily. 30 tablet 0   • [DISCONTINUED] NON FORMULARY Take 1 tablet by mouth Daily. instaflex       No current facility-administered medications on file prior to visit.        Social History     Social History   • Marital status:      Spouse name: N/A   • Number of children: N/A   • Years of education: N/A  "    Occupational History   • Not on file.     Social History Main Topics   • Smoking status: Former Smoker     Packs/day: 4.00     Years: 33.00     Types: Cigarettes     Quit date: 1980   • Smokeless tobacco: Never Used   • Alcohol use Yes      Comment: once a month a glass of wine or mixed drink   • Drug use: No   • Sexual activity: Defer     Other Topics Concern   • Not on file     Social History Narrative           Review of Systems   Constitution: Negative.   HENT: Negative for congestion and headaches.    Eyes: Negative for vision loss in left eye and vision loss in right eye.   Respiratory: Negative.  Negative for cough, hemoptysis, shortness of breath, sleep disturbances due to breathing, snoring, sputum production and wheezing.    Endocrine: Negative.    Hematologic/Lymphatic: Negative.    Skin: Negative for poor wound healing and rash.   Musculoskeletal: Negative for falls, gout, muscle cramps and myalgias.   Gastrointestinal: Negative for abdominal pain, diarrhea, dysphagia, hematemesis, melena, nausea and vomiting.   Neurological: Negative for excessive daytime sleepiness, dizziness, light-headedness, loss of balance, seizures and vertigo.   Psychiatric/Behavioral: Negative for depression and substance abuse. The patient is not nervous/anxious.        Procedures    ECG 12 Lead  Date/Time: 2/9/2017 3:12 PM  Performed by: ELMER MAX  Authorized by: ELMER MAX   Comparison: compared with previous ECG   Similar to previous ECG  Rhythm: sinus rhythm  Clinical impression: normal ECG               Objective:      Vitals:    02/09/17 1326 02/09/17 1329 02/09/17 1330   BP: 160/54 160/54 164/52   BP Location: Left arm Left arm Right arm   Pulse: 68     Weight: 204 lb (92.5 kg)     Height: 64\" (162.6 cm)       Body mass index is 35.02 kg/(m^2).    Physical Exam   Constitutional: She is oriented to person, place, and time. She appears well-developed and well-nourished. No distress.   HENT:   Head: " Normocephalic and atraumatic.   Eyes: Conjunctivae are normal. No scleral icterus.   Neck: Neck supple. No JVD present. Carotid bruit is not present. No thyromegaly present.   Cardiovascular: Normal rate, regular rhythm, S1 normal, S2 normal, normal heart sounds and intact distal pulses.   No extrasystoles are present. PMI is not displaced.  Exam reveals no gallop.    No murmur heard.  Pulses:       Carotid pulses are 2+ on the right side, and 2+ on the left side.       Radial pulses are 2+ on the right side, and 2+ on the left side.        Dorsalis pedis pulses are 2+ on the right side, and 2+ on the left side.        Posterior tibial pulses are 2+ on the right side, and 2+ on the left side.   1+ LE edema   Pulmonary/Chest: Effort normal. No respiratory distress. She has wheezes in the right upper field and the left upper field. She has no rhonchi. She has no rales. She exhibits no tenderness.   Abdominal: Soft. Bowel sounds are normal. She exhibits no distension, no abdominal bruit and no mass. There is no tenderness.   Musculoskeletal: She exhibits no edema or deformity.        Right shoulder: She exhibits swelling.   Lymphadenopathy:     She has no cervical adenopathy.   Neurological: She is alert and oriented to person, place, and time. No cranial nerve deficit.   Skin: Skin is warm and dry. No rash noted. She is not diaphoretic. No cyanosis. No pallor. Nails show no clubbing.   Psychiatric: She has a normal mood and affect. Judgment normal.   Vitals reviewed.      Lab Review:       Assessment:      Diagnosis Plan   1. Paroxysmal atrial fibrillation  CBC (No Diff)    Basic Metabolic Panel   2. Iron deficiency anemia, unspecified iron deficiency anemia type  CBC (No Diff)   3. Gastrointestinal hemorrhage, unspecified gastrointestinal hemorrhage type  CBC (No Diff)   4. Essential hypertension  Basic Metabolic Panel         1. Recurrent gastrointestinal bleeding with significant anemia requiring transfusion of 4  units at this last hospitalization 01/2017.  She is on chronic iron therapy.  She has a longstanding history of slow gastrointestinal bleeding from angiodysplasia of the colon, as well as diverticular disease.  She is not a candidate for anticoagulation.  2. Atrial fibrillation.  One episode, but she could have paroxysmal atrial fibrillation.  She certainly has a lot of risks for this.  However, her bleeding is such that I would not recommend that she be anticoagulated.  3. Hypertension.  Increase Lopressor to 100 mg twice a day.  4. Chronic obstructive pulmonary disease and recurrent bronchospastic airway disease.  Will follow up with Dr. Mata, use inhalers for this.  5. Obstructive sleep apnea on BiPAP.  6. Moderate mitral deficiency.      Plan:       See back 4 weeks with Layla Cedillo. Stop xarelto and increase lopressor to 100mg BID.     Atrial Fibrillation and Atrial Flutter  Assessment  • The patient has paroxysmal atrial fibrillation  • This is non-valvular in etiology  • The patient's CHADS2-VASc score is 2  • A [id number] score of 2 or more is considered a high risk for a thromboembolic event    Plan  • Attempt to maintain sinus rhythm

## 2017-02-09 NOTE — PROGRESS NOTES
Date of Office Visit: 2017  Encounter Provider: DELTA Ortiz  Place of Service: [facility] CARDIOLOGY  Patient Name: Raquel Deluna  :1935    Chief Complaint   Patient presents with   • Atrial Fibrillation   :     HPI: Raquel Deluna is a 81 y.o. female who presents today for evaluation of hypertension after her recent emergency department visit on 17.  The patient presented to the ED after home health nurse checked her blood pressure and was told that it was elevated.  She was recommended to present to the ED for further evaluation. The patient reported a frontal headache, congestion, and shortness of breath.  She told the ED physician that she had not had her blood pressure medication that day.  Upon review of the ED records, her blood pressure was elevated at 190/71 and heart rate 103.  She was afebrile.  Comprehensive metabolic panel showed many abnormalities and her CBC revealed that she was anemic.  She had a CT scan of the head which showed no acute process.  EKG showed atrial fibrillation with a heart rate of 105 bpm.  The plan of care was discussed with Dr. Kamaljit Martínez.  The patient was recommended to start Xarelto 20 mg 1 tablet daily for stroke prevention.  She was then recommended to follow-up in our office for further evaluation.    Upon review of the patient's records, the patient was recently admitted to [facility] on 2017 and discharged on 2/3/2017.  She was diagnosed with acute on chronic anemia with a baseline hemoglobin of 8.5-9.0.  She was admitted to the hospital with a hemoglobin of 4.4 and was given blood transfusion 4 units.  She has a history of GI bleed in the past, but there was no obvious GI bleed during her hospitalization.  She was taking NSAIDs and these were discontinued.  She also has iron deficiency anemia and received iron infusions.  She recently was noted to have dark stools which were concerning for melena.  She  underwent colonoscopy which showed diverticular disease, sigmoid and descending colon along with multiple colonic angiectasias.  Gastroenterology, Dr. Claudine Bentley recommended the patient avoid NSAIDs and aspirin if possible. She was felt to have acute kidney injury secondary to hypovolemia and poor renal perfusion.  She was also noted to be hyperkalemic and hyponatremic.  She has a history of sleep apnea and COPD.     I reviewed the patient's records from [facility].  She had a 2-D echocardiogram at [facility] on 1/14/16 with the following results: EF 55-60%, borderline concentric left ventricular hypertrophy, mitral annular calcification, moderate mitral regurgitation, mild tricuspid regurgitation, and mild to moderate pulmonary hypertension.  She had a nuclear stress test completed 8/28/2014 which was normal showing no evidence of ischemia or infarction.    I was also able to review gastroenterology notes from April 2016 from Dr. Bryan Allison.  He noted the patient has a history of intestinal malabsorption, iron deficiency anemia due to chronic blood loss, Hemoccult-positive stools, Bella's esophagus, erosive esophagitis, colon polyps, and hemoglobin in the past at 7 g.  He noted that she had been on oral iron and had blackish stools.      Past Medical History   Diagnosis Date   • Anemia      iron def   • Anxiety    • Arthritis    • Atrial fibrillation    • Bella's esophagus 3/14/2016   • Cellulitis      left leg hx about a month ago   • Chronic bronchitis    • Colon polyp 3/14/2016   • Diarrhea    • OBANDO (dyspnea on exertion)    • Essential hypertension 8/24/2013     Overview:  2015 IMO UPDATE   • Gastroesophageal reflux disease 2/9/2017   • Hiatal hernia    • High cholesterol    • History of goiter    • History of transfusion      had reaction to 2nd unit after receiving the 1st   • Hyperkalemia    • Hyperlipidemia    • Hypertension    • Hyponatremia    • Internal bleeding       losing blood can't find out where so has to get iron infusions   • Intestinal malabsorption 3/31/2016   • Iron deficiency      receives iron infusion   • Iron deficiency anemia 3/14/2016   • Migraine    • OA (osteoarthritis)      knees   • OA (osteoarthritis) of knee 10/25/2016   • Osteoporosis    • PONV (postoperative nausea and vomiting)    • Reflux esophagitis    • Sleep apnea      cpap   • SOB (shortness of breath)    • Stress incontinence      wears pads       Past Surgical History   Procedure Laterality Date   • Thyroid surgery       for goiter   • Colonoscopy     • Tonsillectomy     • Appendectomy     • Hysterectomy     • Cervical disc surgery     • Pr total knee arthroplasty Right 10/25/2016     Procedure: RT TOTAL KNEE ARTHROPLASTY;  Surgeon: Ricky Dsouza MD;  Location: [facility] MAIN OR;  Service: Orthopedics   • Endoscopy N/A 2/2/2017     Procedure: ESOPHAGOGASTRODUODENOSCOPY with biopsies;  Surgeon: Josef Mccoy MD;  Location: [facility] ENDOSCOPY;  Service:    • Colonoscopy N/A 2/3/2017     Procedure: COLONOSCOPY TO CECUM AND TERMINAL ILEUM WITH COLD AND HOT SNARE POLYPECTOMIES;  Surgeon: Josef Mccoy MD;  Location: [facility] ENDOSCOPY;  Service:        Social History     Social History   • Marital status:      Spouse name: N/A   • Number of children: N/A   • Years of education: N/A     Occupational History   • Not on file.     Social History Main Topics   • Smoking status: Former Smoker     Packs/day: 4.00     Years: 33.00     Types: Cigarettes     Quit date: 1980   • Smokeless tobacco: Never Used   • Alcohol use Yes      Comment: once a month a glass of wine or mixed drink   • Drug use: No   • Sexual activity: Defer     Other Topics Concern   • Not on file     Social History Narrative       Family History   Problem Relation Age of Onset   • Adopted: Yes       ROS    Allergies   Allergen Reactions   • Sulfa Antibiotics Other (See Comments)     Yeast infections         Current Outpatient  "Prescriptions:   •  aclidinium bromide (TUDORZA PRESSAIR) 400 MCG/ACT aerosol powder  powder for inhalation, Inhale 1 puff 2 (Two) Times a Day., Disp: , Rfl:   •  amLODIPine (NORVASC) 10 MG tablet, Take 1 tablet by mouth Daily., Disp: 30 tablet, Rfl: 3  •  atorvastatin (LIPITOR) 20 MG tablet, Take 20 mg by mouth Daily., Disp: , Rfl:   •  B Complex Vitamins (VITAMIN B COMPLEX PO), Take 1 tablet by mouth Daily., Disp: , Rfl:   •  budesonide-formoterol (SYMBICORT) 160-4.5 MCG/ACT inhaler, Inhale 2 puffs 2 (Two) Times a Day., Disp: , Rfl:   •  CALCIUM CARBONATE-VITAMIN D PO, Take 1 tablet by mouth Daily., Disp: , Rfl:   •  citalopram (CeleXA) 40 MG tablet, Take 40 mg by mouth Every Night., Disp: , Rfl:   •  esomeprazole (NexIUM) 40 MG capsule, Take 40 mg by mouth Daily Before Supper., Disp: , Rfl:   •  FERREX 150 150 MG capsule, TAKE 1 CAPSULE EVERY DAY, Disp: , Rfl: 0  •  furosemide (LASIX) 20 MG tablet, Take 1 tablet by mouth Daily., Disp: 30 tablet, Rfl: 3  •  meloxicam (MOBIC) 15 MG tablet, Take 15 mg by mouth Daily., Disp: , Rfl:   •  metoprolol tartrate (LOPRESSOR) 50 MG tablet, Take 50 mg by mouth 2 (Two) Times a Day., Disp: , Rfl:   •  mometasone (NASONEX) 50 MCG/ACT nasal spray, 2 sprays into each nostril Daily., Disp: , Rfl:   •  Multiple Vitamin (MULTIVITAMIN) capsule, Take 1 capsule by mouth Daily., Disp: , Rfl:   •  potassium chloride (KLOR-CON) 8 MEQ CR tablet, Take 1 tablet by mouth Daily., Disp: 30 tablet, Rfl: 3  •  rivaroxaban (XARELTO) 20 MG tablet, Take 1 tablet by mouth Daily., Disp: 30 tablet, Rfl: 0  •  triamterene-hydrochlorothiazide (MAXZIDE-25) 37.5-25 MG per tablet, Take 1 tablet by mouth Daily., Disp: , Rfl:      Objective:     Vitals:    02/09/17 1326 02/09/17 1329 02/09/17 1330   BP: 160/54 160/54 164/52   BP Location: Left arm Left arm Right arm   Pulse: 68     Weight: 204 lb (92.5 kg)     Height: 64\" (162.6 cm)       Body mass index is 35.02 kg/(m^2).      Procedures      Assessment:    "    Diagnosis Plan   1. Paroxysmal atrial fibrillation     2. Iron deficiency anemia, unspecified iron deficiency anemia type     3. Gastrointestinal hemorrhage, unspecified gastrointestinal hemorrhage type     4. Essential hypertension            Plan:       After further review of the patient's records, I discussed her plan of care Dr. Ladonna Mclaughlin.  We feel the patient should be evaluated by a cardiologist and the appointment will be changed to her.  I will not be billing for this appointment.  I simply reviewed her past medical history and records.  Dr. Mclaughlin and will be seen the patient.    As always, it has been a pleasure to participate in your patient's care.      Sincerely,         DELTA Willis

## 2017-02-15 NOTE — TELEPHONE ENCOUNTER
----- Message from Josef Mccoy MD sent at 2/6/2017  4:22 PM EST -----  Pathology is negative, colonoscopy 5 years

## 2017-02-15 NOTE — TELEPHONE ENCOUNTER
Patient called, advised as per Dr. Mccoy's note her pathology was negative and will need to repeat her colonoscopy in 5 years. She verb understanding. Telephone message sent to Arlene RICHARDS to add patient to colon recall list for 5 years. 2/4/22.

## 2017-03-16 NOTE — PROGRESS NOTES
Date of Office Visit: 2017  Encounter Provider: DELTA Ortiz  Place of Service: [facility] CARDIOLOGY  Patient Name: Raquel Deluna  :1935    Chief Complaint   Patient presents with   • Atrial Fibrillation   :     HPI: Raquel Deluna is a 82 y.o. female who presents today for 4 week follow-up of essential hypertension and paroxysmal atrial fibrillation. The patient was initially seen in the office on 2017 for evaluation of paroxysmal atrial fibrillation.  The patient had been admitted to [facility] on 17 with acute on chronic anemia and a hemoglobin of 4.4.  There was no observable source of GI bleeding noted during that hospitalization.  She had had a history of GI bleeds in the past.  She was taking NSAIDs and the medications were discontinued.  She was also felt to have acute kidney injury secondary to hypovolemia and poor renal perfusion.  She was hypo-not treatment and hyperkalemic.  She was discharged from the hospital on 2/3/17.    The patient presented to the emergency department on 17 for elevated blood pressure.  The patient was experiencing frontal headache, congestion, and shortness of breath.  EKG tracing completed showed atrial fibrillation with a heart rate of 105 bpm.  The patient was recommended to start Xarelto 20 mg daily for stroke prevention and then to follow-up in our office.    Dr. Ladonna Mclaughlin saw her in the office on 17.  She noted that it did not appear that Dr. Tanya go that she had recent low hemoglobin and a history of gastrointestinal bleeding.  The patient did report that she was short of breath and more fatigued.  When she was in the office that day she was in normal sinus rhythm.  Her blood pressure was elevated 160s over 50s.  The patient was recommended to stop her Xarelto and increase Lopressor to 100 mg twice daily for better blood pressure control.  The patient was recommended to follow-up with  Dr. Mata for sleep apnea and COPD.    The patient presents a for follow-up.  Her blood pressure remains elevated.  She checks her blood pressure at home which averages 140 over 70s.  Her weight is down 5 pounds since last visit.  She is tolerating the Lopressor 100 mg twice daily just fine without any adverse effects.  The patient overall feels better.  She says her shortness of breath has improved.  She believes that physical therapy is also helping her to become stronger.  She does report a cough and lower extremity edema.  She denies chest pain, paroxysmal nocturnal dyspnea, orthopnea, edema, palpitations, dizziness, or syncope.  She had a repeat CBC in February which showed a stable hemoglobin and hematocrit.  To note the patient is on 2 diuretics therapies.    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   • Cellulitis      left leg hx about a month ago   • Chronic bronchitis    • Colon polyp 3/14/2016   • OBANDO (dyspnea on exertion)    • Essential hypertension 8/24/2013     Overview:  2015 IMO UPDATE   • Gastroesophageal reflux disease 2/9/2017   • Hiatal hernia    • History of goiter    • History of transfusion      had reaction to 2nd unit after receiving the 1st   • Hyperkalemia    • Hyperlipidemia    • Hypertension    • Hyponatremia    • Internal bleeding      losing blood can't find out where so has to get iron infusions   • Intestinal malabsorption 3/31/2016   • Iron deficiency anemia 03/14/2016     receives iron infusion in past   • Migraine    • OA (osteoarthritis) of knee 10/25/2016   • Osteoporosis    • PONV (postoperative nausea and vomiting)    • Reflux esophagitis    • Sleep apnea      cpap   • SOB (shortness of breath)    • Stress incontinence      wears pads       Past Surgical History   Procedure Laterality Date   • Thyroid surgery       for goiter   • Colonoscopy     •  Tonsillectomy     • Appendectomy     • Hysterectomy     • Cervical disc surgery     • Pr total knee arthroplasty Right 10/25/2016     Procedure: RT TOTAL KNEE ARTHROPLASTY;  Surgeon: Ricky Dsouza MD;  Location: [facility] MAIN OR;  Service: Orthopedics   • Endoscopy N/A 2/2/2017     Procedure: ESOPHAGOGASTRODUODENOSCOPY with biopsies;  Surgeon: Josef Mccoy MD;  Location[facility] ENDOSCOPY;  Service:    • Colonoscopy N/A 2/3/2017     Procedure: COLONOSCOPY TO CECUM AND TERMINAL ILEUM WITH COLD AND HOT SNARE POLYPECTOMIES;  Surgeon: Josef Mccoy MD;  Location: [facility] ENDOSCOPY;  Service:        Social History     Social History   • Marital status:      Spouse name: N/A   • Number of children: N/A   • Years of education: N/A     Occupational History   • Not on file.     Social History Main Topics   • Smoking status: Former Smoker     Packs/day: 4.00     Years: 33.00     Types: Cigarettes     Quit date: 1980   • Smokeless tobacco: Never Used   • Alcohol use Yes      Comment: once a month a glass of wine or mixed drink   • Drug use: No   • Sexual activity: Defer     Other Topics Concern   • Not on file     Social History Narrative       Family History   Problem Relation Age of Onset   • Adopted: Yes       Review of Systems   Constitution: Negative for chills, diaphoresis, fever, malaise/fatigue, night sweats, weight gain and weight loss.   HENT: Negative for hearing loss, nosebleeds, sore throat and tinnitus.    Eyes: Negative for blurred vision, double vision, pain and visual disturbance.   Cardiovascular: Positive for dyspnea on exertion and leg swelling. Negative for chest pain, claudication, cyanosis, irregular heartbeat, near-syncope, orthopnea, palpitations, paroxysmal nocturnal dyspnea and syncope.   Respiratory: Positive for cough and shortness of breath. Negative for hemoptysis, snoring and wheezing.    Endocrine: Negative for cold intolerance, heat intolerance and polyuria.   Hematologic/Lymphatic:  Negative for bleeding problem. Does not bruise/bleed easily.   Skin: Positive for itching. Negative for color change, dry skin and flushing.   Musculoskeletal: Positive for joint pain. Negative for falls, joint swelling, muscle cramps, muscle weakness and myalgias.   Gastrointestinal: Negative for abdominal pain, constipation, heartburn, melena, nausea and vomiting.   Genitourinary: Positive for frequency. Negative for dysuria and hematuria.   Neurological: Negative for excessive daytime sleepiness, dizziness, light-headedness, loss of balance, numbness, paresthesias, seizures and vertigo.   Psychiatric/Behavioral: Negative for altered mental status, depression, memory loss and substance abuse. The patient does not have insomnia and is not nervous/anxious.    Allergic/Immunologic: Negative for environmental allergies.       Allergies   Allergen Reactions   • Sulfa Antibiotics Other (See Comments)     Yeast infections         Current Outpatient Prescriptions:   •  aclidinium bromide (TUDORZA PRESSAIR) 400 MCG/ACT aerosol powder  powder for inhalation, Inhale 1 puff 2 (Two) Times a Day., Disp: , Rfl:   •  amLODIPine (NORVASC) 10 MG tablet, Take 1 tablet by mouth Daily., Disp: 30 tablet, Rfl: 3  •  atorvastatin (LIPITOR) 20 MG tablet, Take 20 mg by mouth Daily., Disp: , Rfl:   •  B Complex Vitamins (VITAMIN B COMPLEX PO), Take 1 tablet by mouth Daily., Disp: , Rfl:   •  budesonide-formoterol (SYMBICORT) 160-4.5 MCG/ACT inhaler, Inhale 2 puffs 2 (Two) Times a Day., Disp: , Rfl:   •  CALCIUM CARBONATE-VITAMIN D PO, Take 1 tablet by mouth Daily., Disp: , Rfl:   •  citalopram (CeleXA) 40 MG tablet, Take 40 mg by mouth Every Night., Disp: , Rfl:   •  esomeprazole (NexIUM) 40 MG capsule, Take 40 mg by mouth Daily Before Supper., Disp: , Rfl:   •  FERREX 150 150 MG capsule, TAKE 1 CAPSULE EVERY DAY, Disp: , Rfl: 0  •  furosemide (LASIX) 20 MG tablet, Take 1 tablet by mouth Daily., Disp: 30 tablet, Rfl: 3  •  meloxicam (MOBIC)  "15 MG tablet, Take 15 mg by mouth Daily., Disp: , Rfl:   •  metoprolol tartrate (LOPRESSOR) 100 MG tablet, Take 1 tablet by mouth 2 (Two) Times a Day., Disp: 60 tablet, Rfl: 11  •  mometasone (NASONEX) 50 MCG/ACT nasal spray, 2 sprays into each nostril Daily., Disp: , Rfl:   •  Multiple Vitamin (MULTIVITAMIN) capsule, Take 1 capsule by mouth Daily., Disp: , Rfl:   •  potassium chloride (KLOR-CON) 8 MEQ CR tablet, Take 1 tablet by mouth Daily., Disp: 30 tablet, Rfl: 3  •  triamterene-hydrochlorothiazide (MAXZIDE-25) 37.5-25 MG per tablet, Take 1 tablet by mouth Daily., Disp: , Rfl:      Objective:     Vitals:    03/16/17 1414 03/16/17 1423 03/16/17 1452   BP: 178/74 178/74 180/78   BP Location: Left arm Right arm Left arm   Patient Position:   Sitting   Pulse: 79     Weight: 199 lb 9.6 oz (90.5 kg)     Height: 64\" (162.6 cm)       Body mass index is 34.26 kg/(m^2).    PHYSICAL EXAM:    Vitals Reviewed.   General Appearance: No acute distress, well developed and well nourished. Obese.   Eyes: Conjunctiva and lids: No erythema, swelling, or discharge. Sclera non-icteric. Glasses.   HENT: Atraumatic, normocephalic. External eyes, ears, and nose normal. No hearing loss noted. Mucous membranes normal. Lips not cyanotic. Neck supple with no tenderness.  Respiratory: No signs of respiratory distress. Respiration rhythm and depth normal.   Clear to auscultation. No rales, crackles, rhonchi, or wheezing auscultated.   Cardiovascular:  Jugular Venous Pressure: Normal  Heart Rate and Rhythm: Normal, Heart Sounds: Normal S1 and S2. No S3 or S4 noted.  Murmurs: No murmurs noted. No rubs, thrills, or gallops.   Arterial Pulses: Carotid pulses normal. No carotid bruit noted. Posterior tibialis and dorsalis pedis pulses normal.   Lower Extremities: +1 bilateral lower extremity edema noted.  Left greater than right.  Gastrointestinal:  Abdomen soft, non-distended, non-tender. Normal bowel sounds. No hepatomegaly.   Musculoskeletal: " Normal movement of extremities  Skin and Nails: General appearance normal. No pallor, cyanosis, diaphoresis. Skin temperature normal. No clubbing of fingernails.   Psychiatric: Patient alert and oriented to person, place, and time. Speech and behavior appropriate. Normal mood and affect.       ECG 12 Lead  Date/Time: 3/16/2017 1:24 PM  Performed by: SAMANTHA OCAMPO  Authorized by: SAMANTHA OCAMPO   Comparison: compared with previous ECG from 2/9/2017  Similar to previous ECG  Rhythm: sinus rhythm  Rate: normal  BPM: 79  Conduction: conduction normal  ST Segments: ST segments normal  T Waves: T waves normal  QRS axis: normal  Other: no other findings  Clinical impression: normal ECG              Assessment:       Diagnosis Plan   1. Essential hypertension     2. Paroxysmal atrial fibrillation     3. Gastrointestinal hemorrhage, unspecified gastrointestinal hemorrhage type     4. Iron deficiency anemia, unspecified iron deficiency anemia type            Plan:       1. Essential Hypertension: The patient's blood pressure is elevated once again today.  I have recommended that she follow a low-sodium diet.  According to the patient her blood pressures are averaging 140 over 60s at home.  I've recommended that she change the batteries in her home blood pressure machine.  I've asked her to check her blood pressure once a day over the next week.  I will then follow-up with her via telephone to further assess her blood pressure readings.  She may have a white coat effect.  I have noted that she is on 2 diuretics therapies, furosemide and hydrochlorothiazide.    2. Atrial Fibrillation and Atrial Flutter  Assessment  • The patient has paroxysmal atrial fibrillation  • The patient's CHADS2-VASc score is 4  • A [id number] score of 2 or more is considered a high risk for a thromboembolic event    Plan  • Attempt to maintain sinus rhythm  • Continue beta blocker for rhythm control    Subjective - Objective  •   The patient is  at higher risk for stroke.  However she has a history of anemia, critically low hemoglobin of 4 in January, and a history of gastrointestinal bleeding.  Dr. Ladonna Mclaughlin has elected not to place the patient on anticoagulation.  The patient is aware of the risk of stroke.  The patient is in a normal sinus rhythm today.    3.  Iron deficiency anemia: This is being managed by her primary care physician.    4. SOB and COPD: The patient feels that her shortness of breath has improved overall.  She plans to follow-up with Dr. Mata in the near future.    5.  Follow Up: I've recommended follow-up with Dr. Ladonna Mclaughlin in 6 months, unless otherwise needed sooner.    As always, it has been a pleasure to participate in your patient's care.      Sincerely,         DELTA Willis

## 2017-03-16 NOTE — PATIENT INSTRUCTIONS
Hypertension  Hypertension, commonly called high blood pressure, is when the force of blood pumping through your arteries is too strong. Your arteries are the blood vessels that carry blood from your heart throughout your body. A blood pressure reading consists of a higher number over a lower number, such as 110/72. The higher number (systolic) is the pressure inside your arteries when your heart pumps. The lower number (diastolic) is the pressure inside your arteries when your heart relaxes. Ideally you want your blood pressure below 120/80.  Hypertension forces your heart to work harder to pump blood. Your arteries may become narrow or stiff. Having untreated or uncontrolled hypertension can cause heart attack, stroke, kidney disease, and other problems.  RISK FACTORS  Some risk factors for high blood pressure are controllable. Others are not.   Risk factors you cannot control include:   · Race. You may be at higher risk if you are .  · Age. Risk increases with age.  · Gender. Men are at higher risk than women before age 45 years. After age 65, women are at higher risk than men.  Risk factors you can control include:  · Not getting enough exercise or physical activity.  · Being overweight.  · Getting too much fat, sugar, calories, or salt in your diet.  · Drinking too much alcohol.  SIGNS AND SYMPTOMS  Hypertension does not usually cause signs or symptoms. Extremely high blood pressure (hypertensive crisis) may cause headache, anxiety, shortness of breath, and nosebleed.  DIAGNOSIS  To check if you have hypertension, your health care provider will measure your blood pressure while you are seated, with your arm held at the level of your heart. It should be measured at least twice using the same arm. Certain conditions can cause a difference in blood pressure between your right and left arms. A blood pressure reading that is higher than normal on one occasion does not mean that you need treatment. If  it is not clear whether you have high blood pressure, you may be asked to return on a different day to have your blood pressure checked again. Or, you may be asked to monitor your blood pressure at home for 1 or more weeks.  TREATMENT  Treating high blood pressure includes making lifestyle changes and possibly taking medicine. Living a healthy lifestyle can help lower high blood pressure. You may need to change some of your habits.  Lifestyle changes may include:  · Following the DASH diet. This diet is high in fruits, vegetables, and whole grains. It is low in salt, red meat, and added sugars.  · Keep your sodium intake below 2,300 mg per day.  · Getting at least 30-45 minutes of aerobic exercise at least 4 times per week.  · Losing weight if necessary.  · Not smoking.  · Limiting alcoholic beverages.  · Learning ways to reduce stress.  Your health care provider may prescribe medicine if lifestyle changes are not enough to get your blood pressure under control, and if one of the following is true:  · You are 18-59 years of age and your systolic blood pressure is above 140.  · You are 60 years of age or older, and your systolic blood pressure is above 150.  · Your diastolic blood pressure is above 90.  · You have diabetes, and your systolic blood pressure is over 140 or your diastolic blood pressure is over 90.  · You have kidney disease and your blood pressure is above 140/90.  · You have heart disease and your blood pressure is above 140/90.  Your personal target blood pressure may vary depending on your medical conditions, your age, and other factors.  HOME CARE INSTRUCTIONS  · Have your blood pressure rechecked as directed by your health care provider.    · Take medicines only as directed by your health care provider. Follow the directions carefully. Blood pressure medicines must be taken as prescribed. The medicine does not work as well when you skip doses. Skipping doses also puts you at risk for  problems.  · Do not smoke.    · Monitor your blood pressure at home as directed by your health care provider.   SEEK MEDICAL CARE IF:   · You think you are having a reaction to medicines taken.  · You have recurrent headaches or feel dizzy.  · You have swelling in your ankles.  · You have trouble with your vision.  SEEK IMMEDIATE MEDICAL CARE IF:  · You develop a severe headache or confusion.  · You have unusual weakness, numbness, or feel faint.  · You have severe chest or abdominal pain.  · You vomit repeatedly.  · You have trouble breathing.  MAKE SURE YOU:   · Understand these instructions.  · Will watch your condition.  · Will get help right away if you are not doing well or get worse.     This information is not intended to replace advice given to you by your health care provider. Make sure you discuss any questions you have with your health care provider.     Document Released: 12/18/2006 Document Revised: 05/03/2016 Document Reviewed: 10/10/2014  [employer] Interactive Patient Education ©2016 [employer]        Low-Sodium Eating Plan  Sodium raises blood pressure and causes water to be held in the body. Getting less sodium from food will help lower your blood pressure, reduce any swelling, and protect your heart, liver, and kidneys. We get sodium by adding salt (sodium chloride) to food. Most of our sodium comes from canned, boxed, and frozen foods. Restaurant foods, fast foods, and pizza are also very high in sodium. Even if you take medicine to lower your blood pressure or to reduce fluid in your body, getting less sodium from your food is important.  WHAT IS MY PLAN?  Most people should limit their sodium intake to 2,300 mg a day. Your health care provider recommends that you limit your sodium intake to __________ a day.   WHAT DO I NEED TO KNOW ABOUT THIS EATING PLAN?  For the low-sodium eating plan, you will follow these general guidelines:  · Choose foods with a % Daily Value for sodium of less than 5%  "(as listed on the food label).    · Use salt-free seasonings or herbs instead of table salt or sea salt.    · Check with your health care provider or pharmacist before using salt substitutes.    · Eat fresh foods.  · Eat more vegetables and fruits.  · Limit canned vegetables. If you do use them, rinse them well to decrease the sodium.    · Limit cheese to 1 oz (28 g) per day.     · Eat lower-sodium products, often labeled as \"lower sodium\" or \"no salt added.\"  · Avoid foods that contain monosodium glutamate (MSG). MSG is sometimes added to Chinese food and some canned foods.    · Check food labels (Nutrition Facts labels) on foods to learn how much sodium is in one serving.  · Eat more home-cooked food and less restaurant, buffet, and fast food.   · When eating at a restaurant, ask that your food be prepared with less salt, or no salt if possible.    HOW DO I READ FOOD LABELS FOR SODIUM INFORMATION?  The Nutrition Facts label lists the amount of sodium in one serving of the food. If you eat more than one serving, you must multiply the listed amount of sodium by the number of servings.  Food labels may also identify foods as:  · Sodium free--Less than 5 mg in a serving.  · Very low sodium--35 mg or less in a serving.  · Low sodium--140 mg or less in a serving.  · Light in sodium--50% less sodium in a serving. For example, if a food that usually has 300 mg of sodium is changed to become light in sodium, it will have 150 mg of sodium.  · Reduced sodium--25% less sodium in a serving. For example, if a food that usually has 400 mg of sodium is changed to reduced sodium, it will have 300 mg of sodium.  WHAT FOODS CAN I EAT?  Grains   Low-sodium cereals, including oats, puffed wheat and rice, and shredded wheat cereals. Low-sodium crackers. Unsalted rice and pasta. Lower-sodium bread.   Vegetables   Frozen or fresh vegetables. Low-sodium or reduced-sodium canned vegetables. Low-sodium or reduced-sodium tomato sauce and " paste. Low-sodium or reduced-sodium tomato and vegetable juices.   Fruits   Fresh, frozen, and canned fruit. Fruit juice.   Meat and Other Protein Products   Low-sodium canned tuna and salmon. Fresh or frozen meat, poultry, seafood, and fish. Lamb. Unsalted nuts. Dried beans, peas, and lentils without added salt. Unsalted canned beans. Homemade soups without salt. Eggs.   Dairy   Milk. Soy milk. Ricotta cheese. Low-sodium or reduced-sodium cheeses. Yogurt.   Condiments   Fresh and dried herbs and spices. Salt-free seasonings. Onion and garlic powders. Low-sodium varieties of mustard and ketchup. Fresh or refrigerated horseradish. Lemon juice.   Fats and Oils    Reduced-sodium salad dressings. Unsalted butter.    Other   Unsalted popcorn and pretzels.   The items listed above may not be a complete list of recommended foods or beverages. Contact your dietitian for more options.  WHAT FOODS ARE NOT RECOMMENDED?  Grains   Instant hot cereals. Bread stuffing, pancake, and biscuit mixes. Croutons. Seasoned rice or pasta mixes. Noodle soup cups. Boxed or frozen macaroni and cheese. Self-rising flour. Regular salted crackers.  Vegetables   Regular canned vegetables. Regular canned tomato sauce and paste. Regular tomato and vegetable juices. Frozen vegetables in sauces. Salted French fries. Olives. Pickles. Relishes. Sauerkraut. Salsa.  Meat and Other Protein Products   Salted, canned, smoked, spiced, or pickled meats, seafood, or fish. Oseguera, ham, sausage, hot dogs, corned beef, chipped beef, and packaged luncheon meats. Salt pork. Jerky. Pickled herring. Anchovies, regular canned tuna, and sardines. Salted nuts.  Dairy   Processed cheese and cheese spreads. Cheese curds. Blue cheese and cottage cheese. Buttermilk.   Condiments   Onion and garlic salt, seasoned salt, table salt, and sea salt. Canned and packaged gravies. Worcestershire sauce. Tartar sauce. Barbecue sauce. Teriyaki sauce. Soy sauce, including reduced sodium.  Steak sauce. Fish sauce. Oyster sauce. Cocktail sauce. Horseradish that you find on the shelf. Regular ketchup and mustard. Meat flavorings and tenderizers. Bouillon cubes. Hot sauce. Tabasco sauce. Marinades. Taco seasonings. Relishes.  Fats and Oils    Regular salad dressings. Salted butter. Margarine. Ghee. Oseguera fat.   Other   Potato and tortilla chips. Corn chips and puffs. Salted popcorn and pretzels. Canned or dried soups. Pizza. Frozen entrees and pot pies.    The items listed above may not be a complete list of foods and beverages to avoid. Contact your dietitian for more information.     This information is not intended to replace advice given to you by your health care provider. Make sure you discuss any questions you have with your health care provider.     Document Released: 06/09/2003 Document Revised: 01/08/2016 Document Reviewed: 10/22/2014  [employer] Interactive Patient Education ©2016 [employer]

## 2017-04-10 NOTE — TELEPHONE ENCOUNTER
I spoke with patient via phone. Her BPs are still running high 160-200s systolic. Please review her medication regimen and advise further. Thanks. Layla

## 2017-09-06 PROBLEM — R53.1 WEAKNESS: Status: ACTIVE | Noted: 2017-01-01

## 2017-09-07 PROBLEM — N17.9 AKI (ACUTE KIDNEY INJURY) (HCC): Status: ACTIVE | Noted: 2017-01-01

## 2017-09-07 PROBLEM — R60.0 BILATERAL EDEMA OF LOWER EXTREMITY: Status: ACTIVE | Noted: 2017-01-01

## 2017-09-07 PROBLEM — N30.00 ACUTE CYSTITIS WITHOUT HEMATURIA: Status: ACTIVE | Noted: 2017-01-01

## 2017-09-07 PROBLEM — Z66 DNR (DO NOT RESUSCITATE): Status: ACTIVE | Noted: 2017-01-01

## 2017-09-08 PROBLEM — I34.0 MITRAL REGURGITATION: Status: ACTIVE | Noted: 2017-01-01

## 2017-09-13 PROBLEM — I65.23 BILATERAL CAROTID ARTERY STENOSIS: Status: ACTIVE | Noted: 2017-01-01

## 2017-09-15 PROBLEM — I34.0 MITRAL VALVE INSUFFICIENCY: Status: ACTIVE | Noted: 2017-01-01

## 2017-09-24 NOTE — ANESTHESIA PREPROCEDURE EVALUATION
Anesthesia Evaluation     history of anesthetic complications: PONV         Airway   Mallampati: II  no difficulty expected  Dental - normal exam     Pulmonary - normal exam   (+) a smoker Former, sleep apnea (BiPAP),   (-) COPD, asthma    PE comment: nonlabored  Cardiovascular - normal exam    Rhythm: regular  Rate: normal    (+) hypertension, valvular problems/murmurs MR and TI, dysrhythmias (paroxysmal) Atrial Fib, PVD (B carotid stenosis), hyperlipidemia  (-) past MI, CAD, angina      Neuro/Psych  (+) headaches (migraines), weakness, psychiatric history Anxiety,    (-) seizures, TIA, CVA  GI/Hepatic/Renal/Endo    (+) obesity,  hiatal hernia, GERD (w/ Bella's), renal disease (KESHIA secondary to IV Dye-->resolved now),   (-) liver disease, diabetes, hypothyroidism, hyperthyroidism    ROS Comment: h/o Thyroid Goiter & Thyroid Mass -->s/p partial thyroidectomy    Musculoskeletal     (+) arthralgias,   Abdominal    Substance History      OB/GYN          Other   (+) arthritis                                     Anesthesia Plan    ASA 4     general   (A-Line, CVL, [place]-Raj, & KOLTON)  intravenous induction   Anesthetic plan and risks discussed with patient.

## 2017-09-25 NOTE — ANESTHESIA PROCEDURE NOTES
Arterial Line    Patient location during procedure: pre-op  Start time: 9/25/2017 10:02 AM  Stop Time:9/25/2017 10:07 AM       Line placed for hemodynamic monitoring.  Performed By   Anesthesiologist: ALYSA LEÓN  Preanesthetic Checklist  Completed: patient identified, surgical consent, pre-op evaluation, timeout performed, IV checked, risks and benefits discussed and monitors and equipment checked  Arterial Line Prep   Sterile Tech: gloves and cap  Prep: ChloraPrep  Patient monitoring: blood pressure monitoring, continuous pulse oximetry and EKG  Arterial Line Procedure   Laterality:left  Location:  radial artery  Catheter size: 20 G   Guidance: landmark technique  Number of attempts: 1  Successful placement: yes          Post Assessment   Dressing Type: occlusive dressing applied and wrist guard applied.   Complications no  Circ/Move/Sens Assessment: normal.   Patient Tolerance: patient tolerated the procedure well with no apparent complications

## 2017-09-25 NOTE — ANESTHESIA PROCEDURE NOTES
Airway  Airway not difficult    General Information and Staff    Patient location during procedure: OR  Anesthesiologist: JEFFY CHAPA    Indications and Patient Condition    Preoxygenated: yes      Final Airway Details  Final airway type: endotracheal airway      Successful airway: ETT  Cuffed: yes   Successful intubation technique: direct laryngoscopy  Facilitating devices/methods: intubating stylet  Endotracheal tube insertion site: oral  Blade: Arnold  Blade size: #2  ETT size: 8.0 mm  Cormack-Lehane Classification: grade I - full view of glottis  Placement verified by: capnometry   Measured from: teeth  ETT to teeth (cm): 21  Number of attempts at approach: 1    Additional Comments  Atraumatic

## 2017-09-25 NOTE — ANESTHESIA PROCEDURE NOTES
Central Line    Patient location during procedure: OR  Start time: 9/25/2017 10:14 AM  Stop Time:9/25/2017 10:28 AM  Indications: vascular access, MD/Surgeon request and central pressure monitoring  Staff  Anesthesiologist: ALYSA LEÓN  Preanesthetic Checklist  Completed: patient identified, surgical consent, pre-op evaluation, timeout performed, IV checked, risks and benefits discussed and monitors and equipment checked  Central Line Prep  Sterile Tech:cap, gloves, gown, mask and sterile barriers  Prep: chloraprep  Patient monitoring: blood pressure monitoring, continuous pulse oximetry and EKG  Central Line Procedure  Laterality:right  Location:internal jugular  Catheter Type:Cordis  Catheter Size:9 Fr  Guidance:landmark technique  Assessment  Post procedure:biopatch applied, line sutured and occlusive dressing applied  Assessement:blood return through all ports, free fluid flow, Ace Test and chest x-ray ordered  Complications:no  Patient Tolerance:patient tolerated the procedure well with no apparent complications

## 2017-09-25 NOTE — ANESTHESIA POSTPROCEDURE EVALUATION
Patient: Raquel Deluna    Procedure Summary     Date Anesthesia Start Anesthesia Stop Room / Location    09/25/17 1122 1507 [facility] OR 17 / [facility] MAIN OR       Procedure Diagnosis Surgeon Provider    KOLTON STERNOTOMY MITRAL VALVE REPLACEMENT, TRICUSPID VALVE REPAIR, MAZE PROCEDURE AND PRP (N/A Chest) Mitral valve insufficiency, unspecified etiology  (Mitral valve insufficiency, unspecified etiology [I34.0]) MD Sky Houston MD          Anesthesia Type: general  Last vitals  BP   125/55 (09/25/17 1530)    Temp   36.1 °C (97 °F) (09/25/17 1506)    Pulse   72 (09/25/17 1600)   Resp   14 (09/25/17 1506)    SpO2   100 % (09/25/17 1600)      Post Anesthesia Care and Evaluation    Patient location during evaluation: PACU  Patient participation: complete - patient participated  Level of consciousness: awake and alert  Pain management: adequate  Airway patency: patent  Anesthetic complications: No anesthetic complications    Cardiovascular status: acceptable  Respiratory status: acceptable  Hydration status: acceptable    Comments: --------------------            09/25/17               1600     --------------------   BP:                  Pulse:      72       Resp:                Temp:                SpO2:      100%     --------------------

## 2017-09-25 NOTE — ANESTHESIA PROCEDURE NOTES
Procedure Performed: Emergent/Open-Heart Anesthesia KOLTON     Start Time:        End Time:        General Procedure Information  Diagnostic Indications for Echo:  assessment of surgical repair  Physician Requesting Echo: LYDIA MAGUIRE  Intubated  Bite block not placed  Heart visualized  Probe Insertion:  Easy  Probe Type:  Multiplane  Modalities:  2D only, color flow mapping and continuous wave Doppler    Echocardiographic and Doppler Measurements    Ventricles    Right Ventricle:  Cavity size normal.  Hypertrophy not present.  Global function normal.    Left Ventricle:  Cavity size normal.  Global Function mildly impaired.  Ejection Fraction 40%.          Valves    Aortic Valve:  Annulus normal.  Stenosis not present.  Regurgitation mild.  Leaflets normal.  Leaflet motions normal.      Mitral Valve:  Annulus normal.  Stenosis not present.  Vena Contracta Width: 0.5 cm.  Regurgitation severe.  Leaflets normal.  Leaflet motions restricted.  Specific leaflet segments with abnormal motions are described in the following comments:       both leaflets    Tricuspid Valve:  Annulus normal.  Stenosis not present.  Area: 5 cm².  Regurgitation moderate.          Aorta    Ascending Aorta:  Size normal.    Aortic Arch:  Size normal.    Descending Aorta:  Plaque thickness less than 3 mm.          Atria    Right Atrium:  Size normal.    Left Atrium:  Size normal.  Left atrial appendage normal.      Septa    Atrial Septum:  Intra-atrial septal morphology normal.                  Anesthesia Information      Echocardiogram Comments:       Mitral valve leaflets both appear to be tethered and apically displaced because of cardiomyopathy.  Tricuspid valve is dominated by anterior and posterior leaflets, septal leaflet is very small.  Tricuspid regurgitation is away from nonseptal leaflets which appear to billow above annulus, although tricuspid annulus itself doesn't appear dilated.    After repair, there was mild to moderate mitral  regurgitation, no tricuspid regurgitation.  Alex aware.

## 2017-09-25 NOTE — ANESTHESIA PROCEDURE NOTES
Central Line    Patient location during procedure: OR  Start time: 9/25/2017 10:29 AM  Stop Time:9/25/2017 10:35 AM  Indications: vascular access, MD/Surgeon request and central pressure monitoring  Staff  Anesthesiologist: ALYSA LEÓN  Preanesthetic Checklist  Completed: patient identified, surgical consent, pre-op evaluation, timeout performed, IV checked, risks and benefits discussed and monitors and equipment checked  Central Line Prep  Sterile Tech:cap, gloves, gown, mask and sterile barriers  Prep: chloraprep  Patient monitoring: blood pressure monitoring, continuous pulse oximetry and EKG  Central Line Procedure  Laterality:right  Location:internal jugular  Catheter Type:[place]-Raj  Guidance:landmark technique  Assessment  Post procedure:biopatch applied, line sutured and occlusive dressing applied  Assessement:blood return through all ports, free fluid flow, Ace Test and chest x-ray ordered  Complications:no  Patient Tolerance:patient tolerated the procedure well with no apparent complications

## 2017-09-27 PROBLEM — N30.00 ACUTE CYSTITIS WITHOUT HEMATURIA: Status: RESOLVED | Noted: 2017-01-01 | Resolved: 2017-01-01

## 2017-09-27 PROBLEM — E87.5 HYPERKALEMIA: Status: RESOLVED | Noted: 2017-01-01 | Resolved: 2017-01-01

## 2017-09-29 PROBLEM — I34.0 MITRAL VALVE INSUFFICIENCY: Status: ACTIVE | Noted: 2017-01-01

## 2017-10-01 NOTE — PLAN OF CARE
Problem: Patient Care Overview (Adult)  Goal: Plan of Care Review  Outcome: Ongoing (interventions implemented as appropriate)    10/01/17 1020   Coping/Psychosocial Response Interventions   Plan Of Care Reviewed With patient   Outcome Evaluation   Outcome Summary/Follow up Plan Pt ambulated within room to bathroom using RW with FF posture and dec step length/gait speed. She fatigued quickly with one standing rest break required prior to entering bathroom. Due to inc time required in bathroom, nsg aide assisted with return to bed.

## 2017-10-01 NOTE — PROGRESS NOTES
"   LOS: 25 days    Patient Care Team:  Ricky Hoyos III, MD as PCP - General  Ricky Hoyos III, MD as PCP - Family Medicine  Anthony Hernández MD as Consulting Physician (Pulmonary Disease)  Christy Jerez MD as Consulting Physician (Dermatology)    Chief Complaint:    Chief Complaint   Patient presents with   • Weakness - Generalized     generalized weakness; had infusion last week; scheduled to have iron infusion this friday.         Subjective follow-up kidney injury on chronic kidney disease    Interval History:   POD6 MV and TV repair, cryomaze.  She feels ok; still eating poorly; breathing is fine on RA    Objective     Vital Signs  Temp:  [98 °F (36.7 °C)-98.7 °F (37.1 °C)] 98.3 °F (36.8 °C)  Heart Rate:  [86-96] 86  Resp:  [18] 18  BP: (136-177)/(46-67) 136/46    Flowsheet Rows         First Filed Value    Admission Height  64\" (162.6 cm) Documented at 09/06/2017 1506    Admission Weight  204 lb (92.5 kg) Documented at 09/06/2017 1506             I/O last 3 completed shifts:  In: 480 [P.O.:480]  Out: 400 [Urine:400]    Intake/Output Summary (Last 24 hours) at 10/01/17 0855  Last data filed at 10/01/17 0539   Gross per 24 hour   Intake              480 ml   Output                0 ml   Net              480 ml       Physical Exam:  General Appearance: alert, oriented x 3, no acute distress, obese.   Skin: warm and dry. Scattered ecchymoses.   HEENT:  oral mucosa moist tho lips dry; eyes w/o icterus  Lungs: Bilateral rales at bases, unlabored breathing effort  Heart: RRR,  no S3, +2/6 syst m  Abdomen: soft, non-tender, body wall edema, + bs  Extremities: trace lower ext edema.   Neuro: normal speech and mental status      Results Review:      Results from last 7 days  Lab Units 10/01/17  0330 09/30/17  0323 09/29/17  0348  09/27/17  0257   SODIUM mmol/L 133* 132* 130*  < > 131*   POTASSIUM mmol/L 3.1* 3.7 4.1  < > 3.4*   CHLORIDE mmol/L 96* 96* 96*  < > 92*   CO2 mmol/L 21.9* 21.6* 20.4*  < > " 22.8   BUN mg/dL 59* 58* 56*  < > 32*   CREATININE mg/dL 1.34* 1.40* 1.86*  < > 2.03*   CALCIUM mg/dL 8.9 9.1 8.9  < > 8.8   BILIRUBIN mg/dL  --   --  0.5  --  0.3   ALK PHOS U/L  --   --  47  --  41   ALT (SGPT) U/L  --   --  <5  --  9   AST (SGOT) U/L  --   --  24  --  70*   GLUCOSE mg/dL 105* 114* 113*  < > 125*   < > = values in this interval not displayed.    Estimated Creatinine Clearance: 35 mL/min (by C-G formula based on Cr of 1.34).      Results from last 7 days  Lab Units 10/01/17  0330 09/30/17  0323 09/28/17  0419 09/26/17  0304   MAGNESIUM mg/dL  --  2.0 2.0 2.3   PHOSPHORUS mg/dL 2.5 2.7 4.6* 3.0               Results from last 7 days  Lab Units 10/01/17  0330 09/30/17  0323 09/29/17  0348 09/28/17  0419 09/27/17  0257   WBC 10*3/mm3 10.28 10.31 10.40 13.89* 16.86*   HEMOGLOBIN g/dL 7.4* 8.0* 7.5* 8.3* 8.2*   PLATELETS 10*3/mm3 268 250 198 201 173         Results from last 7 days  Lab Units 09/26/17  0304 09/25/17  1527   INR  1.39* 1.63*         Imaging Results (last 24 hours)     ** No results found for the last 24 hours. **          amLODIPine 5 mg Oral Q24H   aspirin 81 mg Oral Daily   atorvastatin 40 mg Oral Nightly   budesonide-formoterol 2 puff Inhalation BID - RT   bumetanide 2 mg Oral BID   carvedilol 6.25 mg Oral Q12H   citalopram 40 mg Oral Nightly   enoxaparin 30 mg Subcutaneous Daily   pantoprazole 40 mg Oral Daily   Or      famotidine 20 mg Intravenous Daily   hydrALAZINE 10 mg Oral Q8H   mupirocin  Each Nare Daily   sennosides-docusate sodium 2 tablet Oral Nightly       sodium chloride 30 mL/hr Last Rate: Stopped (09/26/17 0810)       Medication Review:   Current Facility-Administered Medications   Medication Dose Route Frequency Provider Last Rate Last Dose   • acetaminophen (TYLENOL) suppository 650 mg  650 mg Rectal Q4H PRN Yonas Johnson MD       • acetaminophen (TYLENOL) tablet 650 mg  650 mg Oral Q4H PRN Yonas Johnson MD       • ALPRAZolam (XANAX) tablet 0.25 mg  0.25 mg  Oral Q8H PRN Yonas Johnson MD       • amLODIPine (NORVASC) tablet 5 mg  5 mg Oral Q24H AlexiaDELTA Collier   5 mg at 09/30/17 1007   • aspirin EC tablet 81 mg  81 mg Oral Daily Yonas Johnson MD   81 mg at 09/30/17 0931   • atorvastatin (LIPITOR) tablet 40 mg  40 mg Oral Nightly Yonas Johnson MD   40 mg at 09/30/17 2311   • bisacodyl (DULCOLAX) EC tablet 10 mg  10 mg Oral Daily PRN Yonas Johnson MD       • bisacodyl (DULCOLAX) suppository 10 mg  10 mg Rectal Daily PRN Yonas Johnson MD       • budesonide-formoterol (SYMBICORT) 160-4.5 MCG/ACT inhaler 2 puff  2 puff Inhalation BID - RT Yonas Johnson MD   2 puff at 09/30/17 2024   • bumetanide (BUMEX) tablet 2 mg  2 mg Oral BID Olievr Gramajo MD   2 mg at 09/30/17 1801   • carvedilol (COREG) tablet 6.25 mg  6.25 mg Oral Q12H Benji Martínez MD   6.25 mg at 10/01/17 0505   • citalopram (CeleXA) tablet 40 mg  40 mg Oral Nightly Yonas Johnson MD   40 mg at 09/30/17 2312   • cyclobenzaprine (FLEXERIL) tablet 10 mg  10 mg Oral Q8H PRN Yonas Johnson MD       • dextrose (D50W) solution 25 g  25 g Intravenous Q15 Min PRN Yonas Johnson MD       • dextrose (GLUTOSE) oral gel 15 g  15 g Oral Q15 Min PRN Yonas Johnson MD       • enoxaparin (LOVENOX) syringe 30 mg  30 mg Subcutaneous Daily Yonas Johnson MD   30 mg at 09/30/17 1801   • pantoprazole (PROTONIX) EC tablet 40 mg  40 mg Oral Daily Yonas Johnson MD   40 mg at 09/30/17 0931    Or   • famotidine (PEPCID) injection 20 mg  20 mg Intravenous Daily Yonas Johnson MD   20 mg at 09/25/17 1552   • furosemide (LASIX) injection 40 mg  40 mg Intravenous Q6H PRN Yonas Johnson MD       • glucagon (human recombinant) (GLUCAGEN DIAGNOSTIC) injection 1 mg  1 mg Subcutaneous Q15 Min PRN Yonas Johnson MD       • hydrALAZINE (APRESOLINE) injection 10 mg  10 mg Intravenous Q6H PRN Yonas Johnson MD   10 mg at 09/30/17 1630   • hydrALAZINE (APRESOLINE)  tablet 10 mg  10 mg Oral Q8H DELTA Leyva   10 mg at 10/01/17 0504   • HYDROcodone-acetaminophen (NORCO) 5-325 MG per tablet 2 tablet  2 tablet Oral Q4H PRN Yonas Johnson MD   2 tablet at 09/28/17 2341   • Influenza Vac Subunit Quad (FLUCELVAX) injection 0.5 mL  0.5 mL Intramuscular During Hospitalization Yonas Johnson MD       • morphine injection 1 mg  1 mg Intravenous Q4H PRN Yonas Johnson MD        And   • naloxone (NARCAN) injection 0.4 mg  0.4 mg Intravenous Q5 Min PRN Yonas Johnson MD       • mupirocin (BACTROBAN) 2 % nasal ointment   Each Nare Daily Yonas Johnson MD       • ondansetron (ZOFRAN) injection 4 mg  4 mg Intravenous Q6H PRN Yonas Johnson MD       • oxyCODONE (ROXICODONE) immediate release tablet 10 mg  10 mg Oral Q4H PRN Yonas Johnson MD       • potassium chloride (MICRO-K) CR capsule 40 mEq  40 mEq Oral PRN Yonas Johnson MD   40 mEq at 10/01/17 0500    Or   • potassium chloride (KLOR-CON) packet 40 mEq  40 mEq Oral PRN Yonas Johnson MD       • potassium chloride 10 mEq in 100 mL IVPB  10 mEq Intravenous Q1H PRN Yonas Johnson MD        Or   • potassium chloride 10 mEq in 100 mL IVPB  10 mEq Intravenous Q1H PRN Yonas Johnson MD       • potassium chloride 20 mEq in 50 mL IVPB  20 mEq Intravenous Q1H PRN Yonas Johnson MD       • potassium chloride 20 mEq in 50 mL IVPB  20 mEq Intravenous Q1H PRN Yonas Johnson MD 50 mL/hr at 09/26/17 0407 40 mEq at 09/26/17 0407   • promethazine (PHENERGAN) tablet 12.5 mg  12.5 mg Oral Q6H PRN Yonas Johnson MD        Or   • promethazine (PHENERGAN) injection 12.5 mg  12.5 mg Intravenous Q6H PRN Yonas Johnson MD       • sennosides-docusate sodium (SENOKOT-S) 8.6-50 MG tablet 2 tablet  2 tablet Oral Nightly Yonas Johnson MD   2 tablet at 09/30/17 2546   • sodium chloride 0.9 % flush 1-10 mL  1-10 mL Intravenous PRN Benji Martínez MD       • sodium chloride 0.9 % flush 30 mL  30  mL Intravenous Once PRN Yonas Johnson MD       • sodium chloride 0.9 % infusion  30 mL/hr Intravenous Continuous PRN Yonas Johnson MD   Stopped at 09/26/17 0810       Assessment/Plan   1. KESHIA on CKD3, non-oliguric, better.  ATN multifactorial: contrast-induced nephropathy after CT angiogram and recent cardiac catheterization, followed by valvular heart surgery.  HypoNa with vol excess and poor solute intake; low K  2.  Nonischemic cardiomyopathy, ejection fraction 35- 40% with valvular heart disease. TR, MR.  Sp MV and TV repair, cryomaze. POD5  3.  Vascular disease with carotid disease  4.  Hypertension. Controlled.  5.  Chronic GI bleed with chronic iron-deficiency: followed by Heme and GI as outpt:  Iron studies again show iron defic   6.  Junctional bradycardia.  Pacer 9.29.17      Plan:  1. Oral bumex 2 mg BID  2. Scheduled oral KCl 20 meq BID, along with K protocol  3. Venofer today; would hold off Epo until iron stores are replete  4. D/w daughter  5. D/w Dr. Flores yesterday    Oliver Gramajo MD  10/01/17  8:55 AM

## 2017-10-01 NOTE — THERAPY TREATMENT NOTE
Acute Care - Physical Therapy Treatment Note  [facility]     Patient Name: Raquel Deluna  : 1935  MRN: [id number]  Today's Date: 10/1/2017  Onset of Illness/Injury or Date of Surgery Date: 17  Date of Referral to PT: 17  Referring Physician: Alex    Admit Date: 2017    Visit Dx:    ICD-10-CM ICD-9-CM   1. Weakness R53.1 780.79   2. New onset left bundle branch block (LBBB) I44.7 426.3   3. Blood loss anemia D50.0 280.0   4. Generalized weakness R53.1 780.79   5. Carotid stenosis, right I65.21 433.10   6. Mitral valve insufficiency, unspecified etiology I34.0 424.0     Patient Active Problem List   Diagnosis   • OA (osteoarthritis) of knee   • Hyponatremia   • Bella's esophagus   • Hematochezia   • Colon polyp   • Essential hypertension   • Gastroesophageal reflux disease   • Hyperlipidemia   • Intestinal malabsorption   • Adverse effect of iron   • Iron deficiency anemia   • Gastrointestinal hemorrhage   • Paroxysmal atrial fibrillation   • Weakness   • Bilateral edema of lower extremity   • DNR (do not resuscitate)   • KESHIA (acute kidney injury)   • Mitral regurgitation   • Bilateral carotid artery stenosis   • Mitral valve insufficiency               Adult Rehabilitation Note       10/01/17 1000 17 1100 17 0836    Rehab Assessment/Intervention    Discipline physical therapist  -LS physical therapist  -LC physical therapist  -EM    Document Type therapy note (daily note)  -LS therapy note (daily note)  -LC therapy note (daily note)  -EM    Subjective Information agree to therapy;complains of;fatigue  -LS agree to therapy;complains of;pain;fatigue  -LC agree to therapy;complains of;pain  -EM    Patient Effort, Rehab Treatment good  -LS good  -LC good  -EM    Precautions/Limitations cardiac precautions  -LS cardiac precautions  -LC cardiac precautions  -EM    Specific Treatment Considerations no raising LUE due to pacer  -LS no raising LUE due to pacer  -LC     Recorded by  [LS] Tonja Liriano, PT [LC] Davin Negron, PT DPT [EM] Matilde Bone, PT    Vital Signs    Pre Systolic BP Rehab   140  -EM    Pre Treatment Diastolic BP   46  -EM    Pretreatment Heart Rate (beats/min)   72  -EM    Posttreatment Heart Rate (beats/min)   72  -EM    Pre SpO2 (%) 96  -LS  95  -EM    O2 Delivery Pre Treatment room air  -LS  room air  -EM    Post SpO2 (%) 96  -LS  94  -EM    O2 Delivery Post Treatment room air  -LS  room air  -EM    Recorded by [LS] Tonja Liriano, PT  [EM] Matilde Bone, PT    Pain Assessment    Pain Assessment 0-10  -LS 0-10  -LC 0-10  -EM    Pain Score 5  -LS 5  -LC 5  -EM    Pain Location Sternum  -LS Sternum  -LC Sternum  -EM    Pain Intervention(s) Repositioned;Ambulation/increased activity  -LS Medication (See MAR)  -LC Medication (See MAR)  -EM    Response to Interventions tolerated  -LS      Recorded by [LS] Tonja Liriano, PT [LC] Davin Negron, PT DPT [EM] Matilde Bone, PT    Cognitive Assessment/Intervention    Current Cognitive/Communication Assessment functional  -LS functional  -LC     Orientation Status oriented x 4  -LS oriented x 4  -LC     Follows Commands/Answers Questions 100% of the time  -% of the time;able to follow multi-step instructions  -     Personal Safety WNL/WFL  -LS WNL/WFL  -LC     Personal Safety Interventions fall prevention program maintained;nonskid shoes/slippers when out of bed  -LS fall prevention program maintained;nonskid shoes/slippers when out of bed;supervised activity  -LC     Recorded by [LS] Tonja Liriano, PT [LC] Davin Negron, PT DPT     Bed Mobility, Assessment/Treatment    Bed Mobility, Assistive Device   head of bed elevated;bed rails  -EM    Bed Mob, Supine to Sit, [place] not tested  -LS  contact guard assist  -EM    Bed Mob, Sit to Supine, [place] not tested  -LS      Bed Mobility, Comment up in chair  -LS up in chair  -LC     Recorded by [LS] Tonja Liriano, PT [LC] Davin Negron, PT DPT [EM]  Matilde Bone, PT    Transfer Assessment/Treatment    Transfers, Sit-Stand [place] moderate assist (50% patient effort);1 person + 1 person to manage equipment  -LS minimum assist (75% patient effort)  -LC minimum assist (75% patient effort);verbal cues required  -EM    Transfers, Stand-Sit [place] moderate assist (50% patient effort);1 person + 1 person to manage equipment  -LS minimum assist (75% patient effort)  -LC minimum assist (75% patient effort);verbal cues required  -EM    Transfers, Sit-Stand-Sit, Assist Device rolling walker  -LS rolling walker  -LC     Transfer, Safety Issues step length decreased;weight-shifting ability decreased;balance decreased during turns  -LS weight-shifting ability decreased;step length decreased;sequencing ability decreased  -LC     Transfer, Impairments strength decreased;impaired balance  -LS pain;impaired balance;strength decreased  -LC     Transfer, Comment Fatigues quickly; inc time to reach full stand; FF posture  -LS  cues to reach back for chair prior to sitting   -EM    Recorded by [LS] Tonja Liriano, PT [LC] Davin Negron, PT DPT [EM] Matilde Bone, PT    Gait Assessment/Treatment    Gait, [place] Level contact guard assist  -LS contact guard assist  -LC contact guard assist  -EM    Gait, Assistive Device rolling walker  -LS rolling walker  -LC rolling walker  -EM    Gait, Distance (Feet) 25  -LS 35  -LC 85  -EM    Gait, Gait Deviations forward flexed posture;dorie decreased;step length decreased  -LS step length decreased;narrow base  -LC dorie decreased  -EM    Gait, Impairments  strength decreased;impaired balance  -LC     Gait, Comment Pt needed to use BSC in bathroom.   -LS  cues to stay closer to rwx, one standing rest break   -EM    Recorded by [LS] Tonja Liriano, PT [LC] Davin Negron, PT DPT [EM] Matilde Bone, PT    Motor Skills/Interventions    Additional Documentation  --  -LC     Recorded by  [LC] Davin Negron, PT  DPT     Therapy Exercises    Exercise Protocols --   5 reps cardiac protocol level 3  -LS  --   5 reps cardiac protocol, level 3   -EM    Recorded by [LS] Tonja Liriano, PT  [EM] Matilde Bone PT    Positioning and Restraints    Pre-Treatment Position sitting in chair/recliner  -LS sitting in chair/recliner  -LC in bed  -EM    Post Treatment Position bathroom  -LS chair  -LC chair  -EM    In Chair  sitting;call light within reach;encouraged to call for assist;with other staff;notified nsg  -LC sitting;call light within reach;with family/caregiver  -EM    Bathroom sitting;call light within reach;encouraged to call for assist;notified nsg  -LS      Recorded by [LS] Tonja Liriano, PT [LC] Davin Negron PT DPT [EM] Matilde Bone, PT      09/28/17 1118          Rehab Assessment/Intervention    Discipline physical therapist  -EM      Document Type therapy note (daily note)  -EM      Subjective Information agree to therapy;complains of;pain  -EM      Patient Effort, Rehab Treatment good  -EM      Precautions/Limitations cardiac precautions;fall precautions  -EM      Recorded by [EM] Matilde Bone PT      Vital Signs    Pre Systolic BP Rehab 138  -EM      Pre Treatment Diastolic BP 42  -EM      Pretreatment Heart Rate (beats/min) 72  -EM      Posttreatment Heart Rate (beats/min) 72  -EM      Pre SpO2 (%) 93  -EM      O2 Delivery Pre Treatment room air  -EM      Post SpO2 (%) 95  -EM      O2 Delivery Post Treatment room air  -EM      Recorded by [EM] Matilde Bone PT      Pain Assessment    Pain Assessment 0-10  -EM      Pain Score 5  -EM      Pain Location Sternum  -EM      Pain Intervention(s) Medication (See MAR)  -EM      Recorded by [EM] Matilde Bone PT      Bed Mobility, Assessment/Treatment    Bed Mobility, Assistive Device head of bed elevated  -EM      Bed Mob, Sit to Supine, [place] minimum assist (75% patient effort);2 person assist required  -EM      Recorded by [EM]  Matilde Bone, PT      Transfer Assessment/Treatment    Transfers, Sit-Stand [place] minimum assist (75% patient effort);2 person assist required;verbal cues required  -EM      Transfers, Stand-Sit [place] minimum assist (75% patient effort)  -EM      Recorded by [EM] Matilde Bone, PT      Gait Assessment/Treatment    Gait, [place] Level minimum assist (75% patient effort);2 person assist required  -EM      Gait, Assistive Device rolling walker  -EM      Gait, Distance (Feet) 35  -EM      Gait, Gait Deviations dorie decreased  -EM      Gait, Comment assist to guide rwx during turns, 2 standing rest breaks   -EM      Recorded by [EM] Matilde Bone, PT      Therapy Exercises    Exercise Protocols --   5 reps cardiac protocol, Level 2   -EM      Recorded by [EM] Matilde Bone, PT      Positioning and Restraints    Pre-Treatment Position sitting in chair/recliner  -EM      Post Treatment Position bed  -EM      In Bed supine;call light within reach;with family/caregiver;notified nsg  -EM      Recorded by [EM] Matilde Bone, PT        User Key  (r) = Recorded By, (t) = Taken By, (c) = Cosigned By    Initials Name Effective Dates    EM Matilde Bone, PT 12/01/15 -     ABHILASH Negron, PT DPT 08/02/16 -     LS Tonja Liriano, PT 06/14/16 -                 IP PT Goals       09/26/17 0914 09/22/17 1030       Bed Mobility PT LTG    Bed Mobility PT LTG, Time to Achieve  1 wk  -LB     Bed Mobility PT LTG, Date Goal Reviewed  09/22/17  -LB     Bed Mobility PT LTG, Outcome  goal ongoing  -LB     Transfer Training PT LTG    Transfer Training PT LTG, Activity Type  sit to stand/stand to sit  -LB     Transfer Training PT LTG, [place] Level  supervision required  -LB     Transfer Training PT LTG, Assist Device  walker, rolling  -LB     Transfer Training PT  LTG, Date Goal Reviewed  09/22/17  -LB     Transfer Training PT LTG, Outcome  goal revised  -LB     Gait Training PT LTG     Gait Training Goal PT LTG, Distance to Achieve  120  -LB     Gait Training Goal PT LTG, Date Goal Reviewed  09/22/17  -LB     Gait Training Goal PT LTG, Outcome  goal revised  -LB     Cardiopulmonary PT LTG    Cardiopulmonary PT LTG, Date Established 09/26/17  -PC (r) MF (t) PC (c)      Cardiopulmonary PT LTG, Time to Achieve 1 wk  -PC (r) MF (t) PC (c)      Cardiopulmonary PT LTG, Level Level IV  -PC (r) MF (t) PC (c)        User Key  (r) = Recorded By, (t) = Taken By, (c) = Cosigned By    Initials Name Provider Type    LB Barby Crawford, PT Physical Therapist    PC Kymberly Sewell, PT Physical Therapist     Nicolás Shepard, PT Student PT Student          Physical Therapy Education     Title: PT OT SLP Therapies (Done)     Topic: Physical Therapy (Done)     Point: Mobility training (Done)    Learning Progress Summary    Learner Readiness Method Response Comment Documented by Status   Patient Acceptance E,TB,D VU,DU  LS 10/01/17 1021 Done    Acceptance E,D VU,DU safety during transfers and gait LC 09/30/17 1131 Done    Acceptance E NR  EM 09/29/17 0841 Active    Acceptance E NR  EM 09/28/17 1122 Active    Acceptance E,D VU,NR  EJ 09/27/17 1650 Done    Acceptance E,D NR   09/26/17 0913 Active    Eager E,TB VU,DU  JT 09/24/17 0942 Done    Acceptance E VU,NR  LB 09/22/17 1028 Done    Acceptance E VU,NR  LB1 09/21/17 1144 Done    Acceptance E VU,NR  LB1 09/20/17 1146 Done    Acceptance E VU Discussed home environment.  Home is handicapped accessible.  Patient can stay on main level.  Has rollator at home. KD 09/19/17 1352 Done    Acceptance E VU,NR  LB 09/18/17 1524 Done    Acceptance E VU,NR  KH 09/17/17 1639 Done    Acceptance E VU,NR  KH 09/16/17 1320 Done    Acceptance E VU  KH 09/11/17 1413 Done    Acceptance E VU  VY 09/10/17 1103 Done    Acceptance E VU,NR  VY 09/09/17 1139 Done    Acceptance E VU,NR  LH 09/08/17 1526 Done   Family Acceptance E VU Discussed home environment.  Home is handicapped accessible.   Patient can stay on main level.  Has rollator at home.  09/19/17 1352 Done               Point: Home exercise program (Done)    Learning Progress Summary    Learner Readiness Method Response Comment Documented by Status   Patient Acceptance E,TB,D VU,DU  LS 10/01/17 1021 Done    Acceptance E NR  EM 09/29/17 0841 Active    Acceptance E NR  EM 09/28/17 1122 Active    Acceptance E,D NR   09/26/17 0913 Active    Eager E,TB VU,DU  JT 09/24/17 0942 Done    Acceptance E,TB VU,DU  JT 09/23/17 1517 Done    Acceptance E VU,NR   09/17/17 1639 Done    Acceptance E VU,NR   09/16/17 1320 Done    Acceptance E VU   09/11/17 1413 Done    Acceptance E VU   09/10/17 1103 Done    Acceptance E VU,NR   09/09/17 1139 Done    Acceptance E VU,NR   09/08/17 1526 Done               Point: Body mechanics (Done)    Learning Progress Summary    Learner Readiness Method Response Comment Documented by Status   Patient Acceptance E,TB,D VU,DU  LS 10/01/17 1021 Done    Acceptance E,D NR   09/26/17 0913 Active    Acceptance E VU   09/10/17 1103 Done    Acceptance E VU,NR   09/09/17 1139 Done    Acceptance E VU,NR   09/08/17 1526 Done               Point: Precautions (Done)    Learning Progress Summary    Learner Readiness Method Response Comment Documented by Status   Patient Acceptance E,TB,D VU,DU  LS 10/01/17 1021 Done    Acceptance E,D VU,NR   09/27/17 1650 Done    Acceptance E,D NR   09/26/17 0913 Active    Acceptance E VU  VY 09/10/17 1103 Done    Acceptance E VU,NR   09/09/17 1139 Done    Acceptance E VU,NR   09/08/17 1526 Done                      User Key     Initials Effective Dates Name Provider Type Discipline     05/18/15 -  Karen Clinton, PT Physical Therapist PT     10/06/15 -  Barby Crawford, PT Physical Therapist PT    KD 10/06/15 -  Alicia De Anda, PT Physical Therapist PT     02/07/17 -  Kandi Buckner, PT Physical Therapist PT    LB1 02/18/16 -  Kandi Loomis, PTA Physical Therapy  Assistant PT    EM 12/01/15 -  Matilde Bone, PT Physical Therapist PT    JT 12/01/15 -  Taylor Teran, PT Physical Therapist PT    EJ 04/21/17 -  Tonia Gonzalez, PT Physical Therapist PT    LC 08/02/16 -  Davin Negron, PT DPT Physical Therapist PT    LS 06/14/16 -  Tonja Liriano, PT Physical Therapist PT    VY 04/24/15 -  Kole Ortez, PTA Physical Therapy Assistant PT    MF 09/18/17 -  Nicolás Shepard, PT Student PT Student PT                    PT Recommendation and Plan  Anticipated Discharge Disposition: skilled nursing facility  Planned Therapy Interventions: balance training, bed mobility training, gait training, strengthening  PT Frequency: daily  Plan of Care Review  Plan Of Care Reviewed With: patient  Outcome Summary/Follow up Plan: Pt ambulated within room to bathroom using RW with FF posture and dec step length/gait speed. She fatigued quickly with one standing rest break required prior to entering bathroom. Due to inc time required in bathroom, nsg aide assisted with return to bed.           Outcome Measures       10/01/17 1000 09/30/17 1100 09/29/17 0800    How much help from another person do you currently need...    Turning from your back to your side while in flat bed without using bedrails? 3  -LS 3  -LC 3  -EM    Moving from lying on back to sitting on the side of a flat bed without bedrails? 2  -LS 3  -LC 3  -EM    Moving to and from a bed to a chair (including a wheelchair)? 3  -LS 3  -LC 3  -EM    Standing up from a chair using your arms (e.g., wheelchair, bedside chair)? 2  -LS 3  -LC 3  -EM    Climbing 3-5 steps with a railing? 2  -LS 3  -LC 3  -EM    To walk in hospital room? 3  -LS 3  -LC 3  -EM    AM-PAC 6 Clicks Score 15  -LS 18  -LC 18  -EM    Functional Assessment    Outcome Measure Options AM-PAC 6 Clicks Basic Mobility (PT)  -LS AM-PAC 6 Clicks Basic Mobility (PT)  -LC       09/28/17 1100          How much help from another person do you currently need...    Turning  from your back to your side while in flat bed without using bedrails? 3  -EM      Moving from lying on back to sitting on the side of a flat bed without bedrails? 3  -EM      Moving to and from a bed to a chair (including a wheelchair)? 3  -EM      Standing up from a chair using your arms (e.g., wheelchair, bedside chair)? 3  -EM      Climbing 3-5 steps with a railing? 2  -EM      To walk in hospital room? 3  -EM      AM-PAC 6 Clicks Score 17  -EM        User Key  (r) = Recorded By, (t) = Taken By, (c) = Cosigned By    Initials Name Provider Type    EM Matilde Bone, PT Physical Therapist    ABHILASH Negron, PT DPT Physical Therapist    SYED Liriano PT Physical Therapist           Time Calculation:         PT Charges       10/01/17 1021          Time Calculation    Start Time 0830  -LS      Stop Time 0845  -LS      Time Calculation (min) 15 min  -LS      PT Received On 10/01/17  -LS      PT - Next Appointment 10/02/17  -        User Key  (r) = Recorded By, (t) = Taken By, (c) = Cosigned By    Initials Name Provider Type    SYED Liriano PT Physical Therapist          Therapy Charges for Today     Code Description Service Date Service Provider Modifiers Qty    64973641451 HC PT THER PROC EA 15 MIN 10/1/2017 Tonja Liriano, PT GP 1    50966877942 HC PT THER SUPP EA 15 MIN 10/1/2017 Tonja Liriano PT GP 1          PT G-Codes  Outcome Measure Options: AM-PAC 6 Clicks Basic Mobility (PT)    Tonja Liriano PT  10/1/2017

## 2017-10-01 NOTE — PROGRESS NOTES
" LOS: 25 days   Patient Care Team:  Ricky Hoyos III, MD as PCP - General  Ricky Hoyos III, MD as PCP - Family Medicine  Anthony Hernández MD as Consulting Physician (Pulmonary Disease)  Christy Jerez MD as Consulting Physician (Dermatology)    Chief Complaint: post op f/u    Subjective:  Symptoms:  Improved.  No shortness of breath or chest pain.    Diet:  No nausea or vomiting.    Activity level: Returning to normal.    Pain:  Pain is well controlled.          Vital Signs  Temp:  [98 °F (36.7 °C)-98.7 °F (37.1 °C)] 98.3 °F (36.8 °C)  Heart Rate:  [86-96] 86  Resp:  [18] 18  BP: (136-177)/(46-67) 136/46  Body mass index is 31.62 kg/(m^2).    Intake/Output Summary (Last 24 hours) at 10/01/17 0841  Last data filed at 10/01/17 0539   Gross per 24 hour   Intake              480 ml   Output                0 ml   Net              480 ml              Last 3 weights    09/29/17  0100 09/30/17  0615 10/01/17  0500   Weight: 186 lb 3.2 oz (84.5 kg) 184 lb 1.6 oz (83.5 kg) 184 lb 4.8 oz (83.6 kg)         Objective:  General Appearance:  Comfortable and in no acute distress.    Vital signs: (most recent): Blood pressure 136/46, pulse 86, temperature 98.3 °F (36.8 °C), temperature source Oral, resp. rate 18, height 64.02\" (162.6 cm), weight 184 lb 4.8 oz (83.6 kg), SpO2 95 %.    Lungs:  Normal effort.  She is not in respiratory distress.    Chest: Symmetric chest wall expansion.   Abdomen: There is no abdominal tenderness.     Extremities: Normal range of motion.    Pulses: Distal pulses are intact.    Neurological: Patient is alert and oriented to person, place and time.    Pupils:  Pupils are equal, round, and reactive to light.    Skin:  Warm and dry.              Results Review:        WBC WBC   Date Value Ref Range Status   10/01/2017 10.28 4.50 - 10.70 10*3/mm3 Final   09/30/2017 10.31 4.50 - 10.70 10*3/mm3 Final   09/29/2017 10.40 4.50 - 10.70 10*3/mm3 Final      HGB Hemoglobin   Date Value Ref Range " Status   10/01/2017 7.4 (L) 11.9 - 15.5 g/dL Final   09/30/2017 8.0 (L) 11.9 - 15.5 g/dL Final   09/29/2017 7.5 (L) 11.9 - 15.5 g/dL Final      HCT Hematocrit   Date Value Ref Range Status   10/01/2017 22.4 (L) 35.6 - 45.5 % Final   09/30/2017 24.9 (L) 35.6 - 45.5 % Final   09/29/2017 23.3 (L) 35.6 - 45.5 % Final      Platelets Platelets   Date Value Ref Range Status   10/01/2017 268 140 - 500 10*3/mm3 Final   09/30/2017 250 140 - 500 10*3/mm3 Final   09/29/2017 198 140 - 500 10*3/mm3 Final        PT/INR:  No results found for: PROTIME/No results found for: INR    Sodium Sodium   Date Value Ref Range Status   10/01/2017 133 (L) 136 - 145 mmol/L Final   09/30/2017 132 (L) 136 - 145 mmol/L Final   09/29/2017 130 (L) 136 - 145 mmol/L Final      Potassium Potassium   Date Value Ref Range Status   10/01/2017 3.1 (L) 3.5 - 5.2 mmol/L Final   09/30/2017 3.7 3.5 - 5.2 mmol/L Final   09/29/2017 4.1 3.5 - 5.2 mmol/L Final      Chloride Chloride   Date Value Ref Range Status   10/01/2017 96 (L) 98 - 107 mmol/L Final   09/30/2017 96 (L) 98 - 107 mmol/L Final   09/29/2017 96 (L) 98 - 107 mmol/L Final      Bicarbonate CO2   Date Value Ref Range Status   10/01/2017 21.9 (L) 22.0 - 29.0 mmol/L Final   09/30/2017 21.6 (L) 22.0 - 29.0 mmol/L Final   09/29/2017 20.4 (L) 22.0 - 29.0 mmol/L Final      BUN BUN   Date Value Ref Range Status   10/01/2017 59 (H) 8 - 23 mg/dL Final   09/30/2017 58 (H) 8 - 23 mg/dL Final   09/29/2017 56 (H) 8 - 23 mg/dL Final      Creatinine Creatinine   Date Value Ref Range Status   10/01/2017 1.34 (H) 0.57 - 1.00 mg/dL Final   09/30/2017 1.40 (H) 0.57 - 1.00 mg/dL Final   09/29/2017 1.86 (H) 0.57 - 1.00 mg/dL Final      Calcium Calcium   Date Value Ref Range Status   10/01/2017 8.9 8.6 - 10.5 mg/dL Final   09/30/2017 9.1 8.6 - 10.5 mg/dL Final   09/29/2017 8.9 8.6 - 10.5 mg/dL Final      Magnesium Magnesium   Date Value Ref Range Status   09/30/2017 2.0 1.6 - 2.4 mg/dL Final            amLODIPine 5 mg Oral  Q24H   aspirin 81 mg Oral Daily   atorvastatin 40 mg Oral Nightly   budesonide-formoterol 2 puff Inhalation BID - RT   bumetanide 2 mg Oral BID   carvedilol 6.25 mg Oral Q12H   citalopram 40 mg Oral Nightly   enoxaparin 30 mg Subcutaneous Daily   pantoprazole 40 mg Oral Daily   Or      famotidine 20 mg Intravenous Daily   hydrALAZINE 10 mg Oral Q8H   mupirocin  Each Nare Daily   sennosides-docusate sodium 2 tablet Oral Nightly       sodium chloride 30 mL/hr Last Rate: Stopped (09/26/17 0810)           Patient Active Problem List   Diagnosis Code   • OA (osteoarthritis) of knee M17.10   • Hyponatremia E87.1   • 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   • Hematochezia K92.1   • Colon polyp K63.5   • Essential hypertension I10   • Gastroesophageal reflux disease K21.9   • Hyperlipidemia E78.5   • Intestinal malabsorption K90.9   • Adverse effect of iron T45.4X5A   • Iron deficiency anemia D50.9   • Gastrointestinal hemorrhage K92.2   • Paroxysmal atrial fibrillation I48.0   • Weakness R53.1   • Bilateral edema of lower extremity R60.0   • DNR (do not resuscitate) Z66   • KESHIA (acute kidney injury) N17.9   • Mitral regurgitation I34.0   • Bilateral carotid artery stenosis I65.23   • Mitral valve insufficiency I34.0       Assessment & Plan     Mitral incompetence-- s/p Mitral valve repair with a 26 mm Med[employer]onic 3-D rigid ring, Tricuspid valve repair with a 26 mm triad band, Cryo-maze POD#6-- [place] on ASA, BB, Statin  POD #2 PPM  Cadiomyopathy  EF 30%- holding ACE d/t KESHIA  HTN-norvasc started per cards -- may need to increase  HLD- on statin  Pulmonary HTN  RIGHT > LEFT CAROTID STENOSIS----plan CEA after valve surgery  KESHIA on CKD III- improving   Leukocytosis- resolved   Deconditioned - PT consulted  UTI- enterococcus faecalis 9/16  Pre op anemia KATHLEEN, on iron -> hgb 7.4 this am - pt asymptomatic, VSS   Hyponatremia-130 (131)- renal following  Pre-op afib- holding ac d/t history of GI bleed  Stool + for occult blood - treatment as per  primary     Routine care  Mobilize   Pulmonary toilet    Hgb 7.4 this am -> d/w Dr. Hill - pt asymptomatic, VSS      Aviva Hays, APRN  10/01/17  8:41 AM    Will try to keep -140 mmHg.  If hemoglobin falls further tomorrow she will require a blood transfusion.  For now, she is asymptomatic in regards to her anemia.

## 2017-10-01 NOTE — PROGRESS NOTES
Hospital Follow Up    LOS:  LOS: 25 days   Patient Name: Raquel Deluna  Age/Sex: 82 y.o. female  : 1935  MRN: [id number]    Date of Hospital Visit: 10/01/17  Length of Stay: 25  Encounter Provider: Benji Martínez MD  Place of Service: [facility] CARDIOLOGY    Subjective:     Follow Up for: Mitral regurgitation, postoperative heart block    Interval History: No major complaints today.  Normal pacemaker function she remains in sinus rhythm with paced ventricular response  Objective:     Objective:  Temp:  [98 °F (36.7 °C)-98.7 °F (37.1 °C)] 98.3 °F (36.8 °C)  Heart Rate:  [84-96] 84  Resp:  [18] 18  BP: (136-177)/(46-67) 136/46  Body mass index is 31.62 kg/(m^2).    Intake/Output Summary (Last 24 hours) at 10/01/17 1117  Last data filed at 10/01/17 0539   Gross per 24 hour   Intake              480 ml   Output                0 ml   Net              480 ml     Last 3 weights    17  0100 17  0615 10/01/17  0500   Weight: 186 lb 3.2 oz (84.5 kg) 184 lb 1.6 oz (83.5 kg) 184 lb 4.8 oz (83.6 kg)     Weight change: 3.2 oz (0.091 kg)    Physical Exam:   General Appearance: Alert, cooperative, in no acute distress. AAOx4.   HEENT: Normocephalic.  Neck: Supple. No JVD. No Carotid bruit. No thyromegaly  Lungs: CTAB. Normal respiratory effort and rate.  Heart:: Regular rate and rhythm, normal S1 and S2, no murmurs, gallops or rubs.  Abdomen: Soft, nontender, non-distended. positive bowel sounds  Extremities: Warm, no cyanosis, or clubbing. No edema.     Lab Review:     Results from last 7 days  Lab Units 10/01/17  0330 17  0323 17  0348   SODIUM mmol/L 133* 132* 130*   POTASSIUM mmol/L 3.1* 3.7 4.1   CHLORIDE mmol/L 96* 96* 96*   CO2 mmol/L 21.9* 21.6* 20.4*   BUN mg/dL 59* 58* 56*   CREATININE mg/dL 1.34* 1.40* 1.86*   GLUCOSE mg/dL 105* 114* 113*   CALCIUM mg/dL 8.9 9.1 8.9             Results from last 7 days  Lab Units 10/01/17  0330 17  0323   WBC  10*3/mm3 10.28 10.31   HEMOGLOBIN g/dL 7.4* 8.0*   HEMATOCRIT % 22.4* 24.9*   PLATELETS 10*3/mm3 268 250       Results from last 7 days  Lab Units 09/26/17  0304 09/25/17  1527   INR  1.39* 1.63*   APTT seconds  --  31.6       Results from last 7 days  Lab Units 09/30/17  0323 09/28/17  0419   MAGNESIUM mg/dL 2.0 2.0                     I reviewed the patient's new clinical results.          I personally viewed and interpreted the patient's EKG/Telemetry data.  Current Medications:   Scheduled Meds:  amLODIPine 5 mg Oral Q24H   aspirin 81 mg Oral Daily   atorvastatin 40 mg Oral Nightly   budesonide-formoterol 2 puff Inhalation BID - RT   bumetanide 2 mg Oral BID   carvedilol 6.25 mg Oral Q12H   citalopram 40 mg Oral Nightly   enoxaparin 30 mg Subcutaneous Daily   pantoprazole 40 mg Oral Daily   Or      famotidine 20 mg Intravenous Daily   hydrALAZINE 10 mg Oral Q8H   iron sucrose 300 mg Intravenous Once   mupirocin  Each Nare Daily   potassium chloride 20 mEq Oral BID With Meals   sennosides-docusate sodium 2 tablet Oral Nightly     Continuous Infusions:  sodium chloride 30 mL/hr Last Rate: Stopped (09/26/17 0810)       Allergies:  Allergies   Allergen Reactions   • Sulfa Antibiotics Other (See Comments)     Yeast infections       Assessment & Plan     Principal Problem:    Mitral valve insufficiency  Active Problems:    Hyponatremia    Essential hypertension    Iron deficiency anemia    Paroxysmal atrial fibrillation    Weakness    Bilateral edema of lower extremity    DNR (do not resuscitate)    KESHIA (acute kidney injury)    Mitral regurgitation    Bilateral carotid artery stenosis          Plan: Continue postoperative care      Benji Martínez MD  10/01/17

## 2017-10-01 NOTE — CONSULTS
Patient Care Team: Ricky Hoyos III, MD as PCP - General Ricky Hoyos III, MD as PCP - Family Medicine Anthony Hernández MD as Consulting Physician (Pulmonary Disease) Christy Jerez MD as Consulting Physician (Dermatology)     CHIEF COMPLAINT: Iron Deficiency Anemia     HISTORY OF PRESENT ILLNESS: 83 yo Pt of Dr Bentley who is S/P Mitral valve Repair and has CM 30%EF CKD was evaluated in Feb 2017 with EGD-Gastric ulcers-HP negative, and colonoscopy w single Hyperplastic polyp and Angiodysplasias- not addressed Pt with long histoy of being on iron and tolerates but says it doesn't work. Is receiving PRBCs and Venofer here No GI complaints. Is up in chair and conversant.     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 • Cellulitis left leg hx about a month ago • Chronic bronchitis • Colon polyp 3/14/2016 • Essential hypertension 8/24/2013 Overview: 2015 IMO UPDATE • GERD (gastroesophageal reflux disease) • Hiatal hernia • History of goiter • History of transfusion had reaction to 2nd unit after receiving the 1st • Hyperlipidemia • Hypertension • Hyponatremia • Intestinal malabsorption 3/31/2016 • Iron deficiency anemia 03/14/2016 receives iron infusion in past • Migraine • AMBER treated with BiPAP • Osteoporosis • PONV (postoperative nausea and vomiting) • Sleep apnea cpap • Stress incontinence wears pads     Past Surgical History: Procedure Laterality Date • APPENDECTOMY • CARDIAC CATHETERIZATION N/A 9/13/2017 Procedure: Left ventriculography; Surgeon: Ron Moscoso MD; Location: [facility] INVASIVE LOCATION; Service: • CARDIAC CATHETERIZATION N/A 9/13/2017 Procedure: Left Heart Cath; Surgeon: Ron Moscoso MD; Location: [facility] CATH INVASIVE LOCATION; Service: • CARDIAC CATHETERIZATION N/A 9/13/2017 Procedure: Coronary angiography; Surgeon: Ron Moscoso MD; Location: [facility]  CATH INVASIVE LOCATION; Service: • CARDIAC CATHETERIZATION N/A 9/13/2017 Procedure: Right Heart Cath; Surgeon: Ron Moscoso MD; Location: [facility] CATH INVASIVE LOCATION; Service: • CERVICAL DISC SURGERY • COLONOSCOPY • COLONOSCOPY N/A 2/3/2017 Procedure: COLONOSCOPY TO CECUM AND TERMINAL ILEUM WITH COLD AND HOT SNARE POLYPECTOMIES; Surgeon: Josef Mccoy MD; Location: [facility] ENDOSCOPY; Service: • ENDOSCOPY N/A 2/2/2017 Procedure: ESOPHAGOGASTRODUODENOSCOPY with biopsies; Surgeon: Josef Mccoy MD; Location: [facility] ENDOSCOPY; Service: • HYSTERECTOMY • MITRAL VALVE REPAIR/REPLACEMENT N/A 9/25/2017 Procedure: KOLTON STERNOTOMY MITRAL VALVE REPLACEMENT, TRICUSPID VALVE REPAIR, MAZE PROCEDURE AND PRP; Surgeon: Yonas Johnson MD; Location: [facility] MAIN OR; Service: • TX TOTAL KNEE ARTHROPLASTY Right 10/25/2016 Procedure: RT TOTAL KNEE ARTHROPLASTY; Surgeon: Ricky Dsouza MD; Location: [facility] OR; Service: Orthopedics • THYROID SURGERY for goiter • TONSILLECTOMY     Family History Problem Relation Age of Onset • Adopted: Yes Social History Substance Use Topics • Smoking status: Former Smoker Packs/day: 4.00 Years: 33.00 Types: Cigarettes Quit date: 1980 • Smokeless tobacco: Never Used • Alcohol use Yes Comment: once a month a glass of wine or mixed drink     Prescriptions Prior to Admission Medication Sig Dispense Refill Last Dose • amLODIPine (NORVASC) 10 MG tablet Take 1 tablet by mouth Daily. 30 tablet 3 9/6/2017 at Unknown time • atorvastatin (LIPITOR) 20 MG tablet Take 20 mg by mouth Daily. 9/6/2017 at Unknown time • budesonide-formoterol (SYMBICORT) 160-4.5 MCG/ACT inhaler Inhale 2 puffs 2 (Two) Times a Day. 9/6/2017 at Unknown time • CALCIUM CARBONATE-VITAMIN D PO Take 1 tablet by mouth Daily. 9/6/2017 at Unknown time • citalopram (CeleXA) 40 MG tablet Take 40 mg by mouth Every Night. 9/6/2017 at Unknown time • esomeprazole (NexIUM) 40 MG capsule Take 40 mg by mouth Daily Before Supper. 9/6/2017  "at Unknown time • meloxicam (MOBIC) 15 MG tablet Take 15 mg by mouth Daily. 9/6/2017 at Unknown time • metoprolol tartrate (LOPRESSOR) 100 MG tablet Take 50 mg by mouth 2 (Two) Times a Day. 60 tablet 11 9/6/2017 at Unknown time • mometasone (NASONEX) 50 MCG/ACT nasal spray 2 sprays into each nostril Daily. 9/5/2017 at Unknown time • Multiple Vitamin (MULTIVITAMIN) capsule Take 1 capsule by mouth Daily. 9/6/2017 at Unknown time • potassium chloride (KLOR-CON) 8 MEQ CR tablet Take 1 tablet by mouth Daily. 30 tablet 3 9/6/2017 at Unknown time • triamterene-hydrochlorothiazide (MAXZIDE-25) 37.5-25 MG per tablet Take 1 tablet by mouth Daily. 9/6/2017 at Unknown time • vitamin B-6 (PYRIDOXINE) 50 MG tablet Take 50 mg by mouth Daily. 9/5/2017 at Unknown time • B Complex Vitamins (VITAMIN B COMPLEX PO) Take 1 tablet by mouth Daily. Taking • FERREX 150 150 MG capsule TAKE 1 CAPSULE EVERY DAY 0 Taking • furosemide (LASIX) 20 MG tablet Take 1 tablet by mouth Daily. 30 tablet 3 Taking • losartan (COZAAR) 50 MG tablet Take 1 tablet by mouth Daily. 90 tablet 3 Allergies: Sulfa antibiotics       REVIEW OF SYSTEMS: Please see the above history of present illness for pertinent positives and negatives. The remainder of the patient's systems have been reviewed and are negative. Vital Signs Temp: [98 °F (36.7 °C)-98.3 °F (36.8 °C)] 98 °F (36.7 °C) Heart Rate: [84-96] 87 Resp: [18] 18 BP: (136-177)/(46-67) 151/60 Flowsheet Rows First Filed Value Admission Height 64\" (162.6 cm) Documented at 09/06/2017 1506 Admission Weight 204 lb (92.5 kg) Documented at 09/06/2017 1506     Physical Exam: Physical Exam Constitutional: Patient appears well-developed and well-nourished and in no acute distress HEENT: Head: Normocephalic and atraumatic. Eyes: Pupils are equal, round, and reactive to light. EOM are intact. Sclera are anicteric and non-injected. Mouth and Throat: Patient has moist mucous membranes. Oropharynx is clear of any erythema or " exudate. Neck: Neck supple. No JVD present. No thyromegaly present. No lymphadenopathy present. Cardiovascular: Regular rate, regular rhythm, S1 normal and S2 normal. Exam reveals no gallop and no friction rub. No murmur heard. Pulmonary/Chest: Lungs are clear to auscultation bilaterally. No respiratory distress. No wheezes. No rhonchi. No rales. Abdominal: Soft. Bowel sounds are normal. No distension and no mass. There is no hepatosplenomegaly. There is no tenderness. Musculoskeletal: Normal Muscle tone Extremities: No edema. Pulses are palpable in all 4 extremities. Neurological: Patient is alert and oriented to person, place, and time. Cranial nerves II-XII are grossly intact with no focal deficits. Skin: Skin is warm. No rash noted. Nails show no clubbing. No cyanosis or erythema.     Results Review: I reviewed the patient's new clinical results. Lab Results (most recent) Procedure Component Value Units Date/Time CBC & Differential [[id number]] Collected: 09/06/17 1603 Specimen: Blood Updated: 09/06/17 1629 Narrative: The following orders were created for panel order CBC & Differential. Procedure Abnormality Status --------- ----------- ------ CBC Auto Differential[[id number]] Abnormal Final result Please view results for these tests on the individual orders. CBC Auto Differential [[id number]] (Abnormal) Collected: 09/06/17 1603 Specimen: Blood Updated: 09/06/17 1629 WBC 10.57 10*3/mm3 RBC 3.82 (L) 10*6/mm3 Hemoglobin 9.6 (L) g/dL Hematocrit 31.4 (L) % MCV 82.2 fL MCH 25.1 (L) pg MCHC 30.6 (L) g/dL RDW 19.3 (H) % RDW-SD 48.2 fl MPV 9.0 fL Platelets 385 10*3/mm3 Neutrophil % 82.2 (H) % Lymphocyte % 7.7 (L) % Monocyte % 8.1 % Eosinophil % 1.2 % Basophil % 0.4 % Immature Grans % 0.4 % Neutrophils, Absolute 8.69 (H) 10*3/mm3 Lymphocytes, Absolute 0.81 (L) 10*3/mm3 Monocytes, Absolute 0.86 10*3/mm3 Eosinophils, Absolute 0.13 10*3/mm3 Basophils, Absolute 0.04 10*3/mm3 Immature Grans, Absolute 0.04 (H) 10*3/mm3  Comprehensive Metabolic Panel [[id number]] (Abnormal) Collected: 09/06/17 1603 Specimen: Blood Updated: 09/06/17 1650 Glucose 127 (H) mg/dL BUN 26 (H) mg/dL Creatinine 1.39 (H) mg/dL Sodium 134 (L) mmol/L Potassium 4.8 mmol/L Chloride 95 (L) mmol/L CO2 25.8 mmol/L Calcium 9.9 mg/dL Total Protein 6.9 g/dL Albumin 3.30 (L) g/dL ALT (SGPT) 26 U/L AST (SGOT) 20 U/L Alkaline Phosphatase 110 U/L Total Bilirubin 0.7 mg/dL eGFR Non African Amer 36 (L) mL/min/1.73 Globulin 3.6 gm/dL A/G Ratio 0.9 g/dL BUN/Creatinine Ratio 18.7 Anion Gap 13.2 mmol/L Narrative: The MDRD GFR formula is only valid for adults with stable renal function between ages 18 and 70. Troponin [[id number]] (Normal) Collected: 09/06/17 1603 Specimen: Blood Updated: 09/06/17 1650 Troponin T <0.010 ng/mL Narrative: Troponin T Reference Ranges: Less than 0.03 ng/mL: Negative for AMI 0.03 to 0.09 ng/mL: Indeterminant for AMI Greater than 0.09 ng/mL: Positive for AMI Magnesium [[id number]] (Normal) Collected: 09/06/17 1603 Specimen: Blood Updated: 09/06/17 1650 Magnesium 2.0 mg/dL Light Blue Top [[id number]] Collected: 09/06/17 1603 Specimen: Blood Updated: 09/06/17 1801 Extra Tube hold for add-on Auto resulted Gold Top - SST [[id number]] Collected: 09/06/17 1603 Specimen: Blood Updated: 09/06/17 1801 Extra Tube Hold for add-ons. Auto resulted. [place] Draw [[id number]] Collected: 09/06/17 1603 Specimen: Blood Updated: 09/06/17 1801 Narrative: The following orders were created for panel order [place] Draw. Procedure Abnormality Status --------- ----------- ------ Light Blue Top[[id number]] Final result Green Top (Gel)[[id number]] Final result Lavender Top[[id number]] Final result Gold Top - SST[[id number]] Final result Please view results for these tests on the individual orders. Green Top (Gel) [[id number]] Collected: 09/06/17 1603 Specimen: Blood Updated: 09/06/17 1801 Extra Tube Hold for add-ons. Auto resulted. Lavender Top [[id number]] Collected: 09/06/17 1603  Specimen: Blood Updated: 09/06/17 1801 Extra Tube hold for add-on Auto resulted C-reactive Protein [[id number]] (Abnormal) Collected: 09/06/17 2036 Specimen: Blood Updated: 09/06/17 2109 C-Reactive Protein 1.65 (H) mg/dL Phosphorus [[id number]] (Normal) Collected: 09/06/17 2036 Specimen: Blood Updated: 09/06/17 2109 Phosphorus 4.2 mg/dL Sedimentation Rate [[id number]] (Abnormal) Collected: 09/06/17 2036 Specimen: Blood Updated: 09/06/17 2111 Sed Rate 49 (H) mm/hr TSH [[id number]] (Normal) Collected: 09/06/17 2036 Specimen: Blood Updated: 09/06/17 2116 TSH 1.790 mIU/mL CBC (No Diff) [[id number]] (Abnormal) Collected: 09/07/17 0544 Specimen: Blood Updated: 09/07/17 0615 WBC 10.65 10*3/mm3 RBC 3.85 (L) 10*6/mm3 Hemoglobin 10.0 (L) g/dL Hematocrit 31.8 (L) % MCV 82.6 fL MCH 26.0 (L) pg MCHC 31.4 (L) g/dL RDW 19.6 (H) % RDW-SD 48.4 fl MPV 9.3 fL Platelets 374 10*3/mm3 Iron Profile [[id number]] (Abnormal) Collected: 09/07/17 0544 Specimen: Blood Updated: 09/07/17 0634 Iron 56 mcg/dL Iron Saturation 13 (L) % Transferrin 285 mg/dL TIBC 425 mcg/dL Basic Metabolic Panel [[id number]] (Abnormal) Collected: 09/07/17 0544 MCHC 32.0 (L) g/dL RDW 20.5 (H) % RDW-SD 60.3 (H) fl MPV 9.1 fL Platelets 320 10*3/mm3 Neutrophil % 75.0 % Lymphocyte % 7.7 (L) % Monocyte % 12.6 (H) % Eosinophil % 4.0 % Basophil % 0.4 % Immature Grans % 0.3 % Neutrophils, Absolute 7.08 10*3/mm3 Lymphocytes, Absolute 0.73 (L) 10*3/mm3 Monocytes, Absolute 1.19 10*3/mm3 Eosinophils, Absolute 0.38 10*3/mm3 Basophils, Absolute 0.04 10*3/mm3 Immature Grans, Absolute 0.03 10*3/mm3 Renal Function Panel [[id number]] (Abnormal) Collected: 09/19/17 0451 Specimen: Blood Updated: 09/19/17 0552 Glucose 96 mg/dL BUN 41 (H) mg/dL Creatinine 3.11 (H) mg/dL Sodium 129 (L) mmol/L Potassium 3.4 (L) mmol/L Chloride 87 (L) mmol/L CO2 26.1 mmol/L Calcium 8.9 mg/dL Albumin 3.00 (L) g/dL Phosphorus 5.2 (H) mg/dL Anion Gap 15.9 mmol/L BUN/Creatinine Ratio 13.2 eGFR Non  Amer 14 (L)  mL/min/1.73 Narrative: The MDRD GFR formula is only valid for adults with stable renal function between ages 18 and 70. Uric Acid [[id number]] (Abnormal) Collected: 09/19/17 0451 Specimen: Blood Updated: 09/19/17 0552 Uric Acid 10.9 (H) mg/dL Magnesium [[id number]] (Normal) Collected: 09/19/17 0451 Specimen: Blood Updated: 09/19/17 0552 Magnesium 1.7 mg/dL CBC & Differential [[id number]] Collected: 09/20/17 0451 Specimen: Blood Updated: 09/20/17 0516 Narrative: The following orders were created for panel order CBC & Differential. Procedure Abnormality Status --------- ----------- ------ CBC Auto Differential[[id number]] Abnormal Final result Please view results for these tests on the individual orders. CBC Auto Differential [[id number]] (Abnormal) Collected: 09/20/17 0451 Specimen: Blood Updated: 09/20/17 0516 WBC 9.47 10*3/mm3 RBC 3.62 (L) 10*6/mm3 Hemoglobin 9.5 (L) g/dL Hematocrit 30.0 (L) % MCV 82.9 fL MCH 26.2 (L) pg MCHC 31.7 (L) g/dL RDW 20.5 (H) % RDW-SD 60.9 (H) fl MPV 9.2 fL Platelets 357 10*3/mm3 Neutrophil % 75.4 % Lymphocyte % 8.1 (L) % Monocyte % 13.0 (H) % Eosinophil % 2.7 % Basophil % 0.4 % Immature Grans % 0.4 % Neutrophils, Absolute 7.13 10*3/mm3 Lymphocytes, Absolute 0.77 (L) 10*3/mm3 Monocytes, Absolute 1.23 (H) 10*3/mm3 Eosinophils, Absolute 0.26 10*3/mm3 Basophils, Absolute 0.04 10*3/mm3 Immature Grans, Absolute 0.04 (H) 10*3/mm3 Comprehensive Metabolic Panel [[id number]] (Abnormal) Collected: 09/20/17 0451 Specimen: Blood Updated: 09/20/17 0541 Glucose 105 (H) mg/dL BUN 41 (H) mg/dL Creatinine 2.22 (H) mg/dL Sodium 134 (L) mmol/L Potassium 3.4 (L) mmol/L Chloride 87 (L) mmol/L CO2 32.4 (H) mmol/L Calcium 9.4 mg/dL Total Protein 6.7 g/dL Albumin 3.20 (L) g/dL ALT (SGPT) 36 (H) U/L AST (SGOT) 22 U/L Alkaline Phosphatase 107 U/L Total Bilirubin 0.4 mg/dL eGFR Non African Amer 21 (L) mL/min/1.73 Globulin 3.5 gm/dL A/G Ratio 0.9 g/dL BUN/Creatinine Ratio 18.5 Anion Gap 14.6 mmol/L Narrative: The MDRD GFR  formula is only valid for adults with stable renal function between ages 18 and 70. CBC & Differential [[id number]] Collected: 09/21/17 0414 Specimen: Blood Updated: 09/21/17 0522 Narrative: The following orders were created for panel order CBC & Differential. Procedure Abnormality Status --------- ----------- ------ CBC Auto Differential[[id number]] Abnormal Final result Please view results for these tests on the individual orders. CBC Auto Differential [[id number]] (Abnormal) Collected: 09/21/17 0414 Specimen: Blood Updated: 09/21/17 0522 WBC 9.66 10*3/mm3 RBC 3.65 (L) 10*6/mm3 Hemoglobin 9.6 (L) g/dL Hematocrit 30.0 (L) % MCV 82.2 fL MCH 26.3 (L) pg MCHC 32.0 (L) g/dL RDW 20.7 (H) % RDW-SD 61.0 (H) fl MPV 9.3 fL Platelets 372 10*3/mm3 Neutrophil % 70.7 % Lymphocyte % 10.7 (L) % Monocyte % 14.7 (H) % Eosinophil % 3.1 % Basophil % 0.5 % Immature Grans % 0.3 % Neutrophils, Absolute 6.83 10*3/mm3 Lymphocytes, Absolute 1.03 10*3/mm3 Monocytes, Absolute 1.42 (H) 10*3/mm3 Eosinophils, Absolute 0.30 10*3/mm3 Basophils, Absolute 0.05 10*3/mm3 Immature Grans, Absolute 0.03 10*3/mm3 Magnesium [[id number]] (Normal) Collected: 09/21/17 0414 Specimen: Blood Updated: 09/21/17 0540 Magnesium 1.7 mg/dL Comprehensive Metabolic Panel [[id number]] (Abnormal) Collected: 09/21/17 0414 Specimen: Blood Updated: 09/21/17 0540 Glucose 103 (H) mg/dL BUN 37 (H) mg/dL Creatinine 1.73 (H) mg/dL Sodium 133 (L) mmol/L Potassium 3.0 (L) mmol/L Chloride 85 (L) mmol/L CO2 37.1 (H) mmol/L Calcium 9.3 mg/dL Total Protein 6.7 g/dL Albumin 3.20 (L) g/dL ALT (SGPT) 31 U/L AST (SGOT) 21 U/L Alkaline Phosphatase 99 U/L Total Bilirubin 0.3 mg/dL eGFR Non African Amer 28 (L) mL/min/1.73 Globulin 3.5 gm/dL A/G Ratio 0.9 g/dL BUN/Creatinine Ratio 21.4 Anion Gap 10.9 mmol/L Narrative: The MDRD GFR formula is only valid for adults with stable renal function between ages 18 and 70. Potassium [[id number]] (Normal) Collected: 09/21/17 1240 Specimen: Blood Updated:  09/21/17 1319 Potassium 4.1 mmol/L BNP [[id number]] (Abnormal) Collected: 09/21/17 1240 Specimen: Blood Updated: 09/21/17 1522 proBNP 98302.0 (H) pg/mL Narrative: Among patients with dyspnea, NT-proBNP is highly sensitive for the detection of acute congestive heart failure. In addition NT-proBNP of <300 pg/ml effectively rules out acute congestive heart failure with 99% negative predictive value. Platelet Function ADP [[id number]] (Normal) Collected: 09/21/17 1514 Specimen: Blood Updated: 09/21/17 1523 ADP Aggregation, % Platelet 93 % Lipid Panel [[id number]] Collected: 09/21/17 1240 Specimen: Blood Updated: 09/21/17 1525 Total Cholesterol 162 mg/dL Triglycerides 109 mg/dL HDL Cholesterol 50 mg/dL LDL Cholesterol 90 mg/dL VLDL Cholesterol 21.8 mg/dL LDL/HDL Ratio 1.80 Narrative: Cholesterol Reference Ranges (U.S. Department of Health and Human Services ATP III Classifications) Desirable <200 mg/dL Borderline High 200-239 mg/dL High Risk >240 mg/dL Triglyceride Reference Ranges (U.S. Department of Health and Human Services ATP III Classifications) Normal <150 mg/dL Borderline High 150-199 mg/dL High 200-499 mg/dL Very High >500 mg/dL HDL Reference Ranges (U.S. Department of Health and Human Services ATP III Classifcations) Low <40 mg/dl (major risk factor for CHD) High >60 mg/dl ('negative' risk factor for CHD) LDL Reference Ranges (U.S. Department of Health and Human Services ATP III Classifcations) Optimal <100 mg/dL Near Optimal 100-129 mg/dL Borderline High 130-159 mg/dL High 160-189 mg/dL Very High >189 mg/dL Comprehensive Metabolic Panel [[id number]] (Abnormal) Collected: 09/21/17 1240 Specimen: Blood Updated: 09/21/17 1525 Glucose 123 (H) mg/dL BUN 37 (H) mg/dL Creatinine 1.84 (H) mg/dL Sodium 136 mmol/L Potassium 4.1 mmol/L Chloride 86 (L) mmol/L CO2 36.3 (H) mmol/L Calcium 9.3 mg/dL Total Protein 6.8 g/dL Albumin 3.50 g/dL ALT (SGPT) 31 U/L AST (SGOT) 23 U/L Alkaline Phosphatase 103 U/L Total Bilirubin 0.3 mg/dL  eGFR Non African Amer 26 (L) mL/min/1.73 Globulin 3.3 gm/dL A/G Ratio 1.1 g/dL BUN/Creatinine Ratio 20.1 Anion Gap 13.7 mmol/L Narrative: The MDRD GFR formula is only valid for adults with stable renal function between ages 18 and 70. CBC & Differential [[id number]] Collected: 09/21/17 1514 Specimen: Blood Updated: 09/21/17 1551 Narrative: The following orders were created for panel order CBC & Differential. Procedure Abnormality Status --------- ----------- ------ CBC Auto Differential[[id number]] Abnormal Final result Please view results for these tests on the individual orders. CBC Auto Differential [[id number]] (Abnormal) Collected: 09/21/17 1514 Specimen: Blood Updated: 09/21/17 1551 WBC 10.62 10*3/mm3 RBC 3.62 (L) 10*6/mm3 Hemoglobin 9.6 (L) g/dL Hematocrit 30.6 (L) % MCV 84.5 fL MCH 26.5 (L) pg MCHC 31.4 (L) g/dL RDW 21.1 (H) % RDW-SD 63.8 (H) fl MPV 9.3 fL Platelets 381 10*3/mm3 Neutrophil % 71.8 % Lymphocyte % 7.9 (L) % Monocyte % 16.3 (H) % Eosinophil % 3.1 % Basophil % 0.5 % Immature Grans % 0.4 % Neutrophils, Absolute 7.63 10*3/mm3 Lymphocytes, Absolute 0.84 (L) 10*3/mm3 Monocytes, Absolute 1.73 (H) 10*3/mm3 Eosinophils, Absolute 0.33 10*3/mm3 Basophils, Absolute 0.05 10*3/mm3 Immature Grans, Absolute 0.04 (H) 10*3/mm3 aPTT [[id number]] (Normal) Collected: 09/21/17 1514 Specimen: Blood Updated: 09/21/17 1602 PTT 29.5 seconds Protime-INR [[id number]] (Abnormal) Collected: 09/21/17 1514 Specimen: Blood Updated: 09/21/17 1602 Protime 14.4 (H) Seconds INR 1.16 (H) Magnesium [[id number]] (Abnormal) Collected: 09/21/17 1240 Specimen: Blood Updated: 09/21/17 1608 Magnesium 1.4 (C) mg/dL CBC & Differential [[id number]] Collected: 09/22/17 0434 Specimen: Blood Updated: 09/22/17 0501 Narrative: The following orders were created for panel order CBC & Differential. Procedure Abnormality Status --------- ----------- ------ CBC Auto Differential[[id number]] Abnormal Final result Please view results for these tests on the  individual orders. CBC Auto Differential [[id number]] (Abnormal) Collected: 09/22/17 0434 Specimen: Blood Updated: 09/22/17 0501 WBC 8.43 10*3/mm3 RBC 3.60 (L) 10*6/mm3 Hemoglobin 9.5 (L) g/dL Hematocrit 30.4 (L) % MCV 84.4 fL MCH 26.4 (L) pg MCHC 31.3 (L) g/dL RDW 20.9 (H) % RDW-SD 63.4 (H) fl MPV 9.2 fL Platelets 359 10*3/mm3 Neutrophil % 67.3 % Lymphocyte % 14.4 (L) % Monocyte % 12.0 % Eosinophil % 5.1 % Basophil % 0.7 % Immature Grans % 0.5 % Neutrophils, Absolute 5.68 10*3/mm3 Lymphocytes, Absolute 1.21 10*3/mm3 Monocytes, Absolute 1.01 10*3/mm3 Eosinophils, Absolute 0.43 10*3/mm3 Basophils, Absolute 0.06 10*3/mm3 Immature Grans, Absolute 0.04 (H) 10*3/mm3 Basic Metabolic Panel [[id number]] (Abnormal) Collected: 09/22/17 0434 Specimen: Blood Updated: 09/22/17 0525 Glucose 108 (H) mg/dL BUN 33 (H) mg/dL Creatinine 1.58 (H) mg/dL Sodium 133 (L) mmol/L Potassium 3.5 mmol/L Chloride 87 (L) mmol/L CO2 34.5 (H) mmol/L Calcium 9.3 mg/dL eGFR Non African Amer 31 (L) mL/min/1.73 BUN/Creatinine Ratio 20.9 Anion Gap 11.5 mmol/L Narrative: The MDRD GFR formula is only valid for adults with stable renal function between ages 18 and 70. Magnesium [[id number]] (Normal) Collected: 09/22/17 0434 Specimen: Blood Updated: 09/22/17 0805 Magnesium 1.6 mg/dL Potassium [[id number]] (Normal) Collected: 09/22/17 1657 Specimen: Blood Updated: 09/22/17 1742 Potassium 5.1 mmol/L CBC & Differential [[id number]] Collected: 09/23/17 0336 Specimen: Blood Updated: 09/23/17 0357 Narrative: The following orders were created for panel order CBC & Differential. Procedure Abnormality Status --------- ----------- ------ CBC Auto Differential[[id number]] Abnormal Final result Please view results for these tests on the individual orders. CBC Auto Differential [[id number]] (Abnormal) Collected: 09/23/17 0336 Specimen: Blood Updated: 09/23/17 0357 WBC 10.42 10*3/mm3 RBC 3.83 (L) 10*6/mm3 Hemoglobin 10.1 (L) g/dL Hematocrit 32.1 (L) % MCV 83.8 fL MCH 26.4 (L)  pg MCHC 31.5 (L) g/dL RDW 21.2 (H) % RDW-SD 63.9 (H) fl MPV 9.1 fL Platelets 418 10*3/mm3 Neutrophil % 72.7 % Lymphocyte % 11.1 (L) % Monocyte % 10.6 % Eosinophil % 4.1 % Basophil % 0.8 % Immature Grans % 0.7 (H) % Neutrophils, Absolute 7.58 10*3/mm3 Lymphocytes, Absolute 1.16 10*3/mm3 Monocytes, Absolute 1.10 10*3/mm3 Eosinophils, Absolute 0.43 10*3/mm3 Basophils, Absolute 0.08 10*3/mm3 Immature Grans, Absolute 0.07 (H) 10*3/mm3 Uric Acid [[id number]] (Abnormal) Collected: 09/23/17 0336 Specimen: Blood Updated: 09/23/17 0419 Uric Acid 11.1 (H) mg/dL Magnesium [[id number]] (Normal) Collected: 09/23/17 0336 Specimen: Blood Updated: 09/23/17 0419 Magnesium 1.6 mg/dL Renal Function Panel [[id number]] (Abnormal) Collected: 09/23/17 0336 Specimen: Blood Updated: 09/23/17 0421 Glucose 126 (H) mg/dL BUN 36 (H) mg/dL Creatinine 1.86 (H) mg/dL Sodium 133 (L) mmol/L Potassium 4.0 mmol/L Chloride 86 (L) mmol/L CO2 32.6 (H) mmol/L Calcium 9.5 mg/dL Albumin 3.50 g/dL Phosphorus 3.6 mg/dL Anion Gap 14.4 mmol/L BUN/Creatinine Ratio 19.4 eGFR Non  Amer 26 (L) mL/min/1.73 Narrative: The MDRD GFR formula is only valid for adults with stable renal function between ages 18 and 70. CBC & Differential [[id number]] Collected: 09/24/17 0434 Specimen: Blood Updated: 09/24/17 0503 Narrative: The following orders were created for panel order CBC & Differential. Procedure Abnormality Status --------- ----------- ------ Scan Slide[[id number]] CBC Auto Differential[[id number]] Abnormal Final result Please view results for these tests on the individual orders. CBC Auto Differential [[id number]] (Abnormal) Collected: 09/24/17 0434 Specimen: Blood Updated: 09/24/17 0503 WBC 11.13 (H) 10*3/mm3 RBC 3.50 (L) 10*6/mm3 Hemoglobin 9.1 (L) g/dL Hematocrit 29.1 (L) % MCV 83.1 fL MCH 26.0 (L) pg MCHC 31.3 (L) g/dL RDW 21.0 (H) % RDW-SD 63.5 (H) fl MPV 9.3 fL Platelets 396 10*3/mm3 Neutrophil % 71.8 % Lymphocyte % 12.4 (L) % Monocyte % 9.4 % Eosinophil % 5.0  % Basophil % 0.7 % Immature Grans % 0.7 (H) % Neutrophils, Absolute 7.98 10*3/mm3 Lymphocytes, Absolute 1.38 10*3/mm3 Monocytes, Absolute 1.05 10*3/mm3 Eosinophils, Absolute 0.56 10*3/mm3 Basophils, Absolute 0.08 10*3/mm3 Immature Grans, Absolute 0.08 (H) 10*3/mm3 Basic Metabolic Panel [[id number]] (Abnormal) Collected: 09/24/17 0433 Specimen: Blood Updated: 09/24/17 0519 Glucose 121 (H) mg/dL BUN 33 (H) mg/dL Creatinine 1.54 (H) mg/dL Sodium 136 mmol/L Potassium 4.7 mmol/L Chloride 94 (L) mmol/L CO2 26.9 mmol/L Calcium 9.4 mg/dL eGFR Non African Amer 32 (L) mL/min/1.73 BUN/Creatinine Ratio 21.4 Anion Gap 15.1 mmol/L Narrative: The MDRD GFR formula is only valid for adults with stable renal function between ages 18 and 70. Blood Gas, Arterial [[id number]] (Abnormal) Collected: 09/24/17 1037 Specimen: Arterial Blood Updated: 09/24/17 1039 Site Arterial: right brachial Ronald's Test N/A pH, Arterial 7.443 pH units pCO2, Arterial 41.1 mm Hg pO2, Arterial 67.3 (L) mm Hg HCO3, Arterial 28.1 (H) mmol/L Base Excess, Arterial 3.6 (H) mmol/L O2 Saturation Calculated 93.8 % Barometric Pressure for Blood Gas 751.1 mmHg Modality Room Air Rate 18 Breaths/minute Narrative: SpO2-94% Meter: [id number] : [id number] Marian Lovell CBC & Differential [[id number]] Collected: 09/25/17 0316 Specimen: Blood Updated: 09/25/17 0350 Narrative: The following orders were created for panel order CBC & Differential. Procedure Abnormality Status --------- ----------- ------ CBC Auto Differential[[id number]] Abnormal Final result Please view results for these tests on the individual orders. CBC Auto Differential [[id number]] (Abnormal) Collected: 09/25/17 0316 Specimen: Blood Updated: 09/25/17 0350 WBC 11.40 (H) 10*3/mm3 RBC 3.51 (L) 10*6/mm3 Hemoglobin 9.4 (L) g/dL Hematocrit 29.8 (L) % MCV 84.9 fL MCH 26.8 (L) pg MCHC 31.5 (L) g/dL RDW 20.8 (H) % RDW-SD 63.4 (H) fl MPV 9.2 fL Platelets 345 10*3/mm3 Neutrophil % 76.0 % Lymphocyte % 7.2 (L) %  Monocyte % 11.8 % Eosinophil % 3.9 % Basophil % 0.7 % Immature Grans % 0.4 % Neutrophils, Absolute 8.66 (H) 10*3/mm3 Lymphocytes, Absolute 0.82 (L) 10*3/mm3 Monocytes, Absolute 1.34 (H) 10*3/mm3 Eosinophils, Absolute 0.45 10*3/mm3 Basophils, Absolute 0.08 10*3/mm3 Immature Grans, Absolute 0.05 (H) 10*3/mm3 Basic Metabolic Panel [[id number]] (Abnormal) Collected: 09/25/17 0316 Specimen: Blood Updated: 09/25/17 0412 Glucose 127 (H) mg/dL BUN 28 (H) mg/dL Creatinine 1.38 (H) mg/dL Sodium 132 (L) mmol/L Potassium 3.8 mmol/L Chloride 93 (L) mmol/L CO2 24.6 mmol/L Calcium 9.2 mg/dL eGFR Non African Amer 37 (L) mL/min/1.73 BUN/Creatinine Ratio 20.3 Anion Gap 14.4 mmol/L Narrative: The MDRD GFR formula is only valid for adults with stable renal function between ages 18 and 70. POC Activated Clotting Time [[id number]] (Normal) Collected: 09/25/17 1153 Specimen: Blood Updated: 09/25/17 1426 Activated Clotting Time 109 Seconds Serial Number: [id number]: 1379 POC Activated Clotting Time [[id number]] (Abnormal) Collected: 09/25/17 1210 Specimen: Blood Updated: 09/25/17 1427 Activated Clotting Time 340 (H) Seconds Serial Number: [id number]: 1379 POC Activated Clotting Time [[id number]] (Abnormal) Collected: 09/25/17 1222 Specimen: Blood Updated: 09/25/17 1427 Activated Clotting Time 351 (H) Seconds Serial Number: [id number]: [id number] POC Activated Clotting Time [[id number]] (Abnormal) Collected: 09/25/17 1246 Specimen: Blood Updated: 09/25/17 1427 Activated Clotting Time 384 (H) Seconds Serial Number: [id number]: 1379 POC Activated Clotting Time [[id number]] (Abnormal) Collected: 09/25/17 1318 Specimen: Blood Updated: 09/25/17 1427 Activated Clotting Time 417 (H) Seconds Serial Number: [id number]: [id number] POC Activated Clotting Time [[id number]] (Abnormal) Collected: 09/25/17 1340 Specimen: Blood Updated: 09/25/17 1427 Activated Clotting Time 373 (H) Seconds Serial Number: [id number]: [id number] POC OR Panel, ISTAT  [[id number]] (Abnormal) Collected: 09/25/17 1412 Specimen: Blood Updated: 09/25/17 1427 Potassium 3.9 mmol/L Total CO2 28 mmol/L Serial Number: [id number]: 1379 Hemoglobin 7.5 (L) g/dL Hematocrit 22 (L) % pH, Arterial 7.44 pH units HCO3, Arterial 27.2 (H) mmol/L Base Excess 3.0000 mmol/L O2 Saturation, Arterial 100 (H) % pO2, Arterial 502 (H) mmHg pCO2, Arterial 39.9 mm Hg Glucose 218 (H) mg/dL POC OR Panel, ISTAT [[id number]] (Abnormal) Collected: 09/25/17 1153 Specimen: Blood Updated: 09/25/17 1431 Potassium 3.5 mmol/L Total CO2 33 (H) mmol/L Serial Number: [id number]: 1379 Hemoglobin 8.8 (L) g/dL Hematocrit 26 (L) % pH, Arterial 7.38 pH units HCO3, Arterial 31.4 (H) mmol/L Base Excess 6.0000 (H) mmol/L O2 Saturation, Arterial 100 (H) % pO2, Arterial 461 (H) mmHg pCO2, Arterial 52.5 (H) mm Hg Glucose 124 mg/dL POC OR Panel, ISTAT [[id number]] (Abnormal) Collected: 09/25/17 1246 Specimen: Blood Updated: 09/25/17 1431 Potassium 3.7 mmol/L Total CO2 32 (H) mmol/L Serial Number: [id number]: 1379 Hemoglobin 6.8 (L) g/dL Hematocrit 20 (L) % pH, Arterial 7.37 pH units HCO3, Arterial 30.4 (H) mmol/L Base Excess 5.0000 mmol/L O2 Saturation, Arterial 100 (H) % pO2, Arterial 515 (H) mmHg pCO2, Arterial 52.8 (H) mm Hg Glucose 128 mg/dL POC OR Panel, ISTAT [[id number]] (Abnormal) Collected: 09/25/17 1251 Specimen: Blood Updated: 09/25/17 1431 Potassium 3.7 mmol/L Total CO2 31 (H) mmol/L Serial Number: [id number]: 5063 Hemoglobin 6.8 (L) g/dL Hematocrit 20 (L) % pH, Arterial 7.31 (L) pH units HCO3, Arterial 29.2 (H) mmol/L Base Excess 3.0000 mmol/L O2 Saturation, Arterial 87 (L) % pO2, Arterial 59 (L) mmHg pCO2, Arterial 58.3 (H) mm Hg Glucose 128 mg/dL POC OR Panel, ISTAT [[id number]] (Abnormal) Collected: 09/25/17 1318 Specimen: Blood Updated: 09/25/17 1431 Potassium 3.9 mmol/L Total CO2 32 (H) mmol/L Serial Number: [id number]: [id number] Hemoglobin 7.5 (L) g/dL Hematocrit 22 (L) % pH, Arterial 7.36 pH units HCO3,  Arterial 30.3 (H) mmol/L Base Excess 5.0000 mmol/L O2 Saturation, Arterial 100 (H) % pO2, Arterial 489 (H) mmHg pCO2, Arterial 54.0 (H) mm Hg Glucose 167 (H) mg/dL POC OR Panel, ISTAT [[id number]] (Abnormal) Collected: 09/25/17 1341 Specimen: Blood Updated: 09/25/17 1432 Potassium 4.5 mmol/L Total CO2 30 (H) mmol/L Serial Number: [id number]: 5063 Hemoglobin 7.8 (L) g/dL Hematocrit 23 (L) % pH, Arterial 7.40 pH units HCO3, Arterial 28.7 (H) mmol/L Base Excess 4.0000 mmol/L O2 Saturation, Arterial 100 (H) % pO2, Arterial 514 (H) mmHg pCO2, Arterial 46.1 (H) mm Hg Glucose 219 (H) mg/dL POC Activated Clotting Time [[id number]] (Normal) Collected: 09/25/17 1411 Specimen: Blood Updated: 09/25/17 1432 Activated Clotting Time 109 Seconds Serial Number: [id number]: 1379 Protime-INR [[id number]] (Abnormal) Collected: 09/25/17 1527 Specimen: Blood Updated: 09/25/17 1543 Protime 18.7 (H) Seconds INR 1.63 (H) aPTT [[id number]] (Normal) Collected: 09/25/17 1527 Specimen: Blood Updated: 09/25/17 1543 PTT 31.6 seconds Blood Gas, Arterial [[id number]] (Abnormal) Collected: 09/25/17 1541 Specimen: Arterial Blood Updated: 09/25/17 1544 Site Arterial Line Ronald's Test N/A pH, Arterial 7.436 pH units pCO2, Arterial 34.0 (L) mm Hg pO2, Arterial 579.3 (H) mm Hg HCO3, Arterial 22.9 mmol/L Base Excess, Arterial -1.0 (L) mmol/L O2 Saturation Calculated 100.0 (H) % A-a Gradiant 0.8 mmHg Barometric Pressure for Blood Gas 747.0 mmHg CBC Auto Differential [[id number]] (Abnormal) Collected: 10/01/17 0330 Specimen: Blood Updated: 10/01/17 0403 WBC 10.28 10*3/mm3 RBC 2.72 (L) 10*6/mm3 Hemoglobin 7.4 (L) g/dL Hematocrit 22.4 (L) % MCV 82.4 fL MCH 27.2 pg MCHC 33.0 g/dL RDW 20.6 (H) % RDW-SD 60.2 (H) fl MPV 9.5 fL Platelets 268 10*3/mm3 Neutrophil % 73.2 % Lymphocyte % 10.7 (L) % Monocyte % 10.0 % Eosinophil % 4.2 % Basophil % 0.3 % Immature Grans % 1.6 (H) % Neutrophils, Absolute 7.53 10*3/mm3 Lymphocytes, Absolute 1.10 10*3/mm3 Monocytes,  Absolute 1.03 10*3/mm3 Eosinophils, Absolute 0.43 10*3/mm3 Basophils, Absolute 0.03 10*3/mm3 Immature Grans, Absolute 0.16 (H) 10*3/mm3 Renal Function Panel [[id number]] (Abnormal) Collected: 10/01/17 0330 Specimen: Blood Updated: 10/01/17 0408 Glucose 105 (H) mg/dL BUN 59 (H) mg/dL Creatinine 1.34 (H) mg/dL Sodium 133 (L) mmol/L Potassium 3.1 (L) mmol/L Chloride 96 (L) mmol/L CO2 21.9 (L) mmol/L Calcium 8.9 mg/dL Albumin 3.00 (L) g/dL Phosphorus 2.5 mg/dL Anion Gap 15.1 mmol/L BUN/Creatinine Ratio 44.0 (H) eGFR Non  Amer 38 (L) mL/min/1.73 Narrative: The MDRD GFR formula is only valid for adults with stable renal function between ages 18 and 70. Iron Profile [[id number]] (Abnormal) Collected: 10/01/17 0330 Specimen: Blood Updated: 10/01/17 0408 Iron 29 (L) mcg/dL Iron Saturation 12 (L) % Transferrin 159 (L) mg/dL TIBC 237 mcg/dL Ferritin [[id number]] (Abnormal) Collected: 10/01/17 0330 Specimen: Blood Updated: 10/01/17 0416 Ferritin 302.50 (H) ng/mL POC Glucose Fingerstick [[id number]] (Normal) Collected: 10/01/17 0548 Specimen: Blood Updated: 10/01/17 0549 Glucose 108 mg/dL Narrative: Meter: [id number] : [id number] Jordan SOLIS POC Glucose Fingerstick [[id number]] (Normal) Collected: 10/01/17 1035 Specimen: Blood Updated: 10/01/17 1037 Glucose 119 mg/dL Narrative: Meter: [id number] : [id number] Elian GREEN Imaging Results (most recent) Procedure Component Value Units Date/Time XR Chest 1 View [[id number]] Collected: 09/06/17 1946 Updated: 09/06/17 1950 Narrative: PORTABLE CHEST SINGLE VIEW HISTORY: 82-year-old morbidly obese female with cardiac enlargement atrial fibrillation and hypertension presents for evaluation of weakness. COMPARISON: 01/31/2017 FINDINGS: 1. Negative acute chest. 2. Vascular calcification, cardiac enlargement, mild chronic pulmonary change and spondylosis. This report was finalized on 9/6/2017 7:47 PM [address] Dr. Kervin Nogueira MD. CT Angiogram Head With & Without Contrast  [[id number]] Collected: 09/14/17 1436 Updated: 09/14/17 1500 Narrative: CT ANGIOGRAM NECK W WO CONTRAST-, CT ANGIOGRAM HEAD W WO CONTRAST- INDICATIONS: Critical right internal carotid artery stenosis TECHNIQUE: Radiation dose reduction techniques were utilized, including automated exposure control and exposure modulation based on body size. Noncontrast head CT was performed, followed by enhanced CT angiography of the head and neck. Three-dimensional reconstructions were created, reviewed, and assessed according to NASCET criteria. COMPARISON: Head CT from 02/05/2017 FINDINGS: No acute intracranial hemorrhage, midline shift or mass effect. No acute territorial infarct is identified. No enhancing lesions and brain following contrast material administration. Moderate to prominent periventricular hypodensity suggests chronic small vessel ischemic change in a patient this age. Arterial calcifications are seen at the base of the brain. Ventricles, cisterns, cerebral sulci are unremarkable for patient age. The visualized paranasal sinuses, orbits, mastoid air cells are unremarkable. Near-complete stenosis of the right internal carotid artery at its origin is apparent, for example image 84 of series 3. 30% narrowing at the origin of the right external carotid artery. 60% narrowing is evident the left carotid bulb. Narrowing in the origin of the left internal carotid artery is estimated at 30%. Scattered calcifications are seen in the bilateral intracranial carotid arteries, with as much as 30% narrowing at the left cavernous segment, as much as 50% narrowing of the right cavernous segment. Scattered calcifications are seen in the vertebral arteries, with as much as 30% narrowing at the upper aspect of the vertebral arteries, for example image 131 of series 2, and 50% narrowing at the origin of the left vertebral artery. Narrowing at the origin of the left common carotid artery is estimated 30%. Approximately 20% narrowing is  seen proximally in the right subclavian artery (beyond the origin vertebral). 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 The CT angiography images otherwise show no hemodynamically significant focal stenosis, aneurysm, or dissection in the cervical carotid or vertebral arteries, or in the arteries at the base of the brain. A left thyroid mass versus humerus involving and confluent nodules, ultrasound is advised for further evaluation. Little if any residual right thyroid tissue. Minimal bilateral pleural effusions. Minimal fibrotic changes apparent at the lung apices. 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 Mediastinal adenopathy, for example right paratracheal 1.3 cm short axis lymph node on image 30 of series 4, could be reactive in nature, may be evidence of neoplasm, clinical correlation and follow-up recommended. Indicated, positron emission tomography could be obtained for further evaluation. Chest CT can also be obtained if indicated. A circumscribed subcutaneous density at the upper back, 2.1 cm on image 93 of series 3, correlate with physical exam. Asymmetric enlargement of posterior right nasopharyngeal soft tissues, for example image 195 of series 4 could reflect asymmetric lymphoid hypertrophy, or potentially represent a lesion, advise direct visualization. Impression: 1. Multifocal arterial narrowing, as detailed above, most prominently (near complete) at the origin of the right internal carotid artery. 2. No acute intracranial hemorrhage or hydrocephalus. No enhancing lesion in brain. 3. Mediastinal adenopathy, possibility of neoplasm not excluded, further evaluation/follow-up recommended. Bilateral pleural effusions, only partly included. 4. Asymmetric prominence posterior right nasopharyngeal soft tissues, further evaluation recommended. 5. Masslike enlargement of  the left thyroid, ultrasound evaluation is suggested. This report was finalized on 9/14/2017 2:57 PM by Dr. Jareth Person MD. CT Angiogram Neck With & Without Contrast [[id number]] Collected: 09/14/17 1436 Updated: 09/14/17 1500 Narrative: CT ANGIOGRAM NECK W WO CONTRAST-, CT ANGIOGRAM HEAD W WO CONTRAST- INDICATIONS: Critical right internal carotid artery stenosis TECHNIQUE: Radiation dose reduction techniques were utilized, including automated exposure control and exposure modulation based on body size. Noncontrast head CT was performed, followed by enhanced CT angiography of the head and neck. Three-dimensional reconstructions were created, reviewed, and assessed according to NASCET criteria. COMPARISON: Head CT from 02/05/2017 FINDINGS: No acute intracranial hemorrhage, midline shift or mass effect. No acute territorial infarct is identified. No enhancing lesions and brain following contrast material administration. Moderate to prominent periventricular hypodensity suggests chronic small vessel ischemic change in a patient this age. Arterial calcifications are seen at the base of the brain. Ventricles, cisterns, cerebral sulci are unremarkable for patient age. The visualized paranasal sinuses, orbits, mastoid air cells are unremarkable. Near-complete stenosis of the right internal carotid artery at its origin is apparent, for example image 84 of series 3. 30% narrowing at the origin of the right external carotid artery. 60% narrowing is evident the left carotid bulb. Narrowing in the origin of the left internal carotid artery is estimated at 30%. Scattered calcifications are seen in the bilateral intracranial carotid arteries, with as much as 30% narrowing at the left cavernous segment, as much as 50% narrowing of the right cavernous segment. Scattered calcifications are seen in the vertebral arteries, with as much as 30% narrowing at the upper aspect of the vertebral arteries, for example image 131 of  series 2, and 50% narrowing at the origin of the left vertebral artery. Narrowing at the origin of the left common carotid artery is estimated 30%. Approximately 20% narrowing is seen proximally in the right subclavian artery (beyond the origin vertebral). 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 The CT angiography images otherwise show no hemodynamically significant focal stenosis, aneurysm, or dissection in the cervical carotid or vertebral arteries, or in the arteries at the base of the brain. A left thyroid mass versus humerus involving and confluent nodules, ultrasound is advised for further evaluation. Little if any residual right thyroid tissue. Minimal bilateral pleural effusions. Minimal fibrotic changes apparent at the lung apices. 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 Mediastinal adenopathy, for example right paratracheal 1.3 cm short axis lymph node on image 30 of series 4, could be reactive in nature, may be evidence of neoplasm, clinical correlation and follow-up recommended. Indicated, positron emission tomography could be obtained for further evaluation. Chest CT can also be obtained if indicated. A circumscribed subcutaneous density at the upper back, 2.1 cm on image 93 of series 3, correlate with physical exam. Asymmetric enlargement of posterior right nasopharyngeal soft tissues, for example image 195 of series 4 could reflect asymmetric lymphoid hypertrophy, or potentially represent a lesion, advise direct visualization. Impression: 1. Multifocal arterial narrowing, as detailed above, most prominently (near complete) at the origin of the right internal carotid artery. 2. No acute intracranial hemorrhage or hydrocephalus. No enhancing lesion in brain. 3. Mediastinal adenopathy, possibility of neoplasm not excluded, further evaluation/follow-up recommended.  Bilateral pleural effusions, only partly included. 4. Asymmetric prominence posterior right nasopharyngeal soft tissues, further evaluation recommended. 5. Masslike enlargement of the left thyroid, ultrasound evaluation is suggested. This report was finalized on 9/14/2017 2:57 PM by Dr. Jareth Person MD. XR Chest PA & Lateral [[id number]] Collected: 09/15/17 1421 Updated: 09/15/17 1427 Narrative: TWO-VIEW CHEST HISTORY: Morbidly obese 82-year-old female with shortness of breath, progressive in severity times several months. COMPARISON: 09/06/2017 FINDINGS: 1. Small bilateral posterior effusions. 2. Otherwise stable negative acute chest. 3. Cardiac enlargement vascular calcification chronic pulmonary change, spondylosis and mild scoliosis. 4. Left neck mass with tracheal deviation to the right, confirmed thyroid mass on CT imaging yesterday. This report was finalized on 9/15/2017 2:24 PM [address] Dr. Kervin Nogueira MD. US Thyroid [[id number]] Collected: 09/16/17 1719 Updated: 09/16/17 1943 Narrative: EXAMINATION: THYROID SONOGRAM HISTORY: 82-year-old female with a history of prior partial thyroidectomy on the right approximately 30 years ago and an abnormality in the left lobe of the thyroid. FINDINGS: Sonographic evaluation of the thyroid was performed. There is evidence of prior surgical resection of the right lobe of the thyroid. No abnormality in the postthyroidectomy bed is appreciated. No residual thyroid tissue in the region is appreciated. The isthmus measures 0.5 cm in thickness. The left lobe measures 5.5 x 2.9 x 2.4 cm. Multiple mixed cystic and solid nodules are seen within the left lobe. The largest nodule measures on the order of 2.2 cm in greatest dimension and has a mixed cystic and solid appearance. Impression: Multinodular appearance of the left lobe of the thyroid as described. Evidence of prior right partial thyroidectomy is noted. This report was finalized on 9/16/2017 7:40 PM by Dr. Toni Elmore,  MD. XR Chest PA & Lateral [[id number]] Collected: 09/17/17 1047 Updated: 09/17/17 1052 Narrative: XR CHEST PA AND LATERAL- HISTORY: Female who is 82 years-old, preoperative evaluation TECHNIQUE: Frontal and lateral views of the chest COMPARISON: 09/15/2017 FINDINGS: Cardiac silhouette is enlarged. Pulmonary vasculature is mildly congested. Small bilateral pleural effusions and basilar likely atelectasis. No pneumothorax. Otherwise stable. Impression: No significant change. This report was finalized on 9/17/2017 10:[address] Dr[address] Jareth Person MD. XR Chest Post CVA Port [[id number]] Collected: 09/25/17 1203 Updated: 09/25/17 1207 Narrative: XR CHEST POST CVA PORT- INDICATIONS: Line placement TECHNIQUE: FRONTAL VIEW OF THE CHEST COMPARISON: 09/17/2017 FINDINGS: Right IJ Elgin-Raj catheter extends to the right main pulmonary artery. Heart is enlarged. Pulmonary vasculature is mildly congested. No focal pulmonary consolidation, pleural effusion, or pneumothorax. Otherwise stable. Impression: Right IJ catheter, no pneumothorax. This report was finalized on 9/25/2017 12:04 PM [address] Dr[address] Jareth Person MD. XR Chest 1 View [[id number]] Collected: 09/25/17 1555 Updated: 09/25/17 1600 Narrative: XR CHEST 1 VW- HISTORY: Female who is 82 years-old, postoperative evaluation TECHNIQUE: Frontal views of the chest COMPARISON: 09/25/2017 FINDINGS: Endotracheal tube tip is 3.1 cm above the augie. Chest tubes are noted. Right IJ Elgin-Raj catheter extends to right lower lobe pulmonary artery. Sternotomy wires are seen. Heart size is normal. No focal pulmonary consolidation, pleural effusion, or pneumothorax. No acute osseous process. Impression: Postsurgical changes. This report was finalized on 9/25/2017 3:56 PM by Dr. Jareth Person MD. XR Chest 1 View [[id number]] Collected: 09/26/17 0428 Updated: 09/26/17 7375 Narrative: X-RAY CHEST 1 VIEW. HISTORY: Postop cardiac surgery. COMPARISON: 09/25/2017. FINDINGS: Mild cardiomegaly.  Lines and tubes are stable. Severe pulmonary congestion, bilateral tiny effusions are suspected. Impression: 1. Congestive changes with small bilateral effusions. 2. Mild congestive heart failure cannot be excluded. Overall no significant change. This report was finalized on 9/26/2017 11:22 PM by Dr. Cameron Hill MD. XR Chest 1 View [[id number]] Collected: 09/27/17 0745 Updated: 09/27/17 1129 Narrative: CLINICAL HISTORY: 82-year-old female 2 days postop mitral and tricuspid valve repair surgery. Follow-up with chest tubes. PORTABLE AP ERECT CHEST DATED 09/27/2017 AT 0605 HOURS FINDINGS: When compared to the exam dated 09/26/2017 at 0352 hours, there has been removal of the right jugular Quincy-Raj catheter and apparent removal of associated right jugular venous sheath. Sternal wire sutures, apparent mediastinal to apical chest tubes, mitral valve annuloplasty ring and tricuspid valve annuloplasty band remain. Subcentimeter trace of left apical pneumothorax and possible 1 mm trace of right apical pneumothorax are present. Lung volumes are increased with associated diminished bibasilar atelectasis. Trace patchy opacity in the left lung base remains along with trace bilateral costophrenic angle blunting. Monitoring lead wires are present. Oxygen cannula tubing superimposes the neck and right shoulder. CONCLUSION: Increased expansion of both lungs with diminished bibasilar atelectasis or infiltrate. Only tiny traces of apical left and right pneumothorax are present with bilateral apparent mediastinal to chest tubes remaining. Quincy-Raj catheter has been removed. This report was finalized on 9/27/2017 11:[address] Etienne Mcgarry MD. XR Chest 1 View [[id number]] Collected: 09/28/17 1203 Updated: 09/28/17 1209 Narrative: SINGLE AP UPRIGHT CHEST X-RAY HISTORY: 82-year-old female following chest tube placement COMPARISON: 05/27/2017 FINDINGS: 1. Median sternotomy, aortic calcification and prosthetic valve. 2. Increased lung  markings bilaterally are probably chronic. 3. Interval removal of bilateral chest tubes with question of bilateral pneumothoraces measuring each 5 mm or less. 4. Follow-up imaging with affected side up suggested for more accurate assessment This report was finalized on 9/28/2017 12:06 PM [address] Dr. Kervin Nogueira MD. reviewed ECG/EMG Results (most recent) Procedure Component Value Units Date/Time ECG 12 Lead [[id number]] Collected: 09/06/17 1901 Updated: 09/07/17 2112 Narrative: RR Interval= 779 ms HI Interval= 188 ms QRSD Interval= 148 ms QT Interval= 452 ms QTc Interval= 512 ms Heart Rate= 77 ms P Axis= 74 deg QRS Axis= -43 deg T Wave Axis= 121 deg I: 40 Axis= -22 deg T: 40 Axis= -62 deg ST Axis= 148 deg SINUS RHYTHM LEFT BUNDLE BRANCH BLOCK SINCE PREVIOUS TRACING, THE RHYTHM IS NOW SINUS AND THE LBBB IS NEW Electronically Signed by: Jordan Yeager ([facility]) 07-Sep-2017 21:09:49 Date and Time of Study: 2017-09-06 19:01:11 Adult Transthoracic Echo Complete With Contrast [[id number]] Resulted: 09/08/17 1330 Updated: 09/08/17 1337 BSA 2.0 m^2 IVSd 1.0 cm LVIDd 4.8 cm LVIDs 2.8 cm LVPWd 1.1 cm IVS/LVPW 0.91 FS 41.7 % EDV(Teich) 107.5 ml ESV(Teich) 29.6 ml EF(Teich) 72.5 % EDV(cubed) 110.6 ml ESV(cubed) 22.0 ml EF(cubed) 80.2 % LV mass(C)d 181.9 grams LV mass(C)dI 92.6 grams/m^2 SV(Teich) 78.0 ml SI(Teich) 39.7 ml/m^2 SV(cubed) 88.6 ml SI(cubed) 45.1 ml/m^2 Ao root diam 2.5 cm Ao root area 4.9 cm^2 ACS 1.6 cm LVOT diam 1.8 cm LVOT area 2.5 cm^2 LVOT area(traced) 2.5 cm^2 RVOT diam 1.9 cm RVOT area 2.8 cm^2 LVLd ap4 8.6 cm EDV(MOD-sp4) 125.0 ml LVLs ap4 7.6 cm ESV(MOD-sp4) 53.0 ml EF(MOD-sp4) 57.6 % LVLd ap2 8.6 cm EDV(MOD-sp2) 102.0 ml LVLs ap2 7.8 cm ESV(MOD-sp2) 55.0 ml EF(MOD-sp2) 46.1 % SV(MOD-sp4) 72.0 ml SI(MOD-sp4) 36.7 ml/m^2 SV(MOD-sp2) 47.0 ml SI(MOD-sp2) 23.9 ml/m^2 Ao root area (BSA corrected) 1.3 Ao root area (BSA corrected) 46.1 ml/m^2 CONTRAST EF 4CH 57.6 ml/m^2 LV Diastolic corrected for BSA 63.6 ml/m^2 LV Systolic  corrected for BSA 27.0 ml/m^2 MV A dur 0.12 sec MV E max jhonny 153.0 cm/sec MV A max jhonny 122.0 cm/sec MV E/A 1.3 MV V2 max 151.0 cm/sec MV max PG 9.1 mmHg MV V2 mean 88.0 cm/sec MV mean PG 4.0 mmHg MV V2 VTI 34.7 cm MVA(VTI) 1.9 cm^2 MV P1/2t max jhonny 144.0 cm/sec MV P1/2t 52.7 msec MVA(P1/2t) 4.2 cm^2 MV dec slope 800.0 cm/sec^2 MV dec time 0.16 sec Ao pk jhonny 154.0 cm/sec Ao max PG 9.5 mmHg Ao max PG (full) 3.5 mmHg Ao V2 mean 99.9 cm/sec Ao mean PG 5.0 mmHg Ao mean PG (full) 2.0 mmHg Ao V2 VTI 33.0 cm LATOSHA(I,A) 2.0 cm^2 LATOSHA(I,D) 2.0 cm^2 LATOSHA(V,A) 2.0 cm^2 LATOSHA(V,D) 2.0 cm^2 AI max jhonny 387.0 cm/sec AI max PG 59.9 mmHg AI dec slope 294.0 cm/sec^2 AI P1/2t 385.5 msec LV V1 max PG 6.0 mmHg LV V1 mean PG 3.0 mmHg LV V1 max 122.0 cm/sec LV V1 mean 74.7 cm/sec LV V1 VTI 25.6 cm SV(Ao) 162.0 ml SI(Ao) 82.5 ml/m^2 SV(LVOT) 65.1 ml SV(RVOT) 50.8 ml SI(LVOT) 33.2 ml/m^2 PA V2 max 115.0 cm/sec PA max PG 5.3 mmHg PA max PG (full) 1.9 mmHg BH CV ECHO ETIENNE - PVA(V,A) 2.3 cm^2 BH CV ECHO ETIENNE - PVA(V,D) 2.3 cm^2 PA acc time 0.11 sec RV V1 max PG 3.4 mmHg RV V1 mean PG 1.0 mmHg RV V1 max 92.4 cm/sec RV V1 mean 52.7 cm/sec RV V1 VTI 17.9 cm TR max jhonny 351.0 cm/sec PA pr(Accel) 30.0 mmHg Pulm Sys Jhonny 69.6 cm/sec Pulm Escalante Jhonny 85.1 cm/sec Pulm S/D 0.82 Qp/Qs 0.78 Pulm A Revs Dur 0.11 sec Pulm A Revs Jhonny 24.3 cm/sec MVA P1/2T LCG 1.5 cm^2 BH CV ECHO ETIENNE - BZI_BMI 34.7 kilograms/m^2  CV ECHO ETIENNE - BSA(HAYCOCK) 2.1 m^2  CV ECHO ETIENNE - BZI_METRIC_WEIGHT 91.6 kg  CV ECHO ETIENNE - BZI_METRIC_HEIGHT 162.6 cm  CV VAS BP RIGHT /66 mmHg TDI S' 10.00 cm/sec RV Base 3.20 cm E/E' ratio 23.0 LA Volume Index 22.0 mL/m2 Lat Peak E' Jhonny 10.0 cm/sec Med Peak E' Jhonny 7.00 cm/sec RAP systole 8 mmHg RVSP(TR) 57 mmHg TAPSE (>1.6) 2.70 cm2 Narrative: · Calculated EF = 57.6%. · Left ventricular systolic function is normal. · The left ventricular cavity is borderline dilated. · Moderate-to-severe mitral valve regurgitation is present · Moderate  tricuspid valve regurgitation is present. · Calculated right ventricular systolic pressure from tricuspid regurgitation is 57 mmHg. Adult Transesophageal Echo [[id number]] Collected: 09/13/17 0735 Updated: 09/13/17 0918 BSA 1.9 m^2 MR max laura 284.0 cm/sec MR max PG 32.3 mmHg TR max laura 341.0 cm/sec  CV ECHO ETIENNE - BZI_BMI 33.6 kilograms/m^2  CV ECHO ETEINNE - BSA(HAYCOCK) 2.0 m^2  CV ECHO ETIENNE - BZI_METRIC_WEIGHT 88.9 kg  CV ECHO ETIENNE - BZI_METRIC_HEIGHT 162.6 cm  CV VAS BP RIGHT /53 mmHg Narrative: · Left ventricular systolic function is moderately decreased. · Left ventricular systolic function is moderately decreased. Estimated EF appears to be in the range of 36 - 40%. Normal left ventricular cavity size and wall thickness noted. · The following segments are hypokinetic: mid anterior, mid anteroseptal, mid inferoseptal, apical anterior, apical septal and apex. · Left atrial cavity size is moderately dilated. · No evidence of a patent foramen ovale. Saline test results are negative. · Right atrial cavity size is moderately dilated. · Mild aortic valve regurgitation is present. · Severe mitral valve regurgitation is present with a centrally-directed jet noted. · Mild to moderate tricuspid valve regurgitation is present. Estimated right ventricular systolic pressure from tricuspid regurgitation is markedly elevated (>55 mmHg). ECG 12 Lead [[id number]] Collected: 09/17/17 0727 Updated: 09/17/17 2121 Narrative: RR Interval= 769 ms KS Interval= 184 ms QRSD Interval= 152 ms QT Interval= 428 ms QTc Interval= 488 ms Heart Rate= 78 ms P Axis= 72 deg QRS Axis= -33 deg T Wave Axis= 123 deg I: 40 Axis= -7 deg T: 40 Axis= -58 deg ST Axis= 153 deg SINUS RHYTHM LEFT BUNDLE BRANCH BLOCK NO SIGNIFICANT CHANGE FROM PREVIOUS ECG Electronically Signed by: Jordan Yeager ([facility]) 17-Sep-2017 21:16:44 Date and Time of Study: 2017-09-17 07:27:16 ECG 12 Lead [[id number]] Collected: 09/25/17 1515 Updated: 09/25/17 2130 Narrative: RR  Interval= 1071 ms NC Interval= 220 ms QRSD Interval= 148 ms QT Interval= 508 ms QTc Interval= 491 ms Heart Rate= 56 ms P Axis= 104 deg QRS Axis= -59 deg T Wave Axis= 84 deg I: 40 Axis= -36 deg T: 40 Axis= -80 deg ST Axis= 189 deg SINUS RHYTHM FIRST DEGREE AV BLOCK, new compared with prior LEFT BUNDLE BRANCH BLOCK Electronically Signed by: Ariana Rene ([facility]) 25-Sep-2017 21:28:14 Date and Time of Study: 2017-09-25 15:15:22 ECG 12 Lead [[id number]] Collected: 09/26/17 1458 Updated: 09/26/17 2009 Narrative: RR Interval= 1071 ms NC Interval= ms QRSD Interval= 158 ms QT Interval= 536 ms QTc Interval= 518 ms Heart Rate= 56 ms P Axis= deg QRS Axis= -40 deg T Wave Axis= 109 deg I: 40 Axis= 3 deg T: 40 Axis= -71 deg ST Axis= 148 deg SINUS RHYTHM WITH MOBITZ I SECOND DEGREE AV BLOCK LEFT BUNDLE BRANCH BLOCK SINCE PREVIOUS TRACING, THE MOBITZ I BLOCK IS NEW Electronically Signed by: Ron Moscoso ([facility]) 26-Sep-2017 20:07:29 Date and Time of Study: 2017-09-26 14:58:49 ECG 12 Lead [[id number]] Collected: 09/26/17 0305 Updated: 09/26/17 2011 Narrative: RR Interval= 923 ms NC Interval= 244 ms QRSD Interval= 160 ms QT Interval= 488 ms QTc Interval= 508 ms Heart Rate= 65 ms P Axis= 80 deg QRS Axis= -49 deg T Wave Axis= 126 deg I: 40 Axis= -33 deg T: 40 Axis= -74 deg ST Axis= 157 deg SINUS RHYTHM SINUS PAUSE/ARREST WITH VENTRICULAR ESCAPE FIRST DEGREE AV BLOCK LEFT BUNDLE BRANCH BLOCK SINCE PREVIOUS TRACING, THE SINUS PAUSE IS NEW Electronically Signed by: Ron Moscoso ([facility]) 26-Sep-2017 20:07:35 Date and Time of Study: 2017-09-26 03:05:37 ECG 12 Lead [[id number]] Collected: 09/27/17 0348 Updated: 09/27/17 2149 Narrative: RR Interval= 833 ms NC Interval= 208 ms QRSD Interval= 158 ms QT Interval= 464 ms QTc Interval= 508 ms Heart Rate= 72 ms P Axis= 8 deg QRS Axis= -10 deg T Wave Axis= 170 deg I: 40 Axis= 26 deg T: 40 Axis= -41 deg ST Axis= 169 deg ATRIAL-VENTRICULAR DUAL-PACED RHYTHM, new compared with prior Electronically  Signed by: Ariana Rene ([facility]) 27-Sep-2017 21:47:00 Date and Time of Study: 2017-09-27 03:48:46 EP/CRM Study [[id number]] Resulted: 09/29/17 1340 Updated: 09/29/17 1344 Narrative: Date: 9/29/17 Procedure: Permanent dual-chamber pacemaker implant, conscious sedation Indication: Complete heart block Technique: The patient was prepped and draped in the usual sterile fashion. 1% Xylocaine without epinephrine was used to anesthetize the skin and subcutaneous tissue in the left deltopectoral triangle. An incision was made in the skin and using blunt dissection the left cephalic vein was isolated and tied off distally with 2-0 silk suture. A venotomy was made and a bipolar ventricular lead was advanced through the vein under fluoroscopic guidance positioned the right ventricular apex. Pacing thresholds were established and the lead was secured in place with 2-0 silk suture. Using the same vein and a Glidewire was advanced under fluoroscopic guidance. An introduction stylet and sheath were advanced over the Glidewire. The Glidewire and stylet were removed. The active fixation atrial lead was then positioned in the right atrial appendage under fluoroscopic guidance. The sheath was removed, pacing thresholds established and the lead secured in place with 2-0 silk suture. The generator pocket was created using blunt dissection. The leads were connected to the generator which is then placed in the pocket. The subcutaneous tissue was closed with 3-0 chromic in a running fashion using 2 interrupted layers. The skin was closed with 4-0 Vicryl in a running fashion using the subcuticular method. There were no complications. Estimated blood loss was minimal. Conscious sedation was administered with Versed and fentanyl. The generator is [employer] model number [id number], serial #[id number]. The atrial lead is [employer] model #2088 TC, serial number [id number]. The ventricular lead is [employer] model #1948, serial number [id number]. Atrial  pacing threshold at 0.4 ms is 1.75 V. The lead impedance is 390 ohms. The P wave sensitivity is 1.3 mV. The ventricular pacing threshold at 0.4 ms his 0.5 V. The R-wave sensitivity is greater than 12 mV. The lead impedance is 740 ohms. Reviewed         Assessment/Plan KATHLEEN   Colonic Angiodysplasia   /P MVRepair   CKD   CHF     Inface of obvious colonic losses would resume oral iron at BID dosing if this is not successful keeping up would coordinate timing of repeat colonoscopy w APC to right sided Angiodysplasias. BGA returns tomorrow I discussed the patients findings and my recommendations with patient.     Koby Huerta MD 10/01/17 12:33 PM   Time: Not recorded

## 2017-10-02 PROBLEM — D64.9 ABSOLUTE ANEMIA: Status: ACTIVE | Noted: 2017-01-01

## 2017-10-02 PROBLEM — K63.5 POLYP OF COLON: Status: ACTIVE | Noted: 2017-01-01

## 2017-10-02 NOTE — THERAPY TREATMENT NOTE
Acute Care - Physical Therapy Treatment Note  [facility]     Patient Name: Raquel Deluna  : 1935  MRN: [id number]  Today's Date: 10/2/2017  Onset of Illness/Injury or Date of Surgery Date: 17  Date of Referral to PT: 17  Referring Physician: Alex    Admit Date: 2017    Visit Dx:    ICD-10-CM ICD-9-CM   1. Weakness R53.1 780.79   2. New onset left bundle branch block (LBBB) I44.7 426.3   3. Blood loss anemia D50.0 280.0   4. Generalized weakness R53.1 780.79   5. Carotid stenosis, right I65.21 433.10   6. Mitral valve insufficiency, unspecified etiology I34.0 424.0     Patient Active Problem List   Diagnosis   • OA (osteoarthritis) of knee   • Hyponatremia   • Bella's esophagus   • Hematochezia   • Colon polyp   • Essential hypertension   • Gastroesophageal reflux disease   • Hyperlipidemia   • Intestinal malabsorption   • Adverse effect of iron   • Iron deficiency anemia   • Gastrointestinal hemorrhage   • Paroxysmal atrial fibrillation   • Weakness   • Bilateral edema of lower extremity   • DNR (do not resuscitate)   • KESHIA (acute kidney injury)   • Mitral regurgitation   • Bilateral carotid artery stenosis   • Mitral valve insufficiency               Adult Rehabilitation Note       10/02/17 1002 10/01/17 1000 17 1100    Rehab Assessment/Intervention    Discipline physical therapist  -PC physical therapist  -LS physical therapist  -LC    Document Type therapy note (daily note)  -PC therapy note (daily note)  -LS therapy note (daily note)  -LC    Subjective Information agree to therapy  -PC agree to therapy;complains of;fatigue  -LS agree to therapy;complains of;pain;fatigue  -LC    Patient Effort, Rehab Treatment good  -PC good  -LS good  -LC    Precautions/Limitations cardiac precautions;pacemaker  -PC cardiac precautions  -LS cardiac precautions  -LC    Specific Treatment Considerations  no raising LUE due to pacer  -LS no raising LUE due to pacer  -LC    Recorded by [PC]  Kymberly Sewell, PT [LS] Tonja Liriano, PT [LC] Davin Negron, PT DPT    Vital Signs    Pretreatment Heart Rate (beats/min) 84  -PC      Posttreatment Heart Rate (beats/min) 87  -PC      Pre SpO2 (%) 94  -PC 96  -LS     O2 Delivery Pre Treatment room air  -PC room air  -LS     Post SpO2 (%) 95  -PC 96  -LS     O2 Delivery Post Treatment room air  -PC room air  -LS     Recorded by [PC] Kymberly Sewell, PT [LS] Tonja Liriano, PT     Pain Assessment    Pain Assessment No/denies pain  -PC 0-10  -LS 0-10  -LC    Pain Score  5  -LS 5  -LC    Pain Location  Sternum  -LS Sternum  -LC    Pain Intervention(s)  Repositioned;Ambulation/increased activity  -LS Medication (See MAR)  -LC    Response to Interventions  tolerated  -LS     Recorded by [PC] Kymberly Sewell, PT [LS] Tonja Liriano, PT [LC] Davin Negron, PT DPT    Cognitive Assessment/Intervention    Current Cognitive/Communication Assessment functional  -PC functional  -LS functional  -    Orientation Status  oriented x 4  -LS oriented x 4  -LC    Follows Commands/Answers Questions  100% of the time  -% of the time;able to follow multi-step instructions  -    Personal Safety  WNL/WFL  -LS WNL/WFL  -LC    Personal Safety Interventions  fall prevention program maintained;nonskid shoes/slippers when out of bed  -LS fall prevention program maintained;nonskid shoes/slippers when out of bed;supervised activity  -LC    Recorded by [PC] Kymberly Sewell, PT [LS] Tonja Liriano, PT [LC] Davin Negron, PT DPT    Bed Mobility, Assessment/Treatment    Bed Mob, Supine to Sit, Piatt  not tested  -LS     Bed Mob, Sit to Supine, Piatt  not tested  -LS     Bed Mobility, Comment in chair  -PC up in chair  -LS up in chair  -LC    Recorded by [PC] Kymberly Sewell, PT [LS] Tonja Liriano, PT [LC] Davin Negron, PT DPT    Transfer Assessment/Treatment    Transfers, Sit-Stand Piatt minimum assist (75% patient effort)  -PC moderate assist (50% patient effort);1  person + 1 person to manage equipment  -LS minimum assist (75% patient effort)  -LC    Transfers, Stand-Sit Dodge minimum assist (75% patient effort)  -PC moderate assist (50% patient effort);1 person + 1 person to manage equipment  -LS minimum assist (75% patient effort)  -LC    Transfers, Sit-Stand-Sit, Assist Device rolling walker  -PC rolling walker  -LS rolling walker  -LC    Transfer, Safety Issues  step length decreased;weight-shifting ability decreased;balance decreased during turns  -LS weight-shifting ability decreased;step length decreased;sequencing ability decreased  -LC    Transfer, Impairments  strength decreased;impaired balance  -LS pain;impaired balance;strength decreased  -LC    Transfer, Comment  Fatigues quickly; inc time to reach full stand; FF posture  -LS     Recorded by [PC] Kymberly Sewell, PT [LS] Tonja Liriano, PT [LC] Davin Negron, PT DPT    Gait Assessment/Treatment    Gait, Dodge Level minimum assist (75% patient effort);contact guard assist  -PC contact guard assist  -LS contact guard assist  -LC    Gait, Assistive Device rolling walker  -PC rolling walker  -LS rolling walker  -LC    Gait, Distance (Feet) 60  -PC 25  -LS 35  -LC    Gait, Gait Deviations forward flexed posture;dorie decreased;step length decreased  -PC forward flexed posture;dorie decreased;step length decreased  -LS step length decreased;narrow base  -LC    Gait, Safety Issues step length decreased  -PC      Gait, Impairments strength decreased  -PC  strength decreased;impaired balance  -LC    Gait, Comment pt required 3 standing rest breaks while ambulating  -PC Pt needed to use BSC in bathroom.   -LS     Recorded by [PC] Kymberly Sewell, PT [LS] Tonja Liriano, PT [LC] Davin Negron, PT DPT    Motor Skills/Interventions    Additional Documentation   --  -LC    Recorded by   [LC] Davin Negron, PT DPT    Therapy Exercises    Exercise Protocols --   cardiac ex 5 reps, level III  -PC --   5 reps  cardiac protocol level 3  -LS     Recorded by [PC] Kymberly Sewell, PT [LS] Tonja Liriano, PT     Positioning and Restraints    Pre-Treatment Position sitting in chair/recliner  -PC sitting in chair/recliner  -LS sitting in chair/recliner  -LC    Post Treatment Position chair  -PC bathroom  -LS chair  -LC    In Chair sitting;call light within reach;encouraged to call for assist;exit alarm on  -PC  sitting;call light within reach;encouraged to call for assist;with other staff;notified nsg  -LC    Bathroom  sitting;call light within reach;encouraged to call for assist;notified nsg  -LS     Recorded by [PC] Kymberly Sewell, PT [LS] Tonja Liriano, PT [LC] Davin Negron, PT DPT      User Key  (r) = Recorded By, (t) = Taken By, (c) = Cosigned By    Initials Name Effective Dates    PC Kymberly Sewell, PT 12/01/15 -     LC Davin Negron, PT DPT 08/02/16 -     LS Tonja Liriano, PT 06/14/16 -                 IP PT Goals       09/26/17 0914 09/22/17 1030       Bed Mobility PT LTG    Bed Mobility PT LTG, Time to Achieve  1 wk  -LB     Bed Mobility PT LTG, Date Goal Reviewed  09/22/17  -LB     Bed Mobility PT LTG, Outcome  goal ongoing  -LB     Transfer Training PT LTG    Transfer Training PT LTG, Activity Type  sit to stand/stand to sit  -LB     Transfer Training PT LTG, [place] Level  supervision required  -LB     Transfer Training PT LTG, Assist Device  walker, rolling  -LB     Transfer Training PT  LTG, Date Goal Reviewed  09/22/17  -LB     Transfer Training PT LTG, Outcome  goal revised  -LB     Gait Training PT LTG    Gait Training Goal PT LTG, Distance to Achieve  120  -LB     Gait Training Goal PT LTG, Date Goal Reviewed  09/22/17  -LB     Gait Training Goal PT LTG, Outcome  goal revised  -LB     Cardiopulmonary PT LTG    Cardiopulmonary PT LTG, Date Established 09/26/17  -PC (r) MF (t) PC (c)      Cardiopulmonary PT LTG, Time to Achieve 1 wk  -PC (r) MF (t) PC (c)      Cardiopulmonary PT LTG, Level Level IV  -PC (r)  MF (t) PC (c)        User Key  (r) = Recorded By, (t) = Taken By, (c) = Cosigned By    Initials Name Provider Type    LB Barby Crawford, PT Physical Therapist    PC Kymberly Sewell, PT Physical Therapist    MF Nicolás Shepard, PT Student PT Student          Physical Therapy Education     Title: PT OT SLP Therapies (Active)     Topic: Physical Therapy (Active)     Point: Mobility training (Active)    Learning Progress Summary    Learner Readiness Method Response Comment Documented by Status   Patient Acceptance E,D NR  PC 10/02/17 1005 Active    Acceptance E,TB,D VU,DU  LS 10/01/17 1021 Done    Acceptance E,D VU,DU safety during transfers and gait LC 09/30/17 1131 Done    Acceptance E NR  EM 09/29/17 0841 Active    Acceptance E NR  EM 09/28/17 1122 Active    Acceptance E,D VU,NR  EJ 09/27/17 1650 Done    Acceptance E,D NR  MF 09/26/17 0913 Active    Eager E,TB VU,DU  JT 09/24/17 0942 Done    Acceptance E VU,NR  LB 09/22/17 1028 Done    Acceptance E VU,NR  LB1 09/21/17 1144 Done    Acceptance E VU,NR  LB1 09/20/17 1146 Done    Acceptance E VU Discussed home environment.  Home is handicapped accessible.  Patient can stay on main level.  Has rollator at home. KD 09/19/17 1352 Done    Acceptance E VU,NR  LB 09/18/17 1524 Done    Acceptance E VU,NR   09/17/17 1639 Done    Acceptance E VU,NR   09/16/17 1320 Done    Acceptance E VU   09/11/17 1413 Done    Acceptance E VU  VY 09/10/17 1103 Done    Acceptance E VU,NR  VY 09/09/17 1139 Done    Acceptance E VU,NR   09/08/17 1526 Done   Family Acceptance E VU Discussed home environment.  Home is handicapped accessible.  Patient can stay on main level.  Has rollator at home. KD 09/19/17 1352 Done               Point: Home exercise program (Active)    Learning Progress Summary    Learner Readiness Method Response Comment Documented by Status   Patient Acceptance E,D NR  PC 10/02/17 1005 Active    Acceptance E,TB,D VU,DU  LS 10/01/17 1021 Done    Acceptance E NR  EM 09/29/17  0841 Active    Acceptance E NR  EM 09/28/17 1122 Active    Acceptance E,D NR   09/26/17 0913 Active    Eager E,TB VU,DU  JT 09/24/17 0942 Done    Acceptance E,TB VU,DU  JT 09/23/17 1517 Done    Acceptance E VU,NR   09/17/17 1639 Done    Acceptance E VU,NR   09/16/17 1320 Done    Acceptance E VU   09/11/17 1413 Done    Acceptance E VU   09/10/17 1103 Done    Acceptance E VU,NR   09/09/17 1139 Done    Acceptance E VU,NR   09/08/17 1526 Done               Point: Body mechanics (Active)    Learning Progress Summary    Learner Readiness Method Response Comment Documented by Status   Patient Acceptance E,D NR   10/02/17 1005 Active    Acceptance E,TB,D VU,DU  LS 10/01/17 1021 Done    Acceptance E,D NR   09/26/17 0913 Active    Acceptance E VU  VY 09/10/17 1103 Done    Acceptance E VU,NR   09/09/17 1139 Done    Acceptance E VU,NR   09/08/17 1526 Done               Point: Precautions (Active)    Learning Progress Summary    Learner Readiness Method Response Comment Documented by Status   Patient Acceptance E,D NR   10/02/17 1005 Active    Acceptance E,TB,D VU,DU   10/01/17 1021 Done    Acceptance E,D VU,NR   09/27/17 1650 Done    Acceptance E,D NR   09/26/17 0913 Active    Acceptance E VU   09/10/17 1103 Done    Acceptance E VU,NR   09/09/17 1139 Done    Acceptance E VU,NR   09/08/17 1526 Done                      User Key     Initials Effective Dates Name Provider Type Discipline     05/18/15 -  Karen Clinton, PT Physical Therapist PT    LB 10/06/15 -  Barby Crawford, PT Physical Therapist PT    KD 10/06/15 -  Alicia De Anda, PT Physical Therapist PT     02/07/17 -  Kandi Buckner, PT Physical Therapist PT    LB1 02/18/16 -  Kandi Loomis, PTA Physical Therapy Assistant PT    PC 12/01/15 -  Kymberly Sewell, PT Physical Therapist PT    EM 12/01/15 -  Matilde Bone, PT Physical Therapist PT    JT 12/01/15 -  Taylor Teran, PT Physical Therapist PT    EJ 04/21/17  -  Tonia Gonzalez, PT Physical Therapist PT    ABHILASH 08/02/16 -  Davin Negron, PT DPT Physical Therapist PT    LS 06/14/16 -  Tonja Liriano, PT Physical Therapist PT    VY 04/24/15 -  Kole Ortez, PTA Physical Therapy Assistant PT    MF 09/18/17 -  Nicolás Shepard, PT Student PT Student PT                    PT Recommendation and Plan  Anticipated Discharge Disposition: skilled nursing facility  Planned Therapy Interventions: balance training, bed mobility training, gait training, strengthening  PT Frequency: daily  Plan of Care Review  Plan Of Care Reviewed With: patient  Progress: progress towards functional goals is fair  Outcome Summary/Follow up Plan: pt cont to be very weak, with slow progress with mobility.  She fatigues quickly and requires standing rest breaks when walking in kraft          Outcome Measures       10/02/17 1000 10/01/17 1000 09/30/17 1100    How much help from another person do you currently need...    Turning from your back to your side while in flat bed without using bedrails? 3  -PC 3  -LS 3  -LC    Moving from lying on back to sitting on the side of a flat bed without bedrails? 2  -PC 2  -LS 3  -LC    Moving to and from a bed to a chair (including a wheelchair)? 3  -PC 3  -LS 3  -LC    Standing up from a chair using your arms (e.g., wheelchair, bedside chair)? 3  -PC 2  -LS 3  -LC    Climbing 3-5 steps with a railing? 2  -PC 2  -LS 3  -LC    To walk in hospital room? 3  -PC 3  -LS 3  -LC    AM-PAC 6 Clicks Score 16  -PC 15  -LS 18  -LC    Functional Assessment    Outcome Measure Options  AM-PAC 6 Clicks Basic Mobility (PT)  -LS AM-PAC 6 Clicks Basic Mobility (PT)  -LC      User Key  (r) = Recorded By, (t) = Taken By, (c) = Cosigned By    Initials Name Provider Type    JOHN Sewell, PT Physical Therapist    ABHILASH Negron, PT DPT Physical Therapist    LS Tonja Liriano, PT Physical Therapist           Time Calculation:         PT Charges       10/02/17 1009          Time  Calculation    Start Time 0913  -PC      Stop Time 0925  -PC      Time Calculation (min) 12 min  -PC      PT Received On 10/02/17  -PC      PT - Next Appointment 10/03/17  -PC        User Key  (r) = Recorded By, (t) = Taken By, (c) = Cosigned By    Initials Name Provider Type    PC Kymberly Sewell PT Physical Therapist          Therapy Charges for Today     Code Description Service Date Service Provider Modifiers Qty    62666661735 HC PT THER PROC EA 15 MIN 10/2/2017 Kymberly Sewell, PT GP 1    [id number] HC PT THER SUPP EA 15 MIN 10/2/2017 Kymberly Sewell, PT GP 1          PT G-Codes  Outcome Measure Options: AM-PAC 6 Clicks Basic Mobility (PT)    Kymberly Sewell PT  10/2/2017

## 2017-10-02 NOTE — PROGRESS NOTES
"DAILY PROGRESS NOTE  [facility]    Patient Identification:  Name: Raquel Deluna  Age: 82 y.o.  Sex: female  :  1935  MRN: [id number]         Primary Care Physician: Ricky Hoyos III, MD    Subjective:  Interval History:She is still weak and poor appetite.    Objective:    Scheduled Meds:    aspirin 81 mg Oral Daily   atorvastatin 40 mg Oral Nightly   budesonide-formoterol 2 puff Inhalation BID - RT   bumetanide 2 mg Oral BID   carvedilol 12.5 mg Oral Q12H   citalopram 40 mg Oral Nightly   enoxaparin 30 mg Subcutaneous Daily   pantoprazole 40 mg Oral Daily   Or      famotidine 20 mg Intravenous Daily   hydrALAZINE 25 mg Oral Q8H   iron sucrose 200 mg Intravenous Q24H   mupirocin  Each Nare Daily   potassium chloride 20 mEq Oral BID With Meals   sennosides-docusate sodium 2 tablet Oral Nightly     Continuous Infusions:    sodium chloride 30 mL/hr Last Rate: Stopped (17 0810)       Vital signs in last 24 hours:  Temp:  [98.1 °F (36.7 °C)-98.7 °F (37.1 °C)] 98.6 °F (37 °C)  Heart Rate:  [82-86] 86  Resp:  [18] 18  BP: (129-158)/(48-73) 158/54    Intake/Output:    Intake/Output Summary (Last 24 hours) at 10/02/17 1241  Last data filed at 10/02/17 0745   Gross per 24 hour   Intake              240 ml   Output              300 ml   Net              -60 ml       Exam:  /54 (BP Location: Right arm, Patient Position: Sitting)  Pulse 86  Temp 98.6 °F (37 °C) (Oral)   Resp 18  Ht 64.02\" (162.6 cm)  Wt 186 lb 3.2 oz (84.5 kg)  SpO2 94%  BMI 31.95 kg/m2    General Appearance:    Alert, cooperative, no distress   Head:    Normocephalic, without obvious abnormality, atraumatic   Eyes:       Throat:   Lips, tongue, gums normal   Neck:   Supple, symmetrical, trachea midline, no JVD   Lungs:     Clear to auscultation bilaterally, respirations unlabored   Chest Wall:    Surgical change    Heart:    Regular rate and rhythm, S1 and S2 normal, no murmur,no  Rub or gallop   Abdomen:     Soft, " non-tender, bowel sounds active, no masses, no organomegaly    Extremities:   Extremities normal, atraumatic, no cyanosis or edema   Pulses:      Skin:   Skin is warm and dry,  no rashes or palpable lesions   Neurologic:   no focal deficits noted      [unfilled]  Data Review:    Results from last 7 days  Lab Units 10/02/17  0312 10/01/17  1750 10/01/17  0330 09/30/17  0323   SODIUM mmol/L 134*  --  133* 132*   POTASSIUM mmol/L 4.4 4.5 3.1* 3.7   CHLORIDE mmol/L 99  --  96* 96*   CO2 mmol/L 21.6*  --  21.9* 21.6*   BUN mg/dL 54*  --  59* 58*   CREATININE mg/dL 1.27*  --  1.34* 1.40*   GLUCOSE mg/dL 92  --  105* 114*   CALCIUM mg/dL 9.1  --  8.9 9.1       Results from last 7 days  Lab Units 10/02/17  0312 10/01/17  0330 09/30/17  0323   WBC 10*3/mm3 9.91 10.28 10.31   HEMOGLOBIN g/dL 7.3* 7.4* 8.0*   HEMATOCRIT % 23.0* 22.4* 24.9*   PLATELETS 10*3/mm3 317 268 250               Lab Results  Lab Value Date/Time   TROPONINT <0.010 09/06/2017 1603   TROPONINT <0.010 02/01/2017 0516   TROPONINT <0.010 01/31/2017 1236   TROPONINT <0.010 10/28/2016 0411           Results from last 7 days  Lab Units 10/02/17  0312 09/29/17  0348 09/27/17  0257   ALK PHOS U/L 82 47 41   BILIRUBIN mg/dL 0.5 0.5 0.3   ALT (SGPT) U/L 11 <5 9   AST (SGOT) U/L 28 24 70*             Glucose   Date/Time Value Ref Range Status   10/01/2017 2003 120 70 - 130 mg/dL Final   10/01/2017 1035 119 70 - 130 mg/dL Final   10/01/2017 0548 108 70 - 130 mg/dL Final   09/30/2017 1946 157 (H) 70 - 130 mg/dL Final   09/30/2017 1559 130 70 - 130 mg/dL Final   09/30/2017 1037 165 (H) 70 - 130 mg/dL Final   09/30/2017 0558 122 70 - 130 mg/dL Final   09/29/2017 2007 127 70 - 130 mg/dL Final       Results from last 7 days  Lab Units 09/26/17  0304 09/25/17  1527   INR  1.39* 1.63*       Patient Active Problem List   Diagnosis Code   • OA (osteoarthritis) of knee M17.10   • Hyponatremia E87.1   • 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   • Hematochezia K92.1   • Colon polyp K63.5   •  Essential hypertension I10   • Gastroesophageal reflux disease K21.9   • Hyperlipidemia E78.5   • Intestinal malabsorption K90.9   • Adverse effect of iron T45.4X5A   • Iron deficiency anemia D50.9   • Gastrointestinal hemorrhage K92.2   • Paroxysmal atrial fibrillation I48.0   • Weakness R53.1   • Bilateral edema of lower extremity R60.0   • DNR (do not resuscitate) Z66   • KESHIA (acute kidney injury) N17.9   • Mitral regurgitation I34.0   • Bilateral carotid artery stenosis I65.23   • Mitral valve insufficiency I34.0       Assessment:  Principal Problem:    Mitral valve insufficiency  Active Problems:    Hyponatremia    Essential hypertension    Iron deficiency anemia    Paroxysmal atrial fibrillation    Weakness    Bilateral edema of lower extremity    KESHIA (acute kidney injury)    Mitral regurgitation    Bilateral carotid artery stenosis  S/P Mitral and tricuspid repair and PPM    Plan:  Will give some more Venofer. May need transfusion. Follow labs.    Arun De MD  10/2/2017  12:41 PM

## 2017-10-02 NOTE — PLAN OF CARE
Problem: Fall Risk (Adult)  Goal: Absence of Falls  Outcome: Ongoing (interventions implemented as appropriate)    10/02/17 0149   Fall Risk (Adult)   Absence of Falls making progress toward outcome

## 2017-10-02 NOTE — PLAN OF CARE
Problem: Patient Care Overview (Adult)  Goal: Plan of Care Review  Outcome: Ongoing (interventions implemented as appropriate)    10/02/17 1006   Coping/Psychosocial Response Interventions   Plan Of Care Reviewed With patient   Patient Care Overview   Progress progress towards functional goals is fair   Outcome Evaluation   Outcome Summary/Follow up Plan pt cont to be very weak, with slow progress with mobility. She fatigues quickly and requires standing rest breaks when walking in [employer]

## 2017-10-02 NOTE — PROGRESS NOTES
Hospital Follow Up    LOS:  LOS: 26 days   Patient Name: Raquel Deluna  Age/Sex: 82 y.o. female  : 1935  MRN: [id number]    Date of Hospital Visit: 10/02/17  Length of Stay: 26  Encounter Provider: Benji Martínez MD  Place of Service: [facility] CARDIOLOGY    Subjective:     Follow Up for: Mitral regurgitation, CHB    Interval History: No major complaints this morning.  Her hemoglobin has remained stable but low.  Her pacemaker is working well.  Blood pressure seems to be reasonably controlled.      Objective:     Objective:  Temp:  [98 °F (36.7 °C)-98.7 °F (37.1 °C)] 98.6 °F (37 °C)  Heart Rate:  [82-87] 85  Resp:  [18] 18  BP: (129-158)/(46-73) 158/54  Body mass index is 31.95 kg/(m^2).    Intake/Output Summary (Last 24 hours) at 10/02/17 0748  Last data filed at 10/01/17 2300   Gross per 24 hour   Intake                0 ml   Output              300 ml   Net             -300 ml     Last 3 weights    17  0615 10/01/17  0500 10/02/17  0434   Weight: 184 lb 1.6 oz (83.5 kg) 184 lb 4.8 oz (83.6 kg) 186 lb 3.2 oz (84.5 kg)     Weight change: 1 lb 14.4 oz (0.862 kg)    Physical Exam:   General Appearance: Alert, cooperative, in no acute distress. AAOx4.   HEENT: Normocephalic.  Neck: Supple. No JVD. No Carotid bruit. No thyromegaly  Lungs: CTAB. Normal respiratory effort and rate.  Heart:: Regular rate and rhythm, normal S1 and S2, no murmurs, gallops or rubs.  Abdomen: Soft, nontender, non-distended. positive bowel sounds  Extremities: Warm, no cyanosis, or clubbing. No edema.     Lab Review:     Results from last 7 days  Lab Units 10/02/17  0312 10/01/17  1750 10/01/17  0330 17  0323   SODIUM mmol/L 134*  --  133* 132*   POTASSIUM mmol/L 4.4 4.5 3.1* 3.7   CHLORIDE mmol/L 99  --  96* 96*   CO2 mmol/L 21.6*  --  21.9* 21.6*   BUN mg/dL 54*  --  59* 58*   CREATININE mg/dL 1.27*  --  1.34* 1.40*   GLUCOSE mg/dL 92  --  105* 114*   CALCIUM mg/dL 9.1  --  8.9 9.1              Results from last 7 days  Lab Units 10/02/17  0312 10/01/17  0330   WBC 10*3/mm3 9.91 10.28   HEMOGLOBIN g/dL 7.3* 7.4*   HEMATOCRIT % 23.0* 22.4*   PLATELETS 10*3/mm3 317 268       Results from last 7 days  Lab Units 09/26/17  0304 09/25/17  1527   INR  1.39* 1.63*   APTT seconds  --  31.6       Results from last 7 days  Lab Units 09/30/17  0323 09/28/17  0419   MAGNESIUM mg/dL 2.0 2.0                     I reviewed the patient's new clinical results.          I personally viewed and interpreted the patient's EKG/Telemetry data.  Current Medications:   Scheduled Meds:  aspirin 81 mg Oral Daily   atorvastatin 40 mg Oral Nightly   budesonide-formoterol 2 puff Inhalation BID - RT   bumetanide 2 mg Oral BID   carvedilol 6.25 mg Oral Q12H   citalopram 40 mg Oral Nightly   enoxaparin 30 mg Subcutaneous Daily   pantoprazole 40 mg Oral Daily   Or      famotidine 20 mg Intravenous Daily   hydrALAZINE 10 mg Oral Q8H   mupirocin  Each Nare Daily   potassium chloride 20 mEq Oral BID With Meals   sennosides-docusate sodium 2 tablet Oral Nightly     Continuous Infusions:  sodium chloride 30 mL/hr Last Rate: Stopped (09/26/17 0810)       Allergies:  Allergies   Allergen Reactions   • Sulfa Antibiotics Other (See Comments)     Yeast infections       Assessment & Plan     Principal Problem:    Mitral valve insufficiency  Active Problems:    Hyponatremia    Essential hypertension    Iron deficiency anemia    Paroxysmal atrial fibrillation    Weakness    Bilateral edema of lower extremity    DNR (do not resuscitate)    KESHIA (acute kidney injury)    Mitral regurgitation    Bilateral carotid artery stenosis          Plan: Adjust medications to consolidate.      Benji Martínez MD  10/02/17

## 2017-10-02 NOTE — PROGRESS NOTES
BGA/GI Progress Note   Chief Complaint:  KATHLEEN    Subjective     Interval History: cleared by cardiology and discussed with SAMEERA Brandt for endoscopy for evaluation for KATHLEEN.  Discussed with pt and agreeable to procedures.  Plan for tomorrow.    History taken from: patient chart RN    Review of Systems:    The following systems were reviewed and negative;  gastrointestinal    Objective     Vital Signs  Temp:  [98.1 °F (36.7 °C)-98.7 °F (37.1 °C)] 98.6 °F (37 °C)  Heart Rate:  [82-86] 86  Resp:  [18] 18  BP: (129-158)/(48-73) 158/54  Body mass index is 31.95 kg/(m^2).    Intake/Output Summary (Last 24 hours) at 10/02/17 1254  Last data filed at 10/02/17 0745   Gross per 24 hour   Intake              240 ml   Output              300 ml   Net              -60 ml     I/O this shift:  In: 240 [P.O.:240]  Out: -     Physical Exam:   General: patient awake, alert and cooperative.  Sitting in chair at bedside   Eyes: Normal lids and lashes, no scleral icterus   Neck: supple, normal ROM, no tracheal deviation   Skin: warm and dry, not jaundiced, healing mid sternal chest incision   Cardiovascular: regular rhythm and rate, no murmurs auscultated   Pulm: clear to auscultation bilaterally, regular and unlabored   Abdomen: soft, nontender, nondistended; normal bowel sounds   Rectal: deferred   Extremities: no rash or edema   Neurologic: Normal mood and behavior    All Medications Have Been Reviewed     Results Review:       Results from last 7 days  Lab Units 10/02/17  0312 10/01/17  0330 09/30/17  0323   WBC 10*3/mm3 9.91 10.28 10.31   HEMOGLOBIN g/dL 7.3* 7.4* 8.0*   HEMATOCRIT % 23.0* 22.4* 24.9*   PLATELETS 10*3/mm3 317 268 250         Results from last 7 days  Lab Units 10/02/17  0312 10/01/17  1750 10/01/17  0330 09/30/17  0323 09/29/17  0348  09/27/17  0257   SODIUM mmol/L 134*  --  133* 132* 130*  < > 131*   POTASSIUM mmol/L 4.4 4.5 3.1* 3.7 4.1  < > 3.4*   CHLORIDE mmol/L 99  --  96* 96* 96*  < > 92*   CO2 mmol/L  21.6*  --  21.9* 21.6* 20.4*  < > 22.8   BUN mg/dL 54*  --  59* 58* 56*  < > 32*   CREATININE mg/dL 1.27*  --  1.34* 1.40* 1.86*  < > 2.03*   CALCIUM mg/dL 9.1  --  8.9 9.1 8.9  < > 8.8   BILIRUBIN mg/dL 0.5  --   --   --  0.5  --  0.3   ALK PHOS U/L 82  --   --   --  47  --  41   ALT (SGPT) U/L 11  --   --   --  <5  --  9   AST (SGOT) U/L 28  --   --   --  24  --  70*   GLUCOSE mg/dL 92  --  105* 114* 113*  < > 125*   < > = values in this interval not displayed.      Results from last 7 days  Lab Units 09/26/17  0304 09/25/17  1527   INR  1.39* 1.63*       RADIOLOGY:    Imaging Results (last 72 hours)     ** No results found for the last 72 hours. 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   • Hematochezia K92.1   • Colon polyp K63.5   • Essential hypertension I10   • Gastroesophageal reflux disease K21.9   • Hyperlipidemia E78.5   • Intestinal malabsorption K90.9   • Adverse effect of iron T45.4X5A   • Iron deficiency anemia D50.9   • Gastrointestinal hemorrhage K92.2   • Paroxysmal atrial fibrillation I48.0   • Weakness R53.1   • Bilateral edema of lower extremity R60.0   • DNR (do not resuscitate) Z66   • KESHIA (acute kidney injury) N17.9   • Mitral regurgitation I34.0   • Bilateral carotid artery stenosis I65.23   • Mitral valve insufficiency I34.0     Lorin Perla, APRN  10/02/17  12:54 PM      Assessment:  1) KATHLEEN- long standing, likely exacerbated by recent surgery; + FOBT, hgb low but stable with no overt GI bleeding reported.  Has been on oral iron in the past, receiving IV Venofer  2) Colon polyps  3) Colonic angiodysplasias - no bleeding    Recommendations:  Will continue with IV iron transfusion - 2nd dose given today  Will consider 1U PRBC tomorrow if Hb still below 7.5  Given the chronicity of her anemia and multiple prior scopes, I would recommend we assess her response to  above before proceeding with repeat endoscopy.  Pt is in agreement.            Josef Motta M.D.  [facility]  [address] [place] [address]  Office: [phone]

## 2017-10-02 NOTE — PROGRESS NOTES
" LOS: 26 days   Patient Care Team:  Ricky Hoyos III, MD as PCP - General  Ricky Hoyos III, MD as PCP - Family Medicine  Anthony Hernández MD as Consulting Physician (Pulmonary Disease)  Christy Jerez MD as Consulting Physician (Dermatology)    Chief Complaint: post op follow-up  Subjective  OOBTC, patient working with PT    Vital Signs  Temp:  [98 °F (36.7 °C)-98.7 °F (37.1 °C)] 98.6 °F (37 °C)  Heart Rate:  [82-87] 86  Resp:  [18] 18  BP: (129-158)/(48-73) 158/54  Body mass index is 31.95 kg/(m^2).    Intake/Output Summary (Last 24 hours) at 10/02/17 0908  Last data filed at 10/01/17 2300   Gross per 24 hour   Intake                0 ml   Output              300 ml   Net             -300 ml              Last 3 weights    09/30/17  0615 10/01/17  0500 10/02/17  0434   Weight: 184 lb 1.6 oz (83.5 kg) 184 lb 4.8 oz (83.6 kg) 186 lb 3.2 oz (84.5 kg)         Objective:  General Appearance:  In no acute distress.    Vital signs: (most recent): Blood pressure 158/54, pulse 86, temperature 98.6 °F (37 °C), temperature source Oral, resp. rate 18, height 64.02\" (162.6 cm), weight 186 lb 3.2 oz (84.5 kg), SpO2 94 %.  Vital signs are normal.  No fever.    Output: Producing urine and producing stool.    HEENT: Normal HEENT exam.    Lungs:  Tachypnea and normal effort.  Breath sounds clear to auscultation.    Heart: S1 normal.  No murmur. (Vpaced)  Chest: (Sternotomy stable; well approximated)  Abdomen: Abdomen is soft.  Bowel sounds are normal.     Pulses: Distal pulses are intact.    Neurological: Patient is alert and oriented to person, place and time.    Pupils:  Pupils are equal, round, and reactive to light.    Skin:  Warm and dry.              Results Review:        WBC WBC   Date Value Ref Range Status   10/02/2017 9.91 4.50 - 10.70 10*3/mm3 Final   10/01/2017 10.28 4.50 - 10.70 10*3/mm3 Final   09/30/2017 10.31 4.50 - 10.70 10*3/mm3 Final      HGB Hemoglobin   Date Value Ref Range Status   10/02/2017 " 7.3 (L) 11.9 - 15.5 g/dL Final   10/01/2017 7.4 (L) 11.9 - 15.5 g/dL Final   09/30/2017 8.0 (L) 11.9 - 15.5 g/dL Final      HCT Hematocrit   Date Value Ref Range Status   10/02/2017 23.0 (L) 35.6 - 45.5 % Final   10/01/2017 22.4 (L) 35.6 - 45.5 % Final   09/30/2017 24.9 (L) 35.6 - 45.5 % Final      Platelets Platelets   Date Value Ref Range Status   10/02/2017 317 140 - 500 10*3/mm3 Final   10/01/2017 268 140 - 500 10*3/mm3 Final   09/30/2017 250 140 - 500 10*3/mm3 Final        PT/INR:  No results found for: PROTIME/No results found for: INR    Sodium Sodium   Date Value Ref Range Status   10/02/2017 134 (L) 136 - 145 mmol/L Final   10/01/2017 133 (L) 136 - 145 mmol/L Final   09/30/2017 132 (L) 136 - 145 mmol/L Final      Potassium Potassium   Date Value Ref Range Status   10/02/2017 4.4 3.5 - 5.2 mmol/L Final   10/01/2017 4.5 3.5 - 5.2 mmol/L Final   10/01/2017 3.1 (L) 3.5 - 5.2 mmol/L Final   09/30/2017 3.7 3.5 - 5.2 mmol/L Final      Chloride Chloride   Date Value Ref Range Status   10/02/2017 99 98 - 107 mmol/L Final   10/01/2017 96 (L) 98 - 107 mmol/L Final   09/30/2017 96 (L) 98 - 107 mmol/L Final      Bicarbonate CO2   Date Value Ref Range Status   10/02/2017 21.6 (L) 22.0 - 29.0 mmol/L Final   10/01/2017 21.9 (L) 22.0 - 29.0 mmol/L Final   09/30/2017 21.6 (L) 22.0 - 29.0 mmol/L Final      BUN BUN   Date Value Ref Range Status   10/02/2017 54 (H) 8 - 23 mg/dL Final   10/01/2017 59 (H) 8 - 23 mg/dL Final   09/30/2017 58 (H) 8 - 23 mg/dL Final      Creatinine Creatinine   Date Value Ref Range Status   10/02/2017 1.27 (H) 0.57 - 1.00 mg/dL Final   10/01/2017 1.34 (H) 0.57 - 1.00 mg/dL Final   09/30/2017 1.40 (H) 0.57 - 1.00 mg/dL Final      Calcium Calcium   Date Value Ref Range Status   10/02/2017 9.1 8.6 - 10.5 mg/dL Final   10/01/2017 8.9 8.6 - 10.5 mg/dL Final   09/30/2017 9.1 8.6 - 10.5 mg/dL Final      Magnesium Magnesium   Date Value Ref Range Status   09/30/2017 2.0 1.6 - 2.4 mg/dL Final             aspirin 81 mg Oral Daily   atorvastatin 40 mg Oral Nightly   budesonide-formoterol 2 puff Inhalation BID - RT   bumetanide 2 mg Oral BID   carvedilol 12.5 mg Oral Q12H   citalopram 40 mg Oral Nightly   enoxaparin 30 mg Subcutaneous Daily   pantoprazole 40 mg Oral Daily   Or      famotidine 20 mg Intravenous Daily   mupirocin  Each Nare Daily   potassium chloride 20 mEq Oral BID With Meals   sennosides-docusate sodium 2 tablet Oral Nightly       sodium chloride 30 mL/hr Last Rate: Stopped (09/26/17 0810)           Patient Active Problem List   Diagnosis Code   • OA (osteoarthritis) of knee M17.10   • Hyponatremia E87.1   • 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   • Hematochezia K92.1   • Colon polyp K63.5   • Essential hypertension I10   • Gastroesophageal reflux disease K21.9   • Hyperlipidemia E78.5   • Intestinal malabsorption K90.9   • Adverse effect of iron T45.4X5A   • Iron deficiency anemia D50.9   • Gastrointestinal hemorrhage K92.2   • Paroxysmal atrial fibrillation I48.0   • Weakness R53.1   • Bilateral edema of lower extremity R60.0   • DNR (do not resuscitate) Z66   • KESHIA (acute kidney injury) N17.9   • Mitral regurgitation I34.0   • Bilateral carotid artery stenosis I65.23   • Mitral valve insufficiency I34.0       Assessment & Plan   Mitral incompetence-- s/p Mitral valve repair with a 26 mm [employer] 3-D rigid ring, Tricuspid valve repair with a 26 mm triad band, Cryo-maze POD#7-- Lewis on ASA, BB, Statin  POD #3 PPM  Cadiomyopathy  EF 30%- holding ACE d/t KESHIA  HTN-increased BB per cards,   HLD- on statin  Pulmonary HTN  RIGHT > LEFT CAROTID STENOSIS----plan CEA after valve surgery  KESHIA on CKD III- improving   Leukocytosis- resolved   Deconditioned - PT consulted  UTI- enterococcus faecalis 9/16  Pre op anemia KATHLEEN, on iron ->7.3/23 -- VSS-- pretty winded after PT today  Hyponatremia-134 - renal following  Pre-op afib- holding ac d/t history of GI bleed  Stool + for occult blood - treatment as per primary        Pacemaker incision well approximated      Routine care  Mobilize   Pulmonary toilet    Making progress-will need skilled nursing facility-- not quite ready yet       Alexia Lala, APRN  10/02/17  9:08 AM

## 2017-10-02 NOTE — PROGRESS NOTES
"   LOS: 26 days    Patient Care Team:  Ricky Hoyos III, MD as PCP - General  Ricky Hoyos III, MD as PCP - Family Medicine  Anthony Hernández MD as Consulting Physician (Pulmonary Disease)  Christy Jerez MD as Consulting Physician (Dermatology)    Chief Complaint:    Chief Complaint   Patient presents with   • Weakness - Generalized     generalized weakness; had infusion last week; scheduled to have iron infusion this friday.         Subjective follow-up kidney injury on chronic kidney disease    Interval History:   POD7 MV and TV repair, cryomaze.  She feels ok; still eating poorly; breathing is fine on RA; leg swelling less    Objective     Vital Signs  Temp:  [98.1 °F (36.7 °C)-98.7 °F (37.1 °C)] 98.6 °F (37 °C)  Heart Rate:  [82-86] 86  Resp:  [18] 18  BP: (129-158)/(48-73) 158/54    Flowsheet Rows         First Filed Value    Admission Height  64\" (162.6 cm) Documented at 09/06/2017 1506    Admission Weight  204 lb (92.5 kg) Documented at 09/06/2017 1506          I/O this shift:  In: 480 [P.O.:480]  Out: -   I/O last 3 completed shifts:  In: 120 [P.O.:120]  Out: 300 [Urine:300]    Intake/Output Summary (Last 24 hours) at 10/02/17 1437  Last data filed at 10/02/17 1300   Gross per 24 hour   Intake              480 ml   Output              300 ml   Net              180 ml       Physical Exam:  General Appearance: alert, oriented x 3, no acute distress, obese.   Skin: warm and dry. Scattered ecchymoses.   HEENT:  oral mucosa moist; eyes w/o icterus  Lungs: Bilateral rales at bases, unlabored breathing effort on RA  Heart: RRR,  no S3, +2/6 syst m  Abdomen: soft, non-tender, ND, + bs  Extremities: trace lower ext edema; skin wrinkled   Neuro: normal speech and mental status      Results Review:      Results from last 7 days  Lab Units 10/02/17  0312 10/01/17  1750 10/01/17  0330 09/30/17  0323 09/29/17  0348  09/27/17  0257   SODIUM mmol/L 134*  --  133* 132* 130*  < > 131*   POTASSIUM mmol/L 4.4 " 4.5 3.1* 3.7 4.1  < > 3.4*   CHLORIDE mmol/L 99  --  96* 96* 96*  < > 92*   CO2 mmol/L 21.6*  --  21.9* 21.6* 20.4*  < > 22.8   BUN mg/dL 54*  --  59* 58* 56*  < > 32*   CREATININE mg/dL 1.27*  --  1.34* 1.40* 1.86*  < > 2.03*   CALCIUM mg/dL 9.1  --  8.9 9.1 8.9  < > 8.8   BILIRUBIN mg/dL 0.5  --   --   --  0.5  --  0.3   ALK PHOS U/L 82  --   --   --  47  --  41   ALT (SGPT) U/L 11  --   --   --  <5  --  9   AST (SGOT) U/L 28  --   --   --  24  --  70*   GLUCOSE mg/dL 92  --  105* 114* 113*  < > 125*   < > = values in this interval not displayed.    Estimated Creatinine Clearance: 37.1 mL/min (by C-G formula based on Cr of 1.27).      Results from last 7 days  Lab Units 10/02/17  0312 10/01/17  0330 09/30/17  0323 09/28/17  0419 09/26/17  0304   MAGNESIUM mg/dL  --   --  2.0 2.0 2.3   PHOSPHORUS mg/dL 2.5 2.5 2.7 4.6* 3.0               Results from last 7 days  Lab Units 10/02/17  0312 10/01/17  0330 09/30/17  0323 09/29/17  0348 09/28/17  0419   WBC 10*3/mm3 9.91 10.28 10.31 10.40 13.89*   HEMOGLOBIN g/dL 7.3* 7.4* 8.0* 7.5* 8.3*   PLATELETS 10*3/mm3 317 268 250 198 201         Results from last 7 days  Lab Units 09/26/17 0304 09/25/17  1527   INR  1.39* 1.63*         Imaging Results (last 24 hours)     ** No results found for the last 24 hours. **          aspirin 81 mg Oral Daily   atorvastatin 40 mg Oral Nightly   budesonide-formoterol 2 puff Inhalation BID - RT   bumetanide 2 mg Oral BID   carvedilol 12.5 mg Oral Q12H   citalopram 40 mg Oral Nightly   enoxaparin 30 mg Subcutaneous Daily   pantoprazole 40 mg Oral Daily   Or      famotidine 20 mg Intravenous Daily   hydrALAZINE 25 mg Oral Q8H   iron sucrose 200 mg Intravenous Q24H   mupirocin  Each Nare Daily   polyethylene glycol with electrolytes 2,000 mL Oral Q8H   potassium chloride 20 mEq Oral BID With Meals   sennosides-docusate sodium 2 tablet Oral Nightly       sodium chloride 30 mL/hr Last Rate: Stopped (09/26/17 0810)       Medication Review:    Current Facility-Administered Medications   Medication Dose Route Frequency Provider Last Rate Last Dose   • acetaminophen (TYLENOL) suppository 650 mg  650 mg Rectal Q4H PRN Yonas Johnson MD       • acetaminophen (TYLENOL) tablet 650 mg  650 mg Oral Q4H PRN Yonas Johnson MD       • ALPRAZolam (XANAX) tablet 0.25 mg  0.25 mg Oral Q8H PRN Yonas Johnson MD       • aspirin EC tablet 81 mg  81 mg Oral Daily Yonas Johnson MD   81 mg at 10/02/17 0851   • atorvastatin (LIPITOR) tablet 40 mg  40 mg Oral Nightly Yonas Johnson MD   40 mg at 10/01/17 2246   • bisacodyl (DULCOLAX) EC tablet 10 mg  10 mg Oral Daily PRN Yonas Johnson MD       • bisacodyl (DULCOLAX) suppository 10 mg  10 mg Rectal Daily PRN Yonas Johnson MD       • budesonide-formoterol (SYMBICORT) 160-4.5 MCG/ACT inhaler 2 puff  2 puff Inhalation BID - RT Yonas Johnson MD   2 puff at 10/02/17 0745   • bumetanide (BUMEX) tablet 2 mg  2 mg Oral BID Oliver Gramajo MD   2 mg at 10/02/17 0851   • carvedilol (COREG) tablet 12.5 mg  12.5 mg Oral Q12H Benji Martínez MD       • citalopram (CeleXA) tablet 40 mg  40 mg Oral Nightly Yonas Johnson MD   40 mg at 10/01/17 2245   • cyclobenzaprine (FLEXERIL) tablet 10 mg  10 mg Oral Q8H PRN Yonas Johnson MD       • dextrose (D50W) solution 25 g  25 g Intravenous Q15 Min PRN Yonas Johnson MD       • dextrose (GLUTOSE) oral gel 15 g  15 g Oral Q15 Min PRN Yonas Johnson MD       • enoxaparin (LOVENOX) syringe 30 mg  30 mg Subcutaneous Daily Yonas Johnson MD   30 mg at 10/01/17 1826   • pantoprazole (PROTONIX) EC tablet 40 mg  40 mg Oral Daily Yonas Johnson MD   40 mg at 10/02/17 0851    Or   • famotidine (PEPCID) injection 20 mg  20 mg Intravenous Daily Yonas Johnson MD   20 mg at 09/25/17 1552   • furosemide (LASIX) injection 40 mg  40 mg Intravenous Q6H PRN Yonas Johnson MD       • glucagon (human recombinant) (GLUCAGEN DIAGNOSTIC)  injection 1 mg  1 mg Subcutaneous Q15 Min PRN Yonas Johnson MD       • hydrALAZINE (APRESOLINE) injection 10 mg  10 mg Intravenous Q6H PRN Yonas Johnson MD   10 mg at 10/01/17 1624   • hydrALAZINE (APRESOLINE) tablet 25 mg  25 mg Oral Q8H DELTA Leyva       • HYDROcodone-acetaminophen (NORCO) 5-325 MG per tablet 2 tablet  2 tablet Oral Q4H PRN Yonas Johnson MD   2 tablet at 09/28/17 2341   • Influenza Vac Subunit Quad (FLUCELVAX) injection 0.5 mL  0.5 mL Intramuscular During Hospitalization Yonas Johnson MD       • iron sucrose (VENOFER) 200 mg in sodium chloride 0.9 % 100 mL IVPB  200 mg Intravenous Q24H Arun De MD       • morphine injection 1 mg  1 mg Intravenous Q4H PRN oYnas Johnson MD        And   • naloxone (NARCAN) injection 0.4 mg  0.4 mg Intravenous Q5 Min PRN Yonas Johnson MD       • mupirocin (BACTROBAN) 2 % nasal ointment   Each Nare Daily Yonas Johnson MD       • ondansetron (ZOFRAN) injection 4 mg  4 mg Intravenous Q6H PRN Yonas Johnson MD       • oxyCODONE (ROXICODONE) immediate release tablet 10 mg  10 mg Oral Q4H PRN Yonas Johnson MD       • polyethylene glycol with electrolytes (GOLYTELY) solution 2,000 mL  2,000 mL Oral Q8H DELTA Tran       • potassium chloride (MICRO-K) CR capsule 40 mEq  40 mEq Oral PRN Yonas Johnson MD   40 mEq at 10/01/17 1416    Or   • potassium chloride (KLOR-CON) packet 40 mEq  40 mEq Oral PRN Yonas Johnson MD       • potassium chloride (MICRO-K) CR capsule 20 mEq  20 mEq Oral BID With Meals Oliver Gramajo MD   20 mEq at 10/02/17 0851   • potassium chloride 10 mEq in 100 mL IVPB  10 mEq Intravenous Q1H PRN Yonas Johnson MD        Or   • potassium chloride 10 mEq in 100 mL IVPB  10 mEq Intravenous Q1H PRN Yonas Johnson MD       • potassium chloride 20 mEq in 50 mL IVPB  20 mEq Intravenous Q1H PRN Yonas Johnson MD       • potassium chloride 20 mEq in 50 mL IVPB  20 mEq  Intravenous Q1H PRN Yonas Johnson MD 50 mL/hr at 09/26/17 0407 40 mEq at 09/26/17 0407   • promethazine (PHENERGAN) tablet 12.5 mg  12.5 mg Oral Q6H PRN Yonas Johnson MD        Or   • promethazine (PHENERGAN) injection 12.5 mg  12.5 mg Intravenous Q6H PRN Yonas Johnson MD       • sennosides-docusate sodium (SENOKOT-S) 8.6-50 MG tablet 2 tablet  2 tablet Oral Nightly Yonas Johnson MD   2 tablet at 10/01/17 2246   • sodium chloride 0.9 % flush 1-10 mL  1-10 mL Intravenous PRN Benji Martínez MD       • sodium chloride 0.9 % flush 30 mL  30 mL Intravenous Once PRN Yonas Johnson MD       • sodium chloride 0.9 % infusion  30 mL/hr Intravenous Continuous PRN Yonas Johnson MD   Stopped at 09/26/17 0810       Assessment/Plan   1. KESHIA on CKD3, non-oliguric, better.  ATN multifactorial: contrast-induced nephropathy after CT angiogram and recent cardiac catheterization, followed by valvular heart surgery.  HypoNa with vol excess and poor solute intake, better on diuretic; stable K  2.  Nonischemic cardiomyopathy, ejection fraction 35-40% with valvular heart disease. TR, MR.  Sp MV and TV repair, cryomaze.   3.  Vascular disease with carotid disease  4.  Hypertension. Controlled.  5.  Chronic GI bleed with chronic iron-deficiency: followed by Heme and GI as outpt:  Iron studies again show iron defic, and s/p iv iron 10.1.17   6.  Junctional bradycardia.  Pacer 9.29.17      Plan:  1. Oral bumex 2 mg BID  2. Scheduled oral KCl 20 meq BID, along with K protocol  3. Surveillance labs  4. Rehab anytime from renal view    Oliver Gramajo MD  10/02/17  2:37 PM

## 2017-10-02 NOTE — PROGRESS NOTES
Adult Nutrition  Assessment/PES    Patient Name:  Raquel Deluna  YOB: 1935  MRN: [id number]  Admit Date:  9/6/2017    Assessment Date:  10/2/2017        Reason for Assessment       10/02/17 1058    Reason for Assessment    Reason For Assessment/Visit follow up protocol              Nutrition/Diet History       10/02/17 1058    Nutrition/Diet History    Other POD 7 MV, TV repairs; POD 3 ppm placement. Deconditioned, works with PT - plan SNU at [place]. Hgb low.             Anthropometrics       10/02/17 1058    Anthropometrics    RD Documented Current Weight  84.4 kg (186 lb)            Labs/Tests/Procedures/Meds       10/02/17 1058    Labs/Tests/Procedures/Meds    Diagnostic Test/Procedure Review reviewed    Labs/Tests Review Reviewed;BUN;Creat;Alb;Na+;Hgb Hct    Medication Review Reviewed, pertinent    Significant Vitals reviewed            Physical Findings       10/02/17 1059    Physical Appearance    Overall Physical Appearance obese    Skin surgical wound              Nutrition Prescription Ordered       10/02/17 1059    Nutrition Prescription PO    Current PO Diet Regular    Fluid Consistency Thin    Common Modifiers Cardiac            Evaluation of Received Nutrient/Fluid Intake       10/02/17 1059    PO Evaluation    Number of Days PO Intake Evaluated 1 day    Number of Meals 3    % PO Intake 75%          Problem/Interventions:          Intervention Goal       10/02/17 1100    Intervention Goal    General Maintain nutrition    PO Continue positive trend;Maintain intake;PO intake (%)    PO Intake % 75 %            Nutrition Intervention       10/02/17 1100    Nutrition Intervention    RD/Tech Action Interview for preference;Encourage intake;Follow Tx progress;Care plan reviewd              Education/Evaluation       10/02/17 1100    Monitor/Evaluation    Monitor Per protocol        Comments:  Will cont to follow    Electronically signed by:  Alexia Khan RD  10/02/17 11:00 AM

## 2017-10-02 NOTE — PROGRESS NOTES
LOS: 25 days   Patient Care Team:  Ricky Hoyos III, MD as PCP - General  Ricky Hoyos III, MD as PCP - Family Medicine  Anthony Hernández MD as Consulting Physician (Pulmonary Disease)  Christy Jerez MD as Consulting Physician (Dermatology)    Chief Complaint   Patient presents with   • Weakness - Generalized     generalized weakness; had infusion last week; scheduled to have iron infusion this friday.           Subjective   Still has no appetite. Trying to drink Ensure but gets too full.  Chest feels pretty tender but pain meds help. Had BM today    Objective     Vital Signs  Temp:  [98 °F (36.7 °C)-98.7 °F (37.1 °C)] 98.7 °F (37.1 °C)  Heart Rate:  [82-89] 83  Resp:  [18] 18  BP: (129-166)/(46-73) 140/53    Physical Exam:  WDWN WF in NAD  Skin warm & dry  Lungs clear with decreased BS  Heart RRR  Abd soft, NT, BS+  Ext trace - 1+ edema     Results Review:     I reviewed the patient's new clinical results.    Medication Review:       LABS    Results from last 7 days  Lab Units 10/01/17  1750 10/01/17  0330 09/30/17 0323 09/29/17  0348   SODIUM mmol/L  --  133* 132* 130*   POTASSIUM mmol/L 4.5 3.1* 3.7 4.1   CHLORIDE mmol/L  --  96* 96* 96*   CO2 mmol/L  --  21.9* 21.6* 20.4*   BUN mg/dL  --  59* 58* 56*   CREATININE mg/dL  --  1.34* 1.40* 1.86*   GLUCOSE mg/dL  --  105* 114* 113*   CALCIUM mg/dL  --  8.9 9.1 8.9       Results from last 7 days  Lab Units 10/01/17  0330 09/30/17  0323 09/29/17  0348   WBC 10*3/mm3 10.28 10.31 10.40   HEMOGLOBIN g/dL 7.4* 8.0* 7.5*   HEMATOCRIT % 22.4* 24.9* 23.3*   PLATELETS 10*3/mm3 268 250 198       Results from last 7 days  Lab Units 09/26/17  0304 09/25/17  1527   INR  1.39* 1.63*           Assessment/Plan     Principal Problem:    Mitral valve insufficiency  Active Problems:    Hyponatremia    Essential hypertension    Iron deficiency anemia - She reports that she has never been able to     Paroxysmal atrial fibrillation    Weakness    Bilateral edema of  lower extremity - pretty much resolved    DNR (do not resuscitate)    KESHIA (acute kidney injury) - resolved    Mitral regurgitation    Bilateral carotid artery stenosis    Leukocytosis - resolved          Matilde Flores MD  10/01/17  9:21 PM      Time:

## 2017-10-03 NOTE — PROGRESS NOTES
" LOS: 27 days   Patient Care Team:  Ricky Hoyos III, MD as PCP - General  Ricky Hoyos III, MD as PCP - Family Medicine  Anthony Hernández MD as Consulting Physician (Pulmonary Disease)  Christy Jerez MD as Consulting Physician (Dermatology)    Chief Complaint: post op f/u    Subjective  OOBTC patient c/o SOB while ambulating    Vital Signs  Temp:  [98.1 °F (36.7 °C)] 98.1 °F (36.7 °C)  Heart Rate:  [84-95] 95  Resp:  [14-18] 16  BP: (149-162)/(58-64) 161/64  Body mass index is 32 kg/(m^2).    Intake/Output Summary (Last 24 hours) at 10/03/17 0808  Last data filed at 10/02/17 1727   Gross per 24 hour   Intake              360 ml   Output                0 ml   Net              360 ml            Last 3 weights    10/01/17  0500 10/02/17  0434 10/03/17  0500   Weight: 184 lb 4.8 oz (83.6 kg) 186 lb 3.2 oz (84.5 kg) 186 lb 8 oz (84.6 kg)         Objective:  General Appearance:  Comfortable and in no acute distress.    Vital signs: (most recent): Blood pressure 136/54, pulse 81, temperature 98.2 °F (36.8 °C), temperature source Oral, resp. rate 16, height 64.02\" (162.6 cm), weight 186 lb 8 oz (84.6 kg), SpO2 99 %.  Vital signs are normal.  No fever.    Output: Producing urine and producing stool.    HEENT: Normal HEENT exam.    Lungs:  Normal respiratory rate and normal effort.  Breath sounds clear to auscultation.    Heart: Normal rate.  Regular rhythm.  S1 normal and S2 normal.    Chest: (Sternotomy stable well approximated)  Abdomen: Abdomen is soft.  Bowel sounds are normal.   There is no abdominal tenderness.     Extremities: Normal range of motion.  There is dependent edema (trace).    Pulses: Distal pulses are intact.    Neurological: Patient is alert and oriented to person, place and time.    Pupils:  Pupils are equal, round, and reactive to light.    Skin:  Warm and dry.              Results Review:        WBC WBC   Date Value Ref Range Status   10/03/2017 11.48 (H) 4.50 - 10.70 10*3/mm3 Final "   10/02/2017 9.91 4.50 - 10.70 10*3/mm3 Final   10/01/2017 10.28 4.50 - 10.70 10*3/mm3 Final      HGB Hemoglobin   Date Value Ref Range Status   10/03/2017 7.4 (L) 11.9 - 15.5 g/dL Final   10/02/2017 7.3 (L) 11.9 - 15.5 g/dL Final   10/01/2017 7.4 (L) 11.9 - 15.5 g/dL Final      HCT Hematocrit   Date Value Ref Range Status   10/03/2017 23.4 (L) 35.6 - 45.5 % Final   10/02/2017 23.0 (L) 35.6 - 45.5 % Final   10/01/2017 22.4 (L) 35.6 - 45.5 % Final      Platelets Platelets   Date Value Ref Range Status   10/03/2017 313 140 - 500 10*3/mm3 Final   10/02/2017 317 140 - 500 10*3/mm3 Final   10/01/2017 268 140 - 500 10*3/mm3 Final        PT/INR:    Protime   Date Value Ref Range Status   10/03/2017 15.2 (H) 11.7 - 14.2 Seconds Final   /  INR   Date Value Ref Range Status   10/03/2017 1.24 (H) 0.90 - 1.10 Final       Sodium Sodium   Date Value Ref Range Status   10/03/2017 132 (L) 136 - 145 mmol/L Final   10/02/2017 134 (L) 136 - 145 mmol/L Final   10/01/2017 133 (L) 136 - 145 mmol/L Final      Potassium Potassium   Date Value Ref Range Status   10/03/2017 3.9 3.5 - 5.2 mmol/L Final   10/02/2017 4.4 3.5 - 5.2 mmol/L Final   10/01/2017 4.5 3.5 - 5.2 mmol/L Final   10/01/2017 3.1 (L) 3.5 - 5.2 mmol/L Final      Chloride Chloride   Date Value Ref Range Status   10/03/2017 97 (L) 98 - 107 mmol/L Final   10/02/2017 99 98 - 107 mmol/L Final   10/01/2017 96 (L) 98 - 107 mmol/L Final      Bicarbonate CO2   Date Value Ref Range Status   10/03/2017 20.6 (L) 22.0 - 29.0 mmol/L Final   10/02/2017 21.6 (L) 22.0 - 29.0 mmol/L Final   10/01/2017 21.9 (L) 22.0 - 29.0 mmol/L Final      BUN BUN   Date Value Ref Range Status   10/03/2017 51 (H) 8 - 23 mg/dL Final   10/02/2017 54 (H) 8 - 23 mg/dL Final   10/01/2017 59 (H) 8 - 23 mg/dL Final      Creatinine Creatinine   Date Value Ref Range Status   10/03/2017 1.21 (H) 0.57 - 1.00 mg/dL Final   10/02/2017 1.27 (H) 0.57 - 1.00 mg/dL Final   10/01/2017 1.34 (H) 0.57 - 1.00 mg/dL Final       Calcium Calcium   Date Value Ref Range Status   10/03/2017 8.8 8.6 - 10.5 mg/dL Final   10/02/2017 9.1 8.6 - 10.5 mg/dL Final   10/01/2017 8.9 8.6 - 10.5 mg/dL Final      Magnesium No results found for: MG         aspirin 81 mg Oral Daily   atorvastatin 40 mg Oral Nightly   budesonide-formoterol 2 puff Inhalation BID - RT   bumetanide 2 mg Oral BID   carvedilol 12.5 mg Oral Q12H   citalopram 40 mg Oral Nightly   enoxaparin 30 mg Subcutaneous Daily   pantoprazole 40 mg Oral Daily   Or      famotidine 20 mg Intravenous Daily   hydrALAZINE 25 mg Oral Q8H   iron sucrose 200 mg Intravenous Q24H   mupirocin  Each Nare Daily   potassium chloride 20 mEq Oral BID With Meals   sennosides-docusate sodium 2 tablet Oral Nightly       sodium chloride 30 mL/hr Last Rate: Stopped (09/26/17 0810)           Patient Active Problem List   Diagnosis Code   • OA (osteoarthritis) of knee M17.10   • Hyponatremia E87.1   • 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   • Hematochezia K92.1   • Colon polyp K63.5   • Essential hypertension I10   • Gastroesophageal reflux disease K21.9   • Hyperlipidemia E78.5   • Intestinal malabsorption K90.9   • Adverse effect of iron T45.4X5A   • Iron deficiency anemia D50.9   • Gastrointestinal hemorrhage K92.2   • Paroxysmal atrial fibrillation I48.0   • Weakness R53.1   • Bilateral edema of lower extremity R60.0   • DNR (do not resuscitate) Z66   • KESHIA (acute kidney injury) N17.9   • Mitral regurgitation I34.0   • Bilateral carotid artery stenosis I65.23   • Mitral valve insufficiency I34.0   • Absolute anemia D64.9   • Polyp of colon K63.5       Assessment & Plan      Mitral incompetence-- s/p Mitral valve repair with a 26 mm [employer] 3-D rigid ring, Tricuspid valve repair with a 26 mm triad band, Cryo-maze POD#8-- [place] on ASA, BB, Statin  POD #4 PPM --incision well approximated   Cadiomyopathy  EF 30%- holding ACE d/t KESHIA  HTN- adjusting medication   HLD- on statin  Pulmonary HTN  RIGHT > LEFT CAROTID  STENOSIS----plan CEA after valve surgery  KESHIA on CKD III- improving   Leukocytosis- resolved   Deconditioned  UTI- enterococcus faecalis 9/16  Pre op anemia KATHLEEN, on iron ->7.4/23.4 -  Hyponatremia-132 - renal following  Pre-op afib- holding ac d/t history of GI bleed  Stool + for occult blood - gi following-- may need scope however, waiting for respond for IV iron  Routine care  Mobilize   Pulmonary toilet    Making progress-will need skilled nursing facility-- not quite ready yet   Check walking oximetry- may need oxygen at home           Alexia Lala, APRN  10/03/17  8:08 AM

## 2017-10-03 NOTE — DISCHARGE SUMMARY
Date of Discharge:  10/3/2017  Date of Admit: 9/6/2017    Discharge Diagnosis:Principal Problem:    Mitral valve insufficiency  Active Problems:    Hyponatremia    Essential hypertension    Iron deficiency anemia    Paroxysmal atrial fibrillation    Weakness    Bilateral edema of lower extremity    KESHIA (acute kidney injury)    Mitral regurgitation    Bilateral carotid artery stenosis    Absolute anemia    Polyp of colon      Hospital Course:     Admitted 9/6/2017 with decompensated congestive heart failure as well as acute kidney injury.  This was due to mitral insufficiency.  Cardiac catheterization done 9/13/2017 showed 20% mid RCA stenosis, normal left main, normal LAD, normal circumflex system.  It did reveal mild pulmonary hypertension with severe mitral insufficiency.  She had a carotid duplex on 9/12/2017 showing severe stenosis of the proximal right internal carotid artery with moderate stenosis of the left internal carotid artery.  Vascular did consult on her and will see her as an outpatient.  On 9/25/2017, she underwent mitral valve repair at the 26 mm [employer] 3-D rigid ring.  She also tricuspid valve repair is a 26 mm triad and and cryo-maize.  Postoperatively, she had a junctional rhythm in the 30s.  She received a permanent pacemaker during hospitalization.    She still quite weak at this time but her breathing is stable and she has no dizziness.  She has minimal left lower extremity edema.  Overall she feels much better.  Her appetite is returning.    Her iron deficiency anemia is worse.  She is occult blood positive but no overt GI bleeding.  She's getting IV then a for.  Would give her a dose today and sent her home on Ferrex 150 twice daily.  She has a history of colonic angiodysplasia and colonic polyps.  Gastroenterology did consult on her.  She has followed in the past with Dr. Flores.     Procedures Performed  Procedure(s):  Device Implant, [place]   PPM       Consults     Date and Time  Order Name Status Description    9/30/2017 1037 Inpatient Consult to Gastroenterology Completed     9/15/2017 1039 Inpatient Consult to Nephrology Completed     9/14/2017 0842 Inpatient Consult to Vascular Surgery Completed     9/14/2017 0842 Inpatient Consult to Cardiothoracic Surgery Completed     9/8/2017 1903 Inpatient Consult to Cardiology Completed     9/6/2017 1940 [facility] (on-call MD unless specified) Completed           Pertinent Test Results:   .      Discharge Physical Exam:    General Appearance No acute distress   Neck No adenopathy, supple, trachea midline, no thyromegaly, no carotid bruit, no JVD   Lungs Clear to auscultation,respirations regular, even and unlabored   Heart Regular rhythm and normal rate, normal S1 and S2, no murmur, no gallop, no rub, no click   Chest wall No abnormalities observed   Abdomen Normal bowel sounds, no masses, no hepatomegaly, soft   Extremities Moves all extremities well, 1+ LLE edema, no cyanosis, no redness   Neurological Alert and oriented x 3     Discharge Medications   Raquel Deluna   Home Medication Instructions BENOIT:[id number]    Printed on:10/03/17 1133   Medication Information                      aspirin 81 MG EC tablet  Take 1 tablet by mouth Daily.             atorvastatin (LIPITOR) 40 MG tablet  Take 1 tablet by mouth Every Night.             B Complex Vitamins (VITAMIN B COMPLEX PO)  Take 1 tablet by mouth Daily.             bisacodyl (DULCOLAX) 5 MG EC tablet  Take 2 tablets by mouth Daily As Needed for Constipation.             budesonide-formoterol (SYMBICORT) 160-4.5 MCG/ACT inhaler  Inhale 2 puffs 2 (Two) Times a Day.             bumetanide (BUMEX) 2 MG tablet  Take 1 tablet by mouth 2 (Two) Times a Day.             CALCIUM CARBONATE-VITAMIN D PO  Take 1 tablet by mouth Daily.             carvedilol (COREG) 12.5 MG tablet  Take 2 tablets by mouth Every 12 (Twelve) Hours.             citalopram (CeleXA) 40 MG tablet  Take 40 mg by mouth Every Night.              esomeprazole (NexIUM) 40 MG capsule  Take 40 mg by mouth Daily Before Supper.             Fe-Succ Ac-C-Thre Ac-B12-FA (FERREX 150 FORTE PLUS)  MG capsule capsule  Take 1 capsule by mouth 2 (Two) Times a Day With Meals.             hydrALAZINE (APRESOLINE) 25 MG tablet  Take 1 tablet by mouth Every 8 (Eight) Hours.             HYDROcodone-acetaminophen (NORCO) 5-325 MG per tablet  Take 1 tablet by mouth Every 4 (Four) Hours As Needed for Moderate Pain  for up to 2 days.             mometasone (NASONEX) 50 MCG/ACT nasal spray  2 sprays into each nostril Daily.             Multiple Vitamin (MULTIVITAMIN) capsule  Take 1 capsule by mouth Daily.             potassium chloride (MICRO-K) 10 MEQ CR capsule  Take 2 capsules by mouth 2 (Two) Times a Day With Meals.             vitamin B-6 (PYRIDOXINE) 50 MG tablet  Take 50 mg by mouth Daily.                 Discharge Diet:   Diet Instructions     Diet: Regular; Thin       Discharge Diet:  Regular   Fluid Consistency:  Thin                 Activity at Discharge:     Discharge disposition: rehab    Condition on Discharge: stable    Follow-up Appointments  No future appointments.  Additional Instructions for the Follow-ups that You Need to Schedule     Discharge Follow-up with Specialty    As directed    Specialty:  cardiology   Follow Up:  2 Weeks   Follow Up Details:  Our office will call you with a 2 week follow up appointment. Please call [phone] with questions       CBC & Differential    Oct 08, 2017 (Approximate)    Manual Differential:  No                 Test Results Pending at Discharge     F/u with vascular - ok for rehab, see back in 4 weeks. Cbc in 1 week    Home on iron   Ladonna Mclaughlin MD  10/03/17  11:32 AM    34min spent in reviewing records, discussion and examination of the patient and discussion with other members of the patient's medical team.     Dictated utilizing Dragon dictation

## 2017-10-03 NOTE — PLAN OF CARE
Problem: Patient Care Overview (Adult)  Goal: Plan of Care Review  Outcome: Ongoing (interventions implemented as appropriate)    10/03/17 0926   Coping/Psychosocial Response Interventions   Plan Of Care Reviewed With patient   Patient Care Overview   Progress progress towards functional goals is fair   Outcome Evaluation   Outcome Summary/Follow up Plan Pt presents c improved endurance c ambulation and decreased need for rest breaks during activity. Pt still fatigues quickly and notes weakness/pain in LEs. Continue to progress as tolerated.

## 2017-10-03 NOTE — PROGRESS NOTES
"DAILY PROGRESS NOTE  [facility]    Patient Identification:  Name: Raquel Deluna  Age: 82 y.o.  Sex: female  :  1935  MRN: [id number]         Primary Care Physician: Ricky Hoyos III, MD    Subjective:  Interval History:She is still weak and poor appetite.    Objective:    Scheduled Meds:    aspirin 81 mg Oral Daily   atorvastatin 40 mg Oral Nightly   budesonide-formoterol 2 puff Inhalation BID - RT   bumetanide 2 mg Oral BID   carvedilol 12.5 mg Oral Q12H   citalopram 40 mg Oral Nightly   enoxaparin 30 mg Subcutaneous Daily   pantoprazole 40 mg Oral Daily   Or      famotidine 20 mg Intravenous Daily   Ferrex 150 forte plus 1 capsule Oral BID With Meals   hydrALAZINE 25 mg Oral Q8H   mupirocin  Each Nare Daily   potassium chloride 20 mEq Oral BID With Meals   sennosides-docusate sodium 2 tablet Oral Nightly     Continuous Infusions:    sodium chloride 30 mL/hr Last Rate: Stopped (17 0810)       Vital signs in last 24 hours:  Temp:  [98.2 °F (36.8 °C)] 98.2 °F (36.8 °C)  Heart Rate:  [81-95] 88  Resp:  [14-16] 16  BP: (136-162)/(54-64) 158/54    Intake/Output:    Intake/Output Summary (Last 24 hours) at 10/03/17 1529  Last data filed at 10/03/17 0700   Gross per 24 hour   Intake              320 ml   Output                0 ml   Net              320 ml       Exam:  /54 (BP Location: Right arm, Patient Position: Lying)  Pulse 88  Temp 98.2 °F (36.8 °C) (Oral)   Resp 16  Ht 64.02\" (162.6 cm)  Wt 186 lb 8 oz (84.6 kg)  SpO2 95%  BMI 32 kg/m2    General Appearance:    Alert, cooperative, no distress   Head:    Normocephalic, without obvious abnormality, atraumatic   Eyes:       Throat:   Lips, tongue, gums normal   Neck:   Supple, symmetrical, trachea midline, no JVD   Lungs:     Clear to auscultation bilaterally, respirations unlabored   Chest Wall:    Surgical change    Heart:    Regular rate and rhythm, S1 and S2 normal, no murmur,no  Rub or gallop   Abdomen:     Soft, " non-tender, bowel sounds active, no masses, no organomegaly    Extremities:   Extremities normal, atraumatic, no cyanosis or edema   Pulses:      Skin:   Skin is warm and dry,  no rashes or palpable lesions   Neurologic:   no focal deficits noted      [unfilled]  Data Review:    Results from last 7 days  Lab Units 10/03/17  0429 10/02/17  0312 10/01/17  1750 10/01/17  0330   SODIUM mmol/L 132* 134*  --  133*   POTASSIUM mmol/L 3.9 4.4 4.5 3.1*   CHLORIDE mmol/L 97* 99  --  96*   CO2 mmol/L 20.6* 21.6*  --  21.9*   BUN mg/dL 51* 54*  --  59*   CREATININE mg/dL 1.21* 1.27*  --  1.34*   GLUCOSE mg/dL 113* 92  --  105*   CALCIUM mg/dL 8.8 9.1  --  8.9       Results from last 7 days  Lab Units 10/03/17  0429 10/02/17  0312 10/01/17  0330   WBC 10*3/mm3 11.48* 9.91 10.28   HEMOGLOBIN g/dL 7.4* 7.3* 7.4*   HEMATOCRIT % 23.4* 23.0* 22.4*   PLATELETS 10*3/mm3 313 317 268               Lab Results  Lab Value Date/Time   TROPONINT <0.010 09/06/2017 1603   TROPONINT <0.010 02/01/2017 0516   TROPONINT <0.010 01/31/2017 1236   TROPONINT <0.010 10/28/2016 0411           Results from last 7 days  Lab Units 10/02/17  0312 09/29/17  0348 09/27/17  0257   ALK PHOS U/L 82 47 41   BILIRUBIN mg/dL 0.5 0.5 0.3   ALT (SGPT) U/L 11 <5 9   AST (SGOT) U/L 28 24 70*             Glucose   Date/Time Value Ref Range Status   10/03/2017 1038 112 70 - 130 mg/dL Final   10/03/2017 0606 126 70 - 130 mg/dL Final   10/02/2017 1945 132 (H) 70 - 130 mg/dL Final   10/02/2017 1608 112 70 - 130 mg/dL Final   10/01/2017 2003 120 70 - 130 mg/dL Final   10/01/2017 1035 119 70 - 130 mg/dL Final   10/01/2017 0548 108 70 - 130 mg/dL Final   09/30/2017 1946 157 (H) 70 - 130 mg/dL Final       Results from last 7 days  Lab Units 10/03/17  0429   INR  1.24*       Patient Active Problem List   Diagnosis Code   • OA (osteoarthritis) of knee M17.10   • Hyponatremia E87.1   • 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   • Hematochezia K92.1   • Colon polyp K63.5   • Essential  hypertension I10   • Gastroesophageal reflux disease K21.9   • Hyperlipidemia E78.5   • Intestinal malabsorption K90.9   • Adverse effect of iron T45.4X5A   • Iron deficiency anemia D50.9   • Gastrointestinal hemorrhage K92.2   • Paroxysmal atrial fibrillation I48.0   • Weakness R53.1   • Bilateral edema of lower extremity R60.0   • DNR (do not resuscitate) Z66   • KESHIA (acute kidney injury) N17.9   • Mitral regurgitation I34.0   • Bilateral carotid artery stenosis I65.23   • Mitral valve insufficiency I34.0   • Absolute anemia D64.9   • Polyp of colon K63.5       Assessment:  Principal Problem:    Mitral valve insufficiency  Active Problems:    Hyponatremia    Essential hypertension    Iron deficiency anemia    Paroxysmal atrial fibrillation    Weakness    Bilateral edema of lower extremity    KESHIA (acute kidney injury)    Mitral regurgitation    Bilateral carotid artery stenosis    Absolute anemia    Polyp of colon  S/P Mitral and tricuspid repair and PPM    Plan:  Will give some more Venofer today. Probably to SNU today. Discussed with cardiology and family    Arun De MD  10/3/2017  3:29 PM

## 2017-10-03 NOTE — PLAN OF CARE
Problem: Inpatient Physical Therapy  Goal: Bed Mobility Goal LTG- PT    09/08/17 1526 09/17/17 1646 09/22/17 1030   Bed Mobility PT LTG   Bed Mobility PT LTG, Date Established --  09/17/17 --    Bed Mobility PT LTG, Time to Achieve --  --  1 wk   Bed Mobility PT LTG, Activity Type all bed mobility --  --    Bed Mobility PT LTG, [place] Level --  supervision required --    Bed Mobility PT LTG, Date Goal Reviewed --  --  --    Bed Mobility PT LTG, Outcome --  --  --    Bed Mobility PT LTG, Reason Goal Not Met --  --  --      10/03/17 1000   Bed Mobility PT LTG   Bed Mobility PT LTG, Date Established --    Bed Mobility PT LTG, Time to Achieve --    Bed Mobility PT LTG, Activity Type --    Bed Mobility PT LTG, [place] Level --    Bed Mobility PT LTG, Date Goal Reviewed 10/03/17   Bed Mobility PT LTG, Outcome goal ongoing   Bed Mobility PT LTG, Reason Goal Not Met progress slower than expected       Goal: Transfer Training Goal 1 LTG- PT    10/03/17 1000   Transfer Training PT LTG   Transfer Training PT LTG, Date Goal Reviewed 10/03/17   Transfer Training PT LTG, Outcome goal ongoing   Transfer Training PT LTG, Reason Goal Not Met progress slower than expected       Goal: Gait Training Goal LTG- PT    09/17/17 1646 09/22/17 1030 10/03/17 1000   Gait Training PT LTG   Gait Training Goal PT LTG, Date Established 09/17/17 --  --    Gait Training Goal PT LTG, Time to Achieve 5 days --  --    Gait Training Goal PT LTG, [place] Level supervision required --  --    Gait Training Goal PT LTG, Assist Device walker, rolling --  --    Gait Training Goal PT LTG, Distance to Achieve --  120 --    Gait Training Goal PT LTG, Date Goal Reviewed --  --  10/03/17   Gait Training Goal PT LTG, Outcome --  --  goal ongoing   Gait Training Goal PT LTG, Reason Goal Not Met --  --  progress slower than expected       Goal: Cardiopulmonary Goal LTG- PT    09/26/17 0914 10/03/17 1000   Cardiopulmonary PT LTG   Cardiopulmonary PT  LTG, Date Established 09/26/17 --    Cardiopulmonary PT LTG, Time to Achieve 1 wk --    Cardiopulmonary PT LTG, Level Level IV --    Cardiopulmonary PT LTG, Date Goal Reviewed --  10/03/17   Cardiopulmonary PT LTG, Outcome --  goal ongoing   Cardiopulmonary PT LTG, Reason Goal Not Met --  progress slower than expected

## 2017-10-03 NOTE — THERAPY TREATMENT NOTE
Acute Care - Physical Therapy Treatment Note  [facility]     Patient Name: Raquel Deluna  : 1935  MRN: [id number]  Today's Date: 10/3/2017  Onset of Illness/Injury or Date of Surgery Date: 17  Date of Referral to PT: 17  Referring Physician: Alex    Admit Date: 2017    Visit Dx:    ICD-10-CM ICD-9-CM   1. Weakness R53.1 780.79   2. New onset left bundle branch block (LBBB) I44.7 426.3   3. Blood loss anemia D50.0 280.0   4. Generalized weakness R53.1 780.79   5. Carotid stenosis, right I65.21 433.10   6. Mitral valve insufficiency, unspecified etiology I34.0 424.0   7. Other iron deficiency anemia D50.8    8. Hematochezia K92.1 578.1   9. Polyp of colon, unspecified part of colon, unspecified type K63.5 211.3     Patient Active Problem List   Diagnosis   • OA (osteoarthritis) of knee   • Hyponatremia   • Bella's esophagus   • Hematochezia   • Colon polyp   • Essential hypertension   • Gastroesophageal reflux disease   • Hyperlipidemia   • Intestinal malabsorption   • Adverse effect of iron   • Iron deficiency anemia   • Gastrointestinal hemorrhage   • Paroxysmal atrial fibrillation   • Weakness   • Bilateral edema of lower extremity   • DNR (do not resuscitate)   • KESHIA (acute kidney injury)   • Mitral regurgitation   • Bilateral carotid artery stenosis   • Mitral valve insufficiency   • Absolute anemia   • Polyp of colon               Adult Rehabilitation Note       10/03/17 0950 10/02/17 1002 10/01/17 1000    Rehab Assessment/Intervention    Discipline physical therapist  -PC,MF,PC2 physical therapist  -PC physical therapist  -LS    Document Type therapy note (daily note)  -PC,MF,PC2 therapy note (daily note)  -PC therapy note (daily note)  -LS    Subjective Information agree to therapy;complains of;pain;fatigue  -PC,MF,PC2 agree to therapy  -PC agree to therapy;complains of;fatigue  -LS    Patient Effort, Rehab Treatment good  -PC,MF,PC2 good  -PC good  -LS     Precautions/Limitations cardiac precautions;fall precautions;pacemaker  -PC,MF,PC2 cardiac precautions;pacemaker  -PC cardiac precautions  -LS    Specific Treatment Considerations   no raising LUE due to pacer  -LS    Recorded by [PC,MF,PC2] Kymberly Sewell, PT (r) Nicolás Shepard, PT Student (t) Kymberly Sewell, PT (c) [PC] Kymberly Sewell, PT [LS] Tonja Liriano, PT    Vital Signs    Pretreatment Heart Rate (beats/min) 79  -PC,MF,PC2 84  -PC     Posttreatment Heart Rate (beats/min)  87  -PC     Pre SpO2 (%) 94  -PC,MF,PC2 94  -PC 96  -LS    O2 Delivery Pre Treatment supplemental O2  -PC,MF,PC2 room air  -PC room air  -LS    Post SpO2 (%) 94  -PC,MF,PC2 95  -PC 96  -LS    O2 Delivery Post Treatment supplemental O2  -PC,MF,PC2 room air  -PC room air  -LS    Recorded by [PC,MF,PC2] Kymberly Sewell, PT (r) Nicolás Shepard, PT Student (t) Kymberly Sewell, PT (c) [PC] Kymberly Sewell, PT [LS] Tonja Liriano, PT    Pain Assessment    Pain Assessment 0-10  -PC,MF,PC2 No/denies pain  -PC 0-10  -LS    Pain Score 6  -PC,MF,PC2  5  -LS    Pain Location Sternum  -PC,MF,PC2  Sternum  -LS    Pain Intervention(s) Repositioned;Ambulation/increased activity  -PC,MF,PC2  Repositioned;Ambulation/increased activity  -LS    Response to Interventions   tolerated  -LS    Recorded by [PC,MF,PC2] Kymberly Sewell, PT (r) Nicolás Shepard, PT Student (t) Kymberly Sewell, PT (c) [PC] Kymberly Sewell, PT [LS] Tonja Liriano, PT    Cognitive Assessment/Intervention    Current Cognitive/Communication Assessment  functional  -PC functional  -LS    Orientation Status   oriented x 4  -LS    Follows Commands/Answers Questions   100% of the time  -LS    Personal Safety   WNL/WFL  -LS    Personal Safety Interventions   fall prevention program maintained;nonskid shoes/slippers when out of bed  -LS    Recorded by  [PC] Kymberly Sewell, PT [LS] Tonja Liriano, PT    Bed Mobility, Assessment/Treatment    Bed Mob, Supine to Sit, [place]   not tested  -LS    Bed Mob,  Sit to Supine, [place]   not tested  -LS    Bed Mobility, Comment up in chair   -PC,MF,PC2 in chair  -PC up in chair  -LS    Recorded by [PC,MF,PC2] Kymberly Sewell, PT (r) Nicolás Shepard, PT Student (t) Kymberly Sewell, PT (c) [PC] Kymberly Sewell, PT [LS] Tonja Liriano, PT    Transfer Assessment/Treatment    Transfers, Sit-Stand [place] minimum assist (75% patient effort)  -PC,MF,PC2 minimum assist (75% patient effort)  -PC moderate assist (50% patient effort);1 person + 1 person to manage equipment  -LS    Transfers, Stand-Sit [place] minimum assist (75% patient effort)  -PC,MF,PC2 minimum assist (75% patient effort)  -PC moderate assist (50% patient effort);1 person + 1 person to manage equipment  -LS    Transfers, Sit-Stand-Sit, Assist Device rolling walker  -PC,MF,PC2 rolling walker  -PC rolling walker  -LS    Transfer, Safety Issues step length decreased;weight-shifting ability decreased  -PC,MF,PC2  step length decreased;weight-shifting ability decreased;balance decreased during turns  -LS    Transfer, Impairments strength decreased;impaired balance  -PC,MF,PC2  strength decreased;impaired balance  -LS    Transfer, Comment achieved on second attempt, noted pain in L knee  -PC,MF,PC2  Fatigues quickly; inc time to reach full stand; FF posture  -LS    Recorded by [PC,MF,PC2] Kymberly Sewell, PT (r) Nicolás Shepard, PT Student (t) Kymberly Sewell, PT (c) [PC] Kymberly Sewell, PT [LS] Tonja Liriano, PT    Gait Assessment/Treatment    Gait, [place] Level minimum assist (75% patient effort)  -PC,MF,PC2 minimum assist (75% patient effort);contact guard assist  -PC contact guard assist  -LS    Gait, Assistive Device rolling walker  -PC,MF,PC2 rolling walker  -PC rolling walker  -LS    Gait, Distance (Feet) 80  -PC,MF,PC2 60  -PC 25  -LS    Gait, Gait Deviations forward flexed posture;dorie decreased;decreased heel strike;step length decreased  -PC,MF,PC2 forward flexed posture;dorie decreased;step  length decreased  -PC forward flexed posture;dorie decreased;step length decreased  -LS    Gait, Safety Issues step length decreased  -PC,MF,PC2 step length decreased  -PC     Gait, Impairments strength decreased  -PC,MF,PC2 strength decreased  -PC     Gait, Comment required 1 rest break during ambulation, standing   -PC,MF,PC2 pt required 3 standing rest breaks while ambulating  -PC Pt needed to use BSC in bathroom.   -LS    Recorded by [PC,MF,PC2] Kymberly Sewell, PT (r) Nicolás Shepard, PT Student (t) Kymberly Sewell, PT (c) [PC] Kymberly Sewell, PT [LS] Tonja Liriano PT    Balance Skills Training    Sitting-Level of Assistance Close supervision  -PC,MF,PC2      Sitting-Balance Support Feet supported  -PC,MF,PC2      Standing-Level of Assistance Minimum assistance  -PC,MF,PC2      Static Standing Balance Support assistive device  -PC,MF,PC2      Recorded by [PC,MF,PC2] Kymberly Sewell, PT (r) Nicolás Shepard, PT Student (t) Kymberly Sewell, PT (c)      Therapy Exercises    Exercise Protocols --   5 reps cardiac level 3, held UE ex, pacemaker incision  -PC,MF,PC2 --   cardiac ex 5 reps, level III  -PC --   5 reps cardiac protocol level 3  -LS    Recorded by [PC,MF,PC2] Kymberly Sewell, PT (r) Nicolás Shepard, PT Student (t) Kymberly Sewell, PT (c) [PC] Kymberly Sewell, PT [LS] Tonja Liriano PT    Positioning and Restraints    Pre-Treatment Position sitting in chair/recliner  -PC,MF,PC2 sitting in chair/recliner  -PC sitting in chair/recliner  -LS    Post Treatment Position chair  -PC,MF,PC2 chair  -PC bathroom  -LS    In Chair reclined;with nsg  -PC,MF,PC2 sitting;call light within reach;encouraged to call for assist;exit alarm on  -PC     Bathroom   sitting;call light within reach;encouraged to call for assist;notified nsg  -LS    Recorded by [PC,MF,PC2] Kymberly Sewell, PT (r) Nicolás Shepard, PT Student (t) Kymberly Sewell, PT (c) [PC] Kymberly Sewell, PT [LS] Tonja Liriano, PT      09/30/17 1100          Rehab  Assessment/Intervention    Discipline physical therapist  -      Document Type therapy note (daily note)  -      Subjective Information agree to therapy;complains of;pain;fatigue  -      Patient Effort, Rehab Treatment good  -      Precautions/Limitations cardiac precautions  -      Specific Treatment Considerations no raising LUE due to pacer  -      Recorded by [LC] Davin Negron, PT DPT      Pain Assessment    Pain Assessment 0-10  -      Pain Score 5  -      Pain Location Sternum  -      Pain Intervention(s) Medication (See MAR)  -      Recorded by [LC] Davin Negron, PT DPT      Cognitive Assessment/Intervention    Current Cognitive/Communication Assessment functional  -      Orientation Status oriented x 4  -      Follows Commands/Answers Questions 100% of the time;able to follow multi-step instructions  -      Personal Safety WNL/WFL  -      Personal Safety Interventions fall prevention program maintained;nonskid shoes/slippers when out of bed;supervised activity  -      Recorded by [LC] Davin Negron, PT DPT      Bed Mobility, Assessment/Treatment    Bed Mobility, Comment up in chair  -      Recorded by [LC] Davin Negron, PT DPT      Transfer Assessment/Treatment    Transfers, Sit-Stand [place] minimum assist (75% patient effort)  -      Transfers, Stand-Sit [place] minimum assist (75% patient effort)  -      Transfers, Sit-Stand-Sit, Assist Device rolling walker  -      Transfer, Safety Issues weight-shifting ability decreased;step length decreased;sequencing ability decreased  -      Transfer, Impairments pain;impaired balance;strength decreased  -      Recorded by [LC] Davin Negron, PT DPT      Gait Assessment/Treatment    Gait, [place] Level contact guard assist  -      Gait, Assistive Device rolling walker  -      Gait, Distance (Feet) 35  -      Gait, Gait Deviations step length decreased;narrow base  -      Gait, Impairments  strength decreased;impaired balance  -LC      Recorded by [LC] Davin Negron, PT DPT      Motor Skills/Interventions    Additional Documentation --  -LC      Recorded by [LC] Davin Negron, PT DPT      Positioning and Restraints    Pre-Treatment Position sitting in chair/recliner  -LC      Post Treatment Position chair  -LC      In Chair sitting;call light within reach;encouraged to call for assist;with other staff;notified nsg  -LC      Recorded by [LC] Davin Negron, PT DPT        User Key  (r) = Recorded By, (t) = Taken By, (c) = Cosigned By    Initials Name Effective Dates    PC Kymberly Sewell, PT 12/01/15 -     LC Davin Negron, PT DPT 08/02/16 -     LS Tonja Liriano, PT 06/14/16 -     MF Nicolás Shepard, PT Student 09/18/17 -                 IP PT Goals       10/03/17 1000 10/03/17 0958 09/26/17 0914    Bed Mobility PT LTG    Bed Mobility PT LTG, Date Goal Reviewed 10/03/17  -PC (r) MF (t) PC (c)      Bed Mobility PT LTG, Outcome goal ongoing  -PC (r) MF (t) PC (c)      Bed Mobility PT LTG, Reason Goal Not Met progress slower than expected  -PC (r) MF (t) PC (c)      Transfer Training PT LTG    Transfer Training PT  LTG, Date Goal Reviewed 10/03/17  -PC (r) MF (t) PC (c)      Transfer Training PT LTG, Outcome goal ongoing  -PC (r) MF (t) PC (c)      Transfer Training PT LTG, Reason Goal Not Met progress slower than expected  -PC (r) MF (t) PC (c)      Gait Training PT LTG    Gait Training Goal PT LTG, Date Goal Reviewed 10/03/17  -PC (r) MF (t) PC (c) 10/03/17  -PC (r) MF (t) PC (c)     Gait Training Goal PT LTG, Outcome goal ongoing  -PC (r) MF (t) PC (c)      Gait Training Goal PT LTG, Reason Goal Not Met progress slower than expected  -PC (r) MF (t) PC (c)      Cardiopulmonary PT LTG    Cardiopulmonary PT LTG, Date Established   09/26/17  -PC (r) MF (t) PC (c)    Cardiopulmonary PT LTG, Time to Achieve   1 wk  -PC (r) MF (t) PC (c)    Cardiopulmonary PT LTG, Level   Level IV  -PC (r) MF (t) PC (c)     Cardiopulmonary PT LTG, Date Goal Reviewed 10/03/17  -PC (r) MF (t) PC (c) 10/03/17  -PC (r) MF (t) PC (c)     Cardiopulmonary PT LTG, Outcome goal ongoing  -PC (r) MF (t) PC (c)      Cardiopulmonary PT LTG, Reason Goal Not Met progress slower than expected  -PC (r) MF (t) PC (c)        09/22/17 1030          Bed Mobility PT LTG    Bed Mobility PT LTG, Time to Achieve 1 wk  -LB      Bed Mobility PT LTG, Date Goal Reviewed 09/22/17  -LB      Bed Mobility PT LTG, Outcome goal ongoing  -LB      Transfer Training PT LTG    Transfer Training PT LTG, Activity Type sit to stand/stand to sit  -LB      Transfer Training PT LTG, [place] Level supervision required  -LB      Transfer Training PT LTG, Assist Device walker, rolling  -LB      Transfer Training PT  LTG, Date Goal Reviewed 09/22/17  -LB      Transfer Training PT LTG, Outcome goal revised  -LB      Gait Training PT LTG    Gait Training Goal PT LTG, Distance to Achieve 120  -LB      Gait Training Goal PT LTG, Date Goal Reviewed 09/22/17  -LB      Gait Training Goal PT LTG, Outcome goal revised  -LB        User Key  (r) = Recorded By, (t) = Taken By, (c) = Cosigned By    Initials Name Provider Type    LB Barby Crawford, PT Physical Therapist    JOHN Sewell, PT Physical Therapist    SHAHID Shepard, PT Student PT Student          Physical Therapy Education     Title: PT OT SLP Therapies (Active)     Topic: Physical Therapy (Active)     Point: Mobility training (Active)    Learning Progress Summary    Learner Readiness Method Response Comment Documented by Status   Patient Acceptance E,D NR  MF 10/03/17 0955 Active    Acceptance E,D NR  PC 10/02/17 1005 Active    Acceptance E,TB,D VU,DU  LS 10/01/17 1021 Done    Acceptance E,D VU,DU safety during transfers and gait LC 09/30/17 1131 Done    Acceptance E NR  EM 09/29/17 0841 Active    Acceptance E NR  EM 09/28/17 1122 Active    Acceptance E,D VU,NR  EJ 09/27/17 1650 Done    Acceptance E,D NR   09/26/17 0913  Active    Eager E,TB VU,DU  JT 09/24/17 0942 Done    Acceptance E VU,NR  LB 09/22/17 1028 Done    Acceptance E VU,NR  LB1 09/21/17 1144 Done    Acceptance E VU,NR  1 09/20/17 1146 Done    Acceptance E VU Discussed home environment.  Home is handicapped accessible.  Patient can stay on main level.  Has rollator at home. KD 09/19/17 1352 Done    Acceptance E VU,NR  LB 09/18/17 1524 Done    Acceptance E VU,NR   09/17/17 1639 Done    Acceptance E VU,NR   09/16/17 1320 Done    Acceptance E VU   09/11/17 1413 Done    Acceptance E VU  VY 09/10/17 1103 Done    Acceptance E VU,NR  VY 09/09/17 1139 Done    Acceptance E VU,NR   09/08/17 1526 Done   Family Acceptance E VU Discussed home environment.  Home is handicapped accessible.  Patient can stay on main level.  Has rollator at home.  09/19/17 1352 Done               Point: Home exercise program (Active)    Learning Progress Summary    Learner Readiness Method Response Comment Documented by Status   Patient Acceptance E,D NR   10/03/17 0955 Active    Acceptance E,D NR   10/02/17 1005 Active    Acceptance E,TB,D VUDU  LS 10/01/17 1021 Done    Acceptance E NR  EM 09/29/17 0841 Active    Acceptance E NR  EM 09/28/17 1122 Active    Acceptance E,D NR   09/26/17 0913 Active    Eager E,TB VU,DU  JT 09/24/17 0942 Done    Acceptance E,TB VU,DU  JT 09/23/17 1517 Done    Acceptance E VU,NR   09/17/17 1639 Done    Acceptance E VU,NR   09/16/17 1320 Done    Acceptance E VU   09/11/17 1413 Done    Acceptance E VU  VY 09/10/17 1103 Done    Acceptance E VU,NR  VY 09/09/17 1139 Done    Acceptance E VU,NR   09/08/17 1526 Done               Point: Body mechanics (Active)    Learning Progress Summary    Learner Readiness Method Response Comment Documented by Status   Patient Acceptance E,D NR   10/03/17 0955 Active    Acceptance E,D NR   10/02/17 1005 Active    Acceptance E,TB,D VU,DU  LS 10/01/17 1021 Done    Acceptance ECLARY   09/26/17 0913 Active     Acceptance E VU  VY 09/10/17 1103 Done    Acceptance E VU,NR  VY 09/09/17 1139 Done    Acceptance E VU,NR  LH 09/08/17 1526 Done               Point: Precautions (Active)    Learning Progress Summary    Learner Readiness Method Response Comment Documented by Status   Patient Acceptance E,D NR  MF 10/03/17 0955 Active    Acceptance E,D NR  PC 10/02/17 1005 Active    Acceptance E,TB,D VU,DU  LS 10/01/17 1021 Done    Acceptance E,D VU,NR  EJ 09/27/17 1650 Done    Acceptance E,D NR   09/26/17 0913 Active    Acceptance E VU  VY 09/10/17 1103 Done    Acceptance E VU,NR  VY 09/09/17 1139 Done    Acceptance E VU,NR   09/08/17 1526 Done                      User Key     Initials Effective Dates Name Provider Type Discipline     05/18/15 -  Karen Clinton, PT Physical Therapist PT    LB 10/06/15 -  Barby Crawford, PT Physical Therapist PT    KD 10/06/15 -  Alicia De Anda, PT Physical Therapist PT    LH 02/07/17 -  Kandi Buckner, PT Physical Therapist PT    LB1 02/18/16 -  Kandi Loomis, PTA Physical Therapy Assistant PT    PC 12/01/15 -  Kymberly Sewell, PT Physical Therapist PT    EM 12/01/15 -  Matilde Bone, PT Physical Therapist PT    JT 12/01/15 -  Taylor Teran, PT Physical Therapist PT    EJ 04/21/17 -  Tonia Gonzalez, PT Physical Therapist PT    LC 08/02/16 -  Davin Negron, PT DPT Physical Therapist PT    LS 06/14/16 -  Tonja Liriano, PT Physical Therapist PT    VY 04/24/15 -  Kole Ortez, PTA Physical Therapy Assistant PT     09/18/17 -  Nicolás Shepard, PT Student PT Student PT                    PT Recommendation and Plan  Anticipated Discharge Disposition: skilled nursing facility  Planned Therapy Interventions: balance training, bed mobility training, gait training, strengthening  PT Frequency: daily  Plan of Care Review  Plan Of Care Reviewed With: patient  Progress: progress towards functional goals is fair  Outcome Summary/Follow up Plan: Pt presents c improved endurance c  ambulation and decreased need for rest breaks during activity. Pt still fatigues quickly and notes weakness/pain in LEs. Continue to progress as tolerated.           Outcome Measures       10/03/17 0900 10/02/17 1000 10/01/17 1000    How much help from another person do you currently need...    Turning from your back to your side while in flat bed without using bedrails? 3  -PC (r) MF (t) PC (c) 3  -PC 3  -LS    Moving from lying on back to sitting on the side of a flat bed without bedrails? 3  -PC (r) MF (t) PC (c) 2  -PC 2  -LS    Moving to and from a bed to a chair (including a wheelchair)? 3  -PC (r) MF (t) PC (c) 3  -PC 3  -LS    Standing up from a chair using your arms (e.g., wheelchair, bedside chair)? 3  -PC (r) MF (t) PC (c) 3  -PC 2  -LS    Climbing 3-5 steps with a railing? 2  -PC (r) MF (t) PC (c) 2  -PC 2  -LS    To walk in hospital room? 3  -PC (r) MF (t) PC (c) 3  -PC 3  -LS    AM-PAC 6 Clicks Score 17  -PC (r) MF (t) 16  -PC 15  -LS    Functional Assessment    Outcome Measure Options AM-PAC 6 Clicks Basic Mobility (PT)  -PC (r) MF (t) PC (c)  AM-PAC 6 Clicks Basic Mobility (PT)  -LS      09/30/17 1100          How much help from another person do you currently need...    Turning from your back to your side while in flat bed without using bedrails? 3  -LC      Moving from lying on back to sitting on the side of a flat bed without bedrails? 3  -LC      Moving to and from a bed to a chair (including a wheelchair)? 3  -LC      Standing up from a chair using your arms (e.g., wheelchair, bedside chair)? 3  -LC      Climbing 3-5 steps with a railing? 3  -LC      To walk in hospital room? 3  -LC      AM-PAC 6 Clicks Score 18  -LC      Functional Assessment    Outcome Measure Options AM-PAC 6 Clicks Basic Mobility (PT)  -LC        User Key  (r) = Recorded By, (t) = Taken By, (c) = Cosigned By    Initials Name Provider Type    PC Kymberly Sewell, PT Physical Therapist    ABHILASH Negron, PT DPT Physical  Therapist    SYED Liriano, PT Physical Therapist    SHAHID Shepard, PT Student PT Student           Time Calculation:         PT Charges       10/03/17 1001 10/03/17 0957       Time Calculation    Start Time  0937  -PC (r) MF (t) PC (c)     Stop Time  0954  -PC (r) MF (t) PC (c)     Time Calculation (min)  17 min  -PC (r) MF (t)     PT Received On  10/03/17  -PC (r) MF (t) PC (c)     PT - Next Appointment  10/04/17  -PC (r) MF (t) PC (c)     PT Goal Re-Cert Due Date 10/10/17  -PC (r) MF (t) PC (c)        User Key  (r) = Recorded By, (t) = Taken By, (c) = Cosigned By    Initials Name Provider Type    PC Kymberly Sewell, PT Physical Therapist    SHAHID Shepard, PT Student PT Student          Therapy Charges for Today     Code Description Service Date Service Provider Modifiers Qty    28078869551 HC PT THER PROC EA 15 MIN 10/3/2017 Nicolás Shepard, PT Student GP 1          PT G-Codes  Outcome Measure Options: AM-PAC 6 Clicks Basic Mobility (PT)    Nicolás Shepard, PT Student  10/3/2017

## 2017-10-03 NOTE — PROGRESS NOTES
Continued Stay Note  [facility]     Patient Name: Raquel Deluna  MRN: [id number]  Today's Date: 10/3/2017    Admit Date: 9/6/2017          Discharge Plan       10/03/17 1129    Case Management/Social Work Plan    Plan Irina    Patient/Family In Agreement With Plan yes    Additional Comments Per Irina Cohen accepts pt and a bed is available today. Pt's nurse (Huong, [id number]) informed. Packet on pt chart. Aliza Mohan LCSW              Discharge Codes     None        Expected Discharge Date and Time     Expected Discharge Date Expected Discharge Time    Oct 3, 2017             Aliza Mohan LCSW

## 2017-10-03 NOTE — DISCHARGE INSTR - LAB
Call as soon as possible to make a follow up appointment with Dr. Johnson in 4-6 weeks.     Call as soon as possible to make a follow up appointment with Dr. Bentley  In 4-6 weeks.     Call as soon as possible to make a follow up appointment with Dr. Jareth Ling to follow up for vascular surgery.

## 2017-10-03 NOTE — PROGRESS NOTES
"   LOS: 27 days    Patient Care Team:  Ricky Hoyos III, MD as PCP - General  Ricky Hoyos III, MD as PCP - Family Medicine  Anthony Hernández MD as Consulting Physician (Pulmonary Disease)  Christy Jreez MD as Consulting Physician (Dermatology)    Chief Complaint:    Chief Complaint   Patient presents with   • Weakness - Generalized     generalized weakness; had infusion last week; scheduled to have iron infusion this friday.         Subjective follow-up kidney injury on chronic kidney disease    Interval History:   POD8 MV and TV repair, cryomaze.  She feels ok; still eating poorly; OBANDO; leg swelling less    Objective     Vital Signs  Temp:  [98.1 °F (36.7 °C)-98.2 °F (36.8 °C)] 98.2 °F (36.8 °C)  Heart Rate:  [81-95] 81  Resp:  [14-18] 16  BP: (136-162)/(54-64) 136/54    Flowsheet Rows         First Filed Value    Admission Height  64\" (162.6 cm) Documented at 09/06/2017 1506    Admission Weight  204 lb (92.5 kg) Documented at 09/06/2017 1506             I/O last 3 completed shifts:  In: 800 [P.O.:800]  Out: 300 [Urine:300]    Intake/Output Summary (Last 24 hours) at 10/03/17 1028  Last data filed at 10/03/17 0700   Gross per 24 hour   Intake              560 ml   Output                0 ml   Net              560 ml       Physical Exam:  General Appearance: alert, oriented x 3, no acute distress, obese; frail   Skin: warm and dry. Scattered ecchymoses.   HEENT:  oral mucosa moist; eyes w/o icterus  Lungs: Bilateral rales at bases, unlabored breathing effort   Heart: RRR,  no S3, +2/6 syst m  Abdomen: soft, non-tender, ND, + bs  Extremities: trace lower ext edema; skin wrinkled   Neuro: normal speech and mental status      Results Review:      Results from last 7 days  Lab Units 10/03/17  0429 10/02/17  0312 10/01/17  1750 10/01/17  0330  09/29/17  0348  09/27/17  0257   SODIUM mmol/L 132* 134*  --  133*  < > 130*  < > 131*   POTASSIUM mmol/L 3.9 4.4 4.5 3.1*  < > 4.1  < > 3.4*   CHLORIDE mmol/L " 97* 99  --  96*  < > 96*  < > 92*   CO2 mmol/L 20.6* 21.6*  --  21.9*  < > 20.4*  < > 22.8   BUN mg/dL 51* 54*  --  59*  < > 56*  < > 32*   CREATININE mg/dL 1.21* 1.27*  --  1.34*  < > 1.86*  < > 2.03*   CALCIUM mg/dL 8.8 9.1  --  8.9  < > 8.9  < > 8.8   BILIRUBIN mg/dL  --  0.5  --   --   --  0.5  --  0.3   ALK PHOS U/L  --  82  --   --   --  47  --  41   ALT (SGPT) U/L  --  11  --   --   --  <5  --  9   AST (SGOT) U/L  --  28  --   --   --  24  --  70*   GLUCOSE mg/dL 113* 92  --  105*  < > 113*  < > 125*   < > = values in this interval not displayed.    Estimated Creatinine Clearance: 39 mL/min (by C-G formula based on Cr of 1.21).      Results from last 7 days  Lab Units 10/02/17  0312 10/01/17  0330 09/30/17  0323 09/28/17  0419   MAGNESIUM mg/dL  --   --  2.0 2.0   PHOSPHORUS mg/dL 2.5 2.5 2.7 4.6*               Results from last 7 days  Lab Units 10/03/17  0429 10/02/17  0312 10/01/17  0330 09/30/17  0323 09/29/17  0348   WBC 10*3/mm3 11.48* 9.91 10.28 10.31 10.40   HEMOGLOBIN g/dL 7.4* 7.3* 7.4* 8.0* 7.5*   PLATELETS 10*3/mm3 313 317 268 250 198         Results from last 7 days  Lab Units 10/03/17  0429   INR  1.24*         Imaging Results (last 24 hours)     ** No results found for the last 24 hours. **          aspirin 81 mg Oral Daily   atorvastatin 40 mg Oral Nightly   budesonide-formoterol 2 puff Inhalation BID - RT   bumetanide 2 mg Oral BID   carvedilol 12.5 mg Oral Q12H   citalopram 40 mg Oral Nightly   enoxaparin 30 mg Subcutaneous Daily   pantoprazole 40 mg Oral Daily   Or      famotidine 20 mg Intravenous Daily   Ferrex 150 forte plus 1 capsule Oral BID With Meals   hydrALAZINE 25 mg Oral Q8H   iron sucrose 200 mg Intravenous Q24H   mupirocin  Each Nare Daily   potassium chloride 20 mEq Oral BID With Meals   sennosides-docusate sodium 2 tablet Oral Nightly       sodium chloride 30 mL/hr Last Rate: Stopped (09/26/17 0810)       Medication Review:   Current Facility-Administered Medications    Medication Dose Route Frequency Provider Last Rate Last Dose   • acetaminophen (TYLENOL) suppository 650 mg  650 mg Rectal Q4H PRN Yonas Johnson MD       • acetaminophen (TYLENOL) tablet 650 mg  650 mg Oral Q4H PRN Yonas Johnson MD       • ALPRAZolam (XANAX) tablet 0.25 mg  0.25 mg Oral Q8H PRN Yonas Johnson MD       • aspirin EC tablet 81 mg  81 mg Oral Daily Yonas Johnson MD   81 mg at 10/03/17 0953   • atorvastatin (LIPITOR) tablet 40 mg  40 mg Oral Nightly Yonas Johnson MD   40 mg at 10/02/17 2036   • bisacodyl (DULCOLAX) EC tablet 10 mg  10 mg Oral Daily PRN Yonas Johnson MD       • bisacodyl (DULCOLAX) suppository 10 mg  10 mg Rectal Daily PRN Yonas Johnson MD       • budesonide-formoterol (SYMBICORT) 160-4.5 MCG/ACT inhaler 2 puff  2 puff Inhalation BID - RT Yonas Johnson MD   2 puff at 10/03/17 0750   • bumetanide (BUMEX) tablet 2 mg  2 mg Oral BID Oliver Gramajo MD   2 mg at 10/03/17 0953   • carvedilol (COREG) tablet 12.5 mg  12.5 mg Oral Q12H Benji Martínez MD   12.5 mg at 10/03/17 0507   • citalopram (CeleXA) tablet 40 mg  40 mg Oral Nightly Yonas Johnson MD   40 mg at 10/02/17 2036   • cyclobenzaprine (FLEXERIL) tablet 10 mg  10 mg Oral Q8H PRN Yonas Johnson MD       • dextrose (D50W) solution 25 g  25 g Intravenous Q15 Min PRN Yonas Johnson MD       • dextrose (GLUTOSE) oral gel 15 g  15 g Oral Q15 Min PRN Yonas Johnson MD       • enoxaparin (LOVENOX) syringe 30 mg  30 mg Subcutaneous Daily Yonas Johnson MD   30 mg at 10/01/17 1826   • pantoprazole (PROTONIX) EC tablet 40 mg  40 mg Oral Daily Yonas Johnson MD   40 mg at 10/03/17 0953    Or   • famotidine (PEPCID) injection 20 mg  20 mg Intravenous Daily Yonas Johnson MD   20 mg at 09/25/17 1552   • Ferrex 150 forte plus capsule 1 capsule  1 capsule Oral BID With Meals Ladonna Mclaughlin MD       • furosemide (LASIX) injection 40 mg  40 mg Intravenous Q6H PRN  Yonas Johnson MD       • glucagon (human recombinant) (GLUCAGEN DIAGNOSTIC) injection 1 mg  1 mg Subcutaneous Q15 Min PRN Yonas Johnson MD       • hydrALAZINE (APRESOLINE) injection 10 mg  10 mg Intravenous Q6H PRN Yonas Johnson MD   10 mg at 10/01/17 1624   • hydrALAZINE (APRESOLINE) tablet 25 mg  25 mg Oral Q8H Alexia Mohamud, APRN   25 mg at 10/03/17 0507   • HYDROcodone-acetaminophen (NORCO) 5-325 MG per tablet 2 tablet  2 tablet Oral Q4H PRN Yonas Johnson MD   2 tablet at 09/28/17 2341   • Influenza Vac Subunit Quad (FLUCELVAX) injection 0.5 mL  0.5 mL Intramuscular During Hospitalization Yonas Johnson MD       • iron sucrose (VENOFER) 200 mg in sodium chloride 0.9 % 100 mL IVPB  200 mg Intravenous Q24H Arun eD MD   200 mg at 10/02/17 1438   • morphine injection 1 mg  1 mg Intravenous Q4H PRN Yonas Johnson MD        And   • naloxone (NARCAN) injection 0.4 mg  0.4 mg Intravenous Q5 Min PRN Yonas Johnson MD       • mupirocin (BACTROBAN) 2 % nasal ointment   Each Nare Daily Yonas Johnson MD       • ondansetron (ZOFRAN) injection 4 mg  4 mg Intravenous Q6H PRN Yonas Johnson MD       • oxyCODONE (ROXICODONE) immediate release tablet 10 mg  10 mg Oral Q4H PRN Yonas Johnson MD       • potassium chloride (MICRO-K) CR capsule 40 mEq  40 mEq Oral PRN Yonas Johnson MD   40 mEq at 10/01/17 1416    Or   • potassium chloride (KLOR-CON) packet 40 mEq  40 mEq Oral PRN Yonas Johnson MD       • potassium chloride (MICRO-K) CR capsule 20 mEq  20 mEq Oral BID With Meals Oliver Gramajo MD   20 mEq at 10/03/17 0953   • potassium chloride 10 mEq in 100 mL IVPB  10 mEq Intravenous Q1H PRN Yonas Johnson MD        Or   • potassium chloride 10 mEq in 100 mL IVPB  10 mEq Intravenous Q1H PRN Yonas Johnson MD       • potassium chloride 20 mEq in 50 mL IVPB  20 mEq Intravenous Q1H PRN Yonas Johnson MD       • potassium chloride 20 mEq in 50 mL IVPB  20  mEq Intravenous Q1H PRN Yonas Johnson MD 50 mL/hr at 09/26/17 0407 40 mEq at 09/26/17 0407   • promethazine (PHENERGAN) tablet 12.5 mg  12.5 mg Oral Q6H PRN Yonas Johnson MD        Or   • promethazine (PHENERGAN) injection 12.5 mg  12.5 mg Intravenous Q6H PRN Yonas Johnson MD       • sennosides-docusate sodium (SENOKOT-S) 8.6-50 MG tablet 2 tablet  2 tablet Oral Nightly Yonas Johnson MD   2 tablet at 10/02/17 2037   • sodium chloride 0.9 % flush 1-10 mL  1-10 mL Intravenous PRN Benji Martínez MD       • sodium chloride 0.9 % flush 30 mL  30 mL Intravenous Once PRN Yonas Johnson MD       • sodium chloride 0.9 % infusion  30 mL/hr Intravenous Continuous PRN Yonas Johnson MD   Stopped at 09/26/17 0810       Assessment/Plan   1. KESHIA on CKD3, non-oliguric, slowly better.  ATN multifactorial: contrast-induced nephropathy after CT angiogram and recent cardiac catheterization, followed by valvular heart surgery.  HypoNa with vol excess and poor solute intake; stable K  2.  Nonischemic cardiomyopathy, ejection fraction 35-40% with valvular heart disease. TR, MR.  Sp MV and TV repair, cryomaze.   3.  Vascular disease with carotid disease  4.  Hypertension. Controlled.  5.  Chronic GI bleed with chronic iron-deficiency: followed by Heme and GI as outpt:  Iron studies again show iron defic, and s/p iv iron 10.1.17   6.  Junctional bradycardia.  Pacer 9.29.17      Plan:  1. Oral bumex 2 mg BID  2. Scheduled oral KCl 20 meq BID, along with K protocol  3. Surveillance labs  4. Rehab anytime from renal view; d/w Dr. Arnoldo Gramajo MD  10/03/17  10:28 AM

## 2017-10-03 NOTE — PROGRESS NOTES
BGA/GI Progress Note   Chief Complaint:  KATHLEEN    Subjective     Interval History: chronic iron deficency anemia, occult blood + stools    History taken from: patient chart RN    Review of Systems:    The following systems were reviewed and negative;  gastrointestinal    Objective   Still with fatigue but improved.  No melena/hematochezia.  Tolerating IV iron    Vital Signs  Temp:  [98.2 °F (36.8 °C)] 98.2 °F (36.8 °C)  Heart Rate:  [81-95] 88  Resp:  [14-16] 16  BP: (136-162)/(54-64) 158/54  Body mass index is 32 kg/(m^2).    Intake/Output Summary (Last 24 hours) at 10/03/17 1507  Last data filed at 10/03/17 0700   Gross per 24 hour   Intake              320 ml   Output                0 ml   Net              320 ml          Physical Exam:   General: patient awake, alert and cooperative.  Sitting in chair at bedside   Eyes: Normal lids and lashes, no scleral icterus   Neck: supple, normal ROM, no tracheal deviation   Skin: warm and dry, not jaundiced, healing mid sternal chest incision   Cardiovascular: regular rhythm and rate, no murmurs auscultated   Pulm: clear to auscultation bilaterally, regular and unlabored   Abdomen: soft, nontender, nondistended; normal bowel sounds   Rectal: deferred   Extremities: no rash or edema   Neurologic: Normal mood and behavior    All Medications Have Been Reviewed     Results Review:       Results from last 7 days  Lab Units 10/03/17  0429 10/02/17  0312 10/01/17  0330   WBC 10*3/mm3 11.48* 9.91 10.28   HEMOGLOBIN g/dL 7.4* 7.3* 7.4*   HEMATOCRIT % 23.4* 23.0* 22.4*   PLATELETS 10*3/mm3 313 317 268         Results from last 7 days  Lab Units 10/03/17  0429 10/02/17  0312 10/01/17  1750 10/01/17  0330  09/29/17  0348  09/27/17  0257   SODIUM mmol/L 132* 134*  --  133*  < > 130*  < > 131*   POTASSIUM mmol/L 3.9 4.4 4.5 3.1*  < > 4.1  < > 3.4*   CHLORIDE mmol/L 97* 99  --  96*  < > 96*  < > 92*   CO2 mmol/L 20.6* 21.6*  --  21.9*  < > 20.4*  < > 22.8   BUN mg/dL 51* 54*  --   59*  < > 56*  < > 32*   CREATININE mg/dL 1.21* 1.27*  --  1.34*  < > 1.86*  < > 2.03*   CALCIUM mg/dL 8.8 9.1  --  8.9  < > 8.9  < > 8.8   BILIRUBIN mg/dL  --  0.5  --   --   --  0.5  --  0.3   ALK PHOS U/L  --  82  --   --   --  47  --  41   ALT (SGPT) U/L  --  11  --   --   --  <5  --  9   AST (SGOT) U/L  --  28  --   --   --  24  --  70*   GLUCOSE mg/dL 113* 92  --  105*  < > 113*  < > 125*   < > = values in this interval not displayed.      Results from last 7 days  Lab Units 10/03/17  0429   INR  1.24*       RADIOLOGY:    Imaging Results (last 72 hours)     ** No results found for the last 72 hours. 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9   • Polyp of colon K63.5     Josef Motta MD  10/03/17  3:07 PM      Assessment:  1) KATHLEEN- long standing, likely exacerbated by recent surgery; + FOBT, hgb low but stable with no overt GI bleeding reported.  Has been on oral iron in the past, receiving IV Venofer  2) Colon polyps  3) Colonic angiodysplasias - no bleeding    Recommendations:  Plans for rehab noted.  Pt receiving 3rd and final dose of Venofer.  Could also consider 1U PRBCs prior to DC but will defer to primary team.  She can f/u in office with Dr. Bentley in 4-6 weeks and her hematologist as  scheduled        Josef Motta M.D.  [facility]  [address] [place] [address]  Office: [phone]

## 2017-10-04 NOTE — PROGRESS NOTES
Case Management Discharge Note    Final Note: [facility]    Discharge Placement     Facility/Agency Request Status Selected? Address Phone Number Fax Number    [facility] Accepted    Yes [address] [facility] , [facility] [id number] [phone]        Other: Other (private auto)    Discharge Codes: 03  Discharged/transferred to skilled nursing facility (SNF) with Medicare certification in anticipation of skilled care

## 2017-10-11 NOTE — PROGRESS NOTES
"Respiratory rate between 20 to 24. Oxygen sats averaging 95 on 2.5 liters of oxygen per nasal canula. Wheezing noted throughout lungs and diminished in bases. Patient states, \"so winded it is wearing me out just breathing.\" Respiratory paged to do sandi neb treatment.  "

## 2017-10-11 NOTE — PROGRESS NOTES
1040 Call placed to DELTA Wright and informed her of patient's wheezing and shortness of breath. Order fr duoneb obtained. Also MsKenia Mcnamara stated of shortness of breath does not improve to take the patient to the ER

## 2017-10-11 NOTE — PROGRESS NOTES
1535 assisted to bathroom using walker with oxygen on; became very short of breath and weak. Assisted back to recliner and respiratory therapy here to do mini neb.

## 2017-10-11 NOTE — PROGRESS NOTES
Oxygen sats on room air fluctuating from 90% to 93%. Discharged home per wheel chair with [employer] [facility] transport service

## 2017-10-11 NOTE — PROGRESS NOTES
Respirations became less labored after mini neb treatment. Fine wheezes left upper lung. Breath sounds diminished bases bilaterally.

## 2017-10-11 NOTE — PROGRESS NOTES
Starting to become short of breath again. Lungs diminished right and left base. Breath sounds in left upper lobe with wheezing. Respiratory therapy paged for a mini neb.

## 2017-10-24 NOTE — TELEPHONE ENCOUNTER
Call from DELTA Drummond, at [facility] @ [phone].   Requesting appt with DR Bentley sooner than 11/15.  States pt transfused with 2 UPC  on 10/11 2nd low Hgb.  Stool at that time positive for occult blood.  On 10/16, Hgb 8.3, 10/23 7.2 in am, and 7.0 in pm.  Pt fatigued.  Hoda states is arranging for another transfusion, but needs to be seen by Dr Bentley sooner in order go get to the bottom of why cannot keep Hgb stable.    Appt rescheduled from 11/15 to 11/2/17 @ 1000.      Update to DR Bentley.

## 2017-10-26 NOTE — PROGRESS NOTES
Tolerated transfusion without difficulty. VSS. Ate small amount for breakfast and lunch. Po ice water, coffee and few sips of pepsi. BRP with asst and use of walker. Gets SOA after amb. O2 PRN @ 2L/MIN NC. Voided x3  And BM x 1. Report called to nursing home and AVS information sent via transportation staff. Patient discharged per wheel chair with portable O2 @2L/MIN at 1635.

## 2017-10-26 NOTE — PATIENT INSTRUCTIONS
Blood Transfusion   A blood transfusion is a procedure in which you receive donated blood through an IV tube. You may need a blood transfusion because of illness, surgery, or injury. The blood may come from a donor, or it may be your own blood that you donated previously.  The blood given in a transfusion is made up of different types of cells. You may receive:  · Red blood cells. These carry oxygen and replace lost blood.  · Platelets. These control bleeding.  · Plasma. This helps blood to clot.  If you have hemophilia or another clotting disorder, you may also receive other types of blood products.  LET YOUR HEALTH CARE PROVIDER KNOW ABOUT:  · Any allergies you have.  · All medicines you are taking, including vitamins, herbs, eye drops, creams, and over-the-counter medicines.  · Previous problems you or members of your family have had with the use of anesthetics.  · Any blood disorders you have.  · Previous surgeries you have had.  · Any medical conditions you may have.  · Any previous reactions you have had during a blood transfusion.    RISKS AND COMPLICATIONS  Generally, this is a safe procedure. However, problems may occur, including:  · Having an allergic reaction to something in the donated blood.  · Fever. This may be a reaction to the white blood cells in the transfused blood.  · Iron overload. This can happen from having many transfusions.  · Transfusion-related acute lung injury (TRALI). This is a rare reaction that causes lung damage. The cause is not known. TRALI can occur within hours of a transfusion or several days later.  · Sudden (acute) or delayed hemolytic reactions. This happens if your blood does not match the cells in your transfusion. Your body's defense system (immune system) may try to attack the new cells. This complication is rare.  · Infection. This is rare.  BEFORE THE PROCEDURE  · You may have a blood test to determine your blood type. This is necessary to know what kind of blood your  body will accept.  · If you are going to have a planned surgery, you may donate your own blood. This may be done in case you need to have a transfusion.  · If you have had an allergic reaction to a transfusion in the past, you may be given medicine to help prevent a reaction. Take this medicine only as directed by your health care provider.  · You will have your temperature, blood pressure, and pulse monitored before the transfusion.  PROCEDURE   · An IV will be started in your hand or arm.  · The bag of donated blood will be attached to your IV tube and given into your vein.  · Your temperature, blood pressure, and pulse will be monitored regularly during the transfusion. This monitoring is done to detect early signs of a transfusion reaction.  · If you have any signs or symptoms of a reaction, your transfusion will be stopped and you may be given medicine.  · When the transfusion is over, your IV will be removed.  · Pressure may be applied to the IV site for a few minutes.  · A bandage (dressing) will be applied.  The procedure may vary among health care providers and hospitals.  AFTER THE PROCEDURE  · Your blood pressure, temperature, and pulse will be monitored regularly.     This information is not intended to replace advice given to you by your health care provider. Make sure you discuss any questions you have with your health care provider.     Document Released: 12/15/2001 Document Revised: 01/08/2016 Document Reviewed: 10/28/2015  [employer] Patient Education ©2017 [employer]

## 2017-11-02 PROBLEM — K55.20 GI AVM (GASTROINTESTINAL ARTERIOVENOUS VASCULAR MALFORMATION): Status: ACTIVE | Noted: 2017-01-01

## 2017-11-02 PROBLEM — D50.0 IRON DEFICIENCY ANEMIA DUE TO CHRONIC BLOOD LOSS: Status: ACTIVE | Noted: 2017-01-01

## 2017-11-02 NOTE — PROGRESS NOTES
"Chief Complaint   Patient presents with   • Anemia     Subjective     HPI  Raquel Deluna is a 82 y.o. female who presents for hospital follow-up.  I initially saw her in February earlier this year.  Her hemoglobin was 4.4.  She had had a chronic anemia with an acute exacerbation.  She has followed with Dr Flores for years.  She underwent EGD on February 2 notable for some superficial gastric ulcers followed by a colonoscopy the next day notable for non-bleeding colonic AVMs as well as a hyperplastic polyp in the transverse colon.    Following her February hospitalization, she had done relatively well.  Was able to maintain her hemoglobin in approximately the 9 g range.  However she was hospitalized in September for acute cardiac decompensation.  She did undergo mitral valve repair and then a permanent pacemaker placement.  She did have anemia at that time her hemoglobin down into the 7 range, her stool was Hemoccult positive.  She had just received IV iron in the decision was made to monitor her response to that.  Unfortunately, she went to rehabilitation and had regular checks for hemoglobin.  She has not been able to keep her blood above 7 and has required about 3 transfusions during the month of October.  She does intermittently see some dark stools.  She occasionally has diarrhea.  She is  following her cardiac surgery but denies any heartburn or acid reflux symptoms.  She has had no nausea or vomiting.  She just got out of [place] rehabilitation today and is going home for the first time in about 2 months.  She does endorse some fatigue.  When her iron gets low, she is quite symptomatic with lethargy, ice pica, and mental fogginess.  She is scheduled with regular follow-up with CBCs with her primary care physician.    She says that she \"can't keep blood in me.\"   She only takes an 81mg asa.      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   • Cellulitis     left leg hx about a month ago   • Chronic bronchitis    • Chronic kidney disease    • Colon polyp 3/14/2016   • Essential hypertension 8/24/2013    Overview:  2015 IMO UPDATE   • GERD (gastroesophageal reflux disease)    • Hiatal hernia    • History of goiter    • History of transfusion     had reaction to 2nd unit after receiving the 1st   • Hyperlipidemia    • Hypertension    • Hyponatremia    • Intestinal malabsorption 3/31/2016   • Iron deficiency anemia 03/14/2016    receives iron infusion in past   • Migraine    • AMBER treated with BiPAP    • Osteoporosis    • PONV (postoperative nausea and vomiting)    • Sleep apnea     cpap   • Stress incontinence     wears pads       Social History     Social History   • Marital status:      Spouse name: N/A   • Number of children: N/A   • Years of education: N/A     Social History Main Topics   • Smoking status: Former Smoker     Packs/day: 4.00     Years: 33.00     Types: Cigarettes     Quit date: 1980   • Smokeless tobacco: Never Used   • Alcohol use Yes      Comment: once a month a glass of wine or mixed drink   • Drug use: No   • Sexual activity: Defer     Other Topics Concern   • None     Social History Narrative         Current Outpatient Prescriptions:   •  aspirin 81 MG EC tablet, Take 1 tablet by mouth Daily., Disp: 30 tablet, Rfl: 5  •  atorvastatin (LIPITOR) 40 MG tablet, Take 1 tablet by mouth Every Night., Disp: 30 tablet, Rfl: 5  •  B Complex Vitamins (VITAMIN B COMPLEX PO), Take 1 tablet by mouth Daily., Disp: , Rfl:   •  bisacodyl (DULCOLAX) 5 MG EC tablet, Take 2 tablets by mouth Daily As Needed for Constipation., Disp: 30 tablet, Rfl: 5  •  budesonide-formoterol (SYMBICORT) 160-4.5 MCG/ACT inhaler, Inhale 2 puffs 2 (Two) Times a Day., Disp: , Rfl:   •  bumetanide (BUMEX) 2 MG tablet, Take 1 tablet by mouth 2 (Two) Times a Day., Disp: 60 tablet, Rfl: 5  •  CALCIUM  CARBONATE-VITAMIN D PO, Take 1 tablet by mouth Daily., Disp: , Rfl:   •  carvedilol (COREG) 12.5 MG tablet, Take 2 tablets by mouth Every 12 (Twelve) Hours., Disp: 60 tablet, Rfl: 5  •  citalopram (CeleXA) 40 MG tablet, Take 40 mg by mouth Every Night., Disp: , Rfl:   •  esomeprazole (NexIUM) 40 MG capsule, Take 40 mg by mouth Daily Before Supper., Disp: , Rfl:   •  Fe-Succ Ac-C-Thre Ac-B12-FA (FERREX 150 FORTE PLUS)  MG capsule capsule, Take 1 capsule by mouth 2 (Two) Times a Day With Meals., Disp: 60 each, Rfl: 5  •  hydrALAZINE (APRESOLINE) 25 MG tablet, Take 1 tablet by mouth Every 8 (Eight) Hours., Disp: 90 tablet, Rfl: 5  •  mometasone (NASONEX) 50 MCG/ACT nasal spray, 2 sprays into each nostril Daily., Disp: , Rfl:   •  Multiple Vitamin (MULTIVITAMIN) capsule, Take 1 capsule by mouth Daily., Disp: , Rfl:   •  potassium chloride (MICRO-K) 10 MEQ CR capsule, Take 2 capsules by mouth 2 (Two) Times a Day With Meals., Disp: 120 capsule, Rfl: 5  •  vitamin B-6 (PYRIDOXINE) 50 MG tablet, Take 50 mg by mouth Daily., Disp: , Rfl:     Review of Systems   Constitutional: Positive for fatigue. Negative for activity change and appetite change.   HENT: Negative for sore throat and trouble swallowing.    Respiratory: Negative.    Cardiovascular:        +sore chest from sternotomy   Gastrointestinal: Negative for abdominal distention, abdominal pain, blood in stool, nausea and vomiting.        Occasional melena   Endocrine: Negative for cold intolerance and heat intolerance.   Genitourinary: Negative for difficulty urinating, dysuria and frequency.   Musculoskeletal: Negative for arthralgias, back pain and myalgias.   Skin: Negative.    Hematological: Negative for adenopathy. Does not bruise/bleed easily.   All other systems reviewed and are negative.      Objective   Vitals:    11/02/17 1021   BP: 136/62   Temp: 97.7 °F (36.5 °C)     Last Weight    11/02/17  1021   Weight: 179 lb 3.2 oz (81.3 kg)     Body mass index  is 30.76 kg/(m^2).      Physical Exam   Constitutional: She is oriented to person, place, and time. She appears well-developed and well-nourished. No distress.   HENT:   Head: Normocephalic and atraumatic.   Right Ear: External ear normal.   Left Ear: External ear normal.   Nose: Nose normal.   Eyes: Conjunctivae and EOM are normal. No scleral icterus.   Cardiovascular: Normal rate, regular rhythm, normal heart sounds and intact distal pulses.  Exam reveals no gallop.    No murmur heard.  No lower extremity edema, median sternotomy   Pulmonary/Chest: Effort normal and breath sounds normal. No respiratory distress. She has no wheezes.   Abdominal: Soft. Normal appearance and bowel sounds are normal. She exhibits no distension and no mass. There is no hepatosplenomegaly. There is tenderness. There is no rigidity, no rebound and no guarding.   abd ttp from bruising from injections   Genitourinary:   Genitourinary Comments: Rectal exam deferred   Musculoskeletal: Normal range of motion. She exhibits no edema or tenderness.   Normal digits and nails of both hands.  Using a walker   Neurological: She is alert and oriented to person, place, and time. She displays no atrophy. Coordination normal.   Skin: Skin is warm and dry. No rash noted. She is not diaphoretic. No erythema.   Psychiatric: She has a normal mood and affect. Her behavior is normal. Judgment and thought content normal.   Vitals reviewed.      WBC   Date Value Ref Range Status   10/03/2017 11.48 (H) 4.50 - 10.70 10*3/mm3 Final     RBC   Date Value Ref Range Status   10/03/2017 2.75 (L) 3.90 - 5.20 10*6/mm3 Final     Hemoglobin   Date Value Ref Range Status   10/09/2017 7.0 (L) 11.9 - 15.5 g/dL Final     Hematocrit   Date Value Ref Range Status   10/09/2017 22.3 (L) 35.6 - 45.5 % Final     MCV   Date Value Ref Range Status   10/03/2017 85.1 80.5 - 98.2 fL Final     MCH   Date Value Ref Range Status   10/03/2017 26.9 26.9 - 32.0 pg Final     MCHC   Date Value  Ref Range Status   10/03/2017 31.6 (L) 32.4 - 36.3 g/dL Final     RDW   Date Value Ref Range Status   10/03/2017 20.8 (H) 11.7 - 13.0 % Final     RDW-SD   Date Value Ref Range Status   10/03/2017 63.8 (H) 37.0 - 54.0 fl Final     MPV   Date Value Ref Range Status   10/03/2017 9.2 6.0 - 12.0 fL Final     Platelets   Date Value Ref Range Status   10/03/2017 313 140 - 500 10*3/mm3 Final     Neutrophil %   Date Value Ref Range Status   10/03/2017 71.7 42.7 - 76.0 % Final     Lymphocyte %   Date Value Ref Range Status   10/03/2017 6.4 (L) 19.6 - 45.3 % Final     Monocyte %   Date Value Ref Range Status   10/03/2017 12.7 (H) 5.0 - 12.0 % Final     Eosinophil %   Date Value Ref Range Status   10/03/2017 5.3 0.3 - 6.2 % Final     Basophil %   Date Value Ref Range Status   10/03/2017 0.3 0.0 - 1.5 % Final     Immature Grans %   Date Value Ref Range Status   10/03/2017 3.6 (H) 0.0 - 0.5 % Final     Neutrophils, Absolute   Date Value Ref Range Status   10/03/2017 8.22 (H) 1.90 - 8.10 10*3/mm3 Final     Lymphocytes, Absolute   Date Value Ref Range Status   10/03/2017 0.74 (L) 0.90 - 4.80 10*3/mm3 Final     Monocytes, Absolute   Date Value Ref Range Status   10/03/2017 1.46 (H) 0.20 - 1.20 10*3/mm3 Final     Eosinophils, Absolute   Date Value Ref Range Status   10/03/2017 0.61 0.00 - 0.70 10*3/mm3 Final     Basophils, Absolute   Date Value Ref Range Status   10/03/2017 0.04 0.00 - 0.20 10*3/mm3 Final     Immature Grans, Absolute   Date Value Ref Range Status   10/03/2017 0.41 (H) 0.00 - 0.03 10*3/mm3 Final     nRBC   Date Value Ref Range Status   09/08/2017 0.0 0.0 - 0.0 /100 WBC Final       Lab Results   Component Value Date    GLUCOSE 113 (H) 10/03/2017    BUN 51 (H) 10/03/2017    CREATININE 1.21 (H) 10/03/2017    EGFRIFNONA 43 (L) 10/03/2017    BCR 42.1 (H) 10/03/2017    CO2 20.6 (L) 10/03/2017    CALCIUM 8.8 10/03/2017    ALBUMIN 3.10 (L) 10/02/2017    LABIL2 1.3 10/02/2017    AST 28 10/02/2017    ALT 11 10/02/2017          Imaging Results (last 7 days)     ** No results found for the last 168 hours. **            Assessment/Plan    1. Iron deficiency anemia: chronic issue but acute worsening over the past few months.  Heme positive stools, colonic AVMs on her 2/2017 colonoscopy  2. 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 Deluna has persisted and requiring blood transfusions.  She remains on oral iron but it does not seem to be helping.  She additionally has received IV iron as recently as early October prior to her discharge to rehabilitation.  She has received about 3 blood transfusions in the month of October and her hemoglobin remains at 7.  I discussed with her that I think it is worth repeating EGD and colonoscopy with plans to treat the AVMs.  We can then reassess how she does following that; if she persists with iron deficiency anemia then we'll proceed with a small bowel capsule study.  I will check with Dr. Mclaughlin to make sure it is okay for her to undergo sedation given her recent cardiac issues.    Raquel was seen today for anemia.    Diagnoses and all orders for this visit:    Iron deficiency anemia due to chronic blood loss  -     Case Request; Standing  -     Implement Anesthesia Orders Day of Procedure; Standing  -     Obtain Informed Consent; Standing  -     lactated ringers infusion; Infuse 30 mL/hr into a venous catheter Continuous.  -     Case Request    GI AVM (gastrointestinal arteriovenous vascular malformation)  -     Case Request; Standing  -     Implement Anesthesia Orders Day of Procedure; Standing  -     Obtain Informed Consent; Standing  -     lactated ringers infusion; Infuse 30 mL/hr into a venous catheter Continuous.  -     Case Request        Dictated utilizing Dragon dictation

## 2017-11-02 NOTE — PATIENT INSTRUCTIONS
Schedule the EGD and colonoscopy    Continue the iron    Follow blood count with your primary doctor    I will make sure that Dr Mclaughlin is ok with your procedures and let you know    For any additional questions, concerns or changes to your condition after today's office visit please contact the office at [phone].

## 2017-11-09 NOTE — TELEPHONE ENCOUNTER
11/09/17  2:58 PM  Raquel Deluna  1935    Home Phone [phone]   Mobile [phone]     Salud Jackson [facility] [phone]    Salud is seeing Ms. Deluna today. Her bumex (2mg BID) was only restarted two days ago. She has pitting edema with blisters. Her legs are being wrapped with kerlix and ace bandages.     /76, HR 90, O2 sat 93% RA, lungs are clear, and weight is stable at 183lbs.    Salud is calling to see if the patient's bumex can be increased?    Rosalinda TAM RN

## 2017-11-09 NOTE — TELEPHONE ENCOUNTER
11/09/17  3:25 PM  I called Salud Jackson and instructed her that Ms. Deluna can increase bumex to 3mg BID - tmm.

## 2017-11-15 PROBLEM — I89.0 LYMPHEDEMA: Status: ACTIVE | Noted: 2017-01-01

## 2017-11-15 PROBLEM — Z86.79 S/P MAZE OPERATION FOR ATRIAL FIBRILLATION: Status: ACTIVE | Noted: 2017-01-01

## 2017-11-15 PROBLEM — Z98.890 S/P TVR (TRICUSPID VALVE REPAIR): Status: ACTIVE | Noted: 2017-01-01

## 2017-11-15 PROBLEM — Z98.890 S/P MAZE OPERATION FOR ATRIAL FIBRILLATION: Status: ACTIVE | Noted: 2017-01-01

## 2017-11-15 PROBLEM — Z98.890 S/P MVR (MITRAL VALVE REPAIR): Status: ACTIVE | Noted: 2017-01-01

## 2017-11-15 NOTE — PROGRESS NOTES
"CARDIOVASCULAR SURGERY FOLLOW-UP PROGRESS NOTE  Chief Complaint: Here for follow-up 6 weeks after mitral valve repair, tricuspid valve repair, and cryo-maze.        HPI:   Dear Dr. Ricky Hoyos III, MD and colleagues:    It was nice to see Raquel Deluna in follow up 6 weeks  after surgery.  As you know, she is a 82 y.o. female with mitral and tricuspid incompetence and atrial fibrillation with carotid artery stenosis who underwent mitral valve repair tricuspid valve repair and cryo-maze on 9/25/17. She did well postoperatively and continues to do well. She comes in today complaining of swelling of both lower extremities..  Her activity level has been fair.       Physical Exam:         /64 (BP Location: Right arm, Patient Position: Sitting, Cuff Size: Adult)  Pulse 78  Temp 98.1 °F (36.7 °C) (Oral)   Resp 20  Ht 64\" (162.6 cm)  Wt 182 lb (82.6 kg)  SpO2 91%  BMI 31.24 kg/m2  Heart:  regular rate and rhythm, S1, S2 normal, no murmur, click, rub or gallop, normal prosthetic valve sounds  Lungs:  clear to auscultation bilaterally  Extremities:  4+ lower extremity edema bilaterally  Incision(s):  mid chest healing well, sternum stable    Assessment/Plan:     S/P mitral valve repair, tricuspid valve repair and Maze procedure. Overall, she is doing well.    She is still having significant swelling of her legs with some blistering.    Keep incisions clean and dry  OK to begin cardiac rehab  Follow-up as scheduled with cardiology  Follow-up as scheduled with PCP  Return to clinic in 3 month(s) with no new studies.  She will follow-up with Dr. Delma Templeton regarding her carotid stenosis.  I have made her an appointment to see the lymphedema clinic.    No restrictions of activity.      Thank you for allowing me to participate in the care of your   patient.  Regards,  Yonas Johnson MD    "

## 2017-11-20 NOTE — TELEPHONE ENCOUNTER
Salud called back with an update on Ms. Deluna.  Yesterday's weight 181, today's weight 185.5,   2+ bilateral edema, /76, HR 74, resp 26,   O2 90-91% with therapy 88-89%. Lungs are clear.  Her Bumex was increased to 3mg BID on 11/9.    Salud states she is supposed to be discharged from  today but she does not feel comfortable with this.  Dr. Johnson referred Ms. Deluna to the lymphedema clinic.    Please advise/jlf

## 2017-12-05 NOTE — TELEPHONE ENCOUNTER
She needs to come in tomorrow to be seen on pacer clinic to get established and to get form done.     Bobby.   RM

## 2017-12-05 NOTE — TELEPHONE ENCOUNTER
Called again, reached VM. Left message stating she needs seen tomorrow and to call back to schedule.

## 2017-12-05 NOTE — TELEPHONE ENCOUNTER
Pt returned my call. She is scheduled in clinic tomorrow at 0800.  I ordered her a [employer]lin transmitter and will give her a cell adapter in clinic.     I canceled interrogation with [employer] the day of surgery.

## 2017-12-05 NOTE — TELEPHONE ENCOUNTER
Received a pre-op Rt carotid endarterectomy pacemaker form to be filled out. Pt is scheduled for surgery on 12/7 at 1100.     Pt has been lost to f/u since implant on 9/29/17 and has not been seen by our device clinic. I am not sure if she has been seen elsewhere. I did call her but reached her VM. I left a message requesting a return call.     Dr. Templeton:  [employer] rep has been scheduled to interrogate pacemaker in pre-op holding at 10AM. Please advise if you need the interrogation earlier.  [employer] # is [phone]    Dr. Mclaughlin:  Pt has post hospital f/u scheduled with you on 12/7 and will need rescheduled given surgery on that date.

## 2017-12-06 NOTE — PROGRESS NOTES
Date of Office Visit: 2017  Encounter Provider: Ladonna Mclaughlin MD  Place of Service: [facility] CARDIOLOGY  Patient Name: Raquel Deluna  :1935      Patient ID:  Raquel Deluna is a 82 y.o. female is here for  followup for s/p MVR, TVR, paf, s/p pacer 17.         History of Present Illness    She was admitted [facility] on 2017 and discharged on 2/3/2017. She was diagnosed with acute on chronic anemia with a baseline hemoglobin of 8.5-9.0. She was admitted to the hospital with a hemoglobin of 4.4 and was given blood transfusion 4 units. She has a history of GI bleed in the past, but there was no obvious GI bleed during her hospitalization. She was taking NSAIDs and these were discontinued. She also has iron deficiency anemia and received iron infusions. She recently was noted to have dark stools which were concerning for melena. She underwent colonoscopy which showed diverticular disease, sigmoid and descending colon along with multiple colonic angiectasias. Gastroenterology, Dr. Claudine Bentley recommended the patient avoid NSAIDs and aspirin if possible. She was felt to have acute kidney injury secondary to hypovolemia and poor renal perfusion. She was also noted to be hyperkalemic and hyponatremic.     She was in the emergency department visit on 17. The patient presented to the ED after home health nurse checked her blood pressure and was told that it was elevated. She was recommended to present to the ED for further evaluation. The patient reported a frontal headache, congestion, and shortness of breath. She told the ED physician that she had not had her blood pressure medication that day. Upon review of the ED records, her blood pressure was elevated at 190/71 and heart rate 103. She was afebrile. Comprehensive metabolic panel showed many abnormalities and her CBC revealed that she was anemic. She had a CT scan of the head which showed no acute  process. EKG showed atrial fibrillation with a heart rate of 105 bpm. The plan of care was discussed with Dr. Kamaljit Martínez. The patient was recommended to start Xarelto 20 mg 1 tablet daily for stroke prevention.            She has a history of sleep apnea and uses a BiPAP and COPD.  She is to f/u with Dr. Mata.        She had a 2-D echocardiogram at [facility] on 1/14/16 with the following results: EF 55-60%, borderline concentric left ventricular hypertrophy, mitral annular calcification, moderate mitral regurgitation, mild tricuspid regurgitation, and mild to moderate pulmonary hypertension. She had a nuclear stress test completed 8/28/2014 which was normal showing no evidence of ischemia or infarction.    Her gastroenterology notes from April 2016 from Dr. Bryan Allison. He noted the patient has a history of intestinal malabsorption, iron deficiency anemia due to chronic blood loss, Hemoccult-positive stools, Bella's esophagus, erosive esophagitis, colon polyps, and hemoglobin in the past at 7 g. He noted that she had been on oral iron and had blackish stools.Her history includes chronic slow gastrointestinal bleeding for years.    She was admitted 9/6/2017 with decompensated congestive heart failure as well as acute kidney injury.  This was due to mitral insufficiency.  Cardiac catheterization done 9/13/2017 showed 20% mid RCA stenosis, normal left main, normal LAD, normal circumflex system.  It did reveal mild pulmonary hypertension with severe mitral insufficiency.  She had a carotid duplex on 9/12/2017 showing severe stenosis of the proximal right internal carotid artery with moderate stenosis of the left internal carotid artery.  Vascular did consult on her and will see her as an outpatient.  On 9/25/2017, she underwent mitral valve repair at the 26 mm Medtronic 3-D rigid ring.  She also tricuspid valve repair is a 26 mm triad and and cryo-maize.  Postoperatively, she had a  junctional rhythm in the 30s.  She received a permanent pacemaker during hospitalization.    She is here today for follow-up and had a pacer checked on 12/6/17 which showed trigger pacing 65% at time and atrially pacing less than 1% of time.  She did have 15 hours of atrial fibrillation.  She has a [employer] Ronny dual-chamber pacemaker.  This was implanted 9/29/17.  She does not take anticoagulation for her atrial fibrillation outside of aspirin because of a history of chronic anemia due to gastrointestinal bleeding.    She is to have a right carotid endarterectomy done tomorrow with Dr. Delma Templeton.  She's been very weak and short winded.  Probably mostly due to anemia.  Her hemoglobin 11/30/17 with a 0.4 with a creatinine of 1.21.  She has no chest pain, tachycardia or nausea.  She does feel very weak.  She has significant LE edema for which she gets her legs wrapped.    Past Medical History:   Diagnosis Date   • Angiodysplasia of colon    • Anxiety    • Arthritis    • Asthma    • Atrial fibrillation 02/2017   • Bella's esophagus 3/14/2016   • Cellulitis     left leg hx about a month ago   ALL HEALED   NOT SWELLING OR OOZING NOW  WEARS CURT ACE WRAPS   • Chronic bronchitis    • Chronic kidney disease    • Colon polyp 3/14/2016   • Essential hypertension 8/24/2013    Overview:  2015 IMO UPDATE   • GERD (gastroesophageal reflux disease)    • Hiatal hernia    • History of goiter    • History of transfusion     had reaction to 2nd unit after receiving the 1st   • Hyperlipidemia    • Hypertension    • Hyponatremia    • Intestinal malabsorption 3/31/2016   • Iron deficiency anemia 03/14/2016    receives iron infusion in past   • Migraine    • Osteoporosis    • PONV (postoperative nausea and vomiting)    • Sleep apnea     cpap   • Stress incontinence     wears pads         Past Surgical History:   Procedure Laterality Date   • APPENDECTOMY     • CARDIAC CATHETERIZATION N/A 9/13/2017    Procedure: Left ventriculography;   Surgeon: Ron Moscoso MD;  Location: [facility] CATH INVASIVE LOCATION;  Service:    • CARDIAC CATHETERIZATION N/A 9/13/2017    Procedure: Left Heart Cath;  Surgeon: Ron Moscoso MD;  Location: [facility] CATH INVASIVE LOCATION;  Service:    • CARDIAC CATHETERIZATION N/A 9/13/2017    Procedure: Coronary angiography;  Surgeon: Ron Moscoso MD;  Location: [facility] CATH INVASIVE LOCATION;  Service:    • CARDIAC CATHETERIZATION N/A 9/13/2017    Procedure: Right Heart Cath;  Surgeon: Ron Moscoso MD;  Location: [facility] CATH INVASIVE LOCATION;  Service:    • CARDIAC ELECTROPHYSIOLOGY PROCEDURE N/A 9/29/2017    Procedure: Device Implant, [employer] Ronny   PPM;  Surgeon: Benji Martínez MD;  Location: [facility] CATH INVASIVE LOCATION;  Service:    • CERVICAL DISC SURGERY     • COLONOSCOPY     • COLONOSCOPY N/A 2/3/2017    NBIH, diverticulosis, one 9 mm hyperplastic polyp, multiple non-bleeding colonic angioectasias, Path; neg   • ENDOSCOPY N/A 2/2/2017    normal esophagus, normal examined duodenum, non-bleeding gastric ulcers w/no sigmata of bleeding   • HYSTERECTOMY     • MITRAL VALVE REPAIR/REPLACEMENT N/A 9/25/2017    Procedure: KOLTON STERNOTOMY MITRAL VALVE REPLACEMENT, TRICUSPID VALVE REPAIR, MAZE PROCEDURE AND PRP;  Surgeon: Yonas Johnson MD;  Location: [facility];  Service:    • PACEMAKER IMPLANTATION       DUAL PACEMAKER ST RONNY  MODEL  [id number]  SERIES [id number]   • UT TOTAL KNEE ARTHROPLASTY Right 10/25/2016    Procedure: RT TOTAL KNEE ARTHROPLASTY;  Surgeon: Ricky Dsouza MD;  Location: [facility];  Service: Orthopedics   • THYROID SURGERY      for goiter   • TONSILLECTOMY         Current Outpatient Prescriptions on File Prior to Visit   Medication Sig Dispense Refill   • aspirin 81 MG EC tablet Take 1 tablet by mouth Daily. (Patient taking differently: Take 81 mg by mouth Daily. TO STAY ON) 30 tablet 5   • B Complex Vitamins (VITAMIN B COMPLEX PO) Take 1 tablet by mouth Every  Morning.     • bisacodyl (DULCOLAX) 5 MG EC tablet Take 2 tablets by mouth Daily As Needed for Constipation. 30 tablet 5   • budesonide-formoterol (SYMBICORT) 160-4.5 MCG/ACT inhaler Inhale 2 puffs 2 (Two) Times a Day.     • bumetanide (BUMEX) 2 MG tablet Take 1 tablet by mouth 2 (Two) Times a Day. (Patient taking differently: Take 2 mg by mouth 2 (Two) Times a Day. PT IS TAKING 1 AND 1/2 TABLET BID) 60 tablet 5   • CALCIUM CARBONATE-VITAMIN D PO Take 1 tablet by mouth Every Morning.     • carvedilol (COREG) 12.5 MG tablet Take 2 tablets by mouth Every 12 (Twelve) Hours. 60 tablet 5   • citalopram (CeleXA) 40 MG tablet Take 40 mg by mouth Every Night.     • esomeprazole (NexIUM) 40 MG capsule Take 40 mg by mouth Daily Before Supper.     • Fe-Succ Ac-C-Thre Ac-B12-FA (FERREX 150 FORTE PLUS)  MG capsule capsule Take 1 capsule by mouth 2 (Two) Times a Day With Meals. 60 each 5   • hydrALAZINE (APRESOLINE) 25 MG tablet Take 1 tablet by mouth Every 8 (Eight) Hours. 90 tablet 5   • mometasone (NASONEX) 50 MCG/ACT nasal spray 2 sprays into each nostril Every Morning.     • Multiple Vitamin (MULTIVITAMIN) capsule Take 1 capsule by mouth Every Morning.     • potassium chloride (MICRO-K) 10 MEQ CR capsule Take 2 capsules by mouth 2 (Two) Times a Day With Meals. 120 capsule 5   • vitamin B-6 (PYRIDOXINE) 50 MG tablet Take 50 mg by mouth Every Morning.     • [DISCONTINUED] atorvastatin (LIPITOR) 40 MG tablet Take 1 tablet by mouth Every Night. 30 tablet 5     No current facility-administered medications on file prior to visit.        Social History     Social History   • Marital status:      Spouse name: N/A   • Number of children: N/A   • Years of education: N/A     Occupational History   • Not on file.     Social History Main Topics   • Smoking status: Former Smoker     Packs/day: 4.00     Years: 33.00     Types: Cigarettes     Quit date: 1980   • Smokeless tobacco: Never Used   • Alcohol use Yes      Comment:  "once a month a glass of wine or mixed drink   • Drug use: No   • Sexual activity: Not on file     Other Topics Concern   • Not on file     Social History Narrative           Review of Systems   Constitution: Negative.   HENT: Negative for congestion.    Eyes: Negative for vision loss in left eye and vision loss in right eye.   Respiratory: Negative.  Negative for cough, hemoptysis, shortness of breath, sleep disturbances due to breathing, snoring, sputum production and wheezing.    Endocrine: Negative.    Hematologic/Lymphatic: Negative.    Skin: Negative for poor wound healing and rash.   Musculoskeletal: Negative for falls, gout, muscle cramps and myalgias.   Gastrointestinal: Negative for abdominal pain, diarrhea, dysphagia, hematemesis, melena, nausea and vomiting.   Neurological: Negative for excessive daytime sleepiness, dizziness, headaches, light-headedness, loss of balance, seizures and vertigo.   Psychiatric/Behavioral: Negative for depression and substance abuse. The patient is not nervous/anxious.        Procedures  Procedures       Objective:      Vitals:    12/06/17 0939   BP: 130/60   BP Location: Right arm   Patient Position: Sitting   Pulse: 80   Weight: 85.7 kg (189 lb)   Height: 64 cm (25.2\")     Body mass index is 209.3 kg/(m^2).    Physical Exam   Constitutional: She is oriented to person, place, and time. She appears well-developed and well-nourished. No distress.   pale   HENT:   Head: Normocephalic and atraumatic.   Eyes: Conjunctivae are normal. No scleral icterus.   Neck: Neck supple. No JVD present. Carotid bruit is not present. No thyromegaly present.   Cardiovascular: Normal rate, regular rhythm, S1 normal, S2 normal, normal heart sounds and intact distal pulses.   No extrasystoles are present. PMI is not displaced.  Exam reveals no gallop.    No murmur heard.  Pulses:       Carotid pulses are 2+ on the right side, and 2+ on the left side.       Radial pulses are 2+ on the right side, " and 2+ on the left side.        Dorsalis pedis pulses are 2+ on the right side, and 2+ on the left side.        Posterior tibial pulses are 2+ on the right side, and 2+ on the left side.   3+ LE edema   Pulmonary/Chest: Effort normal and breath sounds normal. No respiratory distress. She has no wheezes. She has no rhonchi. She has no rales. She exhibits no tenderness.   Abdominal: Soft. Bowel sounds are normal. She exhibits no distension, no abdominal bruit and no mass. There is no tenderness.   Musculoskeletal: She exhibits no edema or deformity.   Lymphadenopathy:     She has no cervical adenopathy.   Neurological: She is alert and oriented to person, place, and time. No cranial nerve deficit.   Skin: Skin is warm and dry. No rash noted. She is not diaphoretic. No cyanosis. No pallor. Nails show no clubbing.   Psychiatric: She has a normal mood and affect. Judgment normal.   Vitals reviewed.      Lab Review:       Assessment:      Diagnosis Plan   1. S/P MVR (mitral valve repair)     2. S/P Maze operation for atrial fibrillation     3. S/P TVR (tricuspid valve repair)       1. Chronic blood loss anemia requiring recurrent transfusions  2. Status post mitral valve and tricuspid valve repairs for mitral tricuspid insufficiency done September 2017  3. Paroxysmal atrial fibrillation.  Cannot be on anticoagulation due to chronic blood loss anemia  4. Status post pacemaker for AV yael block after surgery.  5. Chronic lower extremity edema  6. Severe carotid artery stenosis.   To Have right carotid endarterectomy done tomorrow with Dr. Delma Templeton.     Plan:       F/u with NP on 1 month. To have right CEA tomorrow.  She may need to be admitted to [facility] longer after this to correct her anemia with transfusions and may be to diurese her a bit more to help with her lower extremity edema.

## 2017-12-07 PROBLEM — I65.21 CAROTID STENOSIS, ASYMPTOMATIC, RIGHT: Status: ACTIVE | Noted: 2017-01-01

## 2017-12-07 NOTE — ANESTHESIA PROCEDURE NOTES
Arterial Line    Patient location during procedure: holding area  Start time: 12/7/2017 10:40 AM  Stop Time:12/7/2017 10:50 AM       Line placed for hemodynamic monitoring.  Performed By   Anesthesiologist: ARGELIA KRAUS  Preanesthetic Checklist  Completed: patient identified, site marked, surgical consent, pre-op evaluation, timeout performed, IV checked, risks and benefits discussed and monitors and equipment checked  Arterial Line Prep   Sterile Tech: mask and cap  Prep: ChloraPrep  Patient monitoring: blood pressure monitoring, continuous pulse oximetry and EKG  Arterial Line Procedure   Laterality:left  Location:  radial artery  Catheter size: 20 G   Guidance: landmark technique  Number of attempts: 1  Successful placement: yes          Post Assessment   Dressing Type: occlusive dressing applied, secured with tape and wrist guard applied.   Complications no  Circ/Move/Sens Assessment: unchanged.   Patient Tolerance: patient tolerated the procedure well with no apparent complications

## 2017-12-07 NOTE — ANESTHESIA PROCEDURE NOTES
Airway  Urgency: elective    Airway not difficult    General Information and Staff    Patient location during procedure: OR  Anesthesiologist: ARGELIA KRAUS  CRNA: VINNY CHERRY    Indications and Patient Condition  Indications for airway management: airway protection    Preoxygenated: yes  MILS maintained throughout  Mask difficulty assessment: 1 - vent by mask    Final Airway Details  Final airway type: endotracheal airway      Successful airway: ETT  Cuffed: yes   Successful intubation technique: direct laryngoscopy  Facilitating devices/methods: intubating stylet  Endotracheal tube insertion site: oral  Blade: Denise  Blade size: #3  ETT size: 7.0 mm  Cormack-Lehane Classification: grade I - full view of glottis  Placement verified by: chest auscultation and capnometry   Cuff volume (mL): 7  Measured from: lips  ETT to lips (cm): 20  Number of attempts at approach: 1    Additional Comments  Atraumatic ET Tube placement.  Teeth as pre-op. BLEBS.  -ABD sounds.  +ET CO2.  Secured to face

## 2017-12-07 NOTE — OP NOTE
Brief op note    12/07/17   Delma Templeton Jr., MD      Preoperative Diagnosis: critical stenosis R ICA, asymptomatic    Postoperative Diagnosis: same as above    Procedure Performed: right carotid endarterectomy with bovine pericardial patch angioplasty and intraoperative duplex ultrasonography    Surgeon: Delma Templeton Jr., MD    Assistant: Jordan Naqvi MD    Anesthesia: General Anesthesia via Endotracheal Tube    Estimated Blood Loss: 100ml    Specimen:  Plaque, not sent    Complications: None    Dispo: awakened from anesthesia, extubated and taken to the recovery room in a stable condition, having suffered no apparent untoward event.    Indication for procedure: As in preoperative diagnosis          Procedure:  The patient was placed on the operating table in the supine position.  After satisfactory general endotracheal anesthesia was achieved a roll was placed under the patient's shoulder and his neck slightly hyperextended and the chin turned toward the right.  Anterior chest and neck were prepped using ChloraPrep and draped in the usual sterile fashion.  An incision was made along the anterior aspect of the sternocleidomastoid muscle and the electrocautery was used to dissected through the subcutaneous tissue and platysma muscle.  The sternocleidomastoid muscle was retracted laterally.  The common facial vein was identified, isolated, clamped divided and suture ligated with 3-0 silk suture.  The common carotid artery was then identified and isolated.  An umbilical tape was passed around this structure.  7500 units of heparin was then given intravenously.  An ACT was checked and was therapeutic.  Using meticulous sharp dissection the carotid bulb and internal carotid artery, external carotid artery, and superior thyroid arteries were all isolated and vessel loops passed around these structures.  Of note, the hypoglossal nerve and vagus nerves were identified and care taken to preserve these.   After 5 minutes circulation time with a heparin vascular control was obtained by tightening the vessel loops and the common carotid artery was clamped.  A lateral arteriotomy was made in the common carotid artery and extended into the internal carotid artery.  A shunt was placed into the internal carotid artery, back flushed, placed into the common carotid artery.  Flow was established from the common carotid artery through the shunt and into the internal carotid artery.  The umbilical tape was secured around the common carotid artery.  An endarterectomy was then performed by transecting the plaque proximally in the common carotid artery and feathering off distally in the internal carotid artery.  An eversion endarterectomy was performed as far as possible up the external carotid artery.  All feathery media and debris were meticulously removed.  The lumen was irrigated heparinized saline solution and again all feathery media and debris were removed.  The bovine pericardial patch was brought into the field and cut to the appropriate length and shape and a patch angioplasty was performed using a running 6-0 Prolene suture beginning at each end and tying on the side.  Prior to completion of this the shunt was removed and the arteries were flushed in turn and the lumen irrigated with heparinized saline solution.  A partial occlusion clamp was then placed on the incompleted portion of the patch angioplasty and flow established from the common carotid artery into the external carotid artery and after several heartbeats into the internal carotid artery.  The patch angioplasty was then completed and the partial occlusion clamp removed.  Hemostasis was checked and noted be satisfactory.  Of note, clamp time for insertion of the shunt and removal of the shunt were less than 1 minute.  An intraoperative duplex scan was performed and this demonstrated a widely patent endarterectomy site with normal flow in the common internal  and external carotid arteries.  50 was then given intravenously.  Hemostasis was once again checked and noted be satisfactory.  The skin is simultaneous tissue were then injected with 0.25% Marcaine with epinephrine.  The wound was copiously irrigated with antibiotic solution and dried.  The platysma muscle was closed with a running 3-0 Vicryl suture and the skin closed with a running 4-0 Vicryl subcuticular fashion.  Dermabond was placed over this.  Sponge, needle, and instrument counts were all reported as correct.  The patient was extubated and transported to recovery room, moving all 4 extremities equally and tongue protrusion was midline.

## 2017-12-07 NOTE — ANESTHESIA PREPROCEDURE EVALUATION
Anesthesia Evaluation     Patient summary reviewed and Nursing notes reviewed   history of anesthetic complications: PONV  NPO Solid Status: > 8 hours  NPO Liquid Status: > 2 hours     Airway   Mallampati: II  Dental      Pulmonary - normal exam    breath sounds clear to auscultation  (+) asthma, sleep apnea on CPAP,   Cardiovascular - normal exam    ECG reviewed  Patient on routine beta blocker and Beta blocker given within 24 hours of surgery    (+) pacemaker pacemaker interrogated Yesterday, hypertension, valvular problems/murmurs, dysrhythmias Atrial Fib, hyperlipidemia      Neuro/Psych  (+) psychiatric history Anxiety,    GI/Hepatic/Renal/Endo    (+)  hiatal hernia, GERD, renal disease CRI,     Musculoskeletal (-) negative ROS    Abdominal    Substance History - negative use     OB/GYN          Other - negative ROS                                     Anesthesia Plan    ASA 3     general     intravenous induction   Anesthetic plan and risks discussed with patient.

## 2017-12-07 NOTE — PERIOPERATIVE NURSING NOTE
Dr. Templeton at bedside, notified of today's Hgb/Hct, pt took ASA 81 mg daily last dose yesterday, notified of PAT PT/INR, looking at bilateral shins.  Order noted for Kefzol 2 grams pre-op.

## 2017-12-07 NOTE — ANESTHESIA POSTPROCEDURE EVALUATION
Patient: Raquel Deluna    Procedure Summary     Date Anesthesia Start Anesthesia Stop Room / Location    12/07/17 1117 1327  [facility] / [facility]       Procedure Diagnosis Surgeon Provider    RT CAROTID ENDARTERECTOMY (Right Neck) No diagnosis on file. MD Ron Salgado Jr., MD          Anesthesia Type: general  Last vitals  BP   142/63 (12/07/17 1700)   Temp   36.7 °C (98.1 °F) (12/07/17 1321)   Pulse   73 (12/07/17 1700)   Resp   16 (12/07/17 1700)     SpO2   94 % (12/07/17 1700)     Post Anesthesia Care and Evaluation    Patient location during evaluation: PACU  Anesthetic complications: No anesthetic complications

## 2017-12-07 NOTE — PLAN OF CARE
Problem: Patient Care Overview (Adult)  Goal: Plan of Care Review  Outcome: Ongoing (interventions implemented as appropriate)    12/07/17 0919   Coping/Psychosocial Response Interventions   Plan Of Care Reviewed With patient   Patient Care Overview   Progress no change       Goal: Adult Individualization and Mutuality  Outcome: Ongoing (interventions implemented as appropriate)    12/07/17 0919   Individualization   Patient Specific Preferences none       Goal: Discharge Needs Assessment  Outcome: Ongoing (interventions implemented as appropriate)    12/07/17 0919   Discharge Needs Assessment   Concerns To Be Addressed care coordination/care conferences         Problem: Perioperative Period (Adult)  Goal: Signs and Symptoms of Listed Potential Problems Will be Absent or Manageable (Perioperative Period)  Outcome: Ongoing (interventions implemented as appropriate)    12/07/17 0919   Perioperative Period   Problems Assessed (Perioperative Period) all   Problems Present (Perioperative Period) pain;physiologic stress response

## 2017-12-08 NOTE — PLAN OF CARE
Problem: Patient Care Overview (Adult)  Goal: Plan of Care Review  Outcome: Ongoing (interventions implemented as appropriate)    12/08/17 1327   Coping/Psychosocial Response Interventions   Plan Of Care Reviewed With patient   Patient Care Overview   Progress improving   Outcome Evaluation   Outcome Summary/Follow up Plan VSS, 2 UPRBC, increase activity, turn Q2H, IV bumex, possible home in AM.        Goal: Adult Individualization and Mutuality  Outcome: Ongoing (interventions implemented as appropriate)    Problem: Perioperative Period (Adult)  Goal: Signs and Symptoms of Listed Potential Problems Will be Absent or Manageable (Perioperative Period)  Outcome: Ongoing (interventions implemented as appropriate)    Problem: Pressure Ulcer Risk (Wily Scale) (Adult,Obstetrics,Pediatric)  Goal: Identify Related Risk Factors and Signs and Symptoms  Outcome: Ongoing (interventions implemented as appropriate)  Goal: Skin Integrity  Outcome: Ongoing (interventions implemented as appropriate)

## 2017-12-08 NOTE — PLAN OF CARE
Problem: Patient Care Overview (Adult)  Goal: Plan of Care Review  Outcome: Ongoing (interventions implemented as appropriate)    12/08/17 0218   Coping/Psychosocial Response Interventions   Plan Of Care Reviewed With patient   Patient Care Overview   Progress improving   Outcome Evaluation   Outcome Summary/Follow up Plan VSS. Pain is controlled within the PO pain meds currently ordered. pt underwent rt carotid endarterectomy 12/7 some bruising smile and shoulder shrug = tongue midline. Khalif edematous legs Cardiology consulted to see 12/8. will continue to monitor.       Goal: Adult Individualization and Mutuality  Outcome: Ongoing (interventions implemented as appropriate)  Goal: Discharge Needs Assessment  Outcome: Ongoing (interventions implemented as appropriate)    Problem: Perioperative Period (Adult)  Goal: Signs and Symptoms of Listed Potential Problems Will be Absent or Manageable (Perioperative Period)  Outcome: Ongoing (interventions implemented as appropriate)

## 2017-12-08 NOTE — CONSULTS
Date of Consultation: 17    Referral Provider: Delma Templeton     Reason for Consultation: Shortness of breath, edema     Encounter Provider: Juan Partida MD    Group of Service: [facility]     Patient Name: Raquel Deluna    :1935    Chief complaint:  Right carotid stenosis    History of Present Illness:  Ms. Deluna is a 81 y/o patient of Dr. Mclaughlin's with a documented h/o severe carotid artery stenosis, AMBER-CPAP, COPD, MV repair/TV repair 17, PAF-no AC due to GIB, s/p MAZE,  [place]ny dual chamber PPM for AV yael block, intestinal malabsorption, iron deficiency anemia, abdalla's esophagus, CKD, GERD, HTN, chronic LE edema and hyperlipidemia. She was last seen in office on 17 for f/u MVR, TVR, PAF and s/p PPM. At that time she had c/o weakness and soa, probably due to anemia. There were no cardiac complaints.    She was admitted yesterday post right carotid endarterectomy, which she tolerated well. We have been asked to see post operatively for cardiac care.  She states that she has been very short of breath over the last several months, although this evidently is more of a chronic issue for her.  However, with any exertion she seems to get significantly short of breath and have to stop.  She is comfortable at rest.  She has chronic lower extremity edema, although she states that her edema today is worse than normal.  She has approximately 3+ edema on board.    Previous testing:  Cardiac Cath 17  Procedure findings:  Left main: Large-caliber vessel that bifurcates to an LAD and left circumflex.  Left main is normal  LAD: Medium caliber vessel that gives rise to a medium caliber diagonal branch.  The LAD is normal  LCX: Medium caliber vessel that gives rise to a medium caliber first obtuse and second obtuse marginal branch.  The distal circumflex has mild luminal irregularities.  RCA: Large caliber vessel that gives rise to a medium caliber PDA and  small caliber RPL.  20% mid vessel stenosis.     Left ventricular angiography: Normal left ventricular size.  Mildly diminished systolic function.  Ejection fraction 40%.     Hemodynamics:   LV: 143/28  AO: 143/56     RA: 21/21/17  RV 43/5  PA 62/33/41  Wedge: 26/49/32     Cardiac output: 3.07 L/m  Cardiac index 1.58 L/m/m²        SVC: 61%  PA: 54%  AO: 95%    Echo 9/13/17  · Left ventricular systolic function is moderately decreased.  · Left ventricular systolic function is moderately decreased. Estimated EF appears to be in the range of 36 - 40%. Normal left ventricular cavity size and wall thickness noted.  · The following segments are hypokinetic: mid anterior, mid anteroseptal, mid inferoseptal, apical anterior, apical septal and apex.  · Left atrial cavity size is moderately dilated.  · No evidence of a patent foramen ovale. Saline test results are negative.  · Right atrial cavity size is moderately dilated.  · Mild aortic valve regurgitation is present.  · Severe mitral valve regurgitation is present with a centrally-directed jet noted.  · Mild to moderate tricuspid valve regurgitation is present. Estimated right ventricular systolic pressure from tricuspid regurgitation is markedly elevated (>55 mmHg).  Past Medical History:   Diagnosis Date   • Angiodysplasia of colon    • Anxiety    • Arthritis    • Asthma    • Atrial fibrillation 02/2017   • Bella's esophagus 3/14/2016   • Cellulitis     left leg hx about a month ago   ALL HEALED   NOT SWELLING OR OOZING NOW  WEARS CURT ACE WRAPS   • Chronic bronchitis    • Chronic kidney disease    • Colon polyp 3/14/2016   • Essential hypertension 8/24/2013    Overview:  2015 IMO UPDATE   • GERD (gastroesophageal reflux disease)    • Hiatal hernia    • History of goiter    • History of transfusion     had reaction to 2nd unit after receiving the 1st   • Hyperlipidemia    • Hypertension    • Hyponatremia    • Intestinal malabsorption 3/31/2016   • Iron deficiency anemia  03/14/2016    receives iron infusion in past   • Migraine    • Osteoporosis    • PONV (postoperative nausea and vomiting)    • Sleep apnea     cpap   • Stress incontinence     wears pads         Past Surgical History:   Procedure Laterality Date   • APPENDECTOMY     • CARDIAC CATHETERIZATION N/A 9/13/2017    Procedure: Left ventriculography;  Surgeon: Ron Moscoso MD;  Location: [facility] CATH INVASIVE LOCATION;  Service:    • CARDIAC CATHETERIZATION N/A 9/13/2017    Procedure: Left Heart Cath;  Surgeon: Ron Moscoso MD;  Location: [facility] CATH INVASIVE LOCATION;  Service:    • CARDIAC CATHETERIZATION N/A 9/13/2017    Procedure: Coronary angiography;  Surgeon: Ron Moscoso MD;  Location: [facility] CATH INVASIVE LOCATION;  Service:    • CARDIAC CATHETERIZATION N/A 9/13/2017    Procedure: Right Heart Cath;  Surgeon: Ron Moscoso MD;  Location: [facility] CATH INVASIVE LOCATION;  Service:    • CARDIAC ELECTROPHYSIOLOGY PROCEDURE N/A 9/29/2017    Procedure: Device Implant, [employer]. Ronny   PPM;  Surgeon: Benji Martínez MD;  Location: [facility] CATH INVASIVE LOCATION;  Service:    • CARDIAC SURGERY      MVR/TVR   • CERVICAL DISC SURGERY     • COLONOSCOPY     • COLONOSCOPY N/A 2/3/2017    NBIH, diverticulosis, one 9 mm hyperplastic polyp, multiple non-bleeding colonic angioectasias, Path; neg   • ENDOSCOPY N/A 2/2/2017    normal esophagus, normal examined duodenum, non-bleeding gastric ulcers w/no sigmata of bleeding   • HYSTERECTOMY     • MITRAL VALVE REPAIR/REPLACEMENT N/A 9/25/2017    Procedure: KOLTON STERNOTOMY MITRAL VALVE REPLACEMENT, TRICUSPID VALVE REPAIR, MAZE PROCEDURE AND PRP;  Surgeon: Yonas Johnson MD;  Location: [facility] OR;  Service:    • PACEMAKER IMPLANTATION       DUAL PACEMAKER ST RONNY  MODEL  [id number]  SERIES [id number]   • CO TOTAL KNEE ARTHROPLASTY Right 10/25/2016    Procedure: RT TOTAL KNEE ARTHROPLASTY;  Surgeon: Ricky Dsouza MD;  Location: [facility] OR;  Service:  Orthopedics   • THYROID SURGERY      for goiter   • TONSILLECTOMY           Allergies   Allergen Reactions   • Sulfa Antibiotics Other (See Comments)     Yeast infections/ RASH         No current facility-administered medications on file prior to encounter.      Current Outpatient Prescriptions on File Prior to Encounter   Medication Sig Dispense Refill   • B Complex Vitamins (VITAMIN B COMPLEX PO) Take 1 tablet by mouth Every Morning.     • budesonide-formoterol (SYMBICORT) 160-4.5 MCG/ACT inhaler Inhale 2 puffs 2 (Two) Times a Day.     • CALCIUM CARBONATE-VITAMIN D PO Take 1 tablet by mouth Every Morning.     • citalopram (CeleXA) 40 MG tablet Take 40 mg by mouth Daily.     • esomeprazole (NexIUM) 40 MG capsule Take 40 mg by mouth Daily Before Supper.     • hydrALAZINE (APRESOLINE) 25 MG tablet Take 1 tablet by mouth Every 8 (Eight) Hours. 90 tablet 5   • mometasone (NASONEX) 50 MCG/ACT nasal spray 2 sprays into each nostril Every Morning.     • aspirin 81 MG EC tablet Take 1 tablet by mouth Daily. (Patient taking differently: Take 81 mg by mouth Daily. TO STAY ON) 30 tablet 5   • carvedilol (COREG) 12.5 MG tablet Take 2 tablets by mouth Every 12 (Twelve) Hours. 60 tablet 5   • Fe-Succ Ac-C-Thre Ac-B12-FA (FERREX 150 FORTE PLUS)  MG capsule capsule Take 1 capsule by mouth 2 (Two) Times a Day With Meals. 60 each 5   • potassium chloride (MICRO-K) 10 MEQ CR capsule Take 2 capsules by mouth 2 (Two) Times a Day With Meals. 120 capsule 5         Social History     Social History   • Marital status:      Spouse name: N/A   • Number of children: N/A   • Years of education: N/A     Occupational History   • Not on file.     Social History Main Topics   • Smoking status: Former Smoker     Packs/day: 4.00     Years: 33.00     Types: Cigarettes     Quit date: 1980   • Smokeless tobacco: Never Used   • Alcohol use Yes      Comment: once a month a glass of wine or mixed drink   • Drug use: No   • Sexual activity:  "Not on file     Other Topics Concern   • Not on file     Social History Narrative         Family History   Problem Relation Age of Onset   • Adopted: Yes   • Malig Hyperthermia Neg Hx          REVIEW OF SYSTEMS:   Pertinent positives were noted in the history of present illness above.  Otherwise, all other systems were reviewed, and are negative.     Objective:     Vitals:    12/08/17 0700 12/08/17 0735 12/08/17 0854 12/08/17 0932   BP: 139/62  122/57 145/67   BP Location:       Patient Position:       Pulse:   71 70   Resp:  20 18 18   Temp:  97.6 °F (36.4 °C) 97.3 °F (36.3 °C) 97.5 °F (36.4 °C)   TempSrc:  Oral Oral    SpO2:   96% 96%   Weight:       Height:         Body mass index is 33.71 kg/(m^2).  Flowsheet Rows         First Filed Value    Admission Height  162.6 cm (64\") Documented at 12/07/2017 0937    Admission Weight  89.1 kg (196 lb 6.4 oz) Documented at 12/07/2017 0937           General:    No acute distress, alert and oriented x4, pleasant                   Head:    Normocephalic, atraumatic.   Eyes:          Conjunctivae and sclerae normal, no icterus, PERRLA   Throat:   No oral lesions, no thrush, oral mucosa moist.    Neck:   Supple, trachea midline.   Lungs:     Decreased breath sounds bilaterally     Heart:    Regular rhythm and normal rate.  II/VI SM LLSB   Abdomen:     Soft, non-tender, non-distended, positive bowel sounds.    Extremities:   3+ edema bilateral lower extremities, erythema lower extremities   Pulses:   Pulses palpable and equal bilaterally.    Skin:   No bleeding or rash.   Neuro:   Non-focal.  Moves all extremities well.    Psychiatric:   Normal mood and affect.     Lab Review:                  Results from last 7 days  Lab Units 12/08/17  0432   SODIUM mmol/L 136   POTASSIUM mmol/L 3.5   CHLORIDE mmol/L 98   CO2 mmol/L 25.4   BUN mg/dL 35*   CREATININE mg/dL 1.59*   GLUCOSE mg/dL 115*   CALCIUM mg/dL 8.2*           Results from last 7 days  Lab Units 12/08/17  0432   WBC " 10*3/mm3 8.97   HEMOGLOBIN g/dL 7.6*   HEMATOCRIT % 24.8*   PLATELETS 10*3/mm3 298       Results from last 7 days  Lab Units 12/07/17  0942   INR  1.37*       EKG:         Baseline:      EKG (reviewed by me personally):      Assessment:   1. POD #1 right carotid endarterectomy  2. Status post MV and TV repairs 9/25/17  3. Acute on chronic diastolic CHF  4. Chronic lower extremity edema  5. Paroxysmal atrial fibrillation - no anticoagulation secondary to GI bleed  6. Status post pacemaker  7. Iron deficiency anemia  8. Acute kidney injury with CKD  9. Hypertension     Plan:       I am not sure about the patient's baseline lower extremity edema, although she states that it is worse, and she has 3+ pitting edema with some venous stasis changes today.  She has also been more short of breath, especially with exertion.  She is anemic, and is scheduled to get 2 units of blood today.  I am going to try to diurese her with Bumex 2 mg IV twice a day.  She will also get 20 mg of IV Lasix in between the units of blood.  Her kidney function is slightly worse today, and this will need to be watched closely.  She had a recent mitral and tricuspid valve repair on 9/25/2017.  I am also going to check an echocardiogram at this time.  She does have paroxysmal atrial fibrillation, although she is not anticoagulated secondary to a significant GI bleed in the past.  She will continue on her blood pressure medications, including hydralazine.  We will continue to follow the patient with you, and make recommendations accordingly.    Thank you very much for this consult.    Tejinder Partida M.D.

## 2017-12-08 NOTE — PROGRESS NOTES
Patient is one day status post right carotid endarterectomy for critical stenosis.  She is doing satisfactorily with no neurologic issues.  She has done satisfactorily from a neurologic standpoint.    Her preoperative hemoglobin was below 8.7.  Hemoglobin this morning is 7.6.  She does have shortness of breath with exertion and also lower extremity edema.  The cardiologists feel that this is likely related to her anemia.    Her incision is healing satisfactorily.  Her neurologic examination is normal.    She will be transfused 2 units of blood followed by Lasix.  Likely home tomorrow if she continues to do satisfactorily.

## 2017-12-08 NOTE — PROGRESS NOTES
Discharge Planning Assessment  [facility]     Patient Name: Raquel Deluna  MRN: [id number]  Today's Date: 12/8/2017    Admit Date: 12/7/2017          Discharge Needs Assessment       12/08/17 1456    Living Environment    Lives With child(indira), adult    Living Arrangements house    Transportation Available car;family or friend will provide    Living Environment    Quality Of Family Relationships supportive    Discharge Needs Assessment    Equipment Currently Used at Home walker, rolling;grab bar;wheelchair;ramp;shower chair;bipap/ cpap            Discharge Plan       12/08/17 1457    Case Management/Social Work Plan    Plan Home with [facility]    Patient/Family In Agreement With Plan yes    Additional Comments Met with pt at bedside.  Introduced self, explained CCP role, facesheet verified.  Pt states she lives in handicap accessible home with son and son's girlfriend.  Son is quadriplegic but son's girlfrend assists pt if needed.  Pt uses rolling walker, grab bars, shower chair, and CPAP.  Went to [facility] for rehab after hospitalization in Sept.  Current with [facility].  Plans home with [facility].  Spoke with [facility] who is following. Pt's daughter from out of state will be staying with her as well until 12/18.   ARACELI Smart RN        Discharge Placement     Facility/Agency Request Status Selected? Address Phone Number Fax Number    [facility] Pending - No Request Sent     [address] [address] [phone]                Demographic Summary       12/08/17 1448    Referral Information    Admission Type inpatient    Arrived From home or self-care    Referral Source admission list    Reason For Consult discharge planning    Contact Information    Permission Granted to Share Information With family/designee   Michael Stallings (son) [phone]    Primary Care Physician Information    Name Ricky Hoyos            Functional Status       12/08/17 1456    Functional Status  Current    Ambulation 3-->assistive equipment and person    Transferring 3-->assistive equipment and person    Toileting 3-->assistive equipment and person    Bathing 3-->assistive equipment and person    Dressing 3-->assistive equipment and person    Eating 0-->independent    Communication 0-->understands/communicates without difficulty    Functional Status Prior    Ambulation 1-->assistive equipment    Transferring 1-->assistive equipment    Toileting 0-->independent    Bathing 0-->independent    Dressing 0-->independent    Eating 0-->independent    Communication 0-->understands/communicates without difficulty    Cognitive/Perceptual/Developmental    Current Mental Status/Cognitive Functioning no deficits noted            Psychosocial     None            Abuse/Neglect     None            Legal     None            Substance Abuse     None            Patient Forms     None          Sparkle Smart

## 2017-12-09 NOTE — PLAN OF CARE
Problem: Patient Care Overview (Adult)  Goal: Plan of Care Review  Outcome: Ongoing (interventions implemented as appropriate)    12/09/17 0048   Coping/Psychosocial Response Interventions   Plan Of Care Reviewed With patient   Patient Care Overview   Progress improving   Outcome Evaluation   Outcome Summary/Follow up Plan VSS. Right neck incision c/d/i, soft, eccomotic. Started IV bumex today for LE edema. Pain controlled with Percocet. Neuro intact. Will continue to monitor.        Goal: Adult Individualization and Mutuality  Outcome: Ongoing (interventions implemented as appropriate)  Goal: Discharge Needs Assessment  Outcome: Ongoing (interventions implemented as appropriate)    Problem: Pressure Ulcer Risk (Wily Scale) (Adult,Obstetrics,Pediatric)  Goal: Identify Related Risk Factors and Signs and Symptoms  Outcome: Ongoing (interventions implemented as appropriate)  Goal: Skin Integrity  Outcome: Ongoing (interventions implemented as appropriate)

## 2017-12-09 NOTE — PLAN OF CARE
Problem: Patient Care Overview (Adult)  Goal: Plan of Care Review  Outcome: Ongoing (interventions implemented as appropriate)  Goal: Adult Individualization and Mutuality  Outcome: Ongoing (interventions implemented as appropriate)    Problem: Perioperative Period (Adult)  Goal: Signs and Symptoms of Listed Potential Problems Will be Absent or Manageable (Perioperative Period)  Outcome: Ongoing (interventions implemented as appropriate)    Problem: Pressure Ulcer Risk (Wily Scale) (Adult,Obstetrics,Pediatric)  Goal: Identify Related Risk Factors and Signs and Symptoms  Outcome: Ongoing (interventions implemented as appropriate)  Goal: Skin Integrity  Outcome: Ongoing (interventions implemented as appropriate)

## 2017-12-09 NOTE — PROGRESS NOTES
"    Patient Name: Raquel Deluna  :1935  82 y.o.      Patient Care Team:  Ricky Hoyos III, MD as PCP - General  Anthony Bulmaro Hernández MD as Consulting Physician (Pulmonary Disease)  Christy Jerez MD as Consulting Physician (Dermatology)  Ladonna Mclaughlin MD as Consulting Physician (Cardiology)    Chief Complaint: A/CDCHF    Interval History: improved, feels better after transfusion and diuresis, will has some SOB       Objective   Vital Signs  Temp:  [97.3 °F (36.3 °C)-98.1 °F (36.7 °C)] 97.7 °F (36.5 °C)  Heart Rate:  [69-86] 69  Resp:  [16-20] 16  BP: (122-166)/(52-76) 133/52    Intake/Output Summary (Last 24 hours) at 17 0834  Last data filed at 17 0632   Gross per 24 hour   Intake             1540 ml   Output              600 ml   Net              940 ml     Flowsheet Rows         First Filed Value    Admission Height  162.6 cm (64\") Documented at 2017 0937    Admission Weight  89.1 kg (196 lb 6.4 oz) Documented at 2017 0937          Physical Exam:   General Appearance:    Alert, cooperative, in no acute distress   Lungs:     Coarse BS.  Normal respiratory effort and rate.      Heart:    Regular rhythm and normal rate, normal S1 and S2, no gallops or rubs.  1/6 HSM   Chest Wall:    No abnormalities observed   Abdomen:     Soft, nontender, positive bowel sounds.     Extremities:   no cyanosis, clubbing, 1+ edema.  No marked joint deformities.  Adequate musculoskeletal strength.       Results Review:      Results from last 7 days  Lab Units 17  0422   SODIUM mmol/L 135*   POTASSIUM mmol/L 4.2   CHLORIDE mmol/L 97*   CO2 mmol/L 25.5   BUN mg/dL 35*   CREATININE mg/dL 1.77*   GLUCOSE mg/dL 91   CALCIUM mg/dL 8.5*           Results from last 7 days  Lab Units 17  0422   WBC 10*3/mm3 7.93   HEMOGLOBIN g/dL 10.4*   HEMATOCRIT % 32.6*   PLATELETS 10*3/mm3 265       Results from last 7 days  Lab Units 17  0942   INR  1.37*                       Medication " Review:     aspirin 81 mg Oral Daily   atorvastatin 20 mg Oral Daily   budesonide-formoterol 2 puff Inhalation BID - RT   bumetanide 2 mg Intravenous BID   carvedilol 25 mg Oral Q12H   citalopram 40 mg Oral Daily   Ferrex 150 forte plus 1 capsule Oral BID With Meals   hydrALAZINE 25 mg Oral Q8H   pantoprazole 40 mg Oral QAM   potassium chloride 20 mEq Oral BID With Meals          DOPamine 2-20 mcg/kg/min    niCARdipine 5-15 mg/hr Last Rate: Stopped (12/07/17 1650)       Assessment/Plan   Active Hospital Problems (** Indicates Principal Problem)    Diagnosis Date Noted   • Carotid stenosis, asymptomatic, right [I65.21] 12/07/2017      Resolved Hospital Problems    Diagnosis Date Noted Date Resolved   No resolved problems to display.     1. POD #2 right carotid endarterectomy  2. Status post MV and TV repairs 9/25/17  3. Acute on chronic diastolic CHF- continue IV diuresis for today  4. Chronic lower extremity edema  5. Paroxysmal atrial fibrillation - no anticoagulation secondary to GI bleed  6. Status post pacemaker  7. Iron deficiency anemia- s/p transfusion  8. Acute kidney injury with CKD  9. Hypertension     Félix Weston III, MD  [facility]  12/09/17  8:34 AM

## 2017-12-09 NOTE — PROGRESS NOTES
Name: Raquel Deluna ADMIT: 2017   : 1935  PCP: Ricky Hoyos III, MD    MRN: [id number] LOS: 2 days   AGE/SEX: 82 y.o. female  ROOM: [facility]/     Active Hospital Problems (** Indicates Principal Problem)    Diagnosis Date Noted   • Carotid stenosis, asymptomatic, right [I65.21] 2017      Resolved Hospital Problems    Diagnosis Date Noted Date Resolved   No resolved problems to display.     Subjective     82 y.o. female postoperative day #2 status post right carotid endarterectomy    No complaints overnight, minimal pain.  Tolerating regular diet.  No neurologic issues.    Complains of bilateral lower extremity swelling.    Review of Systems  As above  Objective     Vital Signs  Temp:  [97.3 °F (36.3 °C)-98.1 °F (36.7 °C)] 97.7 °F (36.5 °C)  Heart Rate:  [69-86] 69  Resp:  [16-20] 16  BP: (122-166)/(52-76) 133/52    Physical Exam   Constitutional: She appears well-developed and well-nourished.   Neck:   Right carotid incision clean, dry, intact   Cardiovascular: Normal rate and regular rhythm.    Pulmonary/Chest: Effort normal. No respiratory distress.   Abdominal: Soft. There is no tenderness.   Musculoskeletal: She exhibits edema.         Results Review:       I reviewed the patient's new clinical results.    Results from last 7 days  Lab Units 17  0422 17  0432 17  0942   WBC 10*3/mm3 7.93 8.97  --    HEMOGLOBIN g/dL 10.4* 7.6* 8.7*   PLATELETS 10*3/mm3 265 298  --      Results from last 7 days  Lab Units 17  0422 17  1049 17  0432   SODIUM mmol/L 135* 134* 136   POTASSIUM mmol/L 4.2 3.7 3.5   CHLORIDE mmol/L 97* 97* 98   CO2 mmol/L 25.5 24.4 25.4   BUN mg/dL 35* 34* 35*   CREATININE mg/dL 1.77* 1.68* 1.59*   GLUCOSE mg/dL 91 128* 115*   Estimated Creatinine Clearance: 27.7 mL/min (by C-G formula based on Cr of 1.77).  Results from last 7 days  Lab Units 17  0422 17  1049 17  0432   CALCIUM mg/dL 8.5* 8.3* 8.2*       Assessment/Plan            Active Hospital Problems (** Indicates Principal Problem)    Diagnosis Date Noted   • Carotid stenosis, asymptomatic, right [I65.21] 12/07/2017      Resolved Hospital Problems    Diagnosis Date Noted Date Resolved   No resolved problems to display.        Assessment & Plan  82 y.o. female status post carotid endarterectomy.  Transfusion of 2 units of packed red blood cells yesterday, hemoglobin is responding appropriately.    Lower extremity swelling, wrapped with Ace wraps this morning.  She will need compression long-term.    Creatinine slightly higher status post Bumex, has slightly risen for 2 days in a row.  She has known chronic kidney disease stage III, has seen Dr. Herring in the past.     Echocardiogram performed, shows severely reduced left ventricular function with ejection fraction of 25%.    From the carotid standpoint, she is doing well, but we'll need cardiology clearance before discharge.  If renal function continues to worsen, will reconsult nephrology      Kole Marie MD  12/09/17   8:30 AM  Office Number [phone]

## 2017-12-10 NOTE — PLAN OF CARE
Problem: Patient Care Overview (Adult)  Goal: Plan of Care Review    12/10/17 1104   Outcome Evaluation   Outcome Summary/Follow up Plan Pt POD 3 carotid endarterectomy. She was previously independent. Has FWW at home. Upon eval, pt demo's decreased LE strength, endurance and balance. Increased assist to stand, however once standing CGA only needed for ambulation. Encouraged short distance ambulation with RN. Will see pt to improve independence and mobility. Anticipate d/c home with possible need for HHC.          Problem: Inpatient Physical Therapy  Goal: Bed Mobility Goal LTG- PT    12/10/17 1104   Bed Mobility PT LTG   Bed Mobility PT LTG, Date Established 12/10/17   Bed Mobility PT LTG, Time to Achieve 1 wk   Bed Mobility PT LTG, Activity Type all bed mobility   Bed Mobility PT LTG, [place] Level independent       Goal: Transfer Training Goal 1 LTG- PT    12/10/17 1104   Transfer Training PT LTG   Transfer Training PT LTG, Date Established 12/10/17   Transfer Training PT LTG, Time to Achieve 1 wk   Transfer Training PT LTG, Activity Type all transfers   Transfer Training PT LTG, [place] Level conditional independence   Transfer Training PT LTG, Assist Device walker, rolling       Goal: Gait Training Goal LTG- PT    12/10/17 1104   Gait Training PT LTG   Gait Training Goal PT LTG, Date Established 12/10/17   Gait Training Goal PT LTG, Time to Achieve 1 wk   Gait Training Goal PT LTG, [place] Level conditional independence   Gait Training Goal PT LTG, Assist Device walker, rolling   Gait Training Goal PT LTG, Distance to Achieve 100

## 2017-12-10 NOTE — PLAN OF CARE
Problem: Patient Care Overview (Adult)  Goal: Plan of Care Review  Outcome: Ongoing (interventions implemented as appropriate)    12/10/17 0103   Coping/Psychosocial Response Interventions   Plan Of Care Reviewed With patient   Outcome Evaluation   Outcome Summary/Follow up Plan VSS. Neuro intact, incision c/d/i, eccomoitic. Continues with IV bumex. Encourage ambulation. Will continue to monitor.        Goal: Adult Individualization and Mutuality  Outcome: Ongoing (interventions implemented as appropriate)  Goal: Discharge Needs Assessment  Outcome: Ongoing (interventions implemented as appropriate)    Problem: Pressure Ulcer Risk (Wily Scale) (Adult,Obstetrics,Pediatric)  Goal: Identify Related Risk Factors and Signs and Symptoms  Outcome: Ongoing (interventions implemented as appropriate)  Goal: Skin Integrity  Outcome: Ongoing (interventions implemented as appropriate)

## 2017-12-10 NOTE — THERAPY EVALUATION
Acute Care - Physical Therapy Initial Evaluation  [facility]     Patient Name: Raquel Deluna  : 1935  MRN: [id number]  Today's Date: 12/10/2017   Onset of Illness/Injury or Date of Surgery Date:  (POD 3 carotid endarterectomy )     Referring Physician: Jareth      Admit Date: 2017     Visit Dx:    ICD-10-CM ICD-9-CM   1. Decreased mobility and endurance Z74.09 780.99     Patient Active Problem List   Diagnosis   • OA (osteoarthritis) of knee   • Hyponatremia   • Bella's esophagus   • Hematochezia   • Colon polyp   • Essential hypertension   • Gastroesophageal reflux disease   • Hyperlipidemia   • Intestinal malabsorption   • Adverse effect of iron   • Iron deficiency anemia   • Gastrointestinal hemorrhage   • Paroxysmal atrial fibrillation   • Weakness   • Bilateral edema of lower extremity   • DNR (do not resuscitate)   • KESHIA (acute kidney injury)   • Mitral regurgitation   • Bilateral carotid artery stenosis   • Mitral valve insufficiency   • Absolute anemia   • Polyp of colon   • Iron deficiency anemia due to chronic blood loss   • GI AVM (gastrointestinal arteriovenous vascular malformation)   • S/P MVR (mitral valve repair)   • S/P TVR (tricuspid valve repair)   • S/P Maze operation for atrial fibrillation   • Lymphedema   • Carotid stenosis, asymptomatic, right     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   • Cellulitis     left leg hx about a month ago   ALL HEALED   NOT SWELLING OR OOZING NOW  WEARS CURT ACE WRAPS   • Chronic bronchitis    • Chronic kidney disease    • Colon polyp 3/14/2016   • Essential hypertension 2013    Overview:  2015 IMO UPDATE   • GERD (gastroesophageal reflux disease)    • Hiatal hernia    • History of goiter    • History of transfusion     had reaction to 2nd unit after receiving the 1st   • Hyperlipidemia    • Hypertension    •  Hyponatremia    • Intestinal malabsorption 3/31/2016   • Iron deficiency anemia 03/14/2016    receives iron infusion in past   • Migraine    • Osteoporosis    • PONV (postoperative nausea and vomiting)    • Sleep apnea     cpap   • Stress incontinence     wears pads     Past Surgical History:   Procedure Laterality Date   • APPENDECTOMY     • CARDIAC CATHETERIZATION N/A 9/13/2017    Procedure: Left ventriculography;  Surgeon: Ron Moscoso MD;  Location: [facility] CATH INVASIVE LOCATION;  Service:    • CARDIAC CATHETERIZATION N/A 9/13/2017    Procedure: Left Heart Cath;  Surgeon: Ron Moscoso MD;  Location: [facility] CATH INVASIVE LOCATION;  Service:    • CARDIAC CATHETERIZATION N/A 9/13/2017    Procedure: Coronary angiography;  Surgeon: Ron Moscoso MD;  Location: [facility] CATH INVASIVE LOCATION;  Service:    • CARDIAC CATHETERIZATION N/A 9/13/2017    Procedure: Right Heart Cath;  Surgeon: Ron Moscoso MD;  Location: [facility] CATH INVASIVE LOCATION;  Service:    • CARDIAC ELECTROPHYSIOLOGY PROCEDURE N/A 9/29/2017    Procedure: Device Implant, [place]   PPM;  Surgeon: Benji Martínez MD;  Location: [facility] CATH INVASIVE LOCATION;  Service:    • CARDIAC SURGERY      MVR/TVR   • CERVICAL DISC SURGERY     • COLONOSCOPY     • COLONOSCOPY N/A 2/3/2017    NBIH, diverticulosis, one 9 mm hyperplastic polyp, multiple non-bleeding colonic angioectasias, Path; neg   • ENDOSCOPY N/A 2/2/2017    normal esophagus, normal examined duodenum, non-bleeding gastric ulcers w/no sigmata of bleeding   • HYSTERECTOMY     • MITRAL VALVE REPAIR/REPLACEMENT N/A 9/25/2017    Procedure: KOLTON STERNOTOMY MITRAL VALVE REPLACEMENT, TRICUSPID VALVE REPAIR, MAZE PROCEDURE AND PRP;  Surgeon: Yonas Johnson MD;  Location: [facility] OR;  Service:    • PACEMAKER IMPLANTATION       DUAL PACEMAKER [employer] RONNY  MODEL  [id number]  SERIES [id number]   • CA THROMBOENDARTECTMY NECK,NECK INCIS Right 12/7/2017    Procedure: RT CAROTID  ENDARTERECTOMY;  Surgeon: Delma Templeton Jr., MD;  Location: [facility] MAIN OR;  Service: Vascular   • AL TOTAL KNEE ARTHROPLASTY Right 10/25/2016    Procedure: RT TOTAL KNEE ARTHROPLASTY;  Surgeon: Ricky Dsouza MD;  Location: [facility] MAIN OR;  Service: Orthopedics   • THYROID SURGERY      for goiter   • TONSILLECTOMY            PT ASSESSMENT (last 72 hours)      PT Evaluation       12/10/17 1100 12/08/17 1456    Rehab Evaluation    Document Type evaluation  -MG     Subjective Information agree to therapy   tingling and burning in bilateral feet (notified RN)  -MG     Patient Effort, Rehab Treatment good  -MG     Symptoms Noted During/After Treatment fatigue  -MG     General Information    Onset of Illness/Injury or Date of Surgery Date --   POD 3 carotid endarterectomy   -MG     Referring Physician Jareth  -MG     General Observations sitting in chair, no alarm   -MG     Pertinent History Of Current Problem open hear surgery 2mo ago  -MG     Precautions/Limitations fall precautions  -MG     Prior Level of Function independent:  -MG     Equipment Currently Used at Home --   4WW  -MG walker, rolling;grab bar;wheelchair;ramp;shower chair;bipap/ cpap  -CC    Plans/Goals Discussed With patient  -MG     Barriers to Rehab medically complex  -MG     Living Environment    Lives With  child(indira), adult  -CC    Living Arrangements  house  -CC    Transportation Available  car;family or friend will provide  -CC    Living Environment Comment no stairs, home handicap accessible   -MG     Clinical Impression    Patient/Family Goals Statement return home   -MG     Criteria for Skilled Therapeutic Interventions Met yes;treatment indicated  -MG     Impairments Found (describe specific impairments) aerobic capacity/endurance;gait, locomotion, and balance;muscle performance  -MG     Rehab Potential good, to achieve stated therapy goals  -MG     Vital Signs    Pre SpO2 (%) 94  -MG     O2 Delivery Pre Treatment supplemental O2  -MG      Intra SpO2 (%) 93  -MG     O2 Delivery Intra Treatment supplemental O2  -MG     Post SpO2 (%) 92  -MG     O2 Delivery Post Treatment supplemental O2  -MG     Pain Assessment    Pain Assessment --   burning in LEs (notified RN)  -MG     Cognitive Assessment/Intervention    Current Cognitive/Communication Assessment functional  -MG     Orientation Status oriented x 4  -MG     Follows Commands/Answers Questions 100% of the time  -MG     Personal Safety WNL/WFL  -MG     Personal Safety Interventions fall prevention program maintained;gait belt  -MG     MMT (Manual Muscle Testing)    General MMT Assessment Detail generalized post op weakness bilateral LEs  -MG     Bed Mobility, Assessment/Treatment    Bed Mobility, Comment not tested, in chair   -MG     Transfer Assessment/Treatment    Transfers, Sit-Stand [place] moderate assist (50% patient effort)  -MG     Transfers, Stand-Sit [place] minimum assist (75% patient effort)  -MG     Transfers, Sit-Stand-Sit, Assist Device rolling walker  -MG     Transfer, Safety Issues weight-shifting ability decreased;step length decreased;loses balance backward  -MG     Transfer, Impairments strength decreased;impaired balance  -MG     Transfer, Comment cues for hand and foot placement   -MG     Gait Assessment/Treatment    Gait, [place] Level contact guard assist  -MG     Gait, Assistive Device rolling walker  -MG     Gait, Distance (Feet) 10   2 trials   -MG     Gait, Gait Pattern Analysis swing-to gait  -MG     Gait, Gait Deviations dorie decreased;step length decreased;forward flexed posture  -MG     Gait, Safety Issues step length decreased  -MG     Gait, Impairments strength decreased;impaired balance  -MG     Gait, Comment initially decr WB through R LE due to pain. cues to stay close to walker   -MG     Positioning and Restraints    Pre-Treatment Position sitting in chair/recliner  -MG     Post Treatment Position chair  -MG     In Chair reclined;call light  within reach;encouraged to call for assist  -MG       User Key  (r) = Recorded By, (t) = Taken By, (c) = Cosigned By    Initials Name Provider Type    CC Sparkle Smart     MG Megan Gosselin, CELINE Physical Therapist          Physical Therapy Education     Title: PT OT SLP Therapies (Active)     Topic: Physical Therapy (Active)     Point: Mobility training (Done)    Learning Progress Summary    Learner Readiness Method Response Comment Documented by Status   Patient Acceptance E VU,NR  MG 12/10/17 1104 Done               Point: Body mechanics (Done)    Learning Progress Summary    Learner Readiness Method Response Comment Documented by Status   Patient Acceptance E VU,NR  MG 12/10/17 1104 Done               Point: Precautions (Done)    Learning Progress Summary    Learner Readiness Method Response Comment Documented by Status   Patient Acceptance E VU,NR  MG 12/10/17 1104 Done                      User Key     Initials Effective Dates Name Provider Type Discipline     09/13/17 -  Megan Gosselin, PT Physical Therapist PT                PT Recommendation and Plan  Anticipated Discharge Disposition: home with home health  Planned Therapy Interventions: balance training, bed mobility training, gait training, home exercise program, patient/family education, strengthening, transfer training  PT Frequency: daily  Plan of Care Review  Outcome Summary/Follow up Plan: Pt POD 3 carotid endarterectomy. She was previously independent. Has FWW at home. Upon eval, pt demo's decreased LE strength, endurance and balance. Increased assist to stand, however once standing CGA only needed for ambulation. Encouraged short distance ambulation with RN. Will see pt to improve independence and mobility. Anticipate d/c home with possible need for HHC.           IP PT Goals       12/10/17 1104          Bed Mobility PT LTG    Bed Mobility PT LTG, Date Established 12/10/17  -MG      Bed Mobility PT LTG, Time to Achieve 1 wk  -MG       Bed Mobility PT LTG, Activity Type all bed mobility  -MG      Bed Mobility PT LTG, [place] Level independent  -MG      Transfer Training PT LTG    Transfer Training PT LTG, Date Established 12/10/17  -MG      Transfer Training PT LTG, Time to Achieve 1 wk  -MG      Transfer Training PT LTG, Activity Type all transfers  -MG      Transfer Training PT LTG, [place] Level conditional independence  -MG      Transfer Training PT LTG, Assist Device walker, rolling  -MG      Gait Training PT LTG    Gait Training Goal PT LTG, Date Established 12/10/17  -MG      Gait Training Goal PT LTG, Time to Achieve 1 wk  -MG      Gait Training Goal PT LTG, [place] Level conditional independence  -MG      Gait Training Goal PT LTG, Assist Device walker, rolling  -MG      Gait Training Goal PT LTG, Distance to Achieve 100  -MG        User Key  (r) = Recorded By, (t) = Taken By, (c) = Cosigned By    Initials Name Provider Type    MG Megan Gosselin, PT Physical Therapist                Outcome Measures       12/10/17 1100          How much help from another person do you currently need...    Turning from your back to your side while in flat bed without using bedrails? 3  -MG      Moving from lying on back to sitting on the side of a flat bed without bedrails? 3  -MG      Moving to and from a bed to a chair (including a wheelchair)? 3  -MG      Standing up from a chair using your arms (e.g., wheelchair, bedside chair)? 3  -MG      Climbing 3-5 steps with a railing? 2  -MG      To walk in hospital room? 3  -MG      AM-PAC 6 Clicks Score 17  -MG      Functional Assessment    Outcome Measure Options AM-PAC 6 Clicks Basic Mobility (PT)  -MG        User Key  (r) = Recorded By, (t) = Taken By, (c) = Cosigned By    Initials Name Provider Type    MG Megan Gosselin, PT Physical Therapist           Time Calculation:         PT Charges       12/10/17 1107          Time Calculation    Start Time 1036  -MG      Stop Time 1059  -MG       Time Calculation (min) 23 min  -MG      PT Received On 12/10/17  -MG      PT - Next Appointment 12/11/17  -MG      PT Goal Re-Cert Due Date 12/17/17  -MG        User Key  (r) = Recorded By, (t) = Taken By, (c) = Cosigned By    Initials Name Provider Type    MG Megan Gosselin, PT Physical Therapist          Therapy Charges for Today     Code Description Service Date Service Provider Modifiers Qty    85199946594 HC PT EVAL MOD COMPLEXITY 2 12/10/2017 Megan Gosselin, PT GP 1    44777141455 HC PT THER PROC EA 15 MIN 12/10/2017 Megan Gosselin, PT GP 1          PT G-Codes  Outcome Measure Options: AM-PAC 6 Clicks Basic Mobility (PT)      Megan Gosselin, PT  12/10/2017

## 2017-12-10 NOTE — PLAN OF CARE
Problem: Patient Care Overview (Adult)  Goal: Plan of Care Review  Outcome: Ongoing (interventions implemented as appropriate)    12/10/17 1531   Coping/Psychosocial Response Interventions   Plan Of Care Reviewed With patient   Patient Care Overview   Progress improving   Outcome Evaluation   Outcome Summary/Follow up Plan pt vss. keeping legs wrapped and elevated. diuertics changed to oral. pain controlled with p.o. medication. continue to monitor       Goal: Adult Individualization and Mutuality  Outcome: Ongoing (interventions implemented as appropriate)  Goal: Discharge Needs Assessment  Outcome: Ongoing (interventions implemented as appropriate)    12/10/17 1531   Discharge Needs Assessment   Concerns To Be Addressed basic needs concerns   Discharge Disposition still a patient         Problem: Perioperative Period (Adult)  Goal: Signs and Symptoms of Listed Potential Problems Will be Absent or Manageable (Perioperative Period)  Outcome: Ongoing (interventions implemented as appropriate)    12/10/17 1531   Perioperative Period   Problems Assessed (Perioperative Period) all   Problems Present (Perioperative Period) pain         Problem: Pressure Ulcer Risk (Wily Scale) (Adult,Obstetrics,Pediatric)  Goal: Identify Related Risk Factors and Signs and Symptoms  Outcome: Ongoing (interventions implemented as appropriate)    12/10/17 1531   Pressure Ulcer Risk (Wily Scale)   Related Risk Factors (Pressure Ulcer Risk (Wily Scale)) mobility impaired;hospitalization prolonged       Goal: Skin Integrity  Outcome: Ongoing (interventions implemented as appropriate)    12/10/17 1531   Pressure Ulcer Risk (Wily Scale) (Adult,Obstetrics,Pediatric)   Skin Integrity making progress toward outcome

## 2017-12-10 NOTE — PROGRESS NOTES
"    Patient Name: Raquel Deluna  :1935  82 y.o.      Patient Care Team:  Ricky Hoyos III, MD as PCP - General  Anthony Bulmaro Hernández MD as Consulting Physician (Pulmonary Disease)  Christy Jerez MD as Consulting Physician (Dermatology)  Ladonna Mclaughlin MD as Consulting Physician (Cardiology)    Chief Complaint: A/CDCHF    Interval History: improved, feels better after transfusion and diuresis, will has some SOB       Objective   Vital Signs  Temp:  [97.5 °F (36.4 °C)-98 °F (36.7 °C)] 97.6 °F (36.4 °C)  Heart Rate:  [69-79] 79  Resp:  [16-18] 18  BP: (125-172)/(52-75) 142/59    Intake/Output Summary (Last 24 hours) at 12/10/17 0709  Last data filed at 17   Gross per 24 hour   Intake              480 ml   Output              500 ml   Net              -20 ml     Flowsheet Rows         First Filed Value    Admission Height  162.6 cm (64\") Documented at 2017 09    Admission Weight  89.1 kg (196 lb 6.4 oz) Documented at 2017 0937          Physical Exam:   General Appearance:    Alert, cooperative, in no acute distress   Lungs:     Coarse BS.  Normal respiratory effort and rate.      Heart:    Regular rhythm and normal rate, normal S1 and S2, no gallops or rubs.  1/6 HSM   Chest Wall:    No abnormalities observed   Abdomen:     Soft, nontender, positive bowel sounds.     Extremities:   no cyanosis, clubbing, 1+ edema.  No marked joint deformities.  Adequate musculoskeletal strength.       Results Review:      Results from last 7 days  Lab Units 17  0422   SODIUM mmol/L 135*   POTASSIUM mmol/L 4.2   CHLORIDE mmol/L 97*   CO2 mmol/L 25.5   BUN mg/dL 35*   CREATININE mg/dL 1.77*   GLUCOSE mg/dL 91   CALCIUM mg/dL 8.5*           Results from last 7 days  Lab Units 17  0422   WBC 10*3/mm3 7.93   HEMOGLOBIN g/dL 10.4*   HEMATOCRIT % 32.6*   PLATELETS 10*3/mm3 265       Results from last 7 days  Lab Units 17  0942   INR  1.37*                       Medication " Review:     aspirin 81 mg Oral Daily   atorvastatin 20 mg Oral Daily   budesonide-formoterol 2 puff Inhalation BID - RT   bumetanide 2 mg Intravenous BID   carvedilol 25 mg Oral Q12H   citalopram 40 mg Oral Daily   Ferrex 150 forte plus 1 capsule Oral BID With Meals   hydrALAZINE 25 mg Oral Q8H   pantoprazole 40 mg Oral QAM   potassium chloride 20 mEq Oral BID With Meals          DOPamine 2-20 mcg/kg/min    niCARdipine 5-15 mg/hr Last Rate: Stopped (12/07/17 1650)       Assessment/Plan   Active Hospital Problems (** Indicates Principal Problem)    Diagnosis Date Noted   • Carotid stenosis, asymptomatic, right [I65.21] 12/07/2017      Resolved Hospital Problems    Diagnosis Date Noted Date Resolved   No resolved problems to display.     1. POD #3 right carotid endarterectomy  2. Status post MV and TV repairs 9/25/17  3. Acute on chronic diastolic CHF- improved, will switch to oral bumex after morning IV dose  4. Chronic lower extremity edema  5. Paroxysmal atrial fibrillation - no anticoagulation secondary to GI bleed  6. Status post pacemaker  7. Iron deficiency anemia- s/p transfusion  8. Acute kidney injury with CKD  9. Hypertension     Félix Weston III, MD  [facility]  12/10/17  7:09 AM

## 2017-12-10 NOTE — PROGRESS NOTES
Name: Raquel Deluna ADMIT: 2017   : 1935  PCP: Ricky Hoyos III, MD    MRN: [id number] LOS: 3 days   AGE/SEX: 82 y.o. female  ROOM: [facility]/     Active Hospital Problems (** Indicates Principal Problem)    Diagnosis Date Noted   • Carotid stenosis, asymptomatic, right [I65.21] 2017      Resolved Hospital Problems    Diagnosis Date Noted Date Resolved   No resolved problems to display.     Subjective     82 y.o. female postoperative day #3 status post right carotid endarterectomy    No complaints overnight, minimal pain.  Tolerating regular diet.  No neurologic issues.    Bilateral lower extremity swelling better with ACE wraps    Review of Systems  As above  Objective     Vital Signs  Temp:  [97.5 °F (36.4 °C)-98 °F (36.7 °C)] 97.8 °F (36.6 °C)  Heart Rate:  [75-79] 79  Resp:  [16-18] 18  BP: (125-172)/(57-75) 168/57    Physical Exam   Constitutional: She appears well-developed and well-nourished.   Neck:   Right carotid incision clean, dry, intact   Cardiovascular: Normal rate and regular rhythm.    Pulmonary/Chest: Effort normal. No respiratory distress.   Abdominal: Soft. There is no tenderness.   Musculoskeletal: She exhibits edema.   much less edema with the ACE wraps      Results Review:       I reviewed the patient's new clinical results.    Results from last 7 days  Lab Units 17  0422 17  0432 17  0942   WBC 10*3/mm3 7.93 8.97  --    HEMOGLOBIN g/dL 10.4* 7.6* 8.7*   PLATELETS 10*3/mm3 265 298  --        Results from last 7 days  Lab Units 12/10/17  0601 17  0422 17  1049 17  0432   SODIUM mmol/L 136 135* 134* 136   POTASSIUM mmol/L 4.7 4.2 3.7 3.5   CHLORIDE mmol/L 97* 97* 97* 98   CO2 mmol/L 25.2 25.5 24.4 25.4   BUN mg/dL 38* 35* 34* 35*   CREATININE mg/dL 1.78* 1.77* 1.68* 1.59*   GLUCOSE mg/dL 96 91 128* 115*   Estimated Creatinine Clearance: 27.5 mL/min (by C-G formula based on Cr of 1.78).    Results from last 7 days  Lab Units 12/10/17  0601  12/09/17  0422 12/08/17  1049 12/08/17  0432   CALCIUM mg/dL 8.8 8.5* 8.3* 8.2*       Assessment/Plan           Active Hospital Problems (** Indicates Principal Problem)    Diagnosis Date Noted   • Carotid stenosis, asymptomatic, right [I65.21] 12/07/2017      Resolved Hospital Problems    Diagnosis Date Noted Date Resolved   No resolved problems to display.        Assessment & Plan  82 y.o. female status post carotid endarterectomy.      Lower extremity swelling, re-wrapped with Ace wraps this morning.  She will need compression long-term.      Creatinine stable today.  She has known chronic kidney disease stage III, has seen Dr. Herring in the past.     Last echocardiogram shows severely reduced left ventricular function with ejection fraction of 25%.    From the carotid standpoint, she is doing well, but we'll need cardiology clearance before discharge.  If renal function continues to worsen, will reconsult nephrology. Stable today.       Kole Marie MD  12/10/17   8:29 AM  Office Number [phone]

## 2017-12-11 NOTE — PROGRESS NOTES
The patient is 4 days s/p R CEA for critical asymptomatic stenosis.  She was anemic preoperatively and was transfused postoperatively.  She states she feels better after transfusions.    She had an elevation of her creatinine postoperatively after diuretic therapy but appears to have stabilized today -- 1.59 , then 1.68, 1.77 and today 1.78.    Right neck incision is healing satisfactorily.  No signs of infection.  No hematoma.    Neurologic examination is normal.    Lower extremity edema has improved, only mild today.    As her creatinine has stabilized very likely she could be discharged home today.  We will await input from cardiology.  Discharge instructions have been given.

## 2017-12-11 NOTE — DISCHARGE INSTR - ACTIVITY
[facility]  Jareth Yin, Jesse Roberto Rachel, Scherrer, Thomas  [address]  [place] [address]  [phone]    Carotid Endartectomy Discharge Instructions:    Activity  · Do not lift anything heavier than 5 pounds (a gallon of milk); no bending, straining, or strenuous activity for the first 2 weeks.  · No driving for 7 days or while taking prescription pain medications. You may ride in a car. It is important that you have the ability to turn your head quickly without discomfort whileoperating a vehicle. This may not be possible for some people for 1-2 weeks after surgery.  · Walk as often as you wish. Walk short distances at first and increase slowly. Avoid exercising inextreme temperatures.  · You may have some slight dizziness, a mild headache or tiredness for a few days.  · You may go up and down stairs. Hold onto the rail for the first few days after surgery because you may experience dizziness.  · If you smoke, please quit. Smoking increases you chances of developing heart disease, carotid artery disease, lung cancer, and peripheral vascular disease. It can also delay wound healing.    Personal Hygiene/Shower  · ?You may shower the next day after your surgery.  · Use soap and water to gently clean the incision. Do not scrub the incision. Pat dry with a clean towel.  · Do not soak in a tub, whirlpool, or hot tub, or go swimming until the incision is completely healed. This is generally 3-4 weeks for most people.    Diet  · Resume the diet you were on before your surgery unless otherwise directed.  · For most people a low saturated fat and cholesterol diet which is high in fruits, vegetables and whole grains is a good healthy diet unless otherwise directed by your doctor.    Incision  · There will be an area of numbness along the incision in the neck and toward the chin. The earlobe may also be affected. This generally goes away and may last for 6-12 months.  · Your  neck incision is about is about 3-4 inches in length. The sutures under the skin will dissolve on their own. Take a close look at the incision in the mirror so that you will know what it looks like before leaving the hospital and will notice changes if they occur at home.  · For the first couple of days sleep on a couple of pillows to help with the swelling in the neck around the incision.  · You may apply an ice pack for the first 48 hours after surgery to the neck incision. It should be applied for 15 minutes, then off for 15 minutes. This may help with swelling and discomfort.  · Men may find it more difficult to shave with a blade and may switch to an electric razor. Do not shave over the incision; go around it until the incision is healed.  · Your incision will take several months to heal completely. It may feel raised and thickened for a while and will decrease over time. It takes 2-3 weeks for the swelling to go away. No ointments, lotions, creams or bandages should be applied to the incision.  · Most people do not have very much pain with this surgery. You can take over the counter Tylenol (Acetaminophen) for mild discomfort. Take it was directed on the packaging. You will be prescribed a pain medication for moderate discomfort, take it as directed. Do NOT take additional Tylenol with prescription pain medication. One of the side effects of pain medications is constipation and nausea. Most people will have nausea if it is taken on an empty stomach. Eat a small snack with pain medication to avoid this side effect. Drinking plenty of fluid and eating high fiber foods (fruits, vegetables and whole grains) can help prevent constipation. If needed you can take Docusate Sodium (Colace) 100 mg, a stool softener, once or twice a day. This can be purchased at any drug store. A laxative may be needed if the constipation continues. Generally, an over the counter laxative, such as Dulcolax tablets, is recommended (take  it as directed on the manufacturers packaging).  · Your updated medication list will be given to you before you leave the hospital. New prescriptions will be provided to you and education regarding new medications provided.    Call Your Surgeon for Any of These Symptoms @ [phone]    · Increasing pain or swelling in the neck causing difficulty breathing or swallowing.   · Sudden or severe headache. A headache that will not go away.  · Changes in your eyesight, problem speaking, or problem swallowing.  · Weakness on one side of your body.  · Increasing pain, not relieved by pain medication.  · Fever above 101 degrees.  · Redness, an increase in swelling or bruising around your incision. There may be some slight drainage the first couple of days from the incision, but this should stop and the incision should be dry. If there is continued drainage or pus the surgeon should be called. If you cannot reach your doctor, go to the nearest emergency room for immediate assessment.    Reducing Your Risks  · All patients with vascular disease should take important steps to prevent worsening of their condition or development of new disease. It is very important that if you smoke to quit. Good medical management of high cholesterol, high blood pressure, and diabetes, along with maintaining a normal body weight is encouraged to all of our patients. This is one of the reasons why routine follow-up with your primary care physician is very important to your continued health and well-being.    Follow-up appointment  · A follow-up appointment will be provided for you before you leave the hospital 1-4 weeks after your surgery. During this or the next visit you will undergo carotid artery duplex scanning. It is extremely important that you make this appointment because we need to evaluate the post-surgical carotid artery for normal blood flow.    Returning to Work  · This is generally discussed at the follow-up visit. If you have a  desk job, you may be able to return to work earlier than someone with a job requiring physical labor. If you want to return to work earlier than 4 weeks, this should be discussed with your surgeon prior to leaving the hospital.

## 2017-12-11 NOTE — PROGRESS NOTES
"    Patient Name: Raquel Deluna  :1935  82 y.o.      Patient Care Team:  Ricky Hoyos III, MD as PCP - General  Anthony Bulmaro Hernández MD as Consulting Physician (Pulmonary Disease)  Christy Jerez MD as Consulting Physician (Dermatology)  Ladonna Mclaughlin MD as Consulting Physician (Cardiology)    Chief Complaint: Follow up acute on chronic diastolic heart failure    Interval History: Continues to have some SOA and edema (some might be chronic). Changed to oral bumex due to bump in kidney function.        Objective   Vital Signs  Temp:  [97.5 °F (36.4 °C)-98.2 °F (36.8 °C)] 98.2 °F (36.8 °C)  Heart Rate:  [74-78] 77  Resp:  [16-20] 20  BP: (147-168)/(55-67) 162/67    Intake/Output Summary (Last 24 hours) at 17 1041  Last data filed at 17 0500   Gross per 24 hour   Intake              800 ml   Output              950 ml   Net             -150 ml     Flowsheet Rows         First Filed Value    Admission Height  162.6 cm (64\") Documented at 2017 0937    Admission Weight  89.1 kg (196 lb 6.4 oz) Documented at 2017 0937          Physical Exam:   General Appearance:    Alert, cooperative, in no acute distress   Lungs:     Rales in the right base. Normal respiratory effort and rate.      Heart:    Regular rhythm and normal rate, normal S1 and S2, no murmurs, gallops or rubs.     Chest Wall:    No abnormalities observed   Abdomen:     Soft, nontender, positive bowel sounds.     Extremities:   no cyanosis, clubbing or.  No marked joint deformities.  Adequate musculoskeletal strength.  2+ pitting edema in the lower extremities     Results Review:      Results from last 7 days  Lab Units 17  0646   SODIUM mmol/L 132*   POTASSIUM mmol/L 5.1   CHLORIDE mmol/L 94*   CO2 mmol/L 26.7   BUN mg/dL 41*   CREATININE mg/dL 1.64*   GLUCOSE mg/dL 96   CALCIUM mg/dL 9.0           Results from last 7 days  Lab Units 17  0422   WBC 10*3/mm3 7.93   HEMOGLOBIN g/dL 10.4*   HEMATOCRIT % " 32.6*   PLATELETS 10*3/mm3 265       Results from last 7 days  Lab Units 12/07/17  0942   INR  1.37*                       Medication Review:     aspirin 81 mg Oral Daily   atorvastatin 20 mg Oral Daily   budesonide-formoterol 2 puff Inhalation BID - RT   bumetanide 2 mg Oral BID   carvedilol 25 mg Oral Q12H   citalopram 40 mg Oral Daily   Ferrex 150 forte plus 1 capsule Oral BID With Meals   hydrALAZINE 25 mg Oral Q8H   pantoprazole 40 mg Oral QAM   potassium chloride 20 mEq Oral BID With Meals          DOPamine 2-20 mcg/kg/min    niCARdipine 5-15 mg/hr Last Rate: Stopped (12/07/17 1650)       Assessment/Plan   1. Acute on chronic diastolic heart failure- she was changed to oral diuretics yesterday due to increased creatinine. Creatinine better today. Continue oral bumex. She should see DLETA Valadez in one week after discharge with a BMP that day.     2. Carotid artery disease s/p right CEA    3. Valve disease s/p MV and TV repairs    4. Chronic lower extremity edema- recommend support stockings    5. Paroxysmal atrial fibrillation- No AC due to prior GIB    6. SSS s/p PPM    7. Iron deficiency anemia- received transfusion. Hgb stable.     8. HTN - stable on current regimen.     9. KESHIA on CKD- creatinine better after changing to oral bumex.    I think she is close to baseline, however she is still on oxygen. Will check walking oximetry. If she fails, she may need to be watched one more day for additional diuresis.  She was previously followed by HH ordered by Dr. Mclaughlin and we will continue this.     DELTA Schilling  [facility]  12/11/17  10:41 AM

## 2017-12-11 NOTE — PROGRESS NOTES
Case Management Discharge Note    Final Note: Pt discharged home with [facility].      Discharge Placement     Facility/Agency Request Status Selected? Address Phone Number Fax Number    [facility] Accepted    Yes [address] [place] [address], [facility] [address] [phone]        Other: Other (transported by family)    Discharge Codes: 06  Discharged/transferred to home under care of organized home health service organization in anticipation of skilled care

## 2017-12-11 NOTE — PLAN OF CARE
Problem: Carotid Endarterectomy (Adult)  Goal: Signs and Symptoms of Listed Potential Problems Will be Absent or Manageable (Carotid Endarterectomy)  Outcome: Ongoing (interventions implemented as appropriate)    12/11/17 0557   Carotid Endarterectomy   Problems Assessed (Carotid Endarterectomy) all   Problems Present (Carotid Endarterectomy) situational response         Problem: Cardiac: Heart Failure (Adult)  Goal: Signs and Symptoms of Listed Potential Problems Will be Absent or Manageable (Cardiac: Heart Failure)  Outcome: Ongoing (interventions implemented as appropriate)    12/11/17 0557   Cardiac: Heart Failure   Problems Assessed (Heart Failure) all   Problems Present (Heart Failure) fluid/electrolyte imbalance;respiratory compromise

## 2017-12-11 NOTE — PLAN OF CARE
Problem: Patient Care Overview (Adult)  Goal: Plan of Care Review  Outcome: Ongoing (interventions implemented as appropriate)    12/11/17 0544   Coping/Psychosocial Response Interventions   Plan Of Care Reviewed With patient   Patient Care Overview   Progress improving   Outcome Evaluation   Outcome Summary/Follow up Plan legs wrapped edema is down in lower legs and feet and ankles still moderate in thighs no complaints of pain no neuro deficits neck incision intact bruising noted          Problem: Perioperative Period (Adult)  Goal: Signs and Symptoms of Listed Potential Problems Will be Absent or Manageable (Perioperative Period)  Outcome: Ongoing (interventions implemented as appropriate)    12/11/17 0544   Perioperative Period   Problems Assessed (Perioperative Period) all   Problems Present (Perioperative Period) pain         Problem: Pressure Ulcer Risk (Wily Scale) (Adult,Obstetrics,Pediatric)  Goal: Identify Related Risk Factors and Signs and Symptoms  Outcome: Ongoing (interventions implemented as appropriate)    12/11/17 0544   Pressure Ulcer Risk (Wily Scale)   Related Risk Factors (Pressure Ulcer Risk (Wily Scale)) mobility impaired;hospitalization prolonged

## 2017-12-11 NOTE — PROGRESS NOTES
Exercise Oximetry    Patient Name:Raquel Deluna   MRN: [id number]   Date: 12/11/17             ROOM AIR BASELINE   SpO2% 94   Heart Rate 84   Blood Pressure 162/67     EXERCISE ON ROOM AIR SpO2% EXERCISE ON O2 @ 0 LPM SpO2%   1 MINUTE 91 1 MINUTE    2 MINUTES 90 2 MINUTES    3 MINUTES 90 3 MINUTES    4 MINUTES 90 4 MINUTES    5 MINUTES 90 5 MINUTES    6 MINUTES 91  6 MINUTES               Distance Walked  60 Distance Walked   Dyspnea (Tian Scale)  8 Dyspnea (Tian Scale)   Fatigue (Tian Scale)  8 Fatigue (Tian Scale)   SpO2% Post Exercise  93 SpO2% Post Exercise   HR Post Exercise  85 HR Post Exercise   Time to Recovery  5 minutes Time to Recovery      Comments: Patient ambulated with assistance of walker.  Became very fatigued at 5 minute juan carlos and needed to sit down and rest for a couple of minutes.

## 2017-12-11 NOTE — DISCHARGE SUMMARY
Name: Raquel Deluna ADMIT: 2017   : 1935  PCP: Ricky Hoyos III, MD    MRN: [id number] LOS: 4 days   AGE/SEX: 82 y.o. female  ROOM: [facility]     Date of Admission: 2017  Date of Discharge:  2017    PCP: Ricky Hoyos III, MD      DISCHARGE DIAGNOSIS  Active Hospital Problems (** Indicates Principal Problem)    Diagnosis Date Noted   • Carotid stenosis, asymptomatic, right [I65.21] 2017      Resolved Hospital Problems    Diagnosis Date Noted Date Resolved   No resolved problems to display.       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   • Cellulitis     left leg hx about a month ago   ALL HEALED   NOT SWELLING OR OOZING NOW  WEARS CURT ACE WRAPS   • Chronic bronchitis    • Chronic kidney disease    • Colon polyp 3/14/2016   • Essential hypertension 2013    Overview:  2015 IMO UPDATE   • GERD (gastroesophageal reflux disease)    • Hiatal hernia    • History of goiter    • History of transfusion     had reaction to 2nd unit after receiving the 1st   • Hyperlipidemia    • Hypertension    • Hyponatremia    • Intestinal malabsorption 3/31/2016   • Iron deficiency anemia 2016    receives iron infusion in past   • Migraine    • Osteoporosis    • PONV (postoperative nausea and vomiting)    • Sleep apnea     cpap   • Stress incontinence     wears pads       CONSULTS   Consults     Date and Time Order Name Status Description    2017 1300 Inpatient Consult to Cardiology Completed           PROCEDURES PERFORMED    Date: 2017, right carotid endarterectomy with patch angioplasty, intraoperative duplex ultrasonography    HOSPITAL COURSE  Patient is a 82 y.o. female presented to [facility] admitted for Critical carotid stenosis on the right, asymptomatic.  Please see the admitting history and physical for further  "details.  Patient was admitted and underwent the above-mentioned procedure.  Postoperatively, the anemia that was present preoperatively slightly worsened.  After discussion with the cardiology service it was felt that she would be better served by having transfusions.  This was performed and in addition, she was given diuretic therapy.  She was overall improved following this but there was an elevation in her creatinine.  It stabilized at the time of the discharge.    Her neurologic examination remained normal and her incision was healing satisfactorily.  She was therefore discharged.      VITAL SIGNS  /57 (BP Location: Right arm, Patient Position: Lying)  Pulse 77  Temp 97.7 °F (36.5 °C) (Oral)   Resp 16  Ht 162.6 cm (64\")  Wt 87.1 kg (192 lb)  SpO2 93%  BMI 32.96 kg/m2  Objective:  Right neck incision is healing very nicely.  Neurologic examination is normal.  The lower extremity edema is only trace to 1+.  CONDITION ON DISCHARGE  Stable.      DISCHARGE DISPOSITION   Home or Self Care      DISCHARGE MEDICATIONS   Raquel Deluna   Home Medication Instructions BENOIT:[id number]    Printed on:12/11/17 6365   Medication Information                      aspirin 81 MG EC tablet  Take 1 tablet by mouth Daily.             atorvastatin (LIPITOR) 20 MG tablet  Take 20 mg by mouth Daily.             B Complex Vitamins (VITAMIN B COMPLEX PO)  Take 1 tablet by mouth Every Morning.             budesonide-formoterol (SYMBICORT) 160-4.5 MCG/ACT inhaler  Inhale 2 puffs 2 (Two) Times a Day.             bumetanide (BUMEX) 2 MG tablet  Take 1.5 tablets by mouth 2 (Two) Times a Day. PT IS TAKING 1 AND 1/2 TABLET BID             CALCIUM CARBONATE-VITAMIN D PO  Take 1 tablet by mouth Every Morning.             carvedilol (COREG) 12.5 MG tablet  Take 2 tablets by mouth Every 12 (Twelve) Hours.             Cholecalciferol (VITAMIN D-3 PO)  Take 2,000 Units by mouth Daily.             citalopram (CeleXA) 40 MG tablet  Take 40 " mg by mouth Daily.             esomeprazole (NexIUM) 40 MG capsule  Take 40 mg by mouth Daily Before Supper.             Fe-Succ Ac-C-Thre Ac-B12-FA (FERREX 150 FORTE PLUS)  MG capsule capsule  Take 1 capsule by mouth 2 (Two) Times a Day With Meals.             hydrALAZINE (APRESOLINE) 25 MG tablet  Take 1 tablet by mouth Every 8 (Eight) Hours.             mometasone (NASONEX) 50 MCG/ACT nasal spray  2 sprays into each nostril Every Morning.             Multiple Vitamins-Minerals (CENTRUM SILVER PO)  Take 1 tablet by mouth Daily.             potassium chloride (MICRO-K) 10 MEQ CR capsule  Take 2 capsules by mouth 2 (Two) Times a Day With Meals.             vitamin B-6 (PYRIDOXINE) 100 MG tablet  Take 100 mg by mouth Daily.                  Activity Instructions     Discharge Activity Restrictions       No lifting more than 20 pounds for 1 week.  Patient may shower at any time.             Future Appointments  Date Time Provider Department Center   2018 1:30 PM DELTA Valadez [facility] None   2018 11:00 AM Yonas Johnson MD [facility] None   3/13/2018 1:30 PM PACEART IN OFFICE, [facility] None     Additional Instructions for the Follow-ups that You Need to Schedule     Discharge Follow-up with Specified Provider: After patient call [phone] for follow-up in 3-4 weeks; 3 Weeks    As directed    To:  After patient call [phone] for follow-up in 3-4 weeks   Follow Up:  3 Weeks             Follow-up Information     Follow up with Ricky Hoyos III, MD .    Specialty:  Internal Medicine    Contact information:    [address] [place] [address]  [phone]            TEST  RESULTS PENDING AT DISCHARGE   Order Current Status    Basic Metabolic Panel Collected (17 0646)           Billin, Post Op [facility]    Fayette Nicolás Templeton Jr., MD  Office Number [phone]    17  7:36 AM

## 2017-12-11 NOTE — PLAN OF CARE
Problem: Patient Care Overview (Adult)  Goal: Plan of Care Review  Outcome: Ongoing (interventions implemented as appropriate)    12/11/17 0544 12/11/17 1327   Coping/Psychosocial Response Interventions   Plan Of Care Reviewed With patient --    Patient Care Overview   Progress improving --    Outcome Evaluation   Outcome Summary/Follow up Plan --  vss, D/C today        Goal: Adult Individualization and Mutuality  Outcome: Ongoing (interventions implemented as appropriate)    Problem: Perioperative Period (Adult)  Goal: Signs and Symptoms of Listed Potential Problems Will be Absent or Manageable (Perioperative Period)  Outcome: Ongoing (interventions implemented as appropriate)    Problem: Pressure Ulcer Risk (Wily Scale) (Adult,Obstetrics,Pediatric)  Goal: Identify Related Risk Factors and Signs and Symptoms  Outcome: Ongoing (interventions implemented as appropriate)  Goal: Skin Integrity  Outcome: Ongoing (interventions implemented as appropriate)    Problem: Carotid Endarterectomy (Adult)  Goal: Signs and Symptoms of Listed Potential Problems Will be Absent or Manageable (Carotid Endarterectomy)  Outcome: Ongoing (interventions implemented as appropriate)    Problem: Cardiac: Heart Failure (Adult)  Goal: Signs and Symptoms of Listed Potential Problems Will be Absent or Manageable (Cardiac: Heart Failure)  Outcome: Ongoing (interventions implemented as appropriate)

## 2018-01-01 ENCOUNTER — HOSPITAL ENCOUNTER (INPATIENT)
Facility: HOSPITAL | Age: 83
LOS: 11 days | End: 2018-01-16
Attending: EMERGENCY MEDICINE | Admitting: HOSPITALIST

## 2018-01-01 ENCOUNTER — APPOINTMENT (OUTPATIENT)
Dept: GENERAL RADIOLOGY | Facility: HOSPITAL | Age: 83
End: 2018-01-01

## 2018-01-01 ENCOUNTER — INPATIENT HOSPITAL (OUTPATIENT)
Dept: URBAN - METROPOLITAN AREA HOSPITAL 113 | Facility: HOSPITAL | Age: 83
End: 2018-01-01
Payer: COMMERCIAL

## 2018-01-01 ENCOUNTER — APPOINTMENT (OUTPATIENT)
Dept: CARDIOLOGY | Facility: HOSPITAL | Age: 83
End: 2018-01-01
Attending: HOSPITALIST

## 2018-01-01 ENCOUNTER — ANESTHESIA (OUTPATIENT)
Dept: GASTROENTEROLOGY | Facility: HOSPITAL | Age: 83
End: 2018-01-01

## 2018-01-01 ENCOUNTER — APPOINTMENT (OUTPATIENT)
Dept: CARDIOLOGY | Facility: HOSPITAL | Age: 83
End: 2018-01-01
Attending: INTERNAL MEDICINE

## 2018-01-01 ENCOUNTER — ANESTHESIA EVENT (OUTPATIENT)
Dept: GASTROENTEROLOGY | Facility: HOSPITAL | Age: 83
End: 2018-01-01

## 2018-01-01 VITALS
BODY MASS INDEX: 35.13 KG/M2 | RESPIRATION RATE: 15 BRPM | SYSTOLIC BLOOD PRESSURE: 105 MMHG | HEART RATE: 63 BPM | DIASTOLIC BLOOD PRESSURE: 55 MMHG | WEIGHT: 205.8 LBS | TEMPERATURE: 97.7 F | HEIGHT: 64 IN | OXYGEN SATURATION: 88 %

## 2018-01-01 DIAGNOSIS — I50.9 HEART FAILURE, UNSPECIFIED: ICD-10-CM

## 2018-01-01 DIAGNOSIS — R10.13 EPIGASTRIC PAIN: ICD-10-CM

## 2018-01-01 DIAGNOSIS — K62.5 HEMORRHAGE OF ANUS AND RECTUM: ICD-10-CM

## 2018-01-01 DIAGNOSIS — D50.0 IRON DEFICIENCY ANEMIA DUE TO CHRONIC BLOOD LOSS: ICD-10-CM

## 2018-01-01 DIAGNOSIS — K26.4 GASTROINTESTINAL HEMORRHAGE ASSOCIATED WITH DUODENAL ULCER: ICD-10-CM

## 2018-01-01 DIAGNOSIS — K55.20 GI AVM (GASTROINTESTINAL ARTERIOVENOUS VASCULAR MALFORMATION): ICD-10-CM

## 2018-01-01 DIAGNOSIS — I50.41 ACUTE COMBINED SYSTOLIC AND DIASTOLIC CONGESTIVE HEART FAILURE (HCC): Primary | ICD-10-CM

## 2018-01-01 LAB
ABO + RH BLD: NORMAL
ABO GROUP BLD: NORMAL
ABO GROUP BLD: NORMAL
ALBUMIN SERPL-MCNC: 2.7 G/DL (ref 3.5–5.2)
ALBUMIN SERPL-MCNC: 2.8 G/DL (ref 3.5–5.2)
ALBUMIN SERPL-MCNC: 2.9 G/DL (ref 3.5–5.2)
ALBUMIN SERPL-MCNC: 3 G/DL (ref 3.5–5.2)
ALBUMIN/GLOB SERPL: 0.9 G/DL
ALBUMIN/GLOB SERPL: 0.9 G/DL
ALBUMIN/GLOB SERPL: 1 G/DL
ALBUMIN/GLOB SERPL: 1 G/DL
ALP SERPL-CCNC: 108 U/L (ref 39–117)
ALP SERPL-CCNC: 63 U/L (ref 39–117)
ALP SERPL-CCNC: 74 U/L (ref 39–117)
ALP SERPL-CCNC: 79 U/L (ref 39–117)
ALT SERPL W P-5'-P-CCNC: 18 U/L (ref 1–33)
ALT SERPL W P-5'-P-CCNC: 20 U/L (ref 1–33)
ALT SERPL W P-5'-P-CCNC: 22 U/L (ref 1–33)
ALT SERPL W P-5'-P-CCNC: 26 U/L (ref 1–33)
ANION GAP SERPL CALCULATED.3IONS-SCNC: 11.2 MMOL/L
ANION GAP SERPL CALCULATED.3IONS-SCNC: 11.2 MMOL/L
ANION GAP SERPL CALCULATED.3IONS-SCNC: 11.3 MMOL/L
ANION GAP SERPL CALCULATED.3IONS-SCNC: 11.4 MMOL/L
ANION GAP SERPL CALCULATED.3IONS-SCNC: 11.4 MMOL/L
ANION GAP SERPL CALCULATED.3IONS-SCNC: 11.6 MMOL/L
ANION GAP SERPL CALCULATED.3IONS-SCNC: 12.1 MMOL/L
ANION GAP SERPL CALCULATED.3IONS-SCNC: 12.2 MMOL/L
ANION GAP SERPL CALCULATED.3IONS-SCNC: 12.8 MMOL/L
ANION GAP SERPL CALCULATED.3IONS-SCNC: 13 MMOL/L
ANION GAP SERPL CALCULATED.3IONS-SCNC: 13.1 MMOL/L
ANION GAP SERPL CALCULATED.3IONS-SCNC: 13.3 MMOL/L
ANION GAP SERPL CALCULATED.3IONS-SCNC: 14.2 MMOL/L
AORTIC DIMENSIONLESS INDEX: 0.5 (DI)
ARTERIAL PATENCY WRIST A: POSITIVE
ARTERIAL PATENCY WRIST A: POSITIVE
ASCENDING AORTA: 2.4 CM
AST SERPL-CCNC: 19 U/L (ref 1–32)
AST SERPL-CCNC: 22 U/L (ref 1–32)
AST SERPL-CCNC: 23 U/L (ref 1–32)
AST SERPL-CCNC: 29 U/L (ref 1–32)
ATMOSPHERIC PRESS: 748 MMHG
ATMOSPHERIC PRESS: 767.8 MMHG
BACTERIA SPEC AEROBE CULT: NORMAL
BACTERIA SPEC AEROBE CULT: NORMAL
BACTERIA UR QL AUTO: ABNORMAL /HPF
BASE EXCESS BLDA CALC-SCNC: 12.5 MMOL/L (ref 0–2)
BASE EXCESS BLDA CALC-SCNC: 3.4 MMOL/L (ref 0–2)
BASOPHILS # BLD AUTO: 0.03 10*3/MM3 (ref 0–0.2)
BASOPHILS # BLD AUTO: 0.04 10*3/MM3 (ref 0–0.2)
BASOPHILS NFR BLD AUTO: 0.4 % (ref 0–1.5)
BASOPHILS NFR BLD AUTO: 0.4 % (ref 0–1.5)
BASOPHILS NFR BLD AUTO: 0.5 % (ref 0–1.5)
BDY SITE: ABNORMAL
BDY SITE: ABNORMAL
BH BB BLOOD EXPIRATION DATE: NORMAL
BH BB BLOOD TYPE BARCODE: 6200
BH BB DISPENSE STATUS: NORMAL
BH BB PRODUCT CODE: NORMAL
BH BB UNIT NUMBER: NORMAL
BH CV ECHO MEAS - ACS: 1.9 CM
BH CV ECHO MEAS - AI MAX PG: 56.3 MMHG
BH CV ECHO MEAS - AI MAX VEL: 375 CM/SEC
BH CV ECHO MEAS - AO MAX PG (FULL): 8 MMHG
BH CV ECHO MEAS - AO MAX PG: 9.9 MMHG
BH CV ECHO MEAS - AO MEAN PG (FULL): 4 MMHG
BH CV ECHO MEAS - AO MEAN PG: 5 MMHG
BH CV ECHO MEAS - AO ROOT AREA (BSA CORRECTED): 1.4
BH CV ECHO MEAS - AO ROOT AREA: 6.6 CM^2
BH CV ECHO MEAS - AO ROOT DIAM: 2.9 CM
BH CV ECHO MEAS - AO V2 MAX: 157 CM/SEC
BH CV ECHO MEAS - AO V2 MEAN: 109 CM/SEC
BH CV ECHO MEAS - AO V2 VTI: 30.5 CM
BH CV ECHO MEAS - AVA(I,A): 0.94 CM^2
BH CV ECHO MEAS - AVA(I,D): 0.94 CM^2
BH CV ECHO MEAS - AVA(V,A): 0.88 CM^2
BH CV ECHO MEAS - AVA(V,D): 0.88 CM^2
BH CV ECHO MEAS - BSA(HAYCOCK): 2.2 M^2
BH CV ECHO MEAS - BSA: 2 M^2
BH CV ECHO MEAS - BZI_BMI: 38.1 KILOGRAMS/M^2
BH CV ECHO MEAS - BZI_METRIC_HEIGHT: 162.6 CM
BH CV ECHO MEAS - BZI_METRIC_WEIGHT: 100.7 KG
BH CV ECHO MEAS - CONTRAST EF 4CH: 36.6 ML/M^2
BH CV ECHO MEAS - EDV(MOD-SP4): 82 ML
BH CV ECHO MEAS - EDV(TEICH): 162 ML
BH CV ECHO MEAS - EF(CUBED): 77 %
BH CV ECHO MEAS - EF(MOD-SP4): 36.6 %
BH CV ECHO MEAS - EF(TEICH): 68.4 %
BH CV ECHO MEAS - ESV(MOD-SP4): 52 ML
BH CV ECHO MEAS - ESV(TEICH): 51.2 ML
BH CV ECHO MEAS - FS: 38.7 %
BH CV ECHO MEAS - IVS/LVPW: 1
BH CV ECHO MEAS - IVSD: 1.1 CM
BH CV ECHO MEAS - LAT PEAK E' VEL: 6 CM/SEC
BH CV ECHO MEAS - LV DIASTOLIC VOL/BSA (35-75): 40.1 ML/M^2
BH CV ECHO MEAS - LV MASS(C)D: 249.6 GRAMS
BH CV ECHO MEAS - LV MASS(C)DI: 122.1 GRAMS/M^2
BH CV ECHO MEAS - LV MAX PG: 1.9 MMHG
BH CV ECHO MEAS - LV MEAN PG: 1 MMHG
BH CV ECHO MEAS - LV SYSTOLIC VOL/BSA (12-30): 25.4 ML/M^2
BH CV ECHO MEAS - LV V1 MAX: 68.7 CM/SEC
BH CV ECHO MEAS - LV V1 MEAN: 43.5 CM/SEC
BH CV ECHO MEAS - LV V1 VTI: 14.2 CM
BH CV ECHO MEAS - LVIDD: 5.7 CM
BH CV ECHO MEAS - LVIDS: 3.5 CM
BH CV ECHO MEAS - LVLD AP4: 7.7 CM
BH CV ECHO MEAS - LVLS AP4: 7.1 CM
BH CV ECHO MEAS - LVOT AREA (M): 2 CM^2
BH CV ECHO MEAS - LVOT AREA: 2 CM^2
BH CV ECHO MEAS - LVOT DIAM: 1.6 CM
BH CV ECHO MEAS - LVPWD: 1.1 CM
BH CV ECHO MEAS - MED PEAK E' VEL: 4 CM/SEC
BH CV ECHO MEAS - MR MAX PG: 92.5 MMHG
BH CV ECHO MEAS - MR MAX VEL: 481 CM/SEC
BH CV ECHO MEAS - MR MEAN PG: 57 MMHG
BH CV ECHO MEAS - MR MEAN VEL: 357 CM/SEC
BH CV ECHO MEAS - MR VTI: 149 CM
BH CV ECHO MEAS - MV DEC SLOPE: 644 CM/SEC^2
BH CV ECHO MEAS - MV DEC TIME: 0.2 SEC
BH CV ECHO MEAS - MV E MAX VEL: 140 CM/SEC
BH CV ECHO MEAS - MV MAX PG: 10 MMHG
BH CV ECHO MEAS - MV MEAN PG: 4 MMHG
BH CV ECHO MEAS - MV P1/2T MAX VEL: 141 CM/SEC
BH CV ECHO MEAS - MV P1/2T: 64.1 MSEC
BH CV ECHO MEAS - MV V2 MAX: 158 CM/SEC
BH CV ECHO MEAS - MV V2 MEAN: 90.3 CM/SEC
BH CV ECHO MEAS - MV V2 VTI: 32.4 CM
BH CV ECHO MEAS - MVA P1/2T LCG: 1.6 CM^2
BH CV ECHO MEAS - MVA(P1/2T): 3.4 CM^2
BH CV ECHO MEAS - MVA(VTI): 0.88 CM^2
BH CV ECHO MEAS - PA ACC TIME: 0.1 SEC
BH CV ECHO MEAS - PA MAX PG (FULL): 3.1 MMHG
BH CV ECHO MEAS - PA MAX PG: 4.8 MMHG
BH CV ECHO MEAS - PA PR(ACCEL): 34.9 MMHG
BH CV ECHO MEAS - PA V2 MAX: 110 CM/SEC
BH CV ECHO MEAS - PULM DIAS VEL: 75.2 CM/SEC
BH CV ECHO MEAS - PULM S/D: 0.54
BH CV ECHO MEAS - PULM SYS VEL: 40.3 CM/SEC
BH CV ECHO MEAS - PVA(V,A): 1.4 CM^2
BH CV ECHO MEAS - PVA(V,D): 1.4 CM^2
BH CV ECHO MEAS - QP/QS: 0.92
BH CV ECHO MEAS - RAP SYSTOLE: 15 MMHG
BH CV ECHO MEAS - RV MAX PG: 1.8 MMHG
BH CV ECHO MEAS - RV MEAN PG: 1 MMHG
BH CV ECHO MEAS - RV V1 MAX: 66.5 CM/SEC
BH CV ECHO MEAS - RV V1 MEAN: 39.6 CM/SEC
BH CV ECHO MEAS - RV V1 VTI: 11.6 CM
BH CV ECHO MEAS - RVOT AREA: 2.3 CM^2
BH CV ECHO MEAS - RVOT DIAM: 1.7 CM
BH CV ECHO MEAS - RVSP: 80 MMHG
BH CV ECHO MEAS - SI(AO): 98.5 ML/M^2
BH CV ECHO MEAS - SI(CUBED): 70.9 ML/M^2
BH CV ECHO MEAS - SI(LVOT): 14 ML/M^2
BH CV ECHO MEAS - SI(MOD-SP4): 14.7 ML/M^2
BH CV ECHO MEAS - SI(TEICH): 54.2 ML/M^2
BH CV ECHO MEAS - SUP REN AO DIAM: 1.4 CM
BH CV ECHO MEAS - SV(AO): 201.5 ML
BH CV ECHO MEAS - SV(CUBED): 144.9 ML
BH CV ECHO MEAS - SV(LVOT): 28.6 ML
BH CV ECHO MEAS - SV(MOD-SP4): 30 ML
BH CV ECHO MEAS - SV(RVOT): 26.3 ML
BH CV ECHO MEAS - SV(TEICH): 110.8 ML
BH CV ECHO MEAS - TAPSE (>1.6): 1.2 CM2
BH CV ECHO MEAS - TR MAX VEL: 402 CM/SEC
BH CV UPPER VENOUS LEFT INTERNAL JUGULAR AUGMENT: NORMAL
BH CV UPPER VENOUS LEFT INTERNAL JUGULAR COMPETENT: NORMAL
BH CV UPPER VENOUS LEFT INTERNAL JUGULAR COMPRESS: NORMAL
BH CV UPPER VENOUS LEFT INTERNAL JUGULAR PHASIC: NORMAL
BH CV UPPER VENOUS LEFT INTERNAL JUGULAR SPONT: NORMAL
BH CV UPPER VENOUS LEFT SUBCLAVIAN AUGMENT: NORMAL
BH CV UPPER VENOUS LEFT SUBCLAVIAN COMPETENT: NORMAL
BH CV UPPER VENOUS LEFT SUBCLAVIAN PHASIC: NORMAL
BH CV UPPER VENOUS LEFT SUBCLAVIAN SPONT: NORMAL
BH CV UPPER VENOUS RIGHT AXILLARY AUGMENT: NORMAL
BH CV UPPER VENOUS RIGHT AXILLARY COMPETENT: NORMAL
BH CV UPPER VENOUS RIGHT AXILLARY COMPRESS: NORMAL
BH CV UPPER VENOUS RIGHT AXILLARY PHASIC: NORMAL
BH CV UPPER VENOUS RIGHT AXILLARY SPONT: NORMAL
BH CV UPPER VENOUS RIGHT BASILIC FOREARM COMPRESS: NORMAL
BH CV UPPER VENOUS RIGHT BASILIC UPPER COMPRESS: NORMAL
BH CV UPPER VENOUS RIGHT BRACHIAL COMPRESS: NORMAL
BH CV UPPER VENOUS RIGHT CEPHALIC FOREARM COMPRESS: NORMAL
BH CV UPPER VENOUS RIGHT CEPHALIC UPPER COLOR: 1
BH CV UPPER VENOUS RIGHT CEPHALIC UPPER COMPRESS: NORMAL
BH CV UPPER VENOUS RIGHT CEPHALIC UPPER THROMBUS: NORMAL
BH CV UPPER VENOUS RIGHT INTERNAL JUGULAR AUGMENT: NORMAL
BH CV UPPER VENOUS RIGHT INTERNAL JUGULAR COMPETENT: NORMAL
BH CV UPPER VENOUS RIGHT INTERNAL JUGULAR PHASIC: NORMAL
BH CV UPPER VENOUS RIGHT INTERNAL JUGULAR SPONT: NORMAL
BH CV UPPER VENOUS RIGHT RADIAL COMPRESS: NORMAL
BH CV UPPER VENOUS RIGHT SUBCLAVIAN AUGMENT: NORMAL
BH CV UPPER VENOUS RIGHT SUBCLAVIAN COMPETENT: NORMAL
BH CV UPPER VENOUS RIGHT SUBCLAVIAN PHASIC: NORMAL
BH CV UPPER VENOUS RIGHT SUBCLAVIAN SPONT: NORMAL
BH CV UPPER VENOUS RIGHT ULNAR COMPRESS: NORMAL
BH CV XLRA - RV BASE: 4 CM
BH CV XLRA - TDI S': 6 CM/SEC
BILIRUB SERPL-MCNC: 0.4 MG/DL (ref 0.1–1.2)
BILIRUB SERPL-MCNC: 0.6 MG/DL (ref 0.1–1.2)
BILIRUB UR QL STRIP: NEGATIVE
BLD GP AB SCN SERPL QL: NEGATIVE
BLD GP AB SCN SERPL QL: NEGATIVE
BUN BLD-MCNC: 37 MG/DL (ref 8–23)
BUN BLD-MCNC: 38 MG/DL (ref 8–23)
BUN BLD-MCNC: 40 MG/DL (ref 8–23)
BUN BLD-MCNC: 40 MG/DL (ref 8–23)
BUN BLD-MCNC: 43 MG/DL (ref 8–23)
BUN BLD-MCNC: 45 MG/DL (ref 8–23)
BUN BLD-MCNC: 51 MG/DL (ref 8–23)
BUN BLD-MCNC: 55 MG/DL (ref 8–23)
BUN BLD-MCNC: 57 MG/DL (ref 8–23)
BUN BLD-MCNC: 57 MG/DL (ref 8–23)
BUN BLD-MCNC: 60 MG/DL (ref 8–23)
BUN BLD-MCNC: 69 MG/DL (ref 8–23)
BUN BLD-MCNC: 77 MG/DL (ref 8–23)
BUN/CREAT SERPL: 22.6 (ref 7–25)
BUN/CREAT SERPL: 22.6 (ref 7–25)
BUN/CREAT SERPL: 22.9 (ref 7–25)
BUN/CREAT SERPL: 24.1 (ref 7–25)
BUN/CREAT SERPL: 24.5 (ref 7–25)
BUN/CREAT SERPL: 24.8 (ref 7–25)
BUN/CREAT SERPL: 24.9 (ref 7–25)
BUN/CREAT SERPL: 31.1 (ref 7–25)
BUN/CREAT SERPL: 35.6 (ref 7–25)
BUN/CREAT SERPL: 40.1 (ref 7–25)
BUN/CREAT SERPL: 40.8 (ref 7–25)
BUN/CREAT SERPL: 49.3 (ref 7–25)
BUN/CREAT SERPL: 53.1 (ref 7–25)
CALCIUM SPEC-SCNC: 8 MG/DL (ref 8.6–10.5)
CALCIUM SPEC-SCNC: 8.1 MG/DL (ref 8.6–10.5)
CALCIUM SPEC-SCNC: 8.2 MG/DL (ref 8.6–10.5)
CALCIUM SPEC-SCNC: 8.3 MG/DL (ref 8.6–10.5)
CALCIUM SPEC-SCNC: 8.4 MG/DL (ref 8.6–10.5)
CALCIUM SPEC-SCNC: 8.5 MG/DL (ref 8.6–10.5)
CALCIUM SPEC-SCNC: 8.6 MG/DL (ref 8.6–10.5)
CALCIUM SPEC-SCNC: 8.7 MG/DL (ref 8.6–10.5)
CALCIUM SPEC-SCNC: 8.7 MG/DL (ref 8.6–10.5)
CALCIUM SPEC-SCNC: 8.9 MG/DL (ref 8.6–10.5)
CALCIUM SPEC-SCNC: 9.1 MG/DL (ref 8.6–10.5)
CHLORIDE SERPL-SCNC: 91 MMOL/L (ref 98–107)
CHLORIDE SERPL-SCNC: 92 MMOL/L (ref 98–107)
CHLORIDE SERPL-SCNC: 93 MMOL/L (ref 98–107)
CHLORIDE SERPL-SCNC: 93 MMOL/L (ref 98–107)
CHLORIDE SERPL-SCNC: 94 MMOL/L (ref 98–107)
CHLORIDE SERPL-SCNC: 95 MMOL/L (ref 98–107)
CHLORIDE SERPL-SCNC: 96 MMOL/L (ref 98–107)
CHLORIDE SERPL-SCNC: 96 MMOL/L (ref 98–107)
CHLORIDE SERPL-SCNC: 97 MMOL/L (ref 98–107)
CHOLEST SERPL-MCNC: 99 MG/DL (ref 0–200)
CLARITY UR: ABNORMAL
CO2 SERPL-SCNC: 23.8 MMOL/L (ref 22–29)
CO2 SERPL-SCNC: 24.4 MMOL/L (ref 22–29)
CO2 SERPL-SCNC: 24.6 MMOL/L (ref 22–29)
CO2 SERPL-SCNC: 25.9 MMOL/L (ref 22–29)
CO2 SERPL-SCNC: 26.7 MMOL/L (ref 22–29)
CO2 SERPL-SCNC: 26.8 MMOL/L (ref 22–29)
CO2 SERPL-SCNC: 26.9 MMOL/L (ref 22–29)
CO2 SERPL-SCNC: 28.7 MMOL/L (ref 22–29)
CO2 SERPL-SCNC: 28.8 MMOL/L (ref 22–29)
CO2 SERPL-SCNC: 28.8 MMOL/L (ref 22–29)
CO2 SERPL-SCNC: 29 MMOL/L (ref 22–29)
CO2 SERPL-SCNC: 30.2 MMOL/L (ref 22–29)
CO2 SERPL-SCNC: 33.6 MMOL/L (ref 22–29)
COLOR UR: YELLOW
CREAT BLD-MCNC: 1.37 MG/DL (ref 0.57–1)
CREAT BLD-MCNC: 1.4 MG/DL (ref 0.57–1)
CREAT BLD-MCNC: 1.45 MG/DL (ref 0.57–1)
CREAT BLD-MCNC: 1.47 MG/DL (ref 0.57–1)
CREAT BLD-MCNC: 1.6 MG/DL (ref 0.57–1)
CREAT BLD-MCNC: 1.61 MG/DL (ref 0.57–1)
CREAT BLD-MCNC: 1.64 MG/DL (ref 0.57–1)
CREAT BLD-MCNC: 1.66 MG/DL (ref 0.57–1)
CREAT BLD-MCNC: 1.68 MG/DL (ref 0.57–1)
CREAT BLD-MCNC: 1.83 MG/DL (ref 0.57–1)
CREAT BLD-MCNC: 1.84 MG/DL (ref 0.57–1)
CREAT BLD-MCNC: 1.88 MG/DL (ref 0.57–1)
CREAT BLD-MCNC: 2.05 MG/DL (ref 0.57–1)
DEPRECATED RDW RBC AUTO: 50.7 FL (ref 37–54)
DEPRECATED RDW RBC AUTO: 50.9 FL (ref 37–54)
DEPRECATED RDW RBC AUTO: 51.2 FL (ref 37–54)
DEPRECATED RDW RBC AUTO: 51.3 FL (ref 37–54)
DEPRECATED RDW RBC AUTO: 51.8 FL (ref 37–54)
DEPRECATED RDW RBC AUTO: 52 FL (ref 37–54)
DEPRECATED RDW RBC AUTO: 52.1 FL (ref 37–54)
DEPRECATED RDW RBC AUTO: 52.1 FL (ref 37–54)
DEPRECATED RDW RBC AUTO: 54 FL (ref 37–54)
DEPRECATED RDW RBC AUTO: 54.2 FL (ref 37–54)
DEPRECATED RDW RBC AUTO: 54.6 FL (ref 37–54)
DEPRECATED RDW RBC AUTO: 54.9 FL (ref 37–54)
E/E' RATIO: 28
EOSINOPHIL # BLD AUTO: 0.12 10*3/MM3 (ref 0–0.7)
EOSINOPHIL # BLD AUTO: 0.39 10*3/MM3 (ref 0–0.7)
EOSINOPHIL # BLD AUTO: 0.47 10*3/MM3 (ref 0–0.7)
EOSINOPHIL # BLD AUTO: 0.48 10*3/MM3 (ref 0–0.7)
EOSINOPHIL # BLD AUTO: 0.54 10*3/MM3 (ref 0–0.7)
EOSINOPHIL NFR BLD AUTO: 1.1 % (ref 0.3–6.2)
EOSINOPHIL NFR BLD AUTO: 5.2 % (ref 0.3–6.2)
EOSINOPHIL NFR BLD AUTO: 5.8 % (ref 0.3–6.2)
EOSINOPHIL NFR BLD AUTO: 5.9 % (ref 0.3–6.2)
EOSINOPHIL NFR BLD AUTO: 7.4 % (ref 0.3–6.2)
ERYTHROCYTE [DISTWIDTH] IN BLOOD BY AUTOMATED COUNT: 15 % (ref 11.7–13)
ERYTHROCYTE [DISTWIDTH] IN BLOOD BY AUTOMATED COUNT: 15.1 % (ref 11.7–13)
ERYTHROCYTE [DISTWIDTH] IN BLOOD BY AUTOMATED COUNT: 15.2 % (ref 11.7–13)
ERYTHROCYTE [DISTWIDTH] IN BLOOD BY AUTOMATED COUNT: 15.3 % (ref 11.7–13)
ERYTHROCYTE [DISTWIDTH] IN BLOOD BY AUTOMATED COUNT: 15.4 % (ref 11.7–13)
ERYTHROCYTE [DISTWIDTH] IN BLOOD BY AUTOMATED COUNT: 15.5 % (ref 11.7–13)
ERYTHROCYTE [DISTWIDTH] IN BLOOD BY AUTOMATED COUNT: 15.7 % (ref 11.7–13)
ERYTHROCYTE [DISTWIDTH] IN BLOOD BY AUTOMATED COUNT: 15.8 % (ref 11.7–13)
GAS FLOW AIRWAY: 1 LPM
GAS FLOW AIRWAY: 4 LPM
GFR SERPL CREATININE-BSD FRML MDRD: 23 ML/MIN/1.73
GFR SERPL CREATININE-BSD FRML MDRD: 26 ML/MIN/1.73
GFR SERPL CREATININE-BSD FRML MDRD: 26 ML/MIN/1.73
GFR SERPL CREATININE-BSD FRML MDRD: 27 ML/MIN/1.73
GFR SERPL CREATININE-BSD FRML MDRD: 29 ML/MIN/1.73
GFR SERPL CREATININE-BSD FRML MDRD: 30 ML/MIN/1.73
GFR SERPL CREATININE-BSD FRML MDRD: 30 ML/MIN/1.73
GFR SERPL CREATININE-BSD FRML MDRD: 31 ML/MIN/1.73
GFR SERPL CREATININE-BSD FRML MDRD: 31 ML/MIN/1.73
GFR SERPL CREATININE-BSD FRML MDRD: 34 ML/MIN/1.73
GFR SERPL CREATININE-BSD FRML MDRD: 35 ML/MIN/1.73
GFR SERPL CREATININE-BSD FRML MDRD: 36 ML/MIN/1.73
GFR SERPL CREATININE-BSD FRML MDRD: 37 ML/MIN/1.73
GLOBULIN UR ELPH-MCNC: 3 GM/DL
GLOBULIN UR ELPH-MCNC: 3 GM/DL
GLOBULIN UR ELPH-MCNC: 3.1 GM/DL
GLOBULIN UR ELPH-MCNC: 3.5 GM/DL
GLUCOSE BLD-MCNC: 102 MG/DL (ref 65–99)
GLUCOSE BLD-MCNC: 103 MG/DL (ref 65–99)
GLUCOSE BLD-MCNC: 103 MG/DL (ref 65–99)
GLUCOSE BLD-MCNC: 105 MG/DL (ref 65–99)
GLUCOSE BLD-MCNC: 106 MG/DL (ref 65–99)
GLUCOSE BLD-MCNC: 108 MG/DL (ref 65–99)
GLUCOSE BLD-MCNC: 121 MG/DL (ref 65–99)
GLUCOSE BLD-MCNC: 137 MG/DL (ref 65–99)
GLUCOSE BLD-MCNC: 87 MG/DL (ref 65–99)
GLUCOSE BLD-MCNC: 93 MG/DL (ref 65–99)
GLUCOSE BLD-MCNC: 95 MG/DL (ref 65–99)
GLUCOSE BLD-MCNC: 96 MG/DL (ref 65–99)
GLUCOSE BLD-MCNC: 99 MG/DL (ref 65–99)
GLUCOSE UR STRIP-MCNC: NEGATIVE MG/DL
HBA1C MFR BLD: 4.9 % (ref 4.8–5.6)
HBV SURFACE AB SER RIA-ACNC: NORMAL
HBV SURFACE AG SERPL QL IA: NORMAL
HCO3 BLDA-SCNC: 28.4 MMOL/L (ref 22–28)
HCO3 BLDA-SCNC: 39.7 MMOL/L (ref 22–28)
HCT VFR BLD AUTO: 23.6 % (ref 35.6–45.5)
HCT VFR BLD AUTO: 24.3 % (ref 35.6–45.5)
HCT VFR BLD AUTO: 24.8 % (ref 35.6–45.5)
HCT VFR BLD AUTO: 25 % (ref 35.6–45.5)
HCT VFR BLD AUTO: 25.3 % (ref 35.6–45.5)
HCT VFR BLD AUTO: 25.5 % (ref 35.6–45.5)
HCT VFR BLD AUTO: 26 % (ref 35.6–45.5)
HCT VFR BLD AUTO: 26.2 % (ref 35.6–45.5)
HCT VFR BLD AUTO: 26.7 % (ref 35.6–45.5)
HCT VFR BLD AUTO: 26.8 % (ref 35.6–45.5)
HCT VFR BLD AUTO: 26.8 % (ref 35.6–45.5)
HCT VFR BLD AUTO: 28.8 % (ref 35.6–45.5)
HCT VFR BLD AUTO: 29.6 % (ref 35.6–45.5)
HDLC SERPL-MCNC: 32 MG/DL (ref 40–60)
HEMOCCULT STL QL: POSITIVE
HGB BLD-MCNC: 7.5 G/DL (ref 11.9–15.5)
HGB BLD-MCNC: 7.7 G/DL (ref 11.9–15.5)
HGB BLD-MCNC: 7.8 G/DL (ref 11.9–15.5)
HGB BLD-MCNC: 8 G/DL (ref 11.9–15.5)
HGB BLD-MCNC: 8 G/DL (ref 11.9–15.5)
HGB BLD-MCNC: 8.2 G/DL (ref 11.9–15.5)
HGB BLD-MCNC: 8.4 G/DL (ref 11.9–15.5)
HGB BLD-MCNC: 8.5 G/DL (ref 11.9–15.5)
HGB BLD-MCNC: 8.7 G/DL (ref 11.9–15.5)
HGB BLD-MCNC: 9.1 G/DL (ref 11.9–15.5)
HGB BLD-MCNC: 9.2 G/DL (ref 11.9–15.5)
HGB UR QL STRIP.AUTO: ABNORMAL
HOLD SPECIMEN: NORMAL
HOLD SPECIMEN: NORMAL
HYALINE CASTS UR QL AUTO: ABNORMAL /LPF
IMM GRANULOCYTES # BLD: 0 10*3/MM3 (ref 0–0.03)
IMM GRANULOCYTES # BLD: 0.02 10*3/MM3 (ref 0–0.03)
IMM GRANULOCYTES # BLD: 0.02 10*3/MM3 (ref 0–0.03)
IMM GRANULOCYTES NFR BLD: 0 % (ref 0–0.5)
IMM GRANULOCYTES NFR BLD: 0.2 % (ref 0–0.5)
IMM GRANULOCYTES NFR BLD: 0.3 % (ref 0–0.5)
INR PPP: 1.36 (ref 0.9–1.1)
KETONES UR QL STRIP: NEGATIVE
LDLC SERPL CALC-MCNC: 58 MG/DL (ref 0–100)
LDLC/HDLC SERPL: 1.81 {RATIO}
LEFT ATRIUM VOLUME INDEX: 21 ML/M2
LEUKOCYTE ESTERASE UR QL STRIP.AUTO: ABNORMAL
LV EF 2D ECHO EST: 37 %
LYMPHOCYTES # BLD AUTO: 0.5 10*3/MM3 (ref 0.9–4.8)
LYMPHOCYTES # BLD AUTO: 0.57 10*3/MM3 (ref 0.9–4.8)
LYMPHOCYTES # BLD AUTO: 0.59 10*3/MM3 (ref 0.9–4.8)
LYMPHOCYTES # BLD AUTO: 0.61 10*3/MM3 (ref 0.9–4.8)
LYMPHOCYTES # BLD AUTO: 0.74 10*3/MM3 (ref 0.9–4.8)
LYMPHOCYTES NFR BLD AUTO: 5.2 % (ref 19.6–45.3)
LYMPHOCYTES NFR BLD AUTO: 6 % (ref 19.6–45.3)
LYMPHOCYTES NFR BLD AUTO: 7.8 % (ref 19.6–45.3)
LYMPHOCYTES NFR BLD AUTO: 8.4 % (ref 19.6–45.3)
LYMPHOCYTES NFR BLD AUTO: 9.3 % (ref 19.6–45.3)
MAGNESIUM SERPL-MCNC: 1.9 MG/DL (ref 1.6–2.4)
MAGNESIUM SERPL-MCNC: 2 MG/DL (ref 1.6–2.4)
MAGNESIUM SERPL-MCNC: 2.1 MG/DL (ref 1.6–2.4)
MAGNESIUM SERPL-MCNC: 2.2 MG/DL (ref 1.6–2.4)
MAGNESIUM SERPL-MCNC: 2.4 MG/DL (ref 1.6–2.4)
MAXIMAL PREDICTED HEART RATE: 140 BPM
MCH RBC QN AUTO: 29.4 PG (ref 26.9–32)
MCH RBC QN AUTO: 29.5 PG (ref 26.9–32)
MCH RBC QN AUTO: 29.6 PG (ref 26.9–32)
MCH RBC QN AUTO: 29.6 PG (ref 26.9–32)
MCH RBC QN AUTO: 29.7 PG (ref 26.9–32)
MCH RBC QN AUTO: 29.9 PG (ref 26.9–32)
MCH RBC QN AUTO: 30 PG (ref 26.9–32)
MCH RBC QN AUTO: 30.1 PG (ref 26.9–32)
MCH RBC QN AUTO: 30.2 PG (ref 26.9–32)
MCH RBC QN AUTO: 30.2 PG (ref 26.9–32)
MCH RBC QN AUTO: 30.5 PG (ref 26.9–32)
MCH RBC QN AUTO: 30.9 PG (ref 26.9–32)
MCHC RBC AUTO-ENTMCNC: 31.1 G/DL (ref 32.4–36.3)
MCHC RBC AUTO-ENTMCNC: 31.2 G/DL (ref 32.4–36.3)
MCHC RBC AUTO-ENTMCNC: 31.3 G/DL (ref 32.4–36.3)
MCHC RBC AUTO-ENTMCNC: 31.6 G/DL (ref 32.4–36.3)
MCHC RBC AUTO-ENTMCNC: 31.6 G/DL (ref 32.4–36.3)
MCHC RBC AUTO-ENTMCNC: 31.7 G/DL (ref 32.4–36.3)
MCHC RBC AUTO-ENTMCNC: 31.7 G/DL (ref 32.4–36.3)
MCHC RBC AUTO-ENTMCNC: 31.8 G/DL (ref 32.4–36.3)
MCHC RBC AUTO-ENTMCNC: 32.1 G/DL (ref 32.4–36.3)
MCHC RBC AUTO-ENTMCNC: 32.2 G/DL (ref 32.4–36.3)
MCHC RBC AUTO-ENTMCNC: 32.3 G/DL (ref 32.4–36.3)
MCHC RBC AUTO-ENTMCNC: 33.5 G/DL (ref 32.4–36.3)
MCV RBC AUTO: 92.1 FL (ref 80.5–98.2)
MCV RBC AUTO: 92.2 FL (ref 80.5–98.2)
MCV RBC AUTO: 92.7 FL (ref 80.5–98.2)
MCV RBC AUTO: 92.9 FL (ref 80.5–98.2)
MCV RBC AUTO: 93.5 FL (ref 80.5–98.2)
MCV RBC AUTO: 94 FL (ref 80.5–98.2)
MCV RBC AUTO: 94.1 FL (ref 80.5–98.2)
MCV RBC AUTO: 94.2 FL (ref 80.5–98.2)
MCV RBC AUTO: 95.2 FL (ref 80.5–98.2)
MCV RBC AUTO: 96.1 FL (ref 80.5–98.2)
MCV RBC AUTO: 96.4 FL (ref 80.5–98.2)
MCV RBC AUTO: 96.5 FL (ref 80.5–98.2)
MODALITY: ABNORMAL
MODALITY: ABNORMAL
MONOCYTES # BLD AUTO: 0.7 10*3/MM3 (ref 0.2–1.2)
MONOCYTES # BLD AUTO: 0.79 10*3/MM3 (ref 0.2–1.2)
MONOCYTES # BLD AUTO: 0.79 10*3/MM3 (ref 0.2–1.2)
MONOCYTES # BLD AUTO: 0.87 10*3/MM3 (ref 0.2–1.2)
MONOCYTES # BLD AUTO: 1.1 10*3/MM3 (ref 0.2–1.2)
MONOCYTES NFR BLD AUTO: 10.1 % (ref 5–12)
MONOCYTES NFR BLD AUTO: 10.8 % (ref 5–12)
MONOCYTES NFR BLD AUTO: 11.6 % (ref 5–12)
MONOCYTES NFR BLD AUTO: 8.8 % (ref 5–12)
MONOCYTES NFR BLD AUTO: 9.5 % (ref 5–12)
NEUTROPHILS # BLD AUTO: 5.3 10*3/MM3 (ref 1.9–8.1)
NEUTROPHILS # BLD AUTO: 5.64 10*3/MM3 (ref 1.9–8.1)
NEUTROPHILS # BLD AUTO: 5.99 10*3/MM3 (ref 1.9–8.1)
NEUTROPHILS # BLD AUTO: 6.5 10*3/MM3 (ref 1.9–8.1)
NEUTROPHILS # BLD AUTO: 9.01 10*3/MM3 (ref 1.9–8.1)
NEUTROPHILS NFR BLD AUTO: 72.6 % (ref 42.7–76)
NEUTROPHILS NFR BLD AUTO: 74.9 % (ref 42.7–76)
NEUTROPHILS NFR BLD AUTO: 75.5 % (ref 42.7–76)
NEUTROPHILS NFR BLD AUTO: 78.3 % (ref 42.7–76)
NEUTROPHILS NFR BLD AUTO: 83 % (ref 42.7–76)
NITRITE UR QL STRIP: NEGATIVE
NT-PROBNP SERPL-MCNC: ABNORMAL PG/ML (ref 0–1800)
NT-PROBNP SERPL-MCNC: ABNORMAL PG/ML (ref 0–1800)
PCO2 BLDA: 43.9 MM HG (ref 35–45)
PCO2 BLDA: 68.6 MM HG (ref 35–45)
PH BLDA: 7.37 PH UNITS (ref 7.35–7.45)
PH BLDA: 7.42 PH UNITS (ref 7.35–7.45)
PH UR STRIP.AUTO: <=5 [PH] (ref 5–8)
PHOSPHATE SERPL-MCNC: 3.1 MG/DL (ref 2.5–4.5)
PHOSPHATE SERPL-MCNC: 3.6 MG/DL (ref 2.5–4.5)
PHOSPHATE SERPL-MCNC: 4.1 MG/DL (ref 2.5–4.5)
PHOSPHATE SERPL-MCNC: 4.1 MG/DL (ref 2.5–4.5)
PHOSPHATE SERPL-MCNC: 4.2 MG/DL (ref 2.5–4.5)
PLATELET # BLD AUTO: 201 10*3/MM3 (ref 140–500)
PLATELET # BLD AUTO: 232 10*3/MM3 (ref 140–500)
PLATELET # BLD AUTO: 235 10*3/MM3 (ref 140–500)
PLATELET # BLD AUTO: 253 10*3/MM3 (ref 140–500)
PLATELET # BLD AUTO: 255 10*3/MM3 (ref 140–500)
PLATELET # BLD AUTO: 263 10*3/MM3 (ref 140–500)
PLATELET # BLD AUTO: 263 10*3/MM3 (ref 140–500)
PLATELET # BLD AUTO: 304 10*3/MM3 (ref 140–500)
PLATELET # BLD AUTO: 305 10*3/MM3 (ref 140–500)
PLATELET # BLD AUTO: 308 10*3/MM3 (ref 140–500)
PLATELET # BLD AUTO: 320 10*3/MM3 (ref 140–500)
PLATELET # BLD AUTO: 357 10*3/MM3 (ref 140–500)
PMV BLD AUTO: 8.9 FL (ref 6–12)
PMV BLD AUTO: 8.9 FL (ref 6–12)
PMV BLD AUTO: 9 FL (ref 6–12)
PMV BLD AUTO: 9 FL (ref 6–12)
PMV BLD AUTO: 9.1 FL (ref 6–12)
PMV BLD AUTO: 9.2 FL (ref 6–12)
PMV BLD AUTO: 9.3 FL (ref 6–12)
PO2 BLDA: 70.7 MM HG (ref 80–100)
PO2 BLDA: 96 MM HG (ref 80–100)
POTASSIUM BLD-SCNC: 3 MMOL/L (ref 3.5–5.2)
POTASSIUM BLD-SCNC: 3.1 MMOL/L (ref 3.5–5.2)
POTASSIUM BLD-SCNC: 3.2 MMOL/L (ref 3.5–5.2)
POTASSIUM BLD-SCNC: 3.3 MMOL/L (ref 3.5–5.2)
POTASSIUM BLD-SCNC: 3.5 MMOL/L (ref 3.5–5.2)
POTASSIUM BLD-SCNC: 3.6 MMOL/L (ref 3.5–5.2)
POTASSIUM BLD-SCNC: 3.7 MMOL/L (ref 3.5–5.2)
POTASSIUM BLD-SCNC: 3.8 MMOL/L (ref 3.5–5.2)
POTASSIUM BLD-SCNC: 3.9 MMOL/L (ref 3.5–5.2)
POTASSIUM BLD-SCNC: 3.9 MMOL/L (ref 3.5–5.2)
POTASSIUM BLD-SCNC: 4.2 MMOL/L (ref 3.5–5.2)
POTASSIUM BLD-SCNC: 4.6 MMOL/L (ref 3.5–5.2)
POTASSIUM BLD-SCNC: 4.9 MMOL/L (ref 3.5–5.2)
PROT SERPL-MCNC: 5.8 G/DL (ref 6–8.5)
PROT SERPL-MCNC: 5.9 G/DL (ref 6–8.5)
PROT SERPL-MCNC: 5.9 G/DL (ref 6–8.5)
PROT SERPL-MCNC: 6.5 G/DL (ref 6–8.5)
PROT UR QL STRIP: NEGATIVE
PROTHROMBIN TIME: 16.3 SECONDS (ref 11.7–14.2)
RBC # BLD AUTO: 2.51 10*6/MM3 (ref 3.9–5.2)
RBC # BLD AUTO: 2.59 10*6/MM3 (ref 3.9–5.2)
RBC # BLD AUTO: 2.62 10*6/MM3 (ref 3.9–5.2)
RBC # BLD AUTO: 2.67 10*6/MM3 (ref 3.9–5.2)
RBC # BLD AUTO: 2.69 10*6/MM3 (ref 3.9–5.2)
RBC # BLD AUTO: 2.77 10*6/MM3 (ref 3.9–5.2)
RBC # BLD AUTO: 2.78 10*6/MM3 (ref 3.9–5.2)
RBC # BLD AUTO: 2.78 10*6/MM3 (ref 3.9–5.2)
RBC # BLD AUTO: 2.79 10*6/MM3 (ref 3.9–5.2)
RBC # BLD AUTO: 2.82 10*6/MM3 (ref 3.9–5.2)
RBC # BLD AUTO: 3.08 10*6/MM3 (ref 3.9–5.2)
RBC # BLD AUTO: 3.11 10*6/MM3 (ref 3.9–5.2)
RBC # UR: ABNORMAL /HPF
REF LAB TEST METHOD: ABNORMAL
RH BLD: POSITIVE
RH BLD: POSITIVE
SAO2 % BLDCOA: 92.5 % (ref 92–99)
SAO2 % BLDCOA: 97.5 % (ref 92–99)
SET MECH RESP RATE: 20
SINUS: 2.7 CM
SODIUM BLD-SCNC: 130 MMOL/L (ref 136–145)
SODIUM BLD-SCNC: 131 MMOL/L (ref 136–145)
SODIUM BLD-SCNC: 131 MMOL/L (ref 136–145)
SODIUM BLD-SCNC: 132 MMOL/L (ref 136–145)
SODIUM BLD-SCNC: 133 MMOL/L (ref 136–145)
SODIUM BLD-SCNC: 134 MMOL/L (ref 136–145)
SODIUM BLD-SCNC: 134 MMOL/L (ref 136–145)
SODIUM BLD-SCNC: 136 MMOL/L (ref 136–145)
SODIUM BLD-SCNC: 137 MMOL/L (ref 136–145)
SODIUM BLD-SCNC: 138 MMOL/L (ref 136–145)
SP GR UR STRIP: 1.01 (ref 1–1.03)
SQUAMOUS #/AREA URNS HPF: ABNORMAL /HPF
STJ: 1.7 CM
STRESS TARGET HR: 119 BPM
TOTAL RATE: 20 BREATHS/MINUTE
TRIGL SERPL-MCNC: 46 MG/DL (ref 0–150)
TROPONIN T SERPL-MCNC: 0.02 NG/ML (ref 0–0.03)
TROPONIN T SERPL-MCNC: 0.02 NG/ML (ref 0–0.03)
TSH SERPL DL<=0.05 MIU/L-ACNC: 3.21 MIU/ML (ref 0.27–4.2)
UNIT  ABO: NORMAL
UNIT  RH: NORMAL
URATE SERPL-MCNC: 10.8 MG/DL (ref 2.4–5.7)
URATE SERPL-MCNC: 11 MG/DL (ref 2.4–5.7)
URATE SERPL-MCNC: 11.1 MG/DL (ref 2.4–5.7)
URATE SERPL-MCNC: 11.2 MG/DL (ref 2.4–5.7)
URATE SERPL-MCNC: 11.3 MG/DL (ref 2.4–5.7)
URATE SERPL-MCNC: 11.4 MG/DL (ref 2.4–5.7)
URATE SERPL-MCNC: 11.5 MG/DL (ref 2.4–5.7)
URATE SERPL-MCNC: 11.8 MG/DL (ref 2.4–5.7)
UROBILINOGEN UR QL STRIP: ABNORMAL
VLDLC SERPL-MCNC: 9.2 MG/DL (ref 5–40)
WBC NRBC COR # BLD: 10.55 10*3/MM3 (ref 4.5–10.7)
WBC NRBC COR # BLD: 10.86 10*3/MM3 (ref 4.5–10.7)
WBC NRBC COR # BLD: 7.18 10*3/MM3 (ref 4.5–10.7)
WBC NRBC COR # BLD: 7.3 10*3/MM3 (ref 4.5–10.7)
WBC NRBC COR # BLD: 7.53 10*3/MM3 (ref 4.5–10.7)
WBC NRBC COR # BLD: 7.87 10*3/MM3 (ref 4.5–10.7)
WBC NRBC COR # BLD: 7.92 10*3/MM3 (ref 4.5–10.7)
WBC NRBC COR # BLD: 7.94 10*3/MM3 (ref 4.5–10.7)
WBC NRBC COR # BLD: 8.3 10*3/MM3 (ref 4.5–10.7)
WBC NRBC COR # BLD: 8.79 10*3/MM3 (ref 4.5–10.7)
WBC NRBC COR # BLD: 8.82 10*3/MM3 (ref 4.5–10.7)
WBC NRBC COR # BLD: 9.23 10*3/MM3 (ref 4.5–10.7)
WBC UR QL AUTO: ABNORMAL /HPF
WHOLE BLOOD HOLD SPECIMEN: NORMAL
WHOLE BLOOD HOLD SPECIMEN: NORMAL

## 2018-01-01 PROCEDURE — 25010000002 EPINEPHRINE PF 1 MG/10ML SOLUTION PREFILLED SYRINGE: Performed by: ANESTHESIOLOGY

## 2018-01-01 PROCEDURE — 97110 THERAPEUTIC EXERCISES: CPT

## 2018-01-01 PROCEDURE — 25010000002 MIDAZOLAM PER 1 MG: Performed by: INTERNAL MEDICINE

## 2018-01-01 PROCEDURE — 84550 ASSAY OF BLOOD/URIC ACID: CPT | Performed by: HOSPITALIST

## 2018-01-01 PROCEDURE — 80048 BASIC METABOLIC PNL TOTAL CA: CPT | Performed by: HOSPITALIST

## 2018-01-01 PROCEDURE — 80048 BASIC METABOLIC PNL TOTAL CA: CPT | Performed by: INTERNAL MEDICINE

## 2018-01-01 PROCEDURE — 83735 ASSAY OF MAGNESIUM: CPT | Performed by: HOSPITALIST

## 2018-01-01 PROCEDURE — 86901 BLOOD TYPING SEROLOGIC RH(D): CPT | Performed by: HOSPITALIST

## 2018-01-01 PROCEDURE — 94799 UNLISTED PULMONARY SVC/PX: CPT

## 2018-01-01 PROCEDURE — 99232 SBSQ HOSP IP/OBS MODERATE 35: CPT | Performed by: NURSE PRACTITIONER

## 2018-01-01 PROCEDURE — 85018 HEMOGLOBIN: CPT | Performed by: INTERNAL MEDICINE

## 2018-01-01 PROCEDURE — 82803 BLOOD GASES ANY COMBINATION: CPT

## 2018-01-01 PROCEDURE — 25010000002 DOBUTAMINE PER 250 MG: Performed by: INTERNAL MEDICINE

## 2018-01-01 PROCEDURE — 80069 RENAL FUNCTION PANEL: CPT | Performed by: HOSPITALIST

## 2018-01-01 PROCEDURE — 80069 RENAL FUNCTION PANEL: CPT | Performed by: INTERNAL MEDICINE

## 2018-01-01 PROCEDURE — 97110 THERAPEUTIC EXERCISES: CPT | Performed by: PHYSICAL THERAPIST

## 2018-01-01 PROCEDURE — 80061 LIPID PANEL: CPT | Performed by: HOSPITALIST

## 2018-01-01 PROCEDURE — 36430 TRANSFUSION BLD/BLD COMPNT: CPT

## 2018-01-01 PROCEDURE — 93005 ELECTROCARDIOGRAM TRACING: CPT

## 2018-01-01 PROCEDURE — 85025 COMPLETE CBC W/AUTO DIFF WBC: CPT | Performed by: INTERNAL MEDICINE

## 2018-01-01 PROCEDURE — P9016 RBC LEUKOCYTES REDUCED: HCPCS

## 2018-01-01 PROCEDURE — C1900 LEAD, CORONARY VENOUS: HCPCS | Performed by: INTERNAL MEDICINE

## 2018-01-01 PROCEDURE — 33225 L VENTRIC PACING LEAD ADD-ON: CPT | Performed by: INTERNAL MEDICINE

## 2018-01-01 PROCEDURE — 99232 SBSQ HOSP IP/OBS MODERATE 35: CPT | Performed by: INTERNAL MEDICINE

## 2018-01-01 PROCEDURE — 85025 COMPLETE CBC W/AUTO DIFF WBC: CPT | Performed by: EMERGENCY MEDICINE

## 2018-01-01 PROCEDURE — 93306 TTE W/DOPPLER COMPLETE: CPT

## 2018-01-01 PROCEDURE — C1769 GUIDE WIRE: HCPCS | Performed by: INTERNAL MEDICINE

## 2018-01-01 PROCEDURE — 84132 ASSAY OF SERUM POTASSIUM: CPT | Performed by: NURSE PRACTITIONER

## 2018-01-01 PROCEDURE — 93010 ELECTROCARDIOGRAM REPORT: CPT | Performed by: INTERNAL MEDICINE

## 2018-01-01 PROCEDURE — 85027 COMPLETE CBC AUTOMATED: CPT | Performed by: NURSE PRACTITIONER

## 2018-01-01 PROCEDURE — 25010000002 EPINEPHRINE PF 1 MG/10ML SOLUTION PREFILLED SYRINGE

## 2018-01-01 PROCEDURE — 71045 X-RAY EXAM CHEST 1 VIEW: CPT

## 2018-01-01 PROCEDURE — 86706 HEP B SURFACE ANTIBODY: CPT | Performed by: INTERNAL MEDICINE

## 2018-01-01 PROCEDURE — 84484 ASSAY OF TROPONIN QUANT: CPT | Performed by: EMERGENCY MEDICINE

## 2018-01-01 PROCEDURE — 81001 URINALYSIS AUTO W/SCOPE: CPT | Performed by: EMERGENCY MEDICINE

## 2018-01-01 PROCEDURE — 36415 COLL VENOUS BLD VENIPUNCTURE: CPT | Performed by: EMERGENCY MEDICINE

## 2018-01-01 PROCEDURE — 84484 ASSAY OF TROPONIN QUANT: CPT | Performed by: HOSPITALIST

## 2018-01-01 PROCEDURE — 0 IOPAMIDOL PER 1 ML: Performed by: INTERNAL MEDICINE

## 2018-01-01 PROCEDURE — 86923 COMPATIBILITY TEST ELECTRIC: CPT

## 2018-01-01 PROCEDURE — 83036 HEMOGLOBIN GLYCOSYLATED A1C: CPT | Performed by: HOSPITALIST

## 2018-01-01 PROCEDURE — 83735 ASSAY OF MAGNESIUM: CPT | Performed by: INTERNAL MEDICINE

## 2018-01-01 PROCEDURE — 71046 X-RAY EXAM CHEST 2 VIEWS: CPT

## 2018-01-01 PROCEDURE — 86850 RBC ANTIBODY SCREEN: CPT | Performed by: INTERNAL MEDICINE

## 2018-01-01 PROCEDURE — 33229 REMV&REPLC PM GEN MULT LEADS: CPT | Performed by: INTERNAL MEDICINE

## 2018-01-01 PROCEDURE — 99153 MOD SED SAME PHYS/QHP EA: CPT | Performed by: INTERNAL MEDICINE

## 2018-01-01 PROCEDURE — 93005 ELECTROCARDIOGRAM TRACING: CPT | Performed by: NURSE PRACTITIONER

## 2018-01-01 PROCEDURE — 25010000002 PROPOFOL 10 MG/ML EMULSION: Performed by: ANESTHESIOLOGY

## 2018-01-01 PROCEDURE — 25010000003 POTASSIUM CHLORIDE 10 MEQ/100ML SOLUTION: Performed by: INTERNAL MEDICINE

## 2018-01-01 PROCEDURE — 80053 COMPREHEN METABOLIC PANEL: CPT | Performed by: EMERGENCY MEDICINE

## 2018-01-01 PROCEDURE — 0JPT0PZ REMOVAL OF CARDIAC RHYTHM RELATED DEVICE FROM TRUNK SUBCUTANEOUS TISSUE AND FASCIA, OPEN APPROACH: ICD-10-PCS | Performed by: INTERNAL MEDICINE

## 2018-01-01 PROCEDURE — 97530 THERAPEUTIC ACTIVITIES: CPT | Performed by: PHYSICAL THERAPIST

## 2018-01-01 PROCEDURE — 93971 EXTREMITY STUDY: CPT

## 2018-01-01 PROCEDURE — 87086 URINE CULTURE/COLONY COUNT: CPT | Performed by: EMERGENCY MEDICINE

## 2018-01-01 PROCEDURE — 44366 SMALL BOWEL ENDOSCOPY: CPT | Performed by: INTERNAL MEDICINE

## 2018-01-01 PROCEDURE — 92950 HEART/LUNG RESUSCITATION CPR: CPT

## 2018-01-01 PROCEDURE — 85610 PROTHROMBIN TIME: CPT | Performed by: INTERNAL MEDICINE

## 2018-01-01 PROCEDURE — 84550 ASSAY OF BLOOD/URIC ACID: CPT | Performed by: INTERNAL MEDICINE

## 2018-01-01 PROCEDURE — 94640 AIRWAY INHALATION TREATMENT: CPT

## 2018-01-01 PROCEDURE — C1894 INTRO/SHEATH, NON-LASER: HCPCS | Performed by: INTERNAL MEDICINE

## 2018-01-01 PROCEDURE — 86900 BLOOD TYPING SEROLOGIC ABO: CPT

## 2018-01-01 PROCEDURE — 85027 COMPLETE CBC AUTOMATED: CPT | Performed by: HOSPITALIST

## 2018-01-01 PROCEDURE — 86850 RBC ANTIBODY SCREEN: CPT | Performed by: HOSPITALIST

## 2018-01-01 PROCEDURE — 36600 WITHDRAWAL OF ARTERIAL BLOOD: CPT

## 2018-01-01 PROCEDURE — 85027 COMPLETE CBC AUTOMATED: CPT | Performed by: INTERNAL MEDICINE

## 2018-01-01 PROCEDURE — 0W3P8ZZ CONTROL BLEEDING IN GASTROINTESTINAL TRACT, VIA NATURAL OR ARTIFICIAL OPENING ENDOSCOPIC: ICD-10-PCS | Performed by: INTERNAL MEDICINE

## 2018-01-01 PROCEDURE — 99222 1ST HOSP IP/OBS MODERATE 55: CPT | Performed by: INTERNAL MEDICINE

## 2018-01-01 PROCEDURE — 99152 MOD SED SAME PHYS/QHP 5/>YRS: CPT | Performed by: INTERNAL MEDICINE

## 2018-01-01 PROCEDURE — 84443 ASSAY THYROID STIM HORMONE: CPT | Performed by: INTERNAL MEDICINE

## 2018-01-01 PROCEDURE — 25010000002 VANCOMYCIN PER 500 MG: Performed by: INTERNAL MEDICINE

## 2018-01-01 PROCEDURE — 25010000002 FENTANYL CITRATE (PF) 100 MCG/2ML SOLUTION: Performed by: INTERNAL MEDICINE

## 2018-01-01 PROCEDURE — 94660 CPAP INITIATION&MGMT: CPT

## 2018-01-01 PROCEDURE — 80053 COMPREHEN METABOLIC PANEL: CPT | Performed by: INTERNAL MEDICINE

## 2018-01-01 PROCEDURE — 86900 BLOOD TYPING SEROLOGIC ABO: CPT | Performed by: INTERNAL MEDICINE

## 2018-01-01 PROCEDURE — 93306 TTE W/DOPPLER COMPLETE: CPT | Performed by: INTERNAL MEDICINE

## 2018-01-01 PROCEDURE — 87340 HEPATITIS B SURFACE AG IA: CPT | Performed by: INTERNAL MEDICINE

## 2018-01-01 PROCEDURE — 85014 HEMATOCRIT: CPT | Performed by: INTERNAL MEDICINE

## 2018-01-01 PROCEDURE — 83880 ASSAY OF NATRIURETIC PEPTIDE: CPT | Performed by: HOSPITALIST

## 2018-01-01 PROCEDURE — 63710000001 DIPHENHYDRAMINE PER 50 MG: Performed by: HOSPITALIST

## 2018-01-01 PROCEDURE — 02H43JZ INSERTION OF PACEMAKER LEAD INTO CORONARY VEIN, PERCUTANEOUS APPROACH: ICD-10-PCS | Performed by: INTERNAL MEDICINE

## 2018-01-01 PROCEDURE — 31500 INSERT EMERGENCY AIRWAY: CPT | Performed by: EMERGENCY MEDICINE

## 2018-01-01 PROCEDURE — 0JH607Z INSERTION OF CARDIAC RESYNCHRONIZATION PACEMAKER PULSE GENERATOR INTO CHEST SUBCUTANEOUS TISSUE AND FASCIA, OPEN APPROACH: ICD-10-PCS | Performed by: INTERNAL MEDICINE

## 2018-01-01 PROCEDURE — 82272 OCCULT BLD FECES 1-3 TESTS: CPT | Performed by: NURSE PRACTITIONER

## 2018-01-01 PROCEDURE — 86901 BLOOD TYPING SEROLOGIC RH(D): CPT | Performed by: INTERNAL MEDICINE

## 2018-01-01 PROCEDURE — C1730 CATH, EP, 19 OR FEW ELECT: HCPCS | Performed by: INTERNAL MEDICINE

## 2018-01-01 PROCEDURE — 83880 ASSAY OF NATRIURETIC PEPTIDE: CPT | Performed by: EMERGENCY MEDICINE

## 2018-01-01 PROCEDURE — 84132 ASSAY OF SERUM POTASSIUM: CPT | Performed by: INTERNAL MEDICINE

## 2018-01-01 PROCEDURE — 84100 ASSAY OF PHOSPHORUS: CPT | Performed by: INTERNAL MEDICINE

## 2018-01-01 PROCEDURE — 99231 SBSQ HOSP IP/OBS SF/LOW 25: CPT

## 2018-01-01 PROCEDURE — 99283 EMERGENCY DEPT VISIT LOW MDM: CPT

## 2018-01-01 PROCEDURE — 97116 GAIT TRAINING THERAPY: CPT | Performed by: PHYSICAL THERAPIST

## 2018-01-01 PROCEDURE — C2621 PMKR, OTHER THAN SING/DUAL: HCPCS | Performed by: INTERNAL MEDICINE

## 2018-01-01 PROCEDURE — 99221 1ST HOSP IP/OBS SF/LOW 40: CPT | Performed by: NURSE PRACTITIONER

## 2018-01-01 PROCEDURE — 85025 COMPLETE CBC W/AUTO DIFF WBC: CPT | Performed by: NURSE PRACTITIONER

## 2018-01-01 PROCEDURE — C1892 INTRO/SHEATH,FIXED,PEEL-AWAY: HCPCS | Performed by: INTERNAL MEDICINE

## 2018-01-01 PROCEDURE — 86900 BLOOD TYPING SEROLOGIC ABO: CPT | Performed by: HOSPITALIST

## 2018-01-01 PROCEDURE — 97161 PT EVAL LOW COMPLEX 20 MIN: CPT | Performed by: PHYSICAL THERAPIST

## 2018-01-01 DEVICE — GEN PM ALLURE QUADRA BIVENT RF CRTP PM3262: Type: IMPLANTABLE DEVICE | Status: FUNCTIONAL

## 2018-01-01 DEVICE — DEV CLIP ENDO RESOLUTION360 CONTRL ROT 235CM: Type: IMPLANTABLE DEVICE | Site: DUODENUM | Status: FUNCTIONAL

## 2018-01-01 DEVICE — LD QUARTET LV S/CRV BIPOL 1458Q/86: Type: IMPLANTABLE DEVICE | Status: FUNCTIONAL

## 2018-01-01 RX ORDER — PANTOPRAZOLE SODIUM 40 MG/1
40 TABLET, DELAYED RELEASE ORAL
Status: DISCONTINUED | OUTPATIENT
Start: 2018-01-01 | End: 2018-01-01

## 2018-01-01 RX ORDER — HYDRALAZINE HYDROCHLORIDE 10 MG/1
10 TABLET, FILM COATED ORAL EVERY 8 HOURS SCHEDULED
Status: DISCONTINUED | OUTPATIENT
Start: 2018-01-01 | End: 2018-01-01

## 2018-01-01 RX ORDER — BUMETANIDE 0.25 MG/ML
4 INJECTION INTRAMUSCULAR; INTRAVENOUS EVERY 8 HOURS
Status: DISCONTINUED | OUTPATIENT
Start: 2018-01-01 | End: 2018-01-01

## 2018-01-01 RX ORDER — SODIUM CHLORIDE, SODIUM LACTATE, POTASSIUM CHLORIDE, CALCIUM CHLORIDE 600; 310; 30; 20 MG/100ML; MG/100ML; MG/100ML; MG/100ML
1000 INJECTION, SOLUTION INTRAVENOUS CONTINUOUS PRN
Status: DISCONTINUED | OUTPATIENT
Start: 2018-01-01 | End: 2018-01-01

## 2018-01-01 RX ORDER — LIDOCAINE HYDROCHLORIDE 10 MG/ML
INJECTION, SOLUTION INFILTRATION; PERINEURAL AS NEEDED
Status: DISCONTINUED | OUTPATIENT
Start: 2018-01-01 | End: 2018-01-01 | Stop reason: HOSPADM

## 2018-01-01 RX ORDER — POTASSIUM CHLORIDE 1.5 G/1.77G
40 POWDER, FOR SOLUTION ORAL AS NEEDED
Status: DISCONTINUED | OUTPATIENT
Start: 2018-01-01 | End: 2018-01-17 | Stop reason: HOSPADM

## 2018-01-01 RX ORDER — BUMETANIDE 0.25 MG/ML
2 INJECTION INTRAMUSCULAR; INTRAVENOUS ONCE
Status: COMPLETED | OUTPATIENT
Start: 2018-01-01 | End: 2018-01-01

## 2018-01-01 RX ORDER — HYDROMORPHONE HCL 110MG/55ML
0.5 PATIENT CONTROLLED ANALGESIA SYRINGE INTRAVENOUS
Status: DISCONTINUED | OUTPATIENT
Start: 2018-01-01 | End: 2018-01-17 | Stop reason: HOSPADM

## 2018-01-01 RX ORDER — SODIUM CHLORIDE 0.9 % (FLUSH) 0.9 %
10 SYRINGE (ML) INJECTION AS NEEDED
Status: DISCONTINUED | OUTPATIENT
Start: 2018-01-01 | End: 2018-01-01

## 2018-01-01 RX ORDER — SENNA AND DOCUSATE SODIUM 50; 8.6 MG/1; MG/1
1 TABLET, FILM COATED ORAL NIGHTLY
Status: DISCONTINUED | OUTPATIENT
Start: 2018-01-01 | End: 2018-01-17 | Stop reason: HOSPADM

## 2018-01-01 RX ORDER — POTASSIUM CHLORIDE 7.45 MG/ML
10 INJECTION INTRAVENOUS
Status: DISCONTINUED | OUTPATIENT
Start: 2018-01-01 | End: 2018-01-17 | Stop reason: HOSPADM

## 2018-01-01 RX ORDER — ONDANSETRON 2 MG/ML
4 INJECTION INTRAMUSCULAR; INTRAVENOUS EVERY 6 HOURS PRN
Status: DISCONTINUED | OUTPATIENT
Start: 2018-01-01 | End: 2018-01-17 | Stop reason: HOSPADM

## 2018-01-01 RX ORDER — POLYETHYLENE GLYCOL 3350 17 G/17G
17 POWDER, FOR SOLUTION ORAL DAILY
Status: DISCONTINUED | OUTPATIENT
Start: 2018-01-01 | End: 2018-01-01

## 2018-01-01 RX ORDER — DOBUTAMINE HYDROCHLORIDE 100 MG/100ML
5 INJECTION INTRAVENOUS CONTINUOUS
Status: DISCONTINUED | OUTPATIENT
Start: 2018-01-01 | End: 2018-01-01

## 2018-01-01 RX ORDER — LANOLIN ALCOHOL/MO/W.PET/CERES
100 CREAM (GRAM) TOPICAL DAILY
Status: DISCONTINUED | OUTPATIENT
Start: 2018-01-01 | End: 2018-01-17 | Stop reason: HOSPADM

## 2018-01-01 RX ORDER — HYDRALAZINE HYDROCHLORIDE 25 MG/1
25 TABLET, FILM COATED ORAL EVERY 8 HOURS SCHEDULED
Status: DISCONTINUED | OUTPATIENT
Start: 2018-01-01 | End: 2018-01-01

## 2018-01-01 RX ORDER — CARVEDILOL 25 MG/1
50 TABLET ORAL EVERY 12 HOURS SCHEDULED
Status: DISCONTINUED | OUTPATIENT
Start: 2018-01-01 | End: 2018-01-17 | Stop reason: HOSPADM

## 2018-01-01 RX ORDER — TORSEMIDE 100 MG/1
100 TABLET ORAL
Status: DISCONTINUED | OUTPATIENT
Start: 2018-01-01 | End: 2018-01-17 | Stop reason: HOSPADM

## 2018-01-01 RX ORDER — ATORVASTATIN CALCIUM 20 MG/1
20 TABLET, FILM COATED ORAL DAILY
Status: DISCONTINUED | OUTPATIENT
Start: 2018-01-01 | End: 2018-01-01

## 2018-01-01 RX ORDER — POTASSIUM CHLORIDE 750 MG/1
40 CAPSULE, EXTENDED RELEASE ORAL ONCE
Status: COMPLETED | OUTPATIENT
Start: 2018-01-01 | End: 2018-01-01

## 2018-01-01 RX ORDER — LIDOCAINE HYDROCHLORIDE 20 MG/ML
INJECTION, SOLUTION INFILTRATION; PERINEURAL AS NEEDED
Status: DISCONTINUED | OUTPATIENT
Start: 2018-01-01 | End: 2018-01-01 | Stop reason: SURG

## 2018-01-01 RX ORDER — LORAZEPAM 0.5 MG/1
0.25 TABLET ORAL EVERY 6 HOURS PRN
Status: DISCONTINUED | OUTPATIENT
Start: 2018-01-01 | End: 2018-01-17 | Stop reason: HOSPADM

## 2018-01-01 RX ORDER — HYDROCODONE BITARTRATE AND ACETAMINOPHEN 10; 325 MG/1; MG/1
1 TABLET ORAL EVERY 4 HOURS PRN
Status: DISCONTINUED | OUTPATIENT
Start: 2018-01-01 | End: 2018-01-17 | Stop reason: HOSPADM

## 2018-01-01 RX ORDER — FUROSEMIDE 10 MG/ML
60 INJECTION INTRAMUSCULAR; INTRAVENOUS ONCE
Status: DISCONTINUED | OUTPATIENT
Start: 2018-01-01 | End: 2018-01-01

## 2018-01-01 RX ORDER — PROPOFOL 10 MG/ML
VIAL (ML) INTRAVENOUS CONTINUOUS PRN
Status: DISCONTINUED | OUTPATIENT
Start: 2018-01-01 | End: 2018-01-01 | Stop reason: SURG

## 2018-01-01 RX ORDER — ASPIRIN 81 MG/1
81 TABLET ORAL DAILY
Status: DISCONTINUED | OUTPATIENT
Start: 2018-01-01 | End: 2018-01-01

## 2018-01-01 RX ORDER — METHYLPREDNISOLONE SODIUM SUCCINATE 40 MG/ML
40 INJECTION, POWDER, LYOPHILIZED, FOR SOLUTION INTRAMUSCULAR; INTRAVENOUS EVERY 12 HOURS
Status: DISCONTINUED | OUTPATIENT
Start: 2018-01-01 | End: 2018-01-17 | Stop reason: HOSPADM

## 2018-01-01 RX ORDER — POTASSIUM CHLORIDE 7.45 MG/ML
10 INJECTION INTRAVENOUS
Status: DISPENSED | OUTPATIENT
Start: 2018-01-01 | End: 2018-01-01

## 2018-01-01 RX ORDER — NALOXONE HCL 0.4 MG/ML
0.4 VIAL (ML) INJECTION
Status: DISCONTINUED | OUTPATIENT
Start: 2018-01-01 | End: 2018-01-17 | Stop reason: HOSPADM

## 2018-01-01 RX ORDER — BUMETANIDE 0.25 MG/ML
4 INJECTION INTRAMUSCULAR; INTRAVENOUS ONCE
Status: DISCONTINUED | OUTPATIENT
Start: 2018-01-01 | End: 2018-01-17 | Stop reason: HOSPADM

## 2018-01-01 RX ORDER — SODIUM CHLORIDE 0.9 % (FLUSH) 0.9 %
1-10 SYRINGE (ML) INJECTION AS NEEDED
Status: DISCONTINUED | OUTPATIENT
Start: 2018-01-01 | End: 2018-01-17 | Stop reason: HOSPADM

## 2018-01-01 RX ORDER — ACETAMINOPHEN 325 MG/1
650 TABLET ORAL EVERY 6 HOURS PRN
Status: DISCONTINUED | OUTPATIENT
Start: 2018-01-01 | End: 2018-01-17 | Stop reason: HOSPADM

## 2018-01-01 RX ORDER — POTASSIUM CHLORIDE 750 MG/1
40 CAPSULE, EXTENDED RELEASE ORAL AS NEEDED
Status: DISCONTINUED | OUTPATIENT
Start: 2018-01-01 | End: 2018-01-17 | Stop reason: HOSPADM

## 2018-01-01 RX ORDER — SODIUM CHLORIDE, SODIUM LACTATE, POTASSIUM CHLORIDE, CALCIUM CHLORIDE 600; 310; 30; 20 MG/100ML; MG/100ML; MG/100ML; MG/100ML
INJECTION, SOLUTION INTRAVENOUS CONTINUOUS PRN
Status: DISCONTINUED | OUTPATIENT
Start: 2018-01-01 | End: 2018-01-01 | Stop reason: SURG

## 2018-01-01 RX ORDER — PANTOPRAZOLE SODIUM 40 MG/10ML
40 INJECTION, POWDER, LYOPHILIZED, FOR SOLUTION INTRAVENOUS EVERY 12 HOURS SCHEDULED
Status: DISCONTINUED | OUTPATIENT
Start: 2018-01-01 | End: 2018-01-17 | Stop reason: HOSPADM

## 2018-01-01 RX ORDER — DIPHENHYDRAMINE HCL 25 MG
25 CAPSULE ORAL ONCE
Status: COMPLETED | OUTPATIENT
Start: 2018-01-01 | End: 2018-01-01

## 2018-01-01 RX ORDER — PROPOFOL 10 MG/ML
VIAL (ML) INTRAVENOUS AS NEEDED
Status: DISCONTINUED | OUTPATIENT
Start: 2018-01-01 | End: 2018-01-01 | Stop reason: SURG

## 2018-01-01 RX ORDER — BUDESONIDE AND FORMOTEROL FUMARATE DIHYDRATE 160; 4.5 UG/1; UG/1
2 AEROSOL RESPIRATORY (INHALATION)
Status: DISCONTINUED | OUTPATIENT
Start: 2018-01-01 | End: 2018-01-17 | Stop reason: HOSPADM

## 2018-01-01 RX ORDER — FLUTICASONE PROPIONATE 50 MCG
2 SPRAY, SUSPENSION (ML) NASAL DAILY
Status: DISCONTINUED | OUTPATIENT
Start: 2018-01-01 | End: 2018-01-17 | Stop reason: HOSPADM

## 2018-01-01 RX ORDER — POLYETHYLENE GLYCOL 3350 17 G/17G
17 POWDER, FOR SOLUTION ORAL DAILY PRN
Status: DISCONTINUED | OUTPATIENT
Start: 2018-01-01 | End: 2018-01-17 | Stop reason: HOSPADM

## 2018-01-01 RX ORDER — VANCOMYCIN HYDROCHLORIDE 1 G/200ML
1000 INJECTION, SOLUTION INTRAVENOUS
Status: DISCONTINUED | OUTPATIENT
Start: 2018-01-01 | End: 2018-01-01

## 2018-01-01 RX ORDER — CALCIUM CHLORIDE 100 MG/ML
INJECTION INTRAVENOUS; INTRAVENTRICULAR
Status: COMPLETED | OUTPATIENT
Start: 2018-01-01 | End: 2018-01-01

## 2018-01-01 RX ORDER — POTASSIUM CHLORIDE 750 MG/1
60 CAPSULE, EXTENDED RELEASE ORAL EVERY 8 HOURS
Status: DISCONTINUED | OUTPATIENT
Start: 2018-01-01 | End: 2018-01-17 | Stop reason: HOSPADM

## 2018-01-01 RX ORDER — FENTANYL CITRATE 50 UG/ML
INJECTION, SOLUTION INTRAMUSCULAR; INTRAVENOUS AS NEEDED
Status: DISCONTINUED | OUTPATIENT
Start: 2018-01-01 | End: 2018-01-01 | Stop reason: HOSPADM

## 2018-01-01 RX ORDER — MIDAZOLAM HYDROCHLORIDE 1 MG/ML
INJECTION INTRAMUSCULAR; INTRAVENOUS AS NEEDED
Status: DISCONTINUED | OUTPATIENT
Start: 2018-01-01 | End: 2018-01-01 | Stop reason: HOSPADM

## 2018-01-01 RX ORDER — CITALOPRAM 20 MG/1
20 TABLET ORAL DAILY
Status: DISCONTINUED | OUTPATIENT
Start: 2018-01-01 | End: 2018-01-17 | Stop reason: HOSPADM

## 2018-01-01 RX ORDER — HYDRALAZINE HYDROCHLORIDE 50 MG/1
50 TABLET, FILM COATED ORAL EVERY 8 HOURS SCHEDULED
Status: DISCONTINUED | OUTPATIENT
Start: 2018-01-01 | End: 2018-01-17 | Stop reason: HOSPADM

## 2018-01-01 RX ORDER — CEFTRIAXONE SODIUM 1 G/50ML
1 INJECTION, SOLUTION INTRAVENOUS EVERY 24 HOURS
Status: DISCONTINUED | OUTPATIENT
Start: 2018-01-01 | End: 2018-01-17 | Stop reason: HOSPADM

## 2018-01-01 RX ORDER — SODIUM CHLORIDE 0.9 % (FLUSH) 0.9 %
3 SYRINGE (ML) INJECTION AS NEEDED
Status: DISCONTINUED | OUTPATIENT
Start: 2018-01-01 | End: 2018-01-01

## 2018-01-01 RX ORDER — METOLAZONE 5 MG/1
5 TABLET ORAL DAILY
Status: DISCONTINUED | OUTPATIENT
Start: 2018-01-01 | End: 2018-01-01

## 2018-01-01 RX ORDER — IPRATROPIUM BROMIDE AND ALBUTEROL SULFATE 2.5; .5 MG/3ML; MG/3ML
3 SOLUTION RESPIRATORY (INHALATION) EVERY 4 HOURS PRN
Status: DISCONTINUED | OUTPATIENT
Start: 2018-01-01 | End: 2018-01-01

## 2018-01-01 RX ORDER — ALLOPURINOL 100 MG/1
100 TABLET ORAL DAILY
Status: DISCONTINUED | OUTPATIENT
Start: 2018-01-01 | End: 2018-01-17 | Stop reason: HOSPADM

## 2018-01-01 RX ORDER — IPRATROPIUM BROMIDE AND ALBUTEROL SULFATE 2.5; .5 MG/3ML; MG/3ML
3 SOLUTION RESPIRATORY (INHALATION)
Status: DISCONTINUED | OUTPATIENT
Start: 2018-01-01 | End: 2018-01-17 | Stop reason: HOSPADM

## 2018-01-01 RX ORDER — POTASSIUM CHLORIDE 750 MG/1
40 CAPSULE, EXTENDED RELEASE ORAL EVERY 8 HOURS
Status: DISCONTINUED | OUTPATIENT
Start: 2018-01-01 | End: 2018-01-01

## 2018-01-01 RX ORDER — LOSARTAN POTASSIUM 100 MG/1
100 TABLET ORAL
Status: DISCONTINUED | OUTPATIENT
Start: 2018-01-01 | End: 2018-01-01

## 2018-01-01 RX ORDER — NITROGLYCERIN 0.4 MG/1
0.4 TABLET SUBLINGUAL
Status: DISCONTINUED | OUTPATIENT
Start: 2018-01-01 | End: 2018-01-17 | Stop reason: HOSPADM

## 2018-01-01 RX ORDER — LOSARTAN POTASSIUM 50 MG/1
50 TABLET ORAL
Status: DISCONTINUED | OUTPATIENT
Start: 2018-01-01 | End: 2018-01-01

## 2018-01-01 RX ORDER — BUMETANIDE 0.25 MG/ML
4 INJECTION INTRAMUSCULAR; INTRAVENOUS EVERY 12 HOURS
Status: DISCONTINUED | OUTPATIENT
Start: 2018-01-01 | End: 2018-01-01

## 2018-01-01 RX ORDER — VANCOMYCIN HYDROCHLORIDE 1 G/200ML
INJECTION, SOLUTION INTRAVENOUS CONTINUOUS PRN
Status: DISCONTINUED | OUTPATIENT
Start: 2018-01-01 | End: 2018-01-01 | Stop reason: HOSPADM

## 2018-01-01 RX ORDER — AMLODIPINE BESYLATE 5 MG/1
5 TABLET ORAL
Status: DISCONTINUED | OUTPATIENT
Start: 2018-01-01 | End: 2018-01-01

## 2018-01-01 RX ORDER — CARVEDILOL 25 MG/1
25 TABLET ORAL EVERY 12 HOURS
Status: DISCONTINUED | OUTPATIENT
Start: 2018-01-01 | End: 2018-01-01

## 2018-01-01 RX ORDER — HYDROCODONE BITARTRATE AND ACETAMINOPHEN 7.5; 325 MG/1; MG/1
1 TABLET ORAL EVERY 4 HOURS PRN
Status: DISCONTINUED | OUTPATIENT
Start: 2018-01-01 | End: 2018-01-17 | Stop reason: HOSPADM

## 2018-01-01 RX ADMIN — POTASSIUM CHLORIDE 10 MEQ: 7.46 INJECTION, SOLUTION INTRAVENOUS at 13:33

## 2018-01-01 RX ADMIN — CITALOPRAM 20 MG: 20 TABLET, FILM COATED ORAL at 09:13

## 2018-01-01 RX ADMIN — CARVEDILOL 37.5 MG: 25 TABLET, FILM COATED ORAL at 10:23

## 2018-01-01 RX ADMIN — BUDESONIDE AND FORMOTEROL FUMARATE DIHYDRATE 2 PUFF: 160; 4.5 AEROSOL RESPIRATORY (INHALATION) at 20:54

## 2018-01-01 RX ADMIN — EPINEPHRINE 1 MG: 0.1 INJECTION, SOLUTION ENDOTRACHEAL; INTRACARDIAC; INTRAVENOUS at 20:38

## 2018-01-01 RX ADMIN — HYDRALAZINE HYDROCHLORIDE 10 MG: 10 TABLET, FILM COATED ORAL at 21:33

## 2018-01-01 RX ADMIN — PANTOPRAZOLE SODIUM 40 MG: 40 INJECTION, POWDER, FOR SOLUTION INTRAVENOUS at 21:11

## 2018-01-01 RX ADMIN — CARVEDILOL 37.5 MG: 25 TABLET, FILM COATED ORAL at 21:07

## 2018-01-01 RX ADMIN — DOCUSATE SODIUM -SENNOSIDES 1 TABLET: 50; 8.6 TABLET, COATED ORAL at 20:58

## 2018-01-01 RX ADMIN — BUMETANIDE 4 MG: 0.25 INJECTION INTRAMUSCULAR; INTRAVENOUS at 17:43

## 2018-01-01 RX ADMIN — BUMETANIDE 2 MG/HR: 0.25 INJECTION INTRAMUSCULAR; INTRAVENOUS at 15:28

## 2018-01-01 RX ADMIN — LOSARTAN POTASSIUM 100 MG: 100 TABLET ORAL at 09:56

## 2018-01-01 RX ADMIN — POTASSIUM CHLORIDE 40 MEQ: 750 CAPSULE, EXTENDED RELEASE ORAL at 16:58

## 2018-01-01 RX ADMIN — PANTOPRAZOLE SODIUM 40 MG: 40 TABLET, DELAYED RELEASE ORAL at 07:09

## 2018-01-01 RX ADMIN — ASPIRIN 81 MG: 81 TABLET ORAL at 10:23

## 2018-01-01 RX ADMIN — POTASSIUM CHLORIDE 40 MEQ: 750 CAPSULE, EXTENDED RELEASE ORAL at 17:54

## 2018-01-01 RX ADMIN — ATORVASTATIN CALCIUM 20 MG: 20 TABLET, FILM COATED ORAL at 09:41

## 2018-01-01 RX ADMIN — PANTOPRAZOLE SODIUM 40 MG: 40 TABLET, DELAYED RELEASE ORAL at 05:53

## 2018-01-01 RX ADMIN — PANTOPRAZOLE SODIUM 40 MG: 40 INJECTION, POWDER, FOR SOLUTION INTRAVENOUS at 09:23

## 2018-01-01 RX ADMIN — CARVEDILOL 37.5 MG: 25 TABLET, FILM COATED ORAL at 20:38

## 2018-01-01 RX ADMIN — BUMETANIDE 2 MG/HR: 0.25 INJECTION INTRAMUSCULAR; INTRAVENOUS at 10:34

## 2018-01-01 RX ADMIN — PANTOPRAZOLE SODIUM 40 MG: 40 TABLET, DELAYED RELEASE ORAL at 05:42

## 2018-01-01 RX ADMIN — BUMETANIDE 2 MG/HR: 0.25 INJECTION INTRAMUSCULAR; INTRAVENOUS at 01:33

## 2018-01-01 RX ADMIN — METOLAZONE 5 MG: 5 TABLET ORAL at 09:42

## 2018-01-01 RX ADMIN — BUMETANIDE 4 MG: 0.25 INJECTION INTRAMUSCULAR; INTRAVENOUS at 05:03

## 2018-01-01 RX ADMIN — ATORVASTATIN CALCIUM 20 MG: 20 TABLET, FILM COATED ORAL at 09:56

## 2018-01-01 RX ADMIN — LORAZEPAM 0.25 MG: 0.5 TABLET ORAL at 14:39

## 2018-01-01 RX ADMIN — BUMETANIDE 2 MG/HR: 0.25 INJECTION INTRAMUSCULAR; INTRAVENOUS at 20:59

## 2018-01-01 RX ADMIN — POTASSIUM CHLORIDE 40 MEQ: 750 CAPSULE, EXTENDED RELEASE ORAL at 01:13

## 2018-01-01 RX ADMIN — HYDRALAZINE HYDROCHLORIDE 25 MG: 25 TABLET ORAL at 15:47

## 2018-01-01 RX ADMIN — LOSARTAN POTASSIUM 50 MG: 50 TABLET, FILM COATED ORAL at 09:59

## 2018-01-01 RX ADMIN — PANTOPRAZOLE SODIUM 40 MG: 40 INJECTION, POWDER, FOR SOLUTION INTRAVENOUS at 09:42

## 2018-01-01 RX ADMIN — METOLAZONE 5 MG: 5 TABLET ORAL at 10:23

## 2018-01-01 RX ADMIN — CARVEDILOL 37.5 MG: 25 TABLET, FILM COATED ORAL at 21:24

## 2018-01-01 RX ADMIN — BUDESONIDE AND FORMOTEROL FUMARATE DIHYDRATE 2 PUFF: 160; 4.5 AEROSOL RESPIRATORY (INHALATION) at 09:55

## 2018-01-01 RX ADMIN — PROPOFOL 100 MG: 10 INJECTION, EMULSION INTRAVENOUS at 16:00

## 2018-01-01 RX ADMIN — BUDESONIDE AND FORMOTEROL FUMARATE DIHYDRATE 2 PUFF: 160; 4.5 AEROSOL RESPIRATORY (INHALATION) at 08:18

## 2018-01-01 RX ADMIN — BUMETANIDE 4 MG: 0.25 INJECTION INTRAMUSCULAR; INTRAVENOUS at 17:14

## 2018-01-01 RX ADMIN — METOLAZONE 5 MG: 5 TABLET ORAL at 10:34

## 2018-01-01 RX ADMIN — BUDESONIDE AND FORMOTEROL FUMARATE DIHYDRATE 2 PUFF: 160; 4.5 AEROSOL RESPIRATORY (INHALATION) at 21:08

## 2018-01-01 RX ADMIN — BUMETANIDE 2 MG/HR: 0.25 INJECTION INTRAMUSCULAR; INTRAVENOUS at 11:53

## 2018-01-01 RX ADMIN — ALLOPURINOL 100 MG: 100 TABLET ORAL at 10:06

## 2018-01-01 RX ADMIN — DOBUTAMINE IN DEXTROSE 5 MCG/KG/MIN: 100 INJECTION, SOLUTION INTRAVENOUS at 00:06

## 2018-01-01 RX ADMIN — CARVEDILOL 37.5 MG: 25 TABLET, FILM COATED ORAL at 21:10

## 2018-01-01 RX ADMIN — BUDESONIDE AND FORMOTEROL FUMARATE DIHYDRATE 2 PUFF: 160; 4.5 AEROSOL RESPIRATORY (INHALATION) at 07:03

## 2018-01-01 RX ADMIN — CARVEDILOL 50 MG: 25 TABLET, FILM COATED ORAL at 09:23

## 2018-01-01 RX ADMIN — BUDESONIDE AND FORMOTEROL FUMARATE DIHYDRATE 2 PUFF: 160; 4.5 AEROSOL RESPIRATORY (INHALATION) at 09:10

## 2018-01-01 RX ADMIN — CARVEDILOL 37.5 MG: 25 TABLET, FILM COATED ORAL at 21:46

## 2018-01-01 RX ADMIN — SODIUM CHLORIDE, POTASSIUM CHLORIDE, SODIUM LACTATE AND CALCIUM CHLORIDE: 600; 310; 30; 20 INJECTION, SOLUTION INTRAVENOUS at 16:00

## 2018-01-01 RX ADMIN — CARVEDILOL 37.5 MG: 25 TABLET, FILM COATED ORAL at 21:58

## 2018-01-01 RX ADMIN — BUMETANIDE 4 MG: 0.25 INJECTION INTRAMUSCULAR; INTRAVENOUS at 05:10

## 2018-01-01 RX ADMIN — DOCUSATE SODIUM -SENNOSIDES 1 TABLET: 50; 8.6 TABLET, COATED ORAL at 21:45

## 2018-01-01 RX ADMIN — ACETAMINOPHEN 650 MG: 325 TABLET ORAL at 14:39

## 2018-01-01 RX ADMIN — DOCUSATE SODIUM -SENNOSIDES 1 TABLET: 50; 8.6 TABLET, COATED ORAL at 20:38

## 2018-01-01 RX ADMIN — BUMETANIDE 4 MG: 0.25 INJECTION INTRAMUSCULAR; INTRAVENOUS at 08:56

## 2018-01-01 RX ADMIN — TORSEMIDE 100 MG: 100 TABLET ORAL at 11:55

## 2018-01-01 RX ADMIN — EPINEPHRINE 1 MG: 0.1 INJECTION, SOLUTION ENDOTRACHEAL; INTRACARDIAC; INTRAVENOUS at 20:51

## 2018-01-01 RX ADMIN — CITALOPRAM 20 MG: 20 TABLET, FILM COATED ORAL at 09:43

## 2018-01-01 RX ADMIN — POTASSIUM CHLORIDE 40 MEQ: 750 CAPSULE, EXTENDED RELEASE ORAL at 01:23

## 2018-01-01 RX ADMIN — BUMETANIDE 4 MG: 0.25 INJECTION INTRAMUSCULAR; INTRAVENOUS at 09:40

## 2018-01-01 RX ADMIN — DOCUSATE SODIUM -SENNOSIDES 1 TABLET: 50; 8.6 TABLET, COATED ORAL at 21:58

## 2018-01-01 RX ADMIN — CARVEDILOL 25 MG: 25 TABLET, FILM COATED ORAL at 14:00

## 2018-01-01 RX ADMIN — EPINEPHRINE 1 MG: 0.1 INJECTION, SOLUTION ENDOTRACHEAL; INTRACARDIAC; INTRAVENOUS at 20:54

## 2018-01-01 RX ADMIN — PYRIDOXINE HCL TAB 50 MG 100 MG: 50 TAB at 10:23

## 2018-01-01 RX ADMIN — HYDRALAZINE HYDROCHLORIDE 25 MG: 25 TABLET ORAL at 21:58

## 2018-01-01 RX ADMIN — BUDESONIDE AND FORMOTEROL FUMARATE DIHYDRATE 2 PUFF: 160; 4.5 AEROSOL RESPIRATORY (INHALATION) at 09:00

## 2018-01-01 RX ADMIN — BUDESONIDE AND FORMOTEROL FUMARATE DIHYDRATE 2 PUFF: 160; 4.5 AEROSOL RESPIRATORY (INHALATION) at 20:55

## 2018-01-01 RX ADMIN — HYDRALAZINE HYDROCHLORIDE 25 MG: 25 TABLET ORAL at 06:27

## 2018-01-01 RX ADMIN — ALLOPURINOL 100 MG: 100 TABLET ORAL at 09:13

## 2018-01-01 RX ADMIN — BUDESONIDE AND FORMOTEROL FUMARATE DIHYDRATE 2 PUFF: 160; 4.5 AEROSOL RESPIRATORY (INHALATION) at 08:53

## 2018-01-01 RX ADMIN — BUMETANIDE 4 MG: 0.25 INJECTION INTRAMUSCULAR; INTRAVENOUS at 01:45

## 2018-01-01 RX ADMIN — HYDRALAZINE HYDROCHLORIDE 10 MG: 10 TABLET, FILM COATED ORAL at 15:48

## 2018-01-01 RX ADMIN — ASPIRIN 81 MG: 81 TABLET ORAL at 09:41

## 2018-01-01 RX ADMIN — POTASSIUM CHLORIDE 40 MEQ: 750 CAPSULE, EXTENDED RELEASE ORAL at 11:55

## 2018-01-01 RX ADMIN — DOCUSATE SODIUM -SENNOSIDES 1 TABLET: 50; 8.6 TABLET, COATED ORAL at 21:10

## 2018-01-01 RX ADMIN — POTASSIUM CHLORIDE 40 MEQ: 750 CAPSULE, EXTENDED RELEASE ORAL at 00:41

## 2018-01-01 RX ADMIN — PANTOPRAZOLE SODIUM 40 MG: 40 TABLET, DELAYED RELEASE ORAL at 05:36

## 2018-01-01 RX ADMIN — DOCUSATE SODIUM -SENNOSIDES 1 TABLET: 50; 8.6 TABLET, COATED ORAL at 21:07

## 2018-01-01 RX ADMIN — CARVEDILOL 37.5 MG: 25 TABLET, FILM COATED ORAL at 20:58

## 2018-01-01 RX ADMIN — POTASSIUM CHLORIDE 40 MEQ: 750 CAPSULE, EXTENDED RELEASE ORAL at 09:43

## 2018-01-01 RX ADMIN — EPINEPHRINE 1 MG: 0.1 INJECTION, SOLUTION ENDOTRACHEAL; INTRACARDIAC; INTRAVENOUS at 20:45

## 2018-01-01 RX ADMIN — AMLODIPINE BESYLATE 5 MG: 5 TABLET ORAL at 09:13

## 2018-01-01 RX ADMIN — ACETAMINOPHEN 650 MG: 325 TABLET ORAL at 09:45

## 2018-01-01 RX ADMIN — CITALOPRAM 20 MG: 20 TABLET, FILM COATED ORAL at 09:41

## 2018-01-01 RX ADMIN — BUMETANIDE 4 MG: 0.25 INJECTION INTRAMUSCULAR; INTRAVENOUS at 01:18

## 2018-01-01 RX ADMIN — CARVEDILOL 37.5 MG: 25 TABLET, FILM COATED ORAL at 09:17

## 2018-01-01 RX ADMIN — CITALOPRAM 20 MG: 20 TABLET, FILM COATED ORAL at 08:56

## 2018-01-01 RX ADMIN — CARVEDILOL 37.5 MG: 25 TABLET, FILM COATED ORAL at 10:05

## 2018-01-01 RX ADMIN — HYDRALAZINE HYDROCHLORIDE 25 MG: 25 TABLET ORAL at 09:43

## 2018-01-01 RX ADMIN — PYRIDOXINE HCL TAB 50 MG 100 MG: 50 TAB at 09:13

## 2018-01-01 RX ADMIN — HYDRALAZINE HYDROCHLORIDE 25 MG: 25 TABLET ORAL at 06:05

## 2018-01-01 RX ADMIN — CARVEDILOL 37.5 MG: 25 TABLET, FILM COATED ORAL at 09:42

## 2018-01-01 RX ADMIN — LORAZEPAM 0.25 MG: 0.5 TABLET ORAL at 01:33

## 2018-01-01 RX ADMIN — BUMETANIDE 2 MG/HR: 0.25 INJECTION INTRAMUSCULAR; INTRAVENOUS at 06:28

## 2018-01-01 RX ADMIN — ASPIRIN 81 MG: 81 TABLET ORAL at 10:05

## 2018-01-01 RX ADMIN — PANTOPRAZOLE SODIUM 40 MG: 40 TABLET, DELAYED RELEASE ORAL at 06:52

## 2018-01-01 RX ADMIN — HYDRALAZINE HYDROCHLORIDE 25 MG: 25 TABLET ORAL at 14:39

## 2018-01-01 RX ADMIN — DOBUTAMINE IN DEXTROSE 5 MCG/KG/MIN: 100 INJECTION, SOLUTION INTRAVENOUS at 15:18

## 2018-01-01 RX ADMIN — TORSEMIDE 100 MG: 100 TABLET ORAL at 14:23

## 2018-01-01 RX ADMIN — EPINEPHRINE 1 MG: 0.1 INJECTION, SOLUTION ENDOTRACHEAL; INTRACARDIAC; INTRAVENOUS at 20:42

## 2018-01-01 RX ADMIN — PYRIDOXINE HCL TAB 50 MG 100 MG: 50 TAB at 10:28

## 2018-01-01 RX ADMIN — ASPIRIN 81 MG: 81 TABLET ORAL at 09:13

## 2018-01-01 RX ADMIN — PYRIDOXINE HCL TAB 50 MG 100 MG: 50 TAB at 09:20

## 2018-01-01 RX ADMIN — HYDRALAZINE HYDROCHLORIDE 25 MG: 25 TABLET ORAL at 21:24

## 2018-01-01 RX ADMIN — POTASSIUM CHLORIDE 40 MEQ: 750 CAPSULE, EXTENDED RELEASE ORAL at 18:17

## 2018-01-01 RX ADMIN — ALLOPURINOL 100 MG: 100 TABLET ORAL at 09:17

## 2018-01-01 RX ADMIN — PANTOPRAZOLE SODIUM 40 MG: 40 INJECTION, POWDER, FOR SOLUTION INTRAVENOUS at 21:07

## 2018-01-01 RX ADMIN — DOCUSATE SODIUM -SENNOSIDES 1 TABLET: 50; 8.6 TABLET, COATED ORAL at 21:33

## 2018-01-01 RX ADMIN — CARVEDILOL 37.5 MG: 25 TABLET, FILM COATED ORAL at 21:09

## 2018-01-01 RX ADMIN — SODIUM BICARBONATE 50 MEQ: 84 INJECTION, SOLUTION INTRAVENOUS at 20:43

## 2018-01-01 RX ADMIN — POTASSIUM CHLORIDE 40 MEQ: 750 CAPSULE, EXTENDED RELEASE ORAL at 10:23

## 2018-01-01 RX ADMIN — EPINEPHRINE 1 MG: 0.1 INJECTION, SOLUTION ENDOTRACHEAL; INTRACARDIAC; INTRAVENOUS at 20:48

## 2018-01-01 RX ADMIN — POTASSIUM CHLORIDE 40 MEQ: 750 CAPSULE, EXTENDED RELEASE ORAL at 17:05

## 2018-01-01 RX ADMIN — METOLAZONE 5 MG: 5 TABLET ORAL at 11:51

## 2018-01-01 RX ADMIN — BUMETANIDE 2 MG/HR: 0.25 INJECTION INTRAMUSCULAR; INTRAVENOUS at 04:57

## 2018-01-01 RX ADMIN — ASPIRIN 81 MG: 81 TABLET ORAL at 09:17

## 2018-01-01 RX ADMIN — BUMETANIDE 4 MG: 0.25 INJECTION INTRAMUSCULAR; INTRAVENOUS at 00:15

## 2018-01-01 RX ADMIN — PANTOPRAZOLE SODIUM 40 MG: 40 TABLET, DELAYED RELEASE ORAL at 06:04

## 2018-01-01 RX ADMIN — BUMETANIDE 4 MG: 0.25 INJECTION INTRAMUSCULAR; INTRAVENOUS at 18:14

## 2018-01-01 RX ADMIN — BUMETANIDE 4 MG: 0.25 INJECTION INTRAMUSCULAR; INTRAVENOUS at 17:05

## 2018-01-01 RX ADMIN — ALLOPURINOL 100 MG: 100 TABLET ORAL at 10:23

## 2018-01-01 RX ADMIN — HYDRALAZINE HYDROCHLORIDE 25 MG: 25 TABLET ORAL at 14:08

## 2018-01-01 RX ADMIN — ASPIRIN 81 MG: 81 TABLET ORAL at 10:00

## 2018-01-01 RX ADMIN — LOSARTAN POTASSIUM 100 MG: 100 TABLET ORAL at 08:55

## 2018-01-01 RX ADMIN — CARVEDILOL 37.5 MG: 25 TABLET, FILM COATED ORAL at 11:33

## 2018-01-01 RX ADMIN — ACETAMINOPHEN 650 MG: 325 TABLET ORAL at 01:44

## 2018-01-01 RX ADMIN — MICONAZOLE NITRATE: 2 POWDER TOPICAL at 18:21

## 2018-01-01 RX ADMIN — HYDRALAZINE HYDROCHLORIDE 25 MG: 25 TABLET ORAL at 05:42

## 2018-01-01 RX ADMIN — BUMETANIDE 4 MG: 0.25 INJECTION INTRAMUSCULAR; INTRAVENOUS at 10:00

## 2018-01-01 RX ADMIN — ASPIRIN 81 MG: 81 TABLET ORAL at 09:56

## 2018-01-01 RX ADMIN — ACETAMINOPHEN 650 MG: 325 TABLET ORAL at 01:40

## 2018-01-01 RX ADMIN — BUMETANIDE 4 MG: 0.25 INJECTION INTRAMUSCULAR; INTRAVENOUS at 09:13

## 2018-01-01 RX ADMIN — CITALOPRAM 20 MG: 20 TABLET, FILM COATED ORAL at 10:05

## 2018-01-01 RX ADMIN — POTASSIUM CHLORIDE 40 MEQ: 750 CAPSULE, EXTENDED RELEASE ORAL at 00:23

## 2018-01-01 RX ADMIN — CITALOPRAM 20 MG: 20 TABLET, FILM COATED ORAL at 09:59

## 2018-01-01 RX ADMIN — PROPOFOL 160 MCG/KG/MIN: 10 INJECTION, EMULSION INTRAVENOUS at 16:00

## 2018-01-01 RX ADMIN — DOBUTAMINE IN DEXTROSE 5 MCG/KG/MIN: 100 INJECTION, SOLUTION INTRAVENOUS at 10:01

## 2018-01-01 RX ADMIN — BUDESONIDE AND FORMOTEROL FUMARATE DIHYDRATE 2 PUFF: 160; 4.5 AEROSOL RESPIRATORY (INHALATION) at 11:16

## 2018-01-01 RX ADMIN — TORSEMIDE 100 MG: 100 TABLET ORAL at 23:26

## 2018-01-01 RX ADMIN — SODIUM BICARBONATE 50 MEQ: 84 INJECTION, SOLUTION INTRAVENOUS at 20:50

## 2018-01-01 RX ADMIN — CITALOPRAM 20 MG: 20 TABLET, FILM COATED ORAL at 09:57

## 2018-01-01 RX ADMIN — ASPIRIN 81 MG: 81 TABLET ORAL at 08:56

## 2018-01-01 RX ADMIN — BUMETANIDE 2 MG/HR: 0.25 INJECTION INTRAMUSCULAR; INTRAVENOUS at 19:15

## 2018-01-01 RX ADMIN — HYDRALAZINE HYDROCHLORIDE 25 MG: 25 TABLET ORAL at 21:10

## 2018-01-01 RX ADMIN — CARVEDILOL 37.5 MG: 25 TABLET, FILM COATED ORAL at 08:55

## 2018-01-01 RX ADMIN — POTASSIUM CHLORIDE 40 MEQ: 750 CAPSULE, EXTENDED RELEASE ORAL at 10:12

## 2018-01-01 RX ADMIN — FLUTICASONE PROPIONATE 2 SPRAY: 50 SPRAY, METERED NASAL at 10:24

## 2018-01-01 RX ADMIN — BUMETANIDE 4 MG: 0.25 INJECTION INTRAMUSCULAR; INTRAVENOUS at 05:22

## 2018-01-01 RX ADMIN — CARVEDILOL 25 MG: 25 TABLET, FILM COATED ORAL at 01:44

## 2018-01-01 RX ADMIN — HYDRALAZINE HYDROCHLORIDE 25 MG: 25 TABLET ORAL at 14:03

## 2018-01-01 RX ADMIN — METOLAZONE 5 MG: 5 TABLET ORAL at 09:17

## 2018-01-01 RX ADMIN — DOCUSATE SODIUM -SENNOSIDES 1 TABLET: 50; 8.6 TABLET, COATED ORAL at 21:09

## 2018-01-01 RX ADMIN — PYRIDOXINE HCL TAB 50 MG 100 MG: 50 TAB at 10:05

## 2018-01-01 RX ADMIN — CARVEDILOL 37.5 MG: 25 TABLET, FILM COATED ORAL at 09:13

## 2018-01-01 RX ADMIN — POTASSIUM CHLORIDE 40 MEQ: 750 CAPSULE, EXTENDED RELEASE ORAL at 09:17

## 2018-01-01 RX ADMIN — LIDOCAINE HYDROCHLORIDE 60 MG: 20 INJECTION, SOLUTION INFILTRATION; PERINEURAL at 16:00

## 2018-01-01 RX ADMIN — BUDESONIDE AND FORMOTEROL FUMARATE DIHYDRATE 2 PUFF: 160; 4.5 AEROSOL RESPIRATORY (INHALATION) at 07:29

## 2018-01-01 RX ADMIN — PYRIDOXINE HCL TAB 50 MG 100 MG: 50 TAB at 08:55

## 2018-01-01 RX ADMIN — ATORVASTATIN CALCIUM 20 MG: 20 TABLET, FILM COATED ORAL at 08:55

## 2018-01-01 RX ADMIN — CARVEDILOL 37.5 MG: 25 TABLET, FILM COATED ORAL at 10:06

## 2018-01-01 RX ADMIN — FLUTICASONE PROPIONATE 2 SPRAY: 50 SPRAY, METERED NASAL at 14:39

## 2018-01-01 RX ADMIN — ATORVASTATIN CALCIUM 20 MG: 20 TABLET, FILM COATED ORAL at 10:00

## 2018-01-01 RX ADMIN — BUMETANIDE 4 MG: 0.25 INJECTION INTRAMUSCULAR; INTRAVENOUS at 18:07

## 2018-01-01 RX ADMIN — CALCIUM CHLORIDE 1 G: 100 INJECTION, SOLUTION INTRAVENOUS at 20:43

## 2018-01-01 RX ADMIN — BUMETANIDE 4 MG: 0.25 INJECTION INTRAMUSCULAR; INTRAVENOUS at 16:58

## 2018-01-01 RX ADMIN — CARVEDILOL 37.5 MG: 25 TABLET, FILM COATED ORAL at 09:56

## 2018-01-01 RX ADMIN — MICONAZOLE NITRATE: 2 POWDER TOPICAL at 23:26

## 2018-01-01 RX ADMIN — BUMETANIDE 2 MG/HR: 0.25 INJECTION INTRAMUSCULAR; INTRAVENOUS at 23:59

## 2018-01-01 RX ADMIN — PANTOPRAZOLE SODIUM 40 MG: 40 TABLET, DELAYED RELEASE ORAL at 06:27

## 2018-01-01 RX ADMIN — POTASSIUM CHLORIDE 40 MEQ: 750 CAPSULE, EXTENDED RELEASE ORAL at 23:19

## 2018-01-01 RX ADMIN — BUDESONIDE AND FORMOTEROL FUMARATE DIHYDRATE 2 PUFF: 160; 4.5 AEROSOL RESPIRATORY (INHALATION) at 21:45

## 2018-01-01 RX ADMIN — BUMETANIDE 4 MG: 0.25 INJECTION INTRAMUSCULAR; INTRAVENOUS at 10:28

## 2018-01-01 RX ADMIN — PYRIDOXINE HCL TAB 50 MG 100 MG: 50 TAB at 09:42

## 2018-01-01 RX ADMIN — DIPHENHYDRAMINE HYDROCHLORIDE 25 MG: 25 CAPSULE ORAL at 01:44

## 2018-01-01 RX ADMIN — ALLOPURINOL 100 MG: 100 TABLET ORAL at 09:42

## 2018-01-01 RX ADMIN — MICONAZOLE NITRATE 1 APPLICATION: 2 POWDER TOPICAL at 17:54

## 2018-01-01 RX ADMIN — ALLOPURINOL 100 MG: 100 TABLET ORAL at 20:58

## 2018-01-01 RX ADMIN — BUMETANIDE 2 MG: 0.25 INJECTION INTRAMUSCULAR; INTRAVENOUS at 21:03

## 2018-01-01 RX ADMIN — PANTOPRAZOLE SODIUM 40 MG: 40 TABLET, DELAYED RELEASE ORAL at 05:57

## 2018-01-01 RX ADMIN — HYDRALAZINE HYDROCHLORIDE 10 MG: 10 TABLET, FILM COATED ORAL at 05:36

## 2018-01-01 RX ADMIN — BUMETANIDE 2 MG/HR: 0.25 INJECTION INTRAMUSCULAR; INTRAVENOUS at 15:48

## 2018-01-01 RX ADMIN — BUMETANIDE 4 MG: 0.25 INJECTION INTRAMUSCULAR; INTRAVENOUS at 01:34

## 2018-01-01 RX ADMIN — PANTOPRAZOLE SODIUM 40 MG: 40 TABLET, DELAYED RELEASE ORAL at 07:50

## 2018-01-01 RX ADMIN — BUMETANIDE 4 MG: 0.25 INJECTION INTRAMUSCULAR; INTRAVENOUS at 02:11

## 2018-01-01 RX ADMIN — CARVEDILOL 37.5 MG: 25 TABLET, FILM COATED ORAL at 21:33

## 2018-01-01 RX ADMIN — FLUTICASONE PROPIONATE 2 SPRAY: 50 SPRAY, METERED NASAL at 09:23

## 2018-01-01 RX ADMIN — CITALOPRAM 20 MG: 20 TABLET, FILM COATED ORAL at 09:20

## 2018-01-01 RX ADMIN — ACETAMINOPHEN 650 MG: 325 TABLET ORAL at 02:36

## 2018-01-01 RX ADMIN — HYDRALAZINE HYDROCHLORIDE 25 MG: 25 TABLET ORAL at 21:45

## 2018-01-01 RX ADMIN — BUMETANIDE 4 MG: 0.25 INJECTION INTRAMUSCULAR; INTRAVENOUS at 18:39

## 2018-01-01 RX ADMIN — PANTOPRAZOLE SODIUM 40 MG: 40 INJECTION, POWDER, FOR SOLUTION INTRAVENOUS at 13:51

## 2018-01-01 RX ADMIN — CARVEDILOL 25 MG: 25 TABLET, FILM COATED ORAL at 01:33

## 2018-01-01 RX ADMIN — POTASSIUM CHLORIDE 40 MEQ: 750 CAPSULE, EXTENDED RELEASE ORAL at 05:45

## 2018-01-01 RX ADMIN — FLUTICASONE PROPIONATE 2 SPRAY: 50 SPRAY, METERED NASAL at 20:25

## 2018-01-01 RX ADMIN — PYRIDOXINE HCL TAB 50 MG 100 MG: 50 TAB at 09:41

## 2018-01-01 RX ADMIN — BUDESONIDE AND FORMOTEROL FUMARATE DIHYDRATE 2 PUFF: 160; 4.5 AEROSOL RESPIRATORY (INHALATION) at 09:45

## 2018-01-01 RX ADMIN — HYDRALAZINE HYDROCHLORIDE 25 MG: 25 TABLET ORAL at 10:23

## 2018-01-01 RX ADMIN — CITALOPRAM 20 MG: 20 TABLET, FILM COATED ORAL at 10:23

## 2018-01-01 RX ADMIN — BUDESONIDE AND FORMOTEROL FUMARATE DIHYDRATE 2 PUFF: 160; 4.5 AEROSOL RESPIRATORY (INHALATION) at 07:26

## 2018-01-01 RX ADMIN — BUDESONIDE AND FORMOTEROL FUMARATE DIHYDRATE 2 PUFF: 160; 4.5 AEROSOL RESPIRATORY (INHALATION) at 20:12

## 2018-01-01 RX ADMIN — HYDRALAZINE HYDROCHLORIDE 25 MG: 25 TABLET ORAL at 21:07

## 2018-01-05 PROBLEM — I50.9 CHF (CONGESTIVE HEART FAILURE) (HCC): Status: ACTIVE | Noted: 2018-01-01

## 2018-01-05 NOTE — ED NOTES
When performing EKG, noticed abdomen firm and distended. Collected urine sample from patient.     Livia Mccabe  01/05/18 8119

## 2018-01-06 PROBLEM — I50.43 ACUTE ON CHRONIC COMBINED SYSTOLIC AND DIASTOLIC CONGESTIVE HEART FAILURE (HCC): Status: ACTIVE | Noted: 2018-01-01

## 2018-01-06 PROBLEM — N18.30 CHRONIC KIDNEY DISEASE, STAGE 3 (HCC): Status: ACTIVE | Noted: 2018-01-01

## 2018-01-06 PROBLEM — K55.20 GI AVM (GASTROINTESTINAL ARTERIOVENOUS VASCULAR MALFORMATION): Status: ACTIVE | Noted: 2017-01-01

## 2018-01-06 NOTE — CONSULTS
Referring Provider: Jeff Li MD  Reason for Consultation: Management of patient with chronic kidney disease and fluid excess    Subjective     Chief complaint   Chief Complaint   Patient presents with   • Shortness of Breath     carrying a lotof fluid in legs    • Rectal Bleeding     onset last week       History of present illness:    This is a very pleasant 82-year-old  female who was admitted with increasing shortness of air and fluid excess.  She has history of chronic kidney disease her baseline creatinine around 1.5-1.6.  She's been having the increasing edema and increasing shortness of breath progressively for the past the 2 weeks.  She has history of a source of severe proximal disease also Bella's esophagus, history of angiodysplasia of the colon and chronic anemia, history of degenerative joint disease, anxiety, chronic atrial fibrillation, dyslipidemia and hypertension, obstructive sleep apnea also history of falls or processes and obstructive sleep apnea patient has history of stress incontinence.  Also has history of AVR and TV repair.  She is having increasing orthopnea and PND, no nausea or vomiting, no abdominal pain, no dysuria or gross hematuria, she has increased lower extremity edema.    Past Medical History:   Diagnosis Date   • Angiodysplasia of colon    • Anxiety    • Arthritis    • Asthma    • Atrial fibrillation 02/2017   • Bella's esophagus 3/14/2016   • Cellulitis     left leg hx about a month ago   ALL HEALED   NOT SWELLING OR OOZING NOW  WEARS CURT ACE WRAPS   • Chronic bronchitis    • Chronic kidney disease    • Colon polyp 3/14/2016   • Essential hypertension 8/24/2013    Overview:  2015 IMO UPDATE   • GERD (gastroesophageal reflux disease)    • Hiatal hernia    • History of goiter    • History of transfusion     had reaction to 2nd unit after receiving the 1st   • Hyperlipidemia    • Hypertension    • Hyponatremia    • Intestinal malabsorption 3/31/2016   • Iron  deficiency anemia 03/14/2016    receives iron infusion in past   • Migraine    • Osteoporosis    • PONV (postoperative nausea and vomiting)    • Sleep apnea     cpap   • Stress incontinence     wears pads     Past Surgical History:   Procedure Laterality Date   • APPENDECTOMY     • CARDIAC CATHETERIZATION N/A 9/13/2017    Procedure: Left ventriculography;  Surgeon: Ron Moscoso MD;  Location: [facility] INVASIVE LOCATION;  Service:    • CARDIAC CATHETERIZATION N/A 9/13/2017    Procedure: Left Heart Cath;  Surgeon: Ron Moscoso MD;  Location: [facility] CATH INVASIVE LOCATION;  Service:    • CARDIAC CATHETERIZATION N/A 9/13/2017    Procedure: Coronary angiography;  Surgeon: Ron Moscoso MD;  Location: [facility] CATH INVASIVE LOCATION;  Service:    • CARDIAC CATHETERIZATION N/A 9/13/2017    Procedure: Right Heart Cath;  Surgeon: Ron Moscoso MD;  Location: [facility] CATH INVASIVE LOCATION;  Service:    • CARDIAC ELECTROPHYSIOLOGY PROCEDURE N/A 9/29/2017    Procedure: Device Implant, [place]   PPM;  Surgeon: Benji Martínez MD;  Location: [facility] INVASIVE LOCATION;  Service:    • CARDIAC SURGERY      MVR/TVR   • CERVICAL DISC SURGERY     • COLONOSCOPY     • COLONOSCOPY N/A 2/3/2017    NBIH, diverticulosis, one 9 mm hyperplastic polyp, multiple non-bleeding colonic angioectasias, Path; neg   • ENDOSCOPY N/A 2/2/2017    normal esophagus, normal examined duodenum, non-bleeding gastric ulcers w/no sigmata of bleeding   • HYSTERECTOMY     • MITRAL VALVE REPAIR/REPLACEMENT N/A 9/25/2017    Procedure: KOLTON STERNOTOMY MITRAL VALVE REPLACEMENT, TRICUSPID VALVE REPAIR, MAZE PROCEDURE AND PRP;  Surgeon: Yonas Johnson MD;  Location: [facility] OR;  Service:    • PACEMAKER IMPLANTATION       DUAL PACEMAKER [facility] [place]  MODEL  [id number]  SERIES [id number]   • MA THROMBOENDARTECTMY NECK,NECK INCIS Right 12/7/2017    Procedure: RT CAROTID ENDARTERECTOMY;  Surgeon: Delma Templeton Jr., MD;  Location:  [facility] MAIN OR;  Service: Vascular   • CA TOTAL KNEE ARTHROPLASTY Right 10/25/2016    Procedure: RT TOTAL KNEE ARTHROPLASTY;  Surgeon: Ricky Dsouza MD;  Location: [facility] MAIN OR;  Service: Orthopedics   • THYROID SURGERY      for goiter   • TONSILLECTOMY       Family History   Problem Relation Age of Onset   • Adopted: Yes   • Malig Hyperthermia Neg Hx      No knowledge of chronic kidney disease since she is adopted  Social History   Substance Use Topics   • Smoking status: Former Smoker     Packs/day: 4.00     Years: 33.00     Types: Cigarettes     Quit date: 1980   • Smokeless tobacco: Never Used   • Alcohol use Yes      Comment: once a month a glass of wine or mixed drink     Prescriptions Prior to Admission   Medication Sig Dispense Refill Last Dose   • aspirin 81 MG EC tablet Take 1 tablet by mouth Daily. (Patient taking differently: Take 81 mg by mouth Daily. TO STAY ON) 30 tablet 5 12/6/2017 at 0900   • atorvastatin (LIPITOR) 20 MG tablet Take 20 mg by mouth Daily.  4 12/6/2017 at 0900   • B Complex Vitamins (VITAMIN B COMPLEX PO) Take 1 tablet by mouth Every Morning.   12/6/2017 at 0900   • budesonide-formoterol (SYMBICORT) 160-4.5 MCG/ACT inhaler Inhale 2 puffs 2 (Two) Times a Day.   12/6/2017 at 0900   • bumetanide (BUMEX) 2 MG tablet Take 1.5 tablets by mouth 2 (Two) Times a Day. PT IS TAKING 1 AND 1/2 TABLET  tablet 3 12/6/2017 at 2000   • CALCIUM CARBONATE-VITAMIN D PO Take 1 tablet by mouth Every Morning.   12/6/2017 at 0900   • carvedilol (COREG) 12.5 MG tablet Take 2 tablets by mouth Every 12 (Twelve) Hours. 60 tablet 5 12/7/2017 at 0600   • Cholecalciferol (VITAMIN D-3 PO) Take 2,000 Units by mouth Daily.   12/6/2017 at 0900   • citalopram (CeleXA) 40 MG tablet Take 40 mg by mouth Daily.   12/6/2017 at 0900   • esomeprazole (NexIUM) 40 MG capsule Take 40 mg by mouth Daily Before Supper.   12/6/2017 at 0900   • Fe-Succ Ac-C-Thre Ac-B12-FA (FERREX 150 FORTE PLUS)  MG capsule capsule  "Take 1 capsule by mouth 2 (Two) Times a Day With Meals. 60 each 5 12/4/2017   • hydrALAZINE (APRESOLINE) 25 MG tablet Take 1 tablet by mouth Every 8 (Eight) Hours. 90 tablet 5 12/7/2017 at 0600   • mometasone (NASONEX) 50 MCG/ACT nasal spray 2 sprays into each nostril Every Morning.   12/6/2017 at 0900   • Multiple Vitamins-Minerals (CENTRUM SILVER PO) Take 1 tablet by mouth Daily.   12/6/2017 at 0900   • potassium chloride (MICRO-K) 10 MEQ CR capsule Take 2 capsules by mouth 2 (Two) Times a Day With Meals. 120 capsule 5 12/6/2017 at 2000   • vitamin B-6 (PYRIDOXINE) 100 MG tablet Take 100 mg by mouth Daily.   12/6/2017 at 0900     Allergies:  Sulfa antibiotics    Review of Systems  14 points review of system were negative other than was noted above in the history of present illness    Objective     Vital Signs  Temp:  [97 °F (36.1 °C)-98.4 °F (36.9 °C)] 97.6 °F (36.4 °C)  Heart Rate:  [] 95  Resp:  [18-20] 20  BP: (126-166)/(63-81) 145/66    Flowsheet Rows         First Filed Value    Admission Height  162.6 cm (64\") Documented at 01/05/2018 1538    Admission Weight  94.3 kg (208 lb) Documented at 01/05/2018 1538           I/O this shift:  In: 240 [P.O.:240]  Out: -   I/O last 3 completed shifts:  In: 240 [P.O.:240]  Out: 250 [Urine:250]    Intake/Output Summary (Last 24 hours) at 01/06/18 1124  Last data filed at 01/06/18 0830   Gross per 24 hour   Intake              480 ml   Output              250 ml   Net              230 ml       Physical Exam:  General Appearance: alert, oriented x 3, no acute distress, morbidly obese  Skin: warm and dry  HEENT: pupils round and reactive to light, nonicteric sclerae, oral mucosa normal,   Neck: supple, increased JVD, trachea midline  Lungs: His breath sounds bilaterally with bilateral rhonchi and crackles, unlabored breathing effort  Heart: Irregularly irregular, no rub  Abdomen: soft, non-tender,  present bowel sounds to auscultation  : no palpable " bladder,  Extremities: 2+ lower extremities edema, no cyanosis or clubbing  Joints: No significant deformities noted, no crepitation over the knees or the ankles.  Lymphatics: No cervical or supraclavicular lymphadenopathy.  Neuro: normal speech and mental status    Results Review:  Results for orders placed or performed during the hospital encounter of 01/05/18   Urine Culture - Urine, Urine, Clean Catch   Result Value Ref Range    Urine Culture Growth present, too young to evaluate    Comprehensive Metabolic Panel   Result Value Ref Range    Glucose 137 (H) 65 - 99 mg/dL    BUN 40 (H) 8 - 23 mg/dL    Creatinine 1.61 (H) 0.57 - 1.00 mg/dL    Sodium 131 (L) 136 - 145 mmol/L    Potassium 4.9 3.5 - 5.2 mmol/L    Chloride 95 (L) 98 - 107 mmol/L    CO2 24.6 22.0 - 29.0 mmol/L    Calcium 9.1 8.6 - 10.5 mg/dL    Total Protein 6.5 6.0 - 8.5 g/dL    Albumin 3.00 (L) 3.50 - 5.20 g/dL    ALT (SGPT) 26 1 - 33 U/L    AST (SGOT) 29 1 - 32 U/L    Alkaline Phosphatase 108 39 - 117 U/L    Total Bilirubin 0.6 0.1 - 1.2 mg/dL    eGFR Non African Amer 31 (L) >60 mL/min/1.73    Globulin 3.5 gm/dL    A/G Ratio 0.9 g/dL    BUN/Creatinine Ratio 24.8 7.0 - 25.0    Anion Gap 11.4 mmol/L   BNP   Result Value Ref Range    proBNP 30041.0 (H) 0.0 - 1800.0 pg/mL   Troponin   Result Value Ref Range    Troponin T 0.016 0.000 - 0.030 ng/mL   CBC Auto Differential   Result Value Ref Range    WBC 10.86 (H) 4.50 - 10.70 10*3/mm3    RBC 3.11 (L) 3.90 - 5.20 10*6/mm3    Hemoglobin 9.2 (L) 11.9 - 15.5 g/dL    Hematocrit 29.6 (L) 35.6 - 45.5 %    MCV 95.2 80.5 - 98.2 fL    MCH 29.6 26.9 - 32.0 pg    MCHC 31.1 (L) 32.4 - 36.3 g/dL    RDW 15.8 (H) 11.7 - 13.0 %    RDW-SD 54.6 (H) 37.0 - 54.0 fl    MPV 9.1 6.0 - 12.0 fL    Platelets 357 140 - 500 10*3/mm3    Neutrophil % 83.0 (H) 42.7 - 76.0 %    Lymphocyte % 5.2 (L) 19.6 - 45.3 %    Monocyte % 10.1 5.0 - 12.0 %    Eosinophil % 1.1 0.3 - 6.2 %    Basophil % 0.4 0.0 - 1.5 %    Immature Grans % 0.2 0.0 - 0.5 %     Neutrophils, Absolute 9.01 (H) 1.90 - 8.10 10*3/mm3    Lymphocytes, Absolute 0.57 (L) 0.90 - 4.80 10*3/mm3    Monocytes, Absolute 1.10 0.20 - 1.20 10*3/mm3    Eosinophils, Absolute 0.12 0.00 - 0.70 10*3/mm3    Basophils, Absolute 0.04 0.00 - 0.20 10*3/mm3    Immature Grans, Absolute 0.02 0.00 - 0.03 10*3/mm3   Urinalysis With / Culture If Indicated - Urine, Clean Catch   Result Value Ref Range    Color, UA Yellow Yellow, Straw    Appearance, UA Cloudy (A) Clear    pH, UA <=5.0 5.0 - 8.0    Specific Gravity, UA 1.011 1.005 - 1.030    Glucose, UA Negative Negative    Ketones, UA Negative Negative    Bilirubin, UA Negative Negative    Blood, UA Trace (A) Negative    Protein, UA Negative Negative    Leuk Esterase, UA Large (3+) (A) Negative    Nitrite, UA Negative Negative    Urobilinogen, UA 0.2 E.U./dL 0.2 - 1.0 E.U./dL   Urinalysis, Microscopic Only - Urine, Clean Catch   Result Value Ref Range    RBC, UA 3-5 (A) None Seen, 0-2 /HPF    WBC, UA 31-50 (A) None Seen, 0-2 /HPF    Bacteria, UA 1+ (A) None Seen /HPF    Squamous Epithelial Cells, UA 0-2 None Seen, 0-2 /HPF    Hyaline Casts, UA 7-12 None Seen /LPF    Methodology Automated Microscopy    BNP   Result Value Ref Range    proBNP 73199.0 (H) 0.0 - 1800.0 pg/mL   CBC (No Diff)   Result Value Ref Range    WBC 10.55 4.50 - 10.70 10*3/mm3    RBC 2.79 (L) 3.90 - 5.20 10*6/mm3    Hemoglobin 8.5 (L) 11.9 - 15.5 g/dL    Hematocrit 26.8 (L) 35.6 - 45.5 %    MCV 96.1 80.5 - 98.2 fL    MCH 30.5 26.9 - 32.0 pg    MCHC 31.7 (L) 32.4 - 36.3 g/dL    RDW 15.7 (H) 11.7 - 13.0 %    RDW-SD 54.9 (H) 37.0 - 54.0 fl    MPV 9.2 6.0 - 12.0 fL    Platelets 320 140 - 500 10*3/mm3   Troponin   Result Value Ref Range    Troponin T 0.023 0.000 - 0.030 ng/mL   Lipid Panel   Result Value Ref Range    Total Cholesterol 99 0 - 200 mg/dL    Triglycerides 46 0 - 150 mg/dL    HDL Cholesterol 32 (L) 40 - 60 mg/dL    LDL Cholesterol  58 0 - 100 mg/dL    VLDL Cholesterol 9.2 5 - 40 mg/dL    LDL/HDL  Ratio 1.81    Hemoglobin A1c   Result Value Ref Range    Hemoglobin A1C 4.90 4.80 - 5.60 %   Magnesium   Result Value Ref Range    Magnesium 2.4 1.6 - 2.4 mg/dL   Basic Metabolic Panel   Result Value Ref Range    Glucose 108 (H) 65 - 99 mg/dL    BUN 40 (H) 8 - 23 mg/dL    Creatinine 1.66 (H) 0.57 - 1.00 mg/dL    Sodium 133 (L) 136 - 145 mmol/L    Potassium 4.6 3.5 - 5.2 mmol/L    Chloride 97 (L) 98 - 107 mmol/L    CO2 24.4 22.0 - 29.0 mmol/L    Calcium 8.4 (L) 8.6 - 10.5 mg/dL    eGFR Non African Amer 30 (L) >60 mL/min/1.73    BUN/Creatinine Ratio 24.1 7.0 - 25.0    Anion Gap 11.6 mmol/L   Light Blue Top   Result Value Ref Range    Extra Tube hold for add-on    Green Top (Gel)   Result Value Ref Range    Extra Tube Hold for add-ons.    Lavender Top   Result Value Ref Range    Extra Tube hold for add-on    Gold Top - SST   Result Value Ref Range    Extra Tube Hold for add-ons.      Imaging Results (last 72 hours)     Procedure Component Value Units Date/Time    XR Chest 2 View [[id number]] Collected:  01/05/18 1845     Updated:  01/05/18 1845    Narrative:       TWO VIEWS CHEST     HISTORY:  Shortness of air.      Two views of the chest demonstrates the heart to be at the upper limits  of normal size, similar in contour as compared to 11/30/2017.     There is blunting of the costophrenic angle on the right and a small  right-sided pleural effusion on the right which is new versus the prior  examination. There is mild cephalization of the pulmonary vasculature.  There is no evidence of consolidation.                 aspirin 81 mg Oral Daily   atorvastatin 20 mg Oral Daily   budesonide-formoterol 2 puff Inhalation BID - RT   bumetanide 4 mg Intravenous Q8H   carvedilol 25 mg Oral Q12H   citalopram 20 mg Oral Daily   hydrALAZINE 25 mg Oral Q8H   pantoprazole 40 mg Oral Q AM   vitamin B-6 100 mg Oral Daily          Assessment/Plan   1.  Chronic kidney disease stage III associated with probable nephrosclerosis near  baseline.  2.  Fluid excess and congestive heart failure.  3.  Chronic anemia associated with iron deficiency and the colonic angiodysplasia  4.  Hypertension not well controlled associated with with excess  5.  Mild hyponatremia associated with fluid excess       Plan :  1.  Agree with the current dose of Bumex   2.  In regard to the PPI she will continue on the PPI since she has history of the Bella esophagus as the benefit of the PPI justify the risk of diffuse and it.    3.  Surveillance labs   4.  We'll restrict sodium intake to 2 g daily       Thank you Dr. Li for asking me to see this patient in consultation         I discussed the patients findings and my recommendations with the patient    Emile Ornelas MD  01/06/18  11:24 AM

## 2018-01-06 NOTE — PROGRESS NOTES
Discharge Planning Assessment  [facility]     Patient Name: Raquel Deluna  MRN: [id number]  Today's Date: 1/6/2018    Admit Date: 1/5/2018          Discharge Needs Assessment       01/06/18 1701    Living Environment    Lives With alone    Living Arrangements house    Home Accessibility no concerns;ramps present at home    Type of Financial/Environmental Concern none    Transportation Available family or friend will provide;car    Living Environment    Quality Of Family Relationships supportive;helpful;involved    Discharge Needs Assessment    Concerns To Be Addressed basic needs concerns;discharge planning concerns    Readmission Within The Last 30 Days no previous admission in last 30 days    Discharge Disposition still a patient            Discharge Plan       01/06/18 1704    Case Management/Social Work Plan    Plan Home with [facility]    Additional Comments CCP consult noted. Met with pt at bedside and verified demographic info on facesheet. Pt lives with her son in a SS home with ramp access. Pt reports her son is a quadriplegic and has a caregiver during the day who is also available to assist pt as needed. DME includes walker, w/c and ramp, grab bars, bath bench and elevated commode. Pt is relatively IADL's and drives. Pt confirmed her PCP and pharmacy ([facility]) and denies trouble paying for medications. Pt has used [facility] in the past and would like to use their services again at discharge- referral called to [facility] [phone]) and voicemail message left to follow. Pt has been in skilled care at [facility] and would return if needed. Pt currently plans to return home at discharge and states son will provide transportation home. CCP will continue to follow for discharge needs.............DON Cruz, CCP        Discharge Placement     Facility/Agency Request Status Selected? Address Phone Number Fax Number    [facility] Accepted    [address]  [address] [phone]        Rehan Burnham RN 1/6/2018 17:04    m left [phone]) to follow Rehan Burnham RN                             Demographic Summary       01/06/18 1700    Referral Information    Admission Type inpatient    Arrived From still a patient    Reason For Consult discharge planning    Record Reviewed medical record    Contact Information    Permission Granted to Share Information With family/designee    Comments kecia Stallings ([phone])            Functional Status       01/06/18 1700    Functional Status Current    Ambulation 3-->assistive equipment and person    Transferring 3-->assistive equipment and person    Toileting 3-->assistive equipment and person    Bathing 2-->assistive person    Dressing 2-->assistive person    Eating 0-->independent    Functional Status Prior    Ambulation 1-->assistive equipment    Transferring 1-->assistive equipment    Toileting 0-->independent    Bathing 0-->independent    Dressing 0-->independent    Eating 0-->independent            Psychosocial     None            Abuse/Neglect     None            Legal     None            Substance Abuse     None            Patient Forms     None          Rehan Burnham RN

## 2018-01-06 NOTE — CONSULTS
Patient Name: Raquel Deluna  :1935  82 y.o.    Date of Admission: 2018  Date of Consultation:  18  Encounter Provider: Ladonna Mclaughlin MD  Place of Service: [facility] CARDIOLOGY  Referring Provider: Jeff Li MD  Patient Care Team:  Ricky Hoyos III, MD as PCP - General  Anthony Hernández MD as Consulting Physician (Pulmonary Disease)  Christy Jerez MD as Consulting Physician (Dermatology)  Ladonna Mclaughlin MD as Consulting Physician (Cardiology)      Chief complaint: SOA    History of Present Illness:      Ms. Deluna is an 82-year-old female with history of severe carotid artery stenosis, COPD, PAF, anemia and GI bleed.  Because of that she's not been a candidate for anticoagulant regulation.  She has hypertension,  mitral  And tricuspid insufficiency and heart failure.    She's hospitalized in  for heart failure.  In September she had heart failure and acute kidney injury due to her severe mitral insufficiency.  On 9/3/17 she had a cardiac catheterization showing 20% mid RCA stenosis, normal left main, normal LAD and normal circumflex.  On 72 and underwent mitral valve repair and tricuspid valve repair.  Postoperatively she had junctional rhythm in the 30s and did get [employer] pacemaker.    2017, she had a right carotid endarterectomy and got packed red blood cells due to anemia.    She had an echocardiogram done 17 showing ejection fraction 25% with moderate left atrial dilation.    She presented emergency department dyspnea, weight gain, edema of her lower extremity.  She is taking Bumex at home without relief.  She was admitted volume overload.  She got to milligrams of IV Bumex and emergency department.  She's now on Bumex 4 mg IV every 8.  She also complained of dark stools her hemoglobin was 9.2.  GI has been consulted for this.  She's had an extensive GI workup in the past without discovery of a bleeding  source.    She is breathing better but still short winded when she walks.  She's had no chest pain or pressure.  She did fill her racing a bit.  She is one day of some vomiting and diarrhea.  She's been watching her salt.  She said that she started filling up with fluid.  There were no changes to her medication.  She is not sure why this happened.      Previous Testing-  Echocardiogram on 12/8/17-  Interpretation Summary      · The left ventricular cavity is mildly dilated.  · There is septal dyskinesis. There is apical and anterolateral akinesis.  · Left ventricular systolic function is severely decreased.  · Estimated EF = 25%.  · Normal right ventricular systolic function noted. Right ventricular cavity is mildly dilated.  · Left atrial cavity size is moderately dilated.  · There is at least moderate mitral regurgitation through the annuloplasty ring (possibly underestimated)..  · There is an annuloplasty ring noted in the tricuspid position. There is mild tricuspid regurgitation  · The calculated RVSP is 55 mm Hg (moderately to severely elevated).  · There is a trivial pericardial effusion.     Cardiac Catheterization on 9/13/17-  Procedure findings:  Left main: Large-caliber vessel that bifurcates to an LAD and left circumflex.  Left main is normal  LAD: Medium caliber vessel that gives rise to a medium caliber diagonal branch.  The LAD is normal  LCX: Medium caliber vessel that gives rise to a medium caliber first obtuse and second obtuse marginal branch.  The distal circumflex has mild luminal irregularities.  RCA: Large caliber vessel that gives rise to a medium caliber PDA and small caliber RPL.  20% mid vessel stenosis.      Left ventricular angiography: Normal left ventricular size.  Mildly diminished systolic function.  Ejection fraction 40%.      Past Medical History:   Diagnosis Date   • Angiodysplasia of colon    • Anxiety    • Arthritis    • Asthma    • Atrial fibrillation 02/2017   • Bella's  esophagus 3/14/2016   • Cellulitis     left leg hx about a month ago   ALL HEALED   NOT SWELLING OR OOZING NOW  WEARS CURT ACE WRAPS   • Chronic bronchitis    • Chronic kidney disease    • Colon polyp 3/14/2016   • Essential hypertension 8/24/2013    Overview:  2015 IMO UPDATE   • GERD (gastroesophageal reflux disease)    • Hiatal hernia    • History of goiter    • History of transfusion     had reaction to 2nd unit after receiving the 1st   • Hyperlipidemia    • Hypertension    • Hyponatremia    • Intestinal malabsorption 3/31/2016   • Iron deficiency anemia 03/14/2016    receives iron infusion in past   • Migraine    • Osteoporosis    • PONV (postoperative nausea and vomiting)    • Sleep apnea     cpap   • Stress incontinence     wears pads       Past Surgical History:   Procedure Laterality Date   • APPENDECTOMY     • CARDIAC CATHETERIZATION N/A 9/13/2017    Procedure: Left ventriculography;  Surgeon: Ron Moscoso MD;  Location:  [facility] CATH INVASIVE LOCATION;  Service:    • CARDIAC CATHETERIZATION N/A 9/13/2017    Procedure: Left Heart Cath;  Surgeon: Ron Moscoso MD;  [facility];  Service:    • CARDIAC CATHETERIZATION N/A 9/13/2017    Procedure: Coronary angiography;  Surgeon: Ron Moscoso MD;  Location:  [facility] CATH INVASIVE LOCATION;  Service:    • CARDIAC CATHETERIZATION N/A 9/13/2017    Procedure: Right Heart Cath;  Surgeon: Ron Moscoso MD;  [facility];  Service:    • CARDIAC ELECTROPHYSIOLOGY PROCEDURE N/A 9/29/2017    Procedure: Device Implant, [employer]   PPM;  Surgeon: Benji Martínez MD;  [facility];  Service:    • CARDIAC SURGERY      MVR/TVR   • CERVICAL DISC SURGERY     • COLONOSCOPY     • COLONOSCOPY N/A 2/3/2017    NBIH, diverticulosis, one 9 mm hyperplastic polyp, multiple non-bleeding colonic angioectasias, Path; neg   • ENDOSCOPY N/A 2/2/2017    normal esophagus, normal  examined duodenum, non-bleeding gastric ulcers w/no sigmata of bleeding   • HYSTERECTOMY     • MITRAL VALVE REPAIR/REPLACEMENT N/A 9/25/2017    Procedure: KOLTON STERNOTOMY MITRAL VALVE REPLACEMENT, TRICUSPID VALVE REPAIR, MAZE PROCEDURE AND PRP;  Surgeon: Yonas Johnson MD;  Location: [facility];  Service:    • PACEMAKER IMPLANTATION       DUAL PACEMAKER ST EV  MODEL  [id number]  SERIES [id number]   • WY THROMBOENDARTECTMY NECK,NECK INCIS Right 12/7/2017    Procedure: RT CAROTID ENDARTERECTOMY;  Surgeon: Delma Templeton Jr., MD;  Location: [facility] OR;  Service: Vascular   • WY TOTAL KNEE ARTHROPLASTY Right 10/25/2016    Procedure: RT TOTAL KNEE ARTHROPLASTY;  Surgeon: Ricky Dsouza MD;  Location: [facility] OR;  Service: Orthopedics   • THYROID SURGERY      for goiter   • TONSILLECTOMY           Prior to Admission medications    Medication Sig Start Date End Date Taking? Authorizing Provider   aspirin 81 MG EC tablet Take 1 tablet by mouth Daily.  Patient taking differently: Take 81 mg by mouth Daily. TO STAY ON 10/4/17  Yes Ladonna Mclaughlin MD   atorvastatin (LIPITOR) 20 MG tablet Take 20 mg by mouth Daily. 11/17/17  Yes Historical Provider, MD   B Complex Vitamins (VITAMIN B COMPLEX PO) Take 1 tablet by mouth Every Morning.   Yes Historical Provider, MD   budesonide-formoterol (SYMBICORT) 160-4.5 MCG/ACT inhaler Inhale 2 puffs 2 (Two) Times a Day. 8/10/16  Yes Historical Provider, MD   bumetanide (BUMEX) 2 MG tablet Take 1.5 tablets by mouth 2 (Two) Times a Day. PT IS TAKING 1 AND 1/2 TABLET BID 12/6/17  Yes Ladonna Mclaughlin MD   CALCIUM CARBONATE-VITAMIN D PO Take 1 tablet by mouth Every Morning.   Yes Historical Provider, MD   carvedilol (COREG) 12.5 MG tablet Take 2 tablets by mouth Every 12 (Twelve) Hours. 10/3/17  Yes Ladonna Mclaughlin MD   Cholecalciferol (VITAMIN D-3 PO) Take 2,000 Units by mouth Daily.   Yes Historical Provider, MD   citalopram (CeleXA) 40 MG tablet Take 40 mg by  mouth Daily.   Yes Historical Provider, MD   esomeprazole (NexIUM) 40 MG capsule Take 40 mg by mouth Daily Before Supper.   Yes Historical Provider, MD   Fe-Succ Ac-C-Thre Ac-B12-FA (FERREX 150 FORTE PLUS)  MG capsule capsule Take 1 capsule by mouth 2 (Two) Times a Day With Meals. 10/3/17  Yes Yonas Johnson MD   hydrALAZINE (APRESOLINE) 25 MG tablet Take 1 tablet by mouth Every 8 (Eight) Hours. 10/3/17  Yes Ladonna Mclaughlin MD   mometasone (NASONEX) 50 MCG/ACT nasal spray 2 sprays into each nostril Every Morning.   Yes Historical Provider, MD   Multiple Vitamins-Minerals (CENTRUM SILVER PO) Take 1 tablet by mouth Daily.   Yes Historical Provider, MD   potassium chloride (MICRO-K) 10 MEQ CR capsule Take 2 capsules by mouth 2 (Two) Times a Day With Meals. 10/3/17  Yes Ladonna Mclaughlin MD   vitamin B-6 (PYRIDOXINE) 100 MG tablet Take 100 mg by mouth Daily.   Yes Historical Provider, MD       Allergies   Allergen Reactions   • Sulfa Antibiotics Other (See Comments)     Yeast infections/ RASH       Social History     Social History   • Marital status:      Spouse name: N/A   • Number of children: N/A   • Years of education: N/A     Social History Main Topics   • Smoking status: Former Smoker     Packs/day: 4.00     Years: 33.00     Types: Cigarettes     Quit date: 1980   • Smokeless tobacco: Never Used   • Alcohol use Yes      Comment: once a month a glass of wine or mixed drink   • Drug use: No   • Sexual activity: Not Asked     Other Topics Concern   • None     Social History Narrative       Family History   Problem Relation Age of Onset   • Adopted: Yes   • Malig Hyperthermia Neg Hx        REVIEW OF SYSTEMS:   All systems reviewed.  Pertinent positives identified in HPI.  All other systems are negative.      Objective:     Vitals:    01/06/18 0600 01/06/18 0700 01/06/18 0853 01/06/18 1100   BP:  145/66  149/86   BP Location:  Right arm  Right arm   Patient Position:  Lying  Lying   Pulse:    95 92   Resp:  18 20 20   Temp:  97.6 °F (36.4 °C)  97.7 °F (36.5 °C)   TempSrc:  Oral  Oral   SpO2:  98% 96%    Weight: 101 kg (222 lb)      Height:         Body mass index is 38.11 kg/(m^2).    General Appearance:    Alert, cooperative, in no acute distress   Head:    Normocephalic, without obvious abnormality, atraumatic   Eyes:            Lids and lashes normal, conjunctivae and sclerae normal, no   icterus, no pallor, corneas clear,   Ears:    Ears appear intact with no abnormalities noted   Throat:   No oral lesions, no thrush, oral mucosa moist   Neck:   No adenopathy, supple, trachea midline, no thyromegaly, no   carotid bruit, no JVD   Back:     No kyphosis present, no scoliosis present, no skin lesions, erythema or scars, no tendernessrange of motion normal   Lungs:     Clear to auscultation except decreased at bases,respirations regular, even and unlabored    Heart:    Regular rhythm and normal rate, normal S1 and S2, no murmur, no gallop, no rub, no click   Chest Wall:    No abnormalities observed   Abdomen:     Normal bowel sounds, no masses, no organomegaly, soft        non-tender, mild -distended, no guarding, no rebound  tenderness   Extremities:   Moves all extremities well, 2+ LE edema, no cyanosis, no redness   Pulses:   Pulses palpable and equal bilaterally. Normal radial, carotid, femoral, dorsalis pedis and posterior tibial pulses bilaterally. Normal abdominal aorta   Skin:  Psychiatric:   No bleeding, bruising or rash    Alert and oriented x 3, normal mood and affect   Lab Review:       Results from last 7 days  Lab Units 01/06/18  0541 01/05/18  1546   SODIUM mmol/L 133* 131*   POTASSIUM mmol/L 4.6 4.9   CHLORIDE mmol/L 97* 95*   CO2 mmol/L 24.4 24.6   BUN mg/dL 40* 40*   CREATININE mg/dL 1.66* 1.61*   CALCIUM mg/dL 8.4* 9.1   BILIRUBIN mg/dL  --  0.6   ALK PHOS U/L  --  108   ALT (SGPT) U/L  --  26   AST (SGOT) U/L  --  29   GLUCOSE mg/dL 108* 137*       Results from last 7 days  Lab Units  01/06/18  0541 01/05/18  1546   TROPONIN T ng/mL 0.023 0.016       Results from last 7 days  Lab Units 01/06/18  0704   WBC 10*3/mm3 10.55   HEMOGLOBIN g/dL 8.5*   HEMATOCRIT % 26.8*   PLATELETS 10*3/mm3 320           Results from last 7 days  Lab Units 01/06/18  0541   MAGNESIUM mg/dL 2.4       Results from last 7 days  Lab Units 01/06/18  0541   CHOLESTEROL mg/dL 99   TRIGLYCERIDES mg/dL 46   HDL CHOL mg/dL 32*                Telemetry-    EKG on 1/5/18-      EKG on 12/8/17-    I personally viewed and interpreted the patient's EKG/Telemetry data.        Assessment and Plan:       1. Dyspnea with volume overload - may be due to anemia and hypertension with CMP.  Diurese. Try losartan and may try to change to entresto but need to watch renal function.   2. Severe cardiomyopathy, nonischemic  3. S/p R CEA 12/2017  4. S/p MV and TV repair 9/2017  5. Pacer for AV block after surgery.  She may need to be upgraded to CRT to help improve EF.  Part of her CHF may be due to ventricular pacing.   6. H/o GI bleeds with anemia.   7. PAF. No AC due to GI bleeding. GI to see.     Continue IV bumex.  Increase BP meds.      Ladonna Mclaughlin MD  01/06/18  1:20 PM

## 2018-01-06 NOTE — THERAPY EVALUATION
Acute Care - Physical Therapy Initial Evaluation  [facility]     Patient Name: Raquel Deluna  : 1935  MRN: [id number]  Today's Date: 2018   Onset of Illness/Injury or Date of Surgery Date: 18  Date of Referral to PT: 18  Referring Physician: Avila      Admit Date: 2018     Visit Dx:    ICD-10-CM ICD-9-CM   1. Acute combined systolic and diastolic congestive heart failure I50.41 428.41     428.0     Patient Active Problem List   Diagnosis   • OA (osteoarthritis) of knee   • Hyponatremia   • Bella's esophagus   • Hematochezia   • Colon polyp   • Essential hypertension   • Gastroesophageal reflux disease   • Hyperlipidemia   • Intestinal malabsorption   • Adverse effect of iron   • Iron deficiency anemia   • Gastrointestinal hemorrhage   • Paroxysmal atrial fibrillation   • Weakness   • Bilateral edema of lower extremity   • DNR (do not resuscitate)   • KESHIA (acute kidney injury)   • Mitral regurgitation   • Bilateral carotid artery stenosis   • Mitral valve insufficiency   • Absolute anemia   • Polyp of colon   • Iron deficiency anemia due to chronic blood loss   • GI AVM (gastrointestinal arteriovenous vascular malformation)   • S/P MVR (mitral valve repair)   • S/P TVR (tricuspid valve repair)   • S/P Maze operation for atrial fibrillation   • Lymphedema   • Carotid stenosis, asymptomatic, right   • Acute on chronic combined systolic and diastolic congestive heart failure   • Chronic kidney disease, stage 3     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   • Cellulitis     left leg hx about a month ago   ALL HEALED   NOT SWELLING OR OOZING NOW  WEARS CURT ACE WRAPS   • Chronic bronchitis    • Chronic kidney disease    • Colon polyp 3/14/2016   • Essential hypertension 2013    Overview:  2015 IMO UPDATE   • GERD (gastroesophageal reflux disease)    •  Hiatal hernia    • History of goiter    • History of transfusion     had reaction to 2nd unit after receiving the 1st   • Hyperlipidemia    • Hypertension    • Hyponatremia    • Intestinal malabsorption 3/31/2016   • Iron deficiency anemia 03/14/2016    receives iron infusion in past   • Migraine    • Osteoporosis    • PONV (postoperative nausea and vomiting)    • Sleep apnea     cpap   • Stress incontinence     wears pads     Past Surgical History:   Procedure Laterality Date   • APPENDECTOMY     • CARDIAC CATHETERIZATION N/A 9/13/2017    Procedure: Left ventriculography;  Surgeon: Ron Moscoso MD;  Location: [facility] CATH INVASIVE LOCATION;  Service:    • CARDIAC CATHETERIZATION N/A 9/13/2017    Procedure: Left Heart Cath;  Surgeon: Ron Moscoso MD;  Location: [facility] CATH INVASIVE LOCATION;  Service:    • CARDIAC CATHETERIZATION N/A 9/13/2017    Procedure: Coronary angiography;  Surgeon: Ron Moscoso MD;  Location: [facility] CATH INVASIVE LOCATION;  Service:    • CARDIAC CATHETERIZATION N/A 9/13/2017    Procedure: Right Heart Cath;  Surgeon: Ron Moscoso MD;  Location: [facility] CATH INVASIVE LOCATION;  Service:    • CARDIAC ELECTROPHYSIOLOGY PROCEDURE N/A 9/29/2017    Procedure: Device Implant, [place]   PPM;  Surgeon: Benji Martínez MD;  Location: [facility] CATH INVASIVE LOCATION;  Service:    • CARDIAC SURGERY      MVR/TVR   • CERVICAL DISC SURGERY     • COLONOSCOPY     • COLONOSCOPY N/A 2/3/2017    NBIH, diverticulosis, one 9 mm hyperplastic polyp, multiple non-bleeding colonic angioectasias, Path; neg   • ENDOSCOPY N/A 2/2/2017    normal esophagus, normal examined duodenum, non-bleeding gastric ulcers w/no sigmata of bleeding   • HYSTERECTOMY     • MITRAL VALVE REPAIR/REPLACEMENT N/A 9/25/2017    Procedure: KOLTON STERNOTOMY MITRAL VALVE REPLACEMENT, TRICUSPID VALVE REPAIR, MAZE PROCEDURE AND PRP;  Surgeon: Yonas Johnson MD;  Location: [facility] OR;  Service:    • PACEMAKER  IMPLANTATION       DUAL PACEMAKER ST EV  MODEL  [id number]  SERIES [id number]   • NE THROMBOENDARTECTMY NECK,NECK INCIS Right 12/7/2017    Procedure: RT CAROTID ENDARTERECTOMY;  Surgeon: Delma Templeton Jr., MD;  Location: [facility] OR;  Service: Vascular   • NE TOTAL KNEE ARTHROPLASTY Right 10/25/2016    Procedure: RT TOTAL KNEE ARTHROPLASTY;  Surgeon: Ricky Dsouza MD;  Location: [facility] MAIN OR;  Service: Orthopedics   • THYROID SURGERY      for goiter   • TONSILLECTOMY            PT ASSESSMENT (last 72 hours)      PT Evaluation       01/06/18 1100 01/05/18 4892    Rehab Evaluation    Document Type evaluation  -     Subjective Information agree to therapy;complains of;weakness;fatigue;pain  -     Patient Effort, Rehab Treatment good  -     General Information    Patient Profile Review yes  -     Onset of Illness/Injury or Date of Surgery Date 01/05/18  -     Referring Physician Avila  -     General Observations awake, alert, fowlers, SCD on LLE, 2L O2/NC  -     Pertinent History Of Current Problem Acute combined systolic and diastolic congestive heart failure  -     Precautions/Limitations fall precautions;oxygen therapy device and L/min  -     Prior Level of Function min assist:;all household mobility;gait;transfer;bed mobility;ADL's  -     Equipment Currently Used at Home bath bench;grab bar;raised toilet;ramp;shower chair;wheelchair;walker, rolling  -     Plans/Goals Discussed With patient;agreed upon  -     Risks Reviewed patient:;LOB;nausea/vomiting;dizziness;increased discomfort;change in vital signs;increased drainage;lines disloged  -     Benefits Reviewed patient:;improve function;increase independence;increase strength;increase balance;decrease pain;decrease risk of DVT;improve skin integrity;increase knowledge  -     Barriers to Rehab previous functional deficit  -     Living Environment    Lives With alone  - alone  -    Living Arrangements house  - house  -     Home Accessibility other (see comments)   pt requests HH  - other (see comments)   PT REQUESTS A HOME HEALTH AIDE  -    Stair Railings at Home none  -LC none  -SM    Type of Financial/Environmental Concern none  -LC none  -SM    Transportation Available family or friend will provide  -LC family or friend will provide  -    Living Environment Comment  N/A  -SM    Clinical Impression    Date of Referral to PT 01/05/18  -     PT Diagnosis impaired gait and mobility  -     Patient/Family Goals Statement return home with assist  -     Criteria for Skilled Therapeutic Interventions Met yes;treatment indicated  -     Predicted Duration of Therapy Intervention (days/wks) 1 week  -     Pain Assessment    Pain Assessment 0-10  -LC     Pain Score 5  -LC     Pain Type Acute pain  -     Pain Location Leg  -LC     Pain Orientation Right;Left  -LC     Pain Intervention(s) Medication (See MAR);Repositioned  -LC     Vision Assessment/Intervention    Visual Impairment WNL  -LC     Cognitive Assessment/Intervention    Current Cognitive/Communication Assessment functional  -     Orientation Status oriented x 4  -LC     Follows Commands/Answers Questions 100% of the time;able to follow multi-step instructions  -     Personal Safety WNL/WFL  -     Personal Safety Interventions fall prevention program maintained;gait belt;muscle strengthening facilitated;nonskid shoes/slippers when out of bed  -LC     ROM (Range of Motion)    General ROM Detail BLE AROM WNL  -LC     MMT (Manual Muscle Testing)    General MMT Assessment Detail BLE MMT grossly 3/5  -LC     Bed Mobility, Assessment/Treatment    Bed Mobility, Assistive Device bed rails;draw sheet  -     Bed Mobility, Roll Left, [place] minimum assist (75% patient effort);2 person assist required  -LC     Bed Mobility, Scoot/Bridge, [place] minimum assist (75% patient effort);2 person assist required  -LC     Bed Mob, Supine to Sit, [place] moderate  assist (50% patient effort);2 person assist required  -LC     Bed Mobility, Safety Issues decreased use of legs for bridging/pushing  -     Bed Mobility, Impairments strength decreased;impaired balance;pain  -LC     Transfer Assessment/Treatment    Transfers, Sit-Stand [place] minimum assist (75% patient effort);2 person assist required  -LC     Transfers, Stand-Sit [place] minimum assist (75% patient effort);2 person assist required  -LC     Transfers, Sit-Stand-Sit, Assist Device rolling walker  -     Transfer, Safety Issues weight-shifting ability decreased  -     Transfer, Impairments strength decreased;impaired balance;pain  -LC     Gait Assessment/Treatment    Gait, [place] Level minimum assist (75% patient effort);2 person assist required  -LC     Gait, Assistive Device rolling walker  -     Gait, Distance (Feet) 5  -LC     Gait, Gait Deviations forward flexed posture;step length decreased  -     Gait, Safety Issues loses balance backward;supplemental O2  -LC     Gait, Impairments strength decreased;impaired balance;pain  -LC     Motor Skills/Interventions    Additional Documentation Balance Skills Training (Group)  -     Balance Skills Training    Sitting-Level of Assistance Minimum assistance  -     Sitting-Balance Support Feet supported  -     Standing-Level of Assistance Minimum assistance;x2  -LC     Static Standing Balance Support assistive device  -     Gait Balance-Level of Assistance Minimum assistance;x2  -LC     Gait Balance Support assistive device  -     Positioning and Restraints    Pre-Treatment Position in bed  -     Post Treatment Position chair  -     In Chair notified nsg;sitting;call light within reach;encouraged to call for assist;exit alarm on;legs elevated  -       01/05/18 1411       General Information    Equipment Currently Used at Home bath bench;grab bar;raised toilet;ramp;shower chair;wheelchair;walker, rolling  -       User Key  (r) =  Recorded By, (t) = Taken By, (c) = Cosigned By    Initials Name Provider Type     Kenya Aguilar, RN Registered Nurse     Davin Negron, PT DPT Physical Therapist          Physical Therapy Education     Title: PT OT SLP Therapies (Active)     Topic: Physical Therapy (Active)     Point: Mobility training (Done)    Learning Progress Summary    Learner Readiness Method Response Comment Documented by Status   Patient Acceptance E,D ULYSSES,DU safety during transfers  01/06/18 1204 Done                      User Key     Initials Effective Dates Name Provider Type Discipline     08/02/16 -  Davin Negron, PT DPT Physical Therapist PT                PT Recommendation and Plan  Anticipated Discharge Disposition: home with home health, inpatient rehabilitation facility  PT Frequency: daily  Plan of Care Review  Plan Of Care Reviewed With: patient  Outcome Summary/Follow up Plan: PT EVAL completed. Pt transferred supine to sit with mod x 2 and use of bed rail and draw sheet. Pt transferred sit to stand with min x 2 and RW. Pt ambulated 5 ft with min x 2 and RW to sit in chair. Pt requires skilled therapy to due decreased activity tolerance, decreased strength, and decreased balance. Recommend D/C home with  or inpatient rehab.          IP PT Goals       01/06/18 1207          Bed Mobility PT LTG    Bed Mobility PT LTG, Date Established 01/06/18  -LC      Bed Mobility PT LTG, Time to Achieve 1 wk  -LC      Bed Mobility PT LTG, Activity Type all bed mobility  -LC      Bed Mobility PT LTG, [place] Level contact guard assist  -LC      Bed Mobility PT Goal  LTG, Assist Device bed rails  -LC      Transfer Training PT LTG    Transfer Training PT LTG, Date Established 01/06/18  -LC      Transfer Training PT LTG, Time to Achieve 1 wk  -LC      Transfer Training PT LTG, Activity Type all transfers  -LC      Transfer Training PT LTG, [place] Level contact guard assist  -LC      Transfer Training PT LTG, Assist Device  walker, rolling  -LC      Gait Training PT LTG    Gait Training Goal PT LTG, Date Established 01/06/18  -      Gait Training Goal PT LTG, Time to Achieve 1 wk  -LC      Gait Training Goal PT LTG, [place] Level contact guard assist  -LC      Gait Training Goal PT LTG, Assist Device walker, rolling  -LC      Gait Training Goal PT LTG, Distance to Achieve 100  -LC        User Key  (r) = Recorded By, (t) = Taken By, (c) = Cosigned By    Initials Name Provider Type    ABHILASH Negron, PT MEKHIT Physical Therapist                Outcome Measures       01/06/18 1200          How much help from another person do you currently need...    Turning from your back to your side while in flat bed without using bedrails? 3  -LC      Moving from lying on back to sitting on the side of a flat bed without bedrails? 2  -LC      Moving to and from a bed to a chair (including a wheelchair)? 2  -LC      Standing up from a chair using your arms (e.g., wheelchair, bedside chair)? 3  -LC      Climbing 3-5 steps with a railing? 1  -LC      To walk in hospital room? 2  -LC      AM-PAC 6 Clicks Score 13  -      Functional Assessment    Outcome Measure Options AM-PAC 6 Clicks Basic Mobility (PT)  -        User Key  (r) = Recorded By, (t) = Taken By, (c) = Cosigned By    Initials Name Provider Type    ABHILASH Negron, PT MEKHIT Physical Therapist           Time Calculation:         PT Charges       01/06/18 1209          Time Calculation    Start Time 1145  -      Stop Time 1200  -      Time Calculation (min) 15 min  -      PT Received On 01/06/18  -      PT - Next Appointment 01/07/18  -      PT Goal Re-Cert Due Date 01/13/18  -      Time Calculation- PT    Total Timed Code Minutes- PT 15 minute(s)  -        User Key  (r) = Recorded By, (t) = Taken By, (c) = Cosigned By    Initials Name Provider Type    ABHILASH Negron, PT MEKHIT Physical Therapist          Therapy Charges for Today     Code Description Service Date Service  Provider Modifiers Qty    [id number] HC PT EVAL LOW COMPLEXITY 1 1/6/2018 Davin Negron, PT DPT GP 1          PT G-Codes  Outcome Measure Options: AM-PAC 6 Clicks Basic Mobility (PT)      Davin Negron, PT DPT  1/6/2018

## 2018-01-06 NOTE — H&P
Name: Raquel Deluna ADMIT: 2018   : 1935  PCP: Ricky Hoyos III, MD    MRN: [id number] LOS: 1 days   AGE/SEX: 82 y.o. female  ROOM: 549/1     Chief Complaint   Patient presents with   • Shortness of Breath     carrying a lotof fluid in legs    • Rectal Bleeding     onset last week       Subjective   Ms. Deluna is a 82 y.o. female with a history of chronic combined congestive heart failure who presents to [facility] complaining of dyspnea, weight gain and lower extremity edema. She's been taking her Bumex at home with no relief. Swelling has extended from her legs into her abdomen.    She has a history of chronic GI bleeding that is been extensively worked up in the past with no identifiable source. She does complain of continued blood in her stool. Her hemoglobin is down to 9.2 from 10.4 3 weeks ago. She sees Dr. Lyla Bentley.      Shortness of Breath   This is a new problem. The current episode started 1 to 4 weeks ago. The problem occurs constantly. The problem has been gradually worsening. Associated symptoms include leg pain, leg swelling (SIGNIFICANT. cant walk or stand.), orthopnea, PND and vomiting (just one day few wks ago). Pertinent negatives include no abdominal pain, fever or sputum production. The symptoms are aggravated by any activity, exercise and lying flat. She has tried nothing for the symptoms. The treatment provided no relief. Her past medical history is significant for allergies, CAD, COPD and a heart failure (bad valves).       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   • Cellulitis     left leg hx about a month ago   ALL HEALED   NOT SWELLING OR OOZING NOW  WEARS CURT ACE WRAPS   • Chronic bronchitis    • Chronic kidney disease    • Colon polyp 3/14/2016   • Essential hypertension 2013    Overview:  2015 IMO UPDATE   • GERD  (gastroesophageal reflux disease)    • Hiatal hernia    • History of goiter    • History of transfusion     had reaction to 2nd unit after receiving the 1st   • Hyperlipidemia    • Hypertension    • Hyponatremia    • Intestinal malabsorption 3/31/2016   • Iron deficiency anemia 03/14/2016    receives iron infusion in past   • Migraine    • Osteoporosis    • PONV (postoperative nausea and vomiting)    • Sleep apnea     cpap   • Stress incontinence     wears pads     Past Surgical History:   Procedure Laterality Date   • APPENDECTOMY     • CARDIAC CATHETERIZATION N/A 9/13/2017    Procedure: Left ventriculography;  Surgeon: Ron Moscoso MD;  Location: [facility] CATH INVASIVE LOCATION;  Service:    • CARDIAC CATHETERIZATION N/A 9/13/2017    Procedure: Left Heart Cath;  Surgeon: Ron Moscoso MD;  Location: [facility] CATH INVASIVE LOCATION;  Service:    • CARDIAC CATHETERIZATION N/A 9/13/2017    Procedure: Coronary angiography;  Surgeon: Ron Moscoso MD;  Location: [facility] CATH INVASIVE LOCATION;  Service:    • CARDIAC CATHETERIZATION N/A 9/13/2017    Procedure: Right Heart Cath;  Surgeon: Ron Moscoso MD;  Location: [facility] CATH INVASIVE LOCATION;  Service:    • CARDIAC ELECTROPHYSIOLOGY PROCEDURE N/A 9/29/2017    Procedure: Device Implant, [facility]   PPM;  Surgeon: Benji Martínez MD;  Location: [facility] CATH INVASIVE LOCATION;  Service:    • CARDIAC SURGERY      MVR/TVR   • CERVICAL DISC SURGERY     • COLONOSCOPY     • COLONOSCOPY N/A 2/3/2017    NBIH, diverticulosis, one 9 mm hyperplastic polyp, multiple non-bleeding colonic angioectasias, Path; neg   • ENDOSCOPY N/A 2/2/2017    normal esophagus, normal examined duodenum, non-bleeding gastric ulcers w/no sigmata of bleeding   • HYSTERECTOMY     • MITRAL VALVE REPAIR/REPLACEMENT N/A 9/25/2017    Procedure: KOLTON STERNOTOMY MITRAL VALVE REPLACEMENT, TRICUSPID VALVE REPAIR, MAZE PROCEDURE AND PRP;  Surgeon: Yonas Johnson MD;  Location:   [facility];  Service:    • PACEMAKER IMPLANTATION       DUAL PACEMAKER ST EV  MODEL  [id number]  SERIES [id number]   • SC THROMBOENDARTECTMY NECK,NECK INCIS Right 12/7/2017    Procedure: RT CAROTID ENDARTERECTOMY;  Surgeon: Delma Templeton Jr., MD;  Location: [facility] MAIN OR;  Service: Vascular   • SC TOTAL KNEE ARTHROPLASTY Right 10/25/2016    Procedure: RT TOTAL KNEE ARTHROPLASTY;  Surgeon: Ricky Dsouza MD;  Location: [facility] MAIN OR;  Service: Orthopedics   • THYROID SURGERY      for goiter   • TONSILLECTOMY       Family History   Problem Relation Age of Onset   • Adopted: Yes   • Malig Hyperthermia Neg Hx      Social History   Substance Use Topics   • Smoking status: Former Smoker     Packs/day: 4.00     Years: 33.00     Types: Cigarettes     Quit date: 1980   • Smokeless tobacco: Never Used   • Alcohol use Yes      Comment: once a month a glass of wine or mixed drink     Prescriptions Prior to Admission   Medication Sig Dispense Refill Last Dose   • aspirin 81 MG EC tablet Take 1 tablet by mouth Daily. (Patient taking differently: Take 81 mg by mouth Daily. TO STAY ON) 30 tablet 5 12/6/2017 at 0900   • atorvastatin (LIPITOR) 20 MG tablet Take 20 mg by mouth Daily.  4 12/6/2017 at 0900   • B Complex Vitamins (VITAMIN B COMPLEX PO) Take 1 tablet by mouth Every Morning.   12/6/2017 at 0900   • budesonide-formoterol (SYMBICORT) 160-4.5 MCG/ACT inhaler Inhale 2 puffs 2 (Two) Times a Day.   12/6/2017 at 0900   • bumetanide (BUMEX) 2 MG tablet Take 1.5 tablets by mouth 2 (Two) Times a Day. PT IS TAKING 1 AND 1/2 TABLET  tablet 3 12/6/2017 at 2000   • CALCIUM CARBONATE-VITAMIN D PO Take 1 tablet by mouth Every Morning.   12/6/2017 at 0900   • carvedilol (COREG) 12.5 MG tablet Take 2 tablets by mouth Every 12 (Twelve) Hours. 60 tablet 5 12/7/2017 at 0600   • Cholecalciferol (VITAMIN D-3 PO) Take 2,000 Units by mouth Daily.   12/6/2017 at 0900   • citalopram (CeleXA) 40 MG tablet Take 40 mg by mouth Daily.    12/6/2017 at 0900   • esomeprazole (NexIUM) 40 MG capsule Take 40 mg by mouth Daily Before Supper.   12/6/2017 at 0900   • Fe-Succ Ac-C-Thre Ac-B12-FA (FERREX 150 FORTE PLUS)  MG capsule capsule Take 1 capsule by mouth 2 (Two) Times a Day With Meals. 60 each 5 12/4/2017   • hydrALAZINE (APRESOLINE) 25 MG tablet Take 1 tablet by mouth Every 8 (Eight) Hours. 90 tablet 5 12/7/2017 at 0600   • mometasone (NASONEX) 50 MCG/ACT nasal spray 2 sprays into each nostril Every Morning.   12/6/2017 at 0900   • Multiple Vitamins-Minerals (CENTRUM SILVER PO) Take 1 tablet by mouth Daily.   12/6/2017 at 0900   • potassium chloride (MICRO-K) 10 MEQ CR capsule Take 2 capsules by mouth 2 (Two) Times a Day With Meals. 120 capsule 5 12/6/2017 at 2000   • vitamin B-6 (PYRIDOXINE) 100 MG tablet Take 100 mg by mouth Daily.   12/6/2017 at 0900     Allergies:    Allergies   Allergen Reactions   • Sulfa Antibiotics Other (See Comments)     Yeast infections/ RASH       Review of Systems   Constitutional: Positive for fatigue and unexpected weight change. Negative for chills and fever.   HENT: Negative.    Eyes: Negative.    Respiratory: Positive for shortness of breath. Negative for sputum production.    Cardiovascular: Positive for palpitations, orthopnea, leg swelling (SIGNIFICANT. cant walk or stand.) and PND.   Gastrointestinal: Positive for abdominal distention, blood in stool (not new) and vomiting (just one day few wks ago). Negative for abdominal pain.   Endocrine: Negative.    Genitourinary: Positive for decreased urine volume and difficulty urinating.   Musculoskeletal: Positive for gait problem.   Skin: Negative.    Neurological: Positive for dizziness and weakness.   Hematological: Negative.    Psychiatric/Behavioral: Negative.         Objective    Vital Signs  Temp:  [97 °F (36.1 °C)-98.4 °F (36.9 °C)] 97.6 °F (36.4 °C)  Heart Rate:  [104-111] 108  Resp:  [18-20] 18  BP: (126-166)/(63-81) 138/63  SpO2:  [92 %-100 %] 99 %  on    ;   O2 Device: nasal cannula  Body mass index is 37.71 kg/(m^2).    Physical Exam   Constitutional: She is oriented to person, place, and time. She appears well-developed and well-nourished. No distress.   HENT:   Head: Normocephalic and atraumatic.   Eyes: Conjunctivae and EOM are normal. No scleral icterus.   Neck: Normal range of motion. Neck supple. No tracheal deviation present.   Cardiovascular: Regular rhythm.  Tachycardia present.    Murmur heard.  Pulmonary/Chest: Effort normal and breath sounds normal. No respiratory distress. She has no wheezes. She has no rales.   Abdominal: Soft. Bowel sounds are normal. She exhibits no distension and no mass. There is no tenderness.   Musculoskeletal: Normal range of motion. She exhibits edema (4+ BLE into abdomen). She exhibits no deformity.   Neurological: She is alert and oriented to person, place, and time. No cranial nerve deficit.   Skin: Skin is warm and dry. No rash noted. No erythema.   Scattered bruises   Psychiatric: She has a normal mood and affect. Her behavior is normal. Judgment and thought content normal.   Nursing note and vitals reviewed.      Results Review:   I reviewed the patient's new clinical results.    Lab Results (last 24 hours)     Procedure Component Value Units Date/Time    CBC & Differential [[id number]] Collected:  01/05/18 1546    Specimen:  Blood Updated:  01/05/18 1613    Narrative:       The following orders were created for panel order CBC & Differential.  Procedure                               Abnormality         Status                     ---------                               -----------         ------                     CBC Auto Differential[[id number]]        Abnormal            Final result                 Please view results for these tests on the individual orders.    Comprehensive Metabolic Panel [[id number]]  (Abnormal) Collected:  01/05/18 1546    Specimen:  Blood Updated:  01/05/18 1638     Glucose 137 (H) mg/dL       BUN 40 (H) mg/dL      Creatinine 1.61 (H) mg/dL      Sodium 131 (L) mmol/L      Potassium 4.9 mmol/L      Chloride 95 (L) mmol/L      CO2 24.6 mmol/L      Calcium 9.1 mg/dL      Total Protein 6.5 g/dL      Albumin 3.00 (L) g/dL      ALT (SGPT) 26 U/L      AST (SGOT) 29 U/L      Alkaline Phosphatase 108 U/L      Total Bilirubin 0.6 mg/dL      eGFR Non African Amer 31 (L) mL/min/1.73      Globulin 3.5 gm/dL      A/G Ratio 0.9 g/dL      BUN/Creatinine Ratio 24.8     Anion Gap 11.4 mmol/L     Narrative:       The MDRD GFR formula is only valid for adults with stable renal function between ages 18 and 70.    BNP [[id number]]  (Abnormal) Collected:  01/05/18 1546    Specimen:  Blood Updated:  01/05/18 1634     proBNP 21113.0 (H) pg/mL     Narrative:       Among patients with dyspnea, NT-proBNP is highly sensitive for the detection of acute congestive heart failure. In addition NT-proBNP of <300 pg/ml effectively rules out acute congestive heart failure with 99% negative predictive value.    Troponin [[id number]]  (Normal) Collected:  01/05/18 1546    Specimen:  Blood Updated:  01/05/18 1638     Troponin T 0.016 ng/mL     Narrative:       Troponin T Reference Ranges:  Less than 0.03 ng/mL:    Negative for AMI  0.03 to 0.09 ng/mL:      Indeterminant for AMI  Greater than 0.09 ng/mL: Positive for AMI    CBC Auto Differential [[id number]]  (Abnormal) Collected:  01/05/18 1546    Specimen:  Blood Updated:  01/05/18 1613     WBC 10.86 (H) 10*3/mm3      RBC 3.11 (L) 10*6/mm3      Hemoglobin 9.2 (L) g/dL      Hematocrit 29.6 (L) %      MCV 95.2 fL      MCH 29.6 pg      MCHC 31.1 (L) g/dL      RDW 15.8 (H) %      RDW-SD 54.6 (H) fl      MPV 9.1 fL      Platelets 357 10*3/mm3      Neutrophil % 83.0 (H) %      Lymphocyte % 5.2 (L) %      Monocyte % 10.1 %      Eosinophil % 1.1 %      Basophil % 0.4 %      Immature Grans % 0.2 %      Neutrophils, Absolute 9.01 (H) 10*3/mm3      Lymphocytes, Absolute 0.57 (L) 10*3/mm3      Monocytes,  Absolute 1.10 10*3/mm3      Eosinophils, Absolute 0.12 10*3/mm3      Basophils, Absolute 0.04 10*3/mm3      Immature Grans, Absolute 0.02 10*3/mm3     Urinalysis With / Culture If Indicated - Urine, Clean Catch [[id number]]  (Abnormal) Collected:  01/05/18 1619    Specimen:  Urine from Urine, Clean Catch Updated:  01/05/18 1734     Color, UA Yellow     Appearance, UA Cloudy (A)     pH, UA <=5.0     Specific Gravity, UA 1.011     Glucose, UA Negative     Ketones, UA Negative     Bilirubin, UA Negative     Blood, UA Trace (A)     Protein, UA Negative     Leuk Esterase, UA Large (3+) (A)     Nitrite, UA Negative     Urobilinogen, UA 0.2 E.U./dL    Urinalysis, Microscopic Only - Urine, Clean Catch [[id number]]  (Abnormal) Collected:  01/05/18 1619    Specimen:  Urine from Urine, Clean Catch Updated:  01/05/18 1734     RBC, UA 3-5 (A) /HPF      WBC, UA 31-50 (A) /HPF      Bacteria, UA 1+ (A) /HPF      Squamous Epithelial Cells, UA 0-2 /HPF      Hyaline Casts, UA 7-12 /LPF      Methodology Automated Microscopy    Urine Culture - Urine, Urine, Clean Catch [[id number]] Collected:  01/05/18 1619    Specimen:  Urine from Urine, Clean Catch Updated:  01/05/18 1721          XR Chest 2 View           Assessment/Plan   Active Hospital Problems (** Indicates Principal Problem)    Diagnosis Date Noted   • **Acute on chronic combined systolic and diastolic congestive heart failure [I50.43] 01/05/2018   • Chronic kidney disease, stage 3 [N18.3] 01/06/2018   • S/P TVR (tricuspid valve repair) [Z98.890] 11/15/2017   • S/P MVR (mitral valve repair) [Z98.890] 11/15/2017   • 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         Ms. Deluna is a 82 y.o. female with known history of CHF with LVEF 25% and valvular heart disease who presents with acute exacerbation of previously diagnosed severe systolic and diastolic heart failure.  Patient is currently volume overloaded.    · Admit to telemetry.    · We will consult Dr. Ladonna Mclaughlin who she sees in the office.  · Strict I/Os and daily weights.  NC oxygen.    · Diuresis with BUMEX 4 mg IV Q8 hours.  Monitor renal function. We will ask nephrology to assist.  · Monitor urine output. Check uric acid.  · We will ask GI to follow regarding ongoing hematochezia. Anemia relatively stable.  · We will use SCDs for DVT prophylaxis due to the bleeding.      I discussed the patients findings and my recommendations with patient.      Jeff Li MD  [facility]  01/06/18  12:47 AM

## 2018-01-06 NOTE — PLAN OF CARE
Problem: Patient Care Overview (Adult)  Goal: Plan of Care Review  Outcome: Ongoing (interventions implemented as appropriate)   01/06/18 0411   Coping/Psychosocial Response Interventions   Plan Of Care Reviewed With patient   Patient Care Overview   Progress progress towards functional goals is fair       Problem: Cardiac: Heart Failure (Adult)  Intervention: Promote Functional Ability   01/06/18 0411   Musculoskeletal Interventions   Self-Care Promotion BADL personal routines maintained   Activity   Activity Type activity adjusted per tolerance;up in chair   Activity Level of Assistance assistance, 1 person   Coping/Psychosocial Interventions   Environmental Support calm environment promoted;personal routine supported       Goal: Signs and Symptoms of Listed Potential Problems Will be Absent or Manageable (Cardiac: Heart Failure)  Outcome: Ongoing (interventions implemented as appropriate)   01/06/18 0411   Cardiac: Heart Failure   Problems Assessed (Heart Failure) all   Problems Present (Heart Failure) decreased quality of life;fluid/electrolyte imbalance;functional decline/self-care deficit;respiratory compromise;situational response;sleep-disordered breathing       Problem: Pain, Acute (Adult)  Goal: Acceptable Pain Control/Comfort Level  Outcome: Ongoing (interventions implemented as appropriate)   01/06/18 0411   Pain, Acute (Adult)   Acceptable Pain Control/Comfort Level making progress toward outcome

## 2018-01-06 NOTE — ED PROVIDER NOTES
EMERGENCY DEPARTMENT ENCOUNTER    CHIEF COMPLAINT  Chief Complaint: dyspnea  History given by: patient  History limited by: none  Room Number: 549/1  PMD: Ricky Hoyos III, MD Dr. McFarland, cardiologist    HPI:  Pt is a 82 y.o. female who presents complaining of acute on chronic dyspnea with exertion that has gotten worse the past week. Pt has been retaining water and has black, coffee ground diarrhea. Pt had a colonoscopy, but they were unable to find the bleed. Pt reports gaining at least 35 lbs this week, possibly more as she has not been able to weigh herself.    Duration: one week  Onset: gradual  Timing: constant  Location: n/a  Radiation: n/a  Quality: n/a  Intensity/Severity: moderate  Progression: worsening  Associated Symptoms: fluid retention, black stool  Aggravating Factors: exertion  Alleviating Factors: none  Previous Episodes: pt has hx of dyspnea  Treatment before arrival: none    PAST MEDICAL HISTORY  Active Ambulatory Problems     Diagnosis Date Noted   • OA (osteoarthritis) of knee 10/25/2016   • Hyponatremia 01/31/2017   • Bella's esophagus 03/14/2016   • Hematochezia 03/14/2016   • Colon polyp 03/14/2016   • Essential hypertension 08/24/2013   • Gastroesophageal reflux disease 02/09/2017   • Hyperlipidemia 02/09/2017   • Intestinal malabsorption 03/31/2016   • Adverse effect of iron 08/24/2013   • Iron deficiency anemia 03/14/2016   • Gastrointestinal hemorrhage 02/09/2017   • Paroxysmal atrial fibrillation 02/09/2017   • Weakness 09/06/2017   • Bilateral edema of lower extremity 09/07/2017   • DNR (do not resuscitate) 09/07/2017   • KESHIA (acute kidney injury) 09/07/2017   • Mitral regurgitation 09/08/2017   • Bilateral carotid artery stenosis 09/13/2017   • Mitral valve insufficiency 09/06/2017   • Absolute anemia 09/06/2017   • Polyp of colon 09/06/2017   • Iron deficiency anemia due to chronic blood loss 11/02/2017   • GI AVM (gastrointestinal arteriovenous vascular malformation)  11/02/2017   • S/P MVR (mitral valve repair) 11/15/2017   • S/P TVR (tricuspid valve repair) 11/15/2017   • S/P Maze operation for atrial fibrillation 11/15/2017   • Lymphedema 11/15/2017   • Carotid stenosis, asymptomatic, right 12/07/2017     Resolved Ambulatory Problems     Diagnosis Date Noted   • Anemia 02/03/2017   • Hyperkalemia 02/03/2017   • Hypertension 02/09/2017   • Acute cystitis without hematuria 09/07/2017     Past Medical History:   Diagnosis Date   • Angiodysplasia of colon    • Anxiety    • Arthritis    • Asthma    • Atrial fibrillation 02/2017   • Bella's esophagus 3/14/2016   • Cellulitis    • Chronic bronchitis    • Chronic kidney disease    • Colon polyp 3/14/2016   • Essential hypertension 8/24/2013   • GERD (gastroesophageal reflux disease)    • Hiatal hernia    • History of goiter    • History of transfusion    • Hyperlipidemia    • Hypertension    • Hyponatremia    • Intestinal malabsorption 3/31/2016   • Iron deficiency anemia 03/14/2016   • Migraine    • Osteoporosis    • PONV (postoperative nausea and vomiting)    • Sleep apnea    • Stress incontinence        PAST SURGICAL HISTORY  Past Surgical History:   Procedure Laterality Date   • APPENDECTOMY     • CARDIAC CATHETERIZATION N/A 9/13/2017    Procedure: Left ventriculography;  Surgeon: Ron Moscoso MD;  Location: [facility] INVASIVE LOCATION;  Service:    • CARDIAC CATHETERIZATION N/A 9/13/2017    Procedure: Left Heart Cath;  Surgeon: Ron Moscoso MD;  Location: [facility] CATH INVASIVE LOCATION;  Service:    • CARDIAC CATHETERIZATION N/A 9/13/2017    Procedure: Coronary angiography;  Surgeon: Ron Moscoso MD;  Location: [facility] CATH INVASIVE LOCATION;  Service:    • CARDIAC CATHETERIZATION N/A 9/13/2017    Procedure: Right Heart Cath;  Surgeon: Ron Moscoso MD;  Location: [facility] CATH INVASIVE LOCATION;  Service:    • CARDIAC ELECTROPHYSIOLOGY PROCEDURE N/A 9/29/2017    Procedure: Device Implant, [facility]   PPM;  Surgeon: Benji Martínez MD;  Location: [facility] INVASIVE LOCATION;  Service:    • CARDIAC SURGERY      MVR/TVR   • CERVICAL DISC SURGERY     • COLONOSCOPY     • COLONOSCOPY N/A 2/3/2017    NBIH, diverticulosis, one 9 mm hyperplastic polyp, multiple non-bleeding colonic angioectasias, Path; neg   • ENDOSCOPY N/A 2/2/2017    normal esophagus, normal examined duodenum, non-bleeding gastric ulcers w/no sigmata of bleeding   • HYSTERECTOMY     • MITRAL VALVE REPAIR/REPLACEMENT N/A 9/25/2017    Procedure: KOLTON STERNOTOMY MITRAL VALVE REPLACEMENT, TRICUSPID VALVE REPAIR, MAZE PROCEDURE AND PRP;  Surgeon: Yonas Johnson MD;  Location: [facility] OR;  Service:    • PACEMAKER IMPLANTATION       DUAL PACEMAKER ST RONNY  MODEL  [id number]  SERIES [id number]   • CT THROMBOENDARTECTMY NECK,NECK INCIS Right 12/7/2017    Procedure: RT CAROTID ENDARTERECTOMY;  Surgeon: Delma Templeton Jr., MD;  Location: [facility] OR;  Service: Vascular   • CT TOTAL KNEE ARTHROPLASTY Right 10/25/2016    Procedure: RT TOTAL KNEE ARTHROPLASTY;  Surgeon: Ricky Dsouza MD;  Location: [facility] OR;  Service: Orthopedics   • THYROID SURGERY      for goiter   • TONSILLECTOMY         FAMILY HISTORY  Family History   Problem Relation Age of Onset   • Adopted: Yes   • Malig Hyperthermia Neg Hx        SOCIAL HISTORY  Social History     Social History   • Marital status:      Spouse name: N/A   • Number of children: N/A   • Years of education: N/A     Occupational History   • Not on file.     Social History Main Topics   • Smoking status: Former Smoker     Packs/day: 4.00     Years: 33.00     Types: Cigarettes     Quit date: 1980   • Smokeless tobacco: Never Used   • Alcohol use Yes      Comment: once a month a glass of wine or mixed drink   • Drug use: No   • Sexual activity: Not on file     Other Topics Concern   • Not on file     Social History Narrative       ALLERGIES  Sulfa antibiotics    REVIEW OF SYSTEMS  Review of  Systems   Constitutional: Negative for fever.   HENT: Negative for sore throat.    Eyes: Negative.    Respiratory: Positive for shortness of breath. Negative for cough.    Cardiovascular: Positive for leg swelling. Negative for chest pain.   Gastrointestinal: Positive for blood in stool. Negative for abdominal pain, diarrhea and vomiting.   Genitourinary: Negative for dysuria.   Musculoskeletal: Negative for neck pain.   Skin: Negative for rash.   Allergic/Immunologic: Negative.    Neurological: Negative for weakness, numbness and headaches.   Hematological: Negative.    Psychiatric/Behavioral: Negative.    All other systems reviewed and are negative.      PHYSICAL EXAM  ED Triage Vitals   Temp Heart Rate Resp BP SpO2   01/05/18 1443 01/05/18 1443 01/05/18 1538 01/05/18 1538 01/05/18 1443   98.4 °F (36.9 °C) 104 18 136/66 92 %      Temp src Heart Rate Source Patient Position BP Location FiO2 (%)   01/05/18 1443 01/05/18 1538 01/05/18 1538 -- --   Tympanic Monitor Sitting         Physical Exam   Constitutional: She is oriented to person, place, and time and well-developed, well-nourished, and in no distress. No distress.   HENT:   Head: Normocephalic and atraumatic.   Eyes: EOM are normal. Pupils are equal, round, and reactive to light.   Neck: Normal range of motion. Neck supple.   Cardiovascular: Normal rate, regular rhythm and normal heart sounds.    Pulmonary/Chest: Effort normal. No respiratory distress. She has decreased breath sounds in the right lower field and the left lower field.   Abdominal: Soft. She exhibits distension. There is no tenderness. There is no rebound and no guarding.   Musculoskeletal: Normal range of motion. She exhibits no edema.   3+ edema to the waist bilaterally. Good pulses.   Neurological: She is alert and oriented to person, place, and time. She has normal sensation and normal strength.   Skin: Skin is warm and dry. No rash noted.   Psychiatric: Mood and affect normal.   Nursing  note and vitals reviewed.      LAB RESULTS  Lab Results (last 24 hours)     Procedure Component Value Units Date/Time    CBC & Differential [[id number]] Collected:  01/05/18 1546    Specimen:  Blood Updated:  01/05/18 1613    Narrative:       The following orders were created for panel order CBC & Differential.  Procedure                               Abnormality         Status                     ---------                               -----------         ------                     CBC Auto Differential[[id number]]        Abnormal            Final result                 Please view results for these tests on the individual orders.    Comprehensive Metabolic Panel [[id number]]  (Abnormal) Collected:  01/05/18 1546    Specimen:  Blood Updated:  01/05/18 1638     Glucose 137 (H) mg/dL      BUN 40 (H) mg/dL      Creatinine 1.61 (H) mg/dL      Sodium 131 (L) mmol/L      Potassium 4.9 mmol/L      Chloride 95 (L) mmol/L      CO2 24.6 mmol/L      Calcium 9.1 mg/dL      Total Protein 6.5 g/dL      Albumin 3.00 (L) g/dL      ALT (SGPT) 26 U/L      AST (SGOT) 29 U/L      Alkaline Phosphatase 108 U/L      Total Bilirubin 0.6 mg/dL      eGFR Non African Amer 31 (L) mL/min/1.73      Globulin 3.5 gm/dL      A/G Ratio 0.9 g/dL      BUN/Creatinine Ratio 24.8     Anion Gap 11.4 mmol/L     Narrative:       The MDRD GFR formula is only valid for adults with stable renal function between ages 18 and 70.    BNP [[id number]]  (Abnormal) Collected:  01/05/18 1546    Specimen:  Blood Updated:  01/05/18 1634     proBNP 07660.0 (H) pg/mL     Narrative:       Among patients with dyspnea, NT-proBNP is highly sensitive for the detection of acute congestive heart failure. In addition NT-proBNP of <300 pg/ml effectively rules out acute congestive heart failure with 99% negative predictive value.    Troponin [[id number]]  (Normal) Collected:  01/05/18 1546    Specimen:  Blood Updated:  01/05/18 1638     Troponin T 0.016 ng/mL     Narrative:        Troponin T Reference Ranges:  Less than 0.03 ng/mL:    Negative for AMI  0.03 to 0.09 ng/mL:      Indeterminant for AMI  Greater than 0.09 ng/mL: Positive for AMI    CBC Auto Differential [[id number]]  (Abnormal) Collected:  01/05/18 1546    Specimen:  Blood Updated:  01/05/18 1613     WBC 10.86 (H) 10*3/mm3      RBC 3.11 (L) 10*6/mm3      Hemoglobin 9.2 (L) g/dL      Hematocrit 29.6 (L) %      MCV 95.2 fL      MCH 29.6 pg      MCHC 31.1 (L) g/dL      RDW 15.8 (H) %      RDW-SD 54.6 (H) fl      MPV 9.1 fL      Platelets 357 10*3/mm3      Neutrophil % 83.0 (H) %      Lymphocyte % 5.2 (L) %      Monocyte % 10.1 %      Eosinophil % 1.1 %      Basophil % 0.4 %      Immature Grans % 0.2 %      Neutrophils, Absolute 9.01 (H) 10*3/mm3      Lymphocytes, Absolute 0.57 (L) 10*3/mm3      Monocytes, Absolute 1.10 10*3/mm3      Eosinophils, Absolute 0.12 10*3/mm3      Basophils, Absolute 0.04 10*3/mm3      Immature Grans, Absolute 0.02 10*3/mm3     Urinalysis With / Culture If Indicated - Urine, Clean Catch [[id number]]  (Abnormal) Collected:  01/05/18 1619    Specimen:  Urine from Urine, Clean Catch Updated:  01/05/18 1734     Color, UA Yellow     Appearance, UA Cloudy (A)     pH, UA <=5.0     Specific Gravity, UA 1.011     Glucose, UA Negative     Ketones, UA Negative     Bilirubin, UA Negative     Blood, UA Trace (A)     Protein, UA Negative     Leuk Esterase, UA Large (3+) (A)     Nitrite, UA Negative     Urobilinogen, UA 0.2 E.U./dL    Urinalysis, Microscopic Only - Urine, Clean Catch [[id number]]  (Abnormal) Collected:  01/05/18 1619    Specimen:  Urine from Urine, Clean Catch Updated:  01/05/18 1734     RBC, UA 3-5 (A) /HPF      WBC, UA 31-50 (A) /HPF      Bacteria, UA 1+ (A) /HPF      Squamous Epithelial Cells, UA 0-2 /HPF      Hyaline Casts, UA 7-12 /LPF      Methodology Automated Microscopy    Urine Culture - Urine, Urine, Clean Catch [[id number]] Collected:  01/05/18 1611    Specimen:  Urine from Urine, Clean Catch  Updated:  01/05/18 1721          I ordered the above labs and reviewed the results    RADIOLOGY  XR Chest 2 View   There is blunting of the costophrenic angle on the right and a small  right-sided pleural effusion on the right which is new versus the prior  examination. There is mild cephalization of the pulmonary vasculature.  There is no evidence of consolidation.           I ordered the above noted radiological studies. Interpreted by radiologist. Reviewed by me in PACS.       PROCEDURES  Procedures      PROGRESS AND CONSULTS  ED Course   1940  Discussed the recommendation to admit and administer IV lasix and possibly blood. Pt and family agreed with the recommendation. All questions answered.    1950  Bumex ordered for fluid retention. Call placed to [facility].    2014  Consulted with Dr. Li [facility], about the pt who agreed to admit.      MEDICAL DECISION MAKING  Results were reviewed/discussed with the patient and they were also made aware of online access. Pt also made aware that some labs, such as cultures, will not be resulted during ER visit and follow up with PMD is necessary.     MDM  Number of Diagnoses or Management Options     Amount and/or Complexity of Data Reviewed  Clinical lab tests: reviewed (Hemoglobin 9.2, BNP 19708)  Tests in the radiology section of CPT®: reviewed (CXR-There is blunting of the costophrenic angle on the right and a small right-sided pleural effusion on the right which is new versus the prior examination. There is mild cephalization of the pulmonary vasculature. There is no evidence of consolidation.)  Decide to obtain previous medical records or to obtain history from someone other than the patient: yes (Cardiac Echo 12/8/17  EF 25% and also severe pulmonary HTN)  Discuss the patient with other providers: yes (Dr. Li [facility])           DIAGNOSIS  Final diagnoses:   Acute combined systolic and diastolic congestive heart failure       DISPOSITION  ADMISSION    Discussed treatment plan and  reason for admission with pt/family and admitting physician.  Pt/family voiced understanding of the plan for admission for further testing/treatment as needed.     Latest Documented Vital Signs:  As of 12:32 AM  BP- 138/63 HR- 108 Temp- 97.6 °F (36.4 °C) (Oral) O2 sat- 99%    --  Documentation assistance provided by doug Read for Dr. Burgess.  Information recorded by the scribe was done at my direction and has been verified and validated by me.     Lisette Read  01/05/18 9025       Manny Burgess MD  01/06/18 0032

## 2018-01-06 NOTE — PLAN OF CARE
Problem: Patient Care Overview (Adult)  Goal: Plan of Care Review  Outcome: Ongoing (interventions implemented as appropriate)   01/06/18 1207   Coping/Psychosocial Response Interventions   Plan Of Care Reviewed With patient   Outcome Evaluation   Outcome Summary/Follow up Plan PT EVAL completed. Pt transferred supine to sit with mod x 2 and use of bed rail and draw sheet. Pt transferred sit to stand with min x 2 and RW. Pt ambulated 5 ft with min x 2 and RW to sit in chair. Pt requires skilled therapy to due decreased activity tolerance, decreased strength, and decreased balance. Recommend D/C home with HH or inpatient rehab.       Problem: Inpatient Physical Therapy  Goal: Bed Mobility Goal LTG- PT  Outcome: Ongoing (interventions implemented as appropriate)   01/06/18 1207   Bed Mobility PT LTG   Bed Mobility PT LTG, Date Established 01/06/18   Bed Mobility PT LTG, Time to Achieve 1 wk   Bed Mobility PT LTG, Activity Type all bed mobility   Bed Mobility PT LTG, [place] Level contact guard assist   Bed Mobility PT Goal LTG, Assist Device bed rails     Goal: Transfer Training Goal 1 LTG- PT  Outcome: Ongoing (interventions implemented as appropriate)   01/06/18 1207   Transfer Training PT LTG   Transfer Training PT LTG, Date Established 01/06/18   Transfer Training PT LTG, Time to Achieve 1 wk   Transfer Training PT LTG, Activity Type all transfers   Transfer Training PT LTG, [place] Level contact guard assist   Transfer Training PT LTG, Assist Device walker, rolling     Goal: Gait Training Goal LTG- PT  Outcome: Ongoing (interventions implemented as appropriate)   01/06/18 1207   Gait Training PT LTG   Gait Training Goal PT LTG, Date Established 01/06/18   Gait Training Goal PT LTG, Time to Achieve 1 wk   Gait Training Goal PT LTG, [place] Level contact guard assist   Gait Training Goal PT LTG, Assist Device walker, rolling   Gait Training Goal PT LTG, Distance to Achieve 100

## 2018-01-07 PROBLEM — K27.9 PUD (PEPTIC ULCER DISEASE): Status: ACTIVE | Noted: 2018-01-01

## 2018-01-07 PROBLEM — D64.9 ANEMIA: Chronic | Status: ACTIVE | Noted: 2018-01-01

## 2018-01-07 PROBLEM — I42.8 NONISCHEMIC CARDIOMYOPATHY (HCC): Chronic | Status: ACTIVE | Noted: 2018-01-01

## 2018-01-07 PROBLEM — G47.33 OSA (OBSTRUCTIVE SLEEP APNEA): Chronic | Status: ACTIVE | Noted: 2018-01-01

## 2018-01-07 NOTE — PROGRESS NOTES
LOS: 2 days   Patient Care Team:  Ricky Hoyos III, MD as PCP - General  Anthony Hernández MD as Consulting Physician (Pulmonary Disease)  Christy Jerez MD as Consulting Physician (Dermatology)  Ladonna Mclaughlin MD as Consulting Physician (Cardiology)    Chief Complaint:   f/u chf    Interval History:   Still dyspnea and would like to urinate more.  No cp.  No palps, tachycardia.     Objective   Vital Signs  Temp:  [97.3 °F (36.3 °C)-97.7 °F (36.5 °C)] 97.6 °F (36.4 °C)  Heart Rate:  [80-95] 80  Resp:  [18-22] 22  BP: (147-163)/(64-86) 163/64    Intake/Output Summary (Last 24 hours) at 01/07/18 0841  Last data filed at 01/07/18 0500   Gross per 24 hour   Intake              960 ml   Output             1350 ml   Net             -390 ml       Comfortable NAD  Neck supple, no JVD or thyromegaly appreciated  S1/S2 RRR, no m/r/g  Lungs CTA B with fine rales at bases, normal effort  Abdomen S/NT/ND (+) BS, no HSM appreciated  Extremities warm, no clubbing, cyanosis, or edema  No visible or palpable skin lesions  A/Ox4, mood and affect appropriate    Results Review:        Results from last 7 days  Lab Units 01/07/18  0427 01/06/18  0541 01/05/18  1546   SODIUM mmol/L 134* 133* 131*   POTASSIUM mmol/L 3.6 4.6 4.9   CHLORIDE mmol/L 96* 97* 95*   CO2 mmol/L 23.8 24.4 24.6   BUN mg/dL 38* 40* 40*   CREATININE mg/dL 1.68* 1.66* 1.61*   GLUCOSE mg/dL 103* 108* 137*   CALCIUM mg/dL 8.2* 8.4* 9.1       Results from last 7 days  Lab Units 01/06/18  0541 01/05/18  1546   TROPONIN T ng/mL 0.023 0.016       Results from last 7 days  Lab Units 01/07/18  0427 01/06/18  0704 01/05/18  1546   WBC 10*3/mm3 7.53 10.55 10.86*   HEMOGLOBIN g/dL 7.8* 8.5* 9.2*   HEMATOCRIT % 25.0* 26.8* 29.6*   PLATELETS 10*3/mm3 308 320 357           Results from last 7 days  Lab Units 01/06/18  0541   CHOLESTEROL mg/dL 99       Results from last 7 days  Lab Units 01/07/18  0427   MAGNESIUM mg/dL 1.9       Results from last 7 days  Lab  Units 01/06/18  0541   CHOLESTEROL mg/dL 99   TRIGLYCERIDES mg/dL 46   HDL CHOL mg/dL 32*       I reviewed the patient's new clinical results.  I personally viewed and interpreted the patient's EKG/Telemetry data        Medication Review:     aspirin 81 mg Oral Daily   atorvastatin 20 mg Oral Daily   budesonide-formoterol 2 puff Inhalation BID - RT   bumetanide 4 mg Intravenous Q8H   carvedilol 25 mg Oral Q12H   citalopram 20 mg Oral Daily   losartan 50 mg Oral Q24H   pantoprazole 40 mg Oral Q AM   vitamin B-6 100 mg Oral Daily            Assessment/Plan     Principal Problem:    Acute on chronic combined systolic and diastolic congestive heart failure  Active Problems:    Hyponatremia    Hematochezia    Essential hypertension    Gastrointestinal hemorrhage    Bilateral edema of lower extremity    GI AVM (gastrointestinal arteriovenous vascular malformation)    S/P MVR (mitral valve repair)    S/P TVR (tricuspid valve repair)    Chronic kidney disease, stage 3    1. Dyspnea with volume overload - may be due to anemia and hypertension with CMP.  Diurese. Try losartan and may try to change to entresto but need to watch renal function.   2. Severe cardiomyopathy, nonischemic  3. S/p R CEA 12/2017  4. S/p MV and TV repair 9/2017  5. Pacer for AV block after surgery.  She may need to be upgraded to CRT to help improve EF.  Part of her CHF may be due to ventricular pacing.   6. H/o GI bleeds with anemia. Has anemia now. Stool positive for blood.   7. PAF. No AC due to GI bleeding. GI to see.   8. CKD.     EP consult about upgrade to CRT.  Increase BP meds.      Ladonna Mclaughlin MD  01/07/18  8:41 AM

## 2018-01-07 NOTE — PROGRESS NOTES
"   LOS: 2 days    Patient Care Team:  Ricky Hoyos III, MD as PCP - General  Anthony Hernández MD as Consulting Physician (Pulmonary Disease)  Christy Jerez MD as Consulting Physician (Dermatology)  Ladonna Mclaughlin MD as Consulting Physician (Cardiology)    Chief Complaint:    Chief Complaint   Patient presents with   • Shortness of Breath     carrying a lotof fluid in legs    • Rectal Bleeding     onset last week       Subjective follow-up chronic kidney disease    Interval History:   Patient is feeling better, less short of breath, less lower extremity edema, no nausea or vomiting, no dysuria or gross hematuria.      Review of Systems:   As noted above    Objective     Vital Signs  Temp:  [97.3 °F (36.3 °C)-97.6 °F (36.4 °C)] 97.5 °F (36.4 °C)  Heart Rate:  [78-90] 78  Resp:  [18-22] 20  BP: (132-163)/(52-77) 132/52    Flowsheet Rows         First Filed Value    Admission Height  162.6 cm (64\") Documented at 01/05/2018 1538    Admission Weight  94.3 kg (208 lb) Documented at 01/05/2018 1538          I/O this shift:  In: 480 [P.O.:480]  Out: -   I/O last 3 completed shifts:  In: 1440 [P.O.:1440]  Out: 1600 [Urine:1600]    Intake/Output Summary (Last 24 hours) at 01/07/18 1328  Last data filed at 01/07/18 1200   Gross per 24 hour   Intake             1200 ml   Output             1350 ml   Net             -150 ml       Physical Exam:  General Appearance: alert, oriented x 3, no acute distress, morbidly obese  Skin: warm and dry  HEENT:  nonicteric sclerae, oral mucosa normal,   Neck: supple, increased JVD, trachea midline  Lungs: His breath sounds bilaterally with bilateral rhonchi and crackles, unlabored breathing effort  Heart: Irregularly irregular, no rub  Abdomen: soft, non-tender,  present bowel sounds to auscultation  : no palpable bladder,  Extremities: 2+ lower extremities edema, no cyanosis or clubbing  Neuro: normal speech and mental status      Results Review:      Results from last " 7 days  Lab Units 01/07/18 0427 01/06/18  0541 01/05/18  1546   SODIUM mmol/L 134* 133* 131*   POTASSIUM mmol/L 3.6 4.6 4.9   CHLORIDE mmol/L 96* 97* 95*   CO2 mmol/L 23.8 24.4 24.6   BUN mg/dL 38* 40* 40*   CREATININE mg/dL 1.68* 1.66* 1.61*   CALCIUM mg/dL 8.2* 8.4* 9.1   BILIRUBIN mg/dL  --   --  0.6   ALK PHOS U/L  --   --  108   ALT (SGPT) U/L  --   --  26   AST (SGOT) U/L  --   --  29   GLUCOSE mg/dL 103* 108* 137*       Estimated Creatinine Clearance: 30.9 mL/min (by C-G formula based on Cr of 1.68).      Results from last 7 days  Lab Units 01/07/18 0427 01/06/18  0541   MAGNESIUM mg/dL 1.9 2.4   PHOSPHORUS mg/dL 4.2  --          Results from last 7 days  Lab Units 01/07/18 0427   URIC ACID mg/dL 11.3*         Results from last 7 days  Lab Units 01/07/18  0427 01/06/18  0704 01/05/18  1546   WBC 10*3/mm3 7.53 10.55 10.86*   HEMOGLOBIN g/dL 7.8* 8.5* 9.2*   PLATELETS 10*3/mm3 308 320 357               Imaging Results (last 24 hours)     ** No results found for the last 24 hours. **          aspirin 81 mg Oral Daily   atorvastatin 20 mg Oral Daily   budesonide-formoterol 2 puff Inhalation BID - RT   bumetanide 4 mg Intravenous Q8H   carvedilol 37.5 mg Oral Q12H   citalopram 20 mg Oral Daily   [START ON 1/8/2018] losartan 100 mg Oral Q24H   pantoprazole 40 mg Oral Q AM   sennosides-docusate sodium 1 tablet Oral Nightly   vitamin B-6 100 mg Oral Daily          Medication Review:   Current Facility-Administered Medications   Medication Dose Route Frequency Provider Last Rate Last Dose   • acetaminophen (TYLENOL) tablet 650 mg  650 mg Oral Q6H PRN Jeff Li MD   650 mg at 01/07/18 0140   • aspirin EC tablet 81 mg  81 mg Oral Daily Jeff Li MD   81 mg at 01/07/18 1000   • atorvastatin (LIPITOR) tablet 20 mg  20 mg Oral Daily Jeff Li MD   20 mg at 01/07/18 1000   • budesonide-formoterol (SYMBICORT) 160-4.5 MCG/ACT inhaler 2 puff  2 puff Inhalation BID - RT Jeff Li MD   2 puff at 01/06/18 0883    • bumetanide (BUMEX) injection 4 mg  4 mg Intravenous Q8H Jeff Li MD   4 mg at 01/07/18 1000   • carvedilol (COREG) tablet 37.5 mg  37.5 mg Oral Q12H Ladonna Mclaughlin MD   37.5 mg at 01/07/18 1006   • citalopram (CeleXA) tablet 20 mg  20 mg Oral Daily Jeff Li MD   20 mg at 01/07/18 0959   • [START ON 1/8/2018] losartan (COZAAR) tablet 100 mg  100 mg Oral Q24H Ladonna Mclaughlin MD       • magnesium hydroxide (MILK OF MAGNESIA) suspension 2400 mg/10mL 10 mL  10 mL Oral Daily PRN Jeff Li MD       • nitroglycerin (NITROSTAT) SL tablet 0.4 mg  0.4 mg Sublingual Q5 Min PRN Jeff Li MD       • ondansetron (ZOFRAN) injection 4 mg  4 mg Intravenous Q6H PRN Jeff Li MD       • pantoprazole (PROTONIX) EC tablet 40 mg  40 mg Oral Q AM Jeff Li MD   40 mg at 01/07/18 0557   • sennosides-docusate sodium (SENOKOT-S) 8.6-50 MG tablet 1 tablet  1 tablet Oral Nightly Tonja Hernandez MD       • sodium chloride 0.9 % flush 1-10 mL  1-10 mL Intravenous PRN Jeff Li MD       • vitamin B-6 (PYRIDOXINE) tablet 100 mg  100 mg Oral Daily Jeff Li MD   100 mg at 01/06/18 0941       Assessment/Plan   1.  Chronic kidney disease stage III associated with probable nephrosclerosis near baseline. He had been stable at 1.68.  2.  Fluid excess and congestive heart failure.   3.  Chronic anemia associated with iron deficiency and the colonic angiodysplasia, hemoglobin today is 7.8.  4.  Hypertension much better controlled  5.  Mild hyponatremia associated with fluid excess, sodium today 134      Plan:  1.  Continue the IV diuretics today and hopefully will be able to switch to oral diuretics tomorrow  2.  Surveillance labs.          Emile Ornelas MD  01/07/18  1:28 PM

## 2018-01-07 NOTE — PLAN OF CARE
Problem: Patient Care Overview (Adult)  Goal: Plan of Care Review  Outcome: Ongoing (interventions implemented as appropriate)   01/06/18 1815   Coping/Psychosocial Response Interventions   Plan Of Care Reviewed With patient   Patient Care Overview   Progress progress towards functional goals is fair   Outcome Evaluation   Outcome Summary/Follow up Plan continue to monitor, ambulate with assist, oxygen as needed, IV diuretics, monitor 02 sats/pedal edema      Goal: Adult Individualization and Mutuality  Outcome: Ongoing (interventions implemented as appropriate)   01/06/18 1815   Individualization   Patient Specific Preferences no preferences made by patient at this time    Patient Specific Goals pain control, improved mobility, no shortness of breath, bilat lower ext edema improved    Patient Specific Interventions monitor vitals/ pedal edema, oxygen as needed, up oob with assistance, monitor 02 sats    Mutuality/Individual Preferences   What Anxieties, Fears or Concerns Do You Have About Your Health or Care? length of hospital stay, gen weakness, impaired mobility, oxygen usage    What Questions Do You Have About Your Health or Care? How long will I be here? How long will I be on IV diuretics?    What Information Would Help Us Give You More Personalized Care? Keep patient updated on plan of care, meds ordered, new tx orders      Goal: Discharge Needs Assessment  Outcome: Ongoing (interventions implemented as appropriate)   01/06/18 1815   Discharge Needs Assessment   Concerns To Be Addressed basic needs concerns;discharge planning concerns   Readmission Within The Last 30 Days no previous admission in last 30 days   Equipment Needed After Discharge none   Discharge Disposition still a patient   Discharge Planning Comments plans on going home with family assistance    Current Health   Anticipated Changes Related to Illness none   Self-Care   Equipment Currently Used at Home bath bench;grab bar;shower chair;raised  toilet;walker, rolling   Living Environment   Transportation Available family or friend will provide;car       Problem: Cardiac: Heart Failure (Adult)  Goal: Signs and Symptoms of Listed Potential Problems Will be Absent or Manageable (Cardiac: Heart Failure)  Outcome: Ongoing (interventions implemented as appropriate)   01/06/18 1815   Cardiac: Heart Failure   Problems Assessed (Heart Failure) all   Problems Present (Heart Failure) decreased quality of life;functional decline/self-care deficit;situational response;respiratory compromise;fluid/electrolyte imbalance       Problem: Pain, Acute (Adult)  Goal: Identify Related Risk Factors and Signs and Symptoms  Outcome: Ongoing (interventions implemented as appropriate)   01/06/18 1815   Pain, Acute   Related Risk Factors (Acute Pain) patient perception;disease process   Signs and Symptoms (Acute Pain) verbalization of pain descriptors;fatigue/weakness     Goal: Acceptable Pain Control/Comfort Level  Outcome: Ongoing (interventions implemented as appropriate)   01/06/18 1815   Pain, Acute (Adult)   Acceptable Pain Control/Comfort Level making progress toward outcome

## 2018-01-07 NOTE — CONSULTS
[facility]  Inpatient Consult Note    Reason for Consult: anemia, heme + stool    Subjective     HPI:   Ms Deluna is an 82-year-old white female previously evaluated by our service as an inpatient during a hospitalization in February 2017.  She underwent an EGD and colonoscopy at that time notable for 3 small nonbleeding gastric ulcers, right-sided colonic AVMs which were not treated.  She denies to me any visualized blood in her stool but she told others that she had had some blood in her stool prior to admission.  She has not had a bowel movement since she's been admitted.  Hemoccult of her stool several months ago was positive.  She has not been transfused in some time for the patient.  Recommendations in February were for iron replacement, and continuous monitoring of hemoglobin and if visualized was bleeding or inability to maintain hemoglobin, consideration of treatment of the right-sided AVMs.  She has a significant cardiac history notable for nonischemic cardiomyopathy which is classified as severe, congestive heart failure with acute exacerbation, history of valvular repair.    She is very sleepy and difficult to arouse during my interview today.  She complains of diffuse abdominal discomfort.  She states that her breathing is better.  She continues to exhibit lower extremity edema.  Her nurse reports no overnight events from a GI standpoint.    Current Facility-Administered Medications:   •  acetaminophen (TYLENOL) tablet 650 mg, 650 mg, Oral, Q6H PRN, Jeff Li MD, 650 mg at 01/07/18 0140  •  aspirin EC tablet 81 mg, 81 mg, Oral, Daily, Jeff Li MD, 81 mg at 01/07/18 1000  •  atorvastatin (LIPITOR) tablet 20 mg, 20 mg, Oral, Daily, Jeff Li MD, 20 mg at 01/07/18 1000  •  budesonide-formoterol (SYMBICORT) 160-4.5 MCG/ACT inhaler 2 puff, 2 puff, Inhalation, BID - RT, Jeff Li MD, 2 puff at 01/06/18 0853  •  bumetanide (BUMEX) injection 4 mg, 4 mg, Intravenous,  Q8H, Jeff Li MD, 4 mg at 01/07/18 1000  •  carvedilol (COREG) tablet 37.5 mg, 37.5 mg, Oral, Q12H, Ladonna Mclaughlin MD, 37.5 mg at 01/07/18 1006  •  citalopram (CeleXA) tablet 20 mg, 20 mg, Oral, Daily, Jeff Li MD, 20 mg at 01/07/18 0959  •  [START ON 1/8/2018] losartan (COZAAR) tablet 100 mg, 100 mg, Oral, Q24H, Ladonna Mclaughlin MD  •  magnesium hydroxide (MILK OF MAGNESIA) suspension 2400 mg/10mL 10 mL, 10 mL, Oral, Daily PRN, Jeff Li MD  •  nitroglycerin (NITROSTAT) SL tablet 0.4 mg, 0.4 mg, Sublingual, Q5 Min PRN, Jeff Li MD  •  ondansetron (ZOFRAN) injection 4 mg, 4 mg, Intravenous, Q6H PRN, Jeff Li MD  •  pantoprazole (PROTONIX) EC tablet 40 mg, 40 mg, Oral, Q AM, Jeff Li MD, 40 mg at 01/07/18 0557  •  sodium chloride 0.9 % flush 1-10 mL, 1-10 mL, Intravenous, PRN, Jeff Li MD  •  vitamin B-6 (PYRIDOXINE) tablet 100 mg, 100 mg, Oral, Daily, Jeff Li MD, 100 mg at 01/06/18 0941  Review of Systems:    Review of systems could not be obtained due to  Patient is difficult to arouse    Objective     Vital Signs  Temp:  [97.3 °F (36.3 °C)-97.6 °F (36.4 °C)] 97.5 °F (36.4 °C)  Heart Rate:  [78-90] 78  Resp:  [18-22] 20  BP: (132-163)/(52-77) 132/52  Body mass index is 38.11 kg/(m^2).    Intake/Output Summary (Last 24 hours) at 01/07/18 1239  Last data filed at 01/07/18 1030   Gross per 24 hour   Intake              960 ml   Output             1350 ml   Net             -390 ml     I/O this shift:  In: 240 [P.O.:240]  Out: -      Physical Exam:   General: patient sleepy, difficult to arouse   Eyes: Normal lids and lashes, no scleral icterus   Neck: supple, normal ROM   Skin: warm and dry, not jaundiced   Cardiovascular: regular rhythm and rate    Pulm: clear to auscultation bilaterallyanteriorly   Abdomen: soft, diffuse mild tenderness, distended; normal bowel sounds   Rectal: deferred   Extremities: 2+ le edema   Psychiatric: sleepy, unable to adequately  n[place]     Results Review:     I reviewed the patient's new clinical results.      Results from last 7 days  Lab Units 01/07/18  0427 01/06/18  0704 01/05/18  1546   WBC 10*3/mm3 7.53 10.55 10.86*   HEMOGLOBIN g/dL 7.8* 8.5* 9.2*   HEMATOCRIT % 25.0* 26.8* 29.6*   PLATELETS 10*3/mm3 308 320 357       Results from last 7 days  Lab Units 01/07/18  0427 01/06/18  0541 01/05/18  1546   SODIUM mmol/L 134* 133* 131*   POTASSIUM mmol/L 3.6 4.6 4.9   CHLORIDE mmol/L 96* 97* 95*   CO2 mmol/L 23.8 24.4 24.6   BUN mg/dL 38* 40* 40*   CREATININE mg/dL 1.68* 1.66* 1.61*   CALCIUM mg/dL 8.2* 8.4* 9.1   BILIRUBIN mg/dL  --   --  0.6   ALK PHOS U/L  --   --  108   ALT (SGPT) U/L  --   --  26   AST (SGOT) U/L  --   --  29   GLUCOSE mg/dL 103* 108* 137*         No results found for: LIPASE    Radiology:  XR Chest 2 View             Assessment/Plan     Patient Active Problem List   Diagnosis   • OA (osteoarthritis) of knee   • Hyponatremia   • Bella's esophagus   • Hematochezia   • Colon polyp   • Essential hypertension   • Gastroesophageal reflux disease   • Hyperlipidemia   • Intestinal malabsorption   • Adverse effect of iron   • Iron deficiency anemia   • Gastrointestinal hemorrhage   • Paroxysmal atrial fibrillation   • Weakness   • Bilateral edema of lower extremity   • DNR (do not resuscitate)   • KESHIA (acute kidney injury)   • Mitral regurgitation   • Bilateral carotid artery stenosis   • Mitral valve insufficiency   • Absolute anemia   • Polyp of colon   • Iron deficiency anemia due to chronic blood loss   • GI AVM (gastrointestinal arteriovenous vascular malformation)   • S/P MVR (mitral valve repair)   • S/P TVR (tricuspid valve repair)   • S/P Maze operation for atrial fibrillation   • Lymphedema   • Carotid stenosis, asymptomatic, right   • Acute on chronic combined systolic and diastolic congestive heart failure   • Chronic kidney disease, stage 3     Impression:  1. Heme positive stool - egd/c/s 2/17 with 3 small Johnathon  and right sided colonic AVMs  2. Anemia  3 PAF - not on AC due to history GI bleeding  4. CHF with volume overload  5. Severe nonischemic CM  6. Hx MV/TV repair    Plan:  - Follow with you-monitor stool.  If There is evidence of gross bleeding and worsening anemia could consider repeating EGD to ensure ulcer healing and colonoscopy to treat right-sided anterior ectasia.  Her anemia is likely multifactorial and in the absence of significant bleeding, would defer further endoscopic evaluation given her multiple comorbidities.  Given her history of peptic ulcer disease would continue daily proton pump inhibitor.  We'll start daily senna for bowel management.    I discussed the patients findings and my recommendations with patient and nursing staff   .    Tonja Hernandez MD

## 2018-01-07 NOTE — THERAPY TREATMENT NOTE
Acute Care - Physical Therapy Treatment Note  [facility]     Patient Name: Raquel Deluna  : 1935  MRN: [id number]  Today's Date: 2018  Onset of Illness/Injury or Date of Surgery Date: 18  Date of Referral to PT: 18  Referring Physician: Avila    Admit Date: 2018    Visit Dx:    ICD-10-CM ICD-9-CM   1. Acute combined systolic and diastolic congestive heart failure I50.41 428.41     428.0     Patient Active Problem List   Diagnosis   • OA (osteoarthritis) of knee   • Hyponatremia   • Bella's esophagus   • Hematochezia   • Colon polyp   • Essential hypertension   • Gastroesophageal reflux disease   • Hyperlipidemia   • Intestinal malabsorption   • Adverse effect of iron   • Iron deficiency anemia   • Gastrointestinal hemorrhage   • Paroxysmal atrial fibrillation   • Weakness   • Bilateral edema of lower extremity   • DNR (do not resuscitate)   • KESHIA (acute kidney injury)   • Mitral regurgitation   • Bilateral carotid artery stenosis   • Mitral valve insufficiency   • Absolute anemia   • Polyp of colon   • Iron deficiency anemia due to chronic blood loss   • GI AVM (gastrointestinal arteriovenous vascular malformation)   • S/P MVR (mitral valve repair)   • S/P TVR (tricuspid valve repair)   • S/P Maze operation for atrial fibrillation   • Lymphedema   • Carotid stenosis, asymptomatic, right   • Acute on chronic combined systolic and diastolic congestive heart failure   • Chronic kidney disease, stage 3               Adult Rehabilitation Note       18 1400          Rehab Assessment/Intervention    Discipline physical therapist  -      Document Type therapy note (daily note)  -      Subjective Information agree to therapy;complains of;fatigue;weakness  -      Patient Effort, Rehab Treatment good  -LC      Precautions/Limitations fall precautions;oxygen therapy device and L/min  -      Patient Response to Treatment pt very lethargic today  -      Recorded by [ABHILASH] Davin TAM  Migdalia, PT DPT      Pain Assessment    Pain Assessment No/denies pain  -LC      Recorded by [LC] Davin Negron, PT DPT      Vision Assessment/Intervention    Visual Impairment WNL  -LC      Recorded by [LC] Davin Negron, PT DPT      Cognitive Assessment/Intervention    Current Cognitive/Communication Assessment functional  -      Orientation Status oriented x 4  -      Follows Commands/Answers Questions 100% of the time;able to follow multi-step instructions  -      Personal Safety WNL/WFL  -LC      Personal Safety Interventions fall prevention program maintained;gait belt;muscle strengthening facilitated;nonskid shoes/slippers when out of bed  -LC      Recorded by [LC] Davin Negron, PT DPT      Bed Mobility, Assessment/Treatment    Bed Mobility, Assistive Device bed rails;draw sheet  -      Bed Mobility, Roll Left, [place] minimum assist (75% patient effort)  -      Bed Mobility, Scoot/Bridge, [place] minimum assist (75% patient effort);2 person assist required  -      Bed Mob, Supine to Sit, [place] moderate assist (50% patient effort);2 person assist required  -LC      Bed Mobility, Safety Issues decreased use of legs for bridging/pushing  -      Bed Mobility, Impairments strength decreased;impaired balance;pain  -LC      Recorded by [LC] Davin Negron, PT DPT      Transfer Assessment/Treatment    Transfers, Sit-Stand [place] minimum assist (75% patient effort);2 person assist required  -LC      Transfers, Stand-Sit [place] minimum assist (75% patient effort);2 person assist required  -      Transfers, Sit-Stand-Sit, Assist Device rolling walker  -      Transfer, Safety Issues weight-shifting ability decreased  -      Transfer, Impairments strength decreased;impaired balance;pain  -LC      Recorded by [LC] Davin Negron, PT DPT      Gait Assessment/Treatment    Gait, Cow[place] Level minimum assist (75% patient effort);2 person assist required  -LC       Gait, Assistive Device rolling walker  -LC      Gait, Distance (Feet) 5  -LC      Gait, Gait Deviations forward flexed posture;step length decreased  -LC      Gait, Safety Issues sequencing ability decreased;balance decreased during turns  -LC      Gait, Impairments strength decreased;impaired balance;pain  -LC      Recorded by [LC] Davin Negron, PT DPT      Positioning and Restraints    Pre-Treatment Position in bed  -LC      Post Treatment Position chair  -LC      In Chair notified nsg;reclined;call light within reach;encouraged to call for assist;exit alarm on;legs elevated  -LC      Recorded by [LC] Davin Negron, PT DPT        User Key  (r) = Recorded By, (t) = Taken By, (c) = Cosigned By    Initials Name Effective Dates    LC Davin Negron, PT DPT 08/02/16 -                 IP PT Goals       01/06/18 1207          Bed Mobility PT LTG    Bed Mobility PT LTG, Date Established 01/06/18  -LC      Bed Mobility PT LTG, Time to Achieve 1 wk  -LC      Bed Mobility PT LTG, Activity Type all bed mobility  -LC      Bed Mobility PT LTG, [place] Level contact guard assist  -LC      Bed Mobility PT Goal  LTG, Assist Device bed rails  -LC      Transfer Training PT LTG    Transfer Training PT LTG, Date Established 01/06/18  -LC      Transfer Training PT LTG, Time to Achieve 1 wk  -LC      Transfer Training PT LTG, Activity Type all transfers  -LC      Transfer Training PT LTG, [place] Level contact guard assist  -LC      Transfer Training PT LTG, Assist Device walker, rolling  -LC      Gait Training PT LTG    Gait Training Goal PT LTG, Date Established 01/06/18  -LC      Gait Training Goal PT LTG, Time to Achieve 1 wk  -LC      Gait Training Goal PT LTG, [place] Level contact guard assist  -LC      Gait Training Goal PT LTG, Assist Device walker, rolling  -LC      Gait Training Goal PT LTG, Distance to Achieve 100  -LC        User Key  (r) = Recorded By, (t) = Taken By, (c) = Cosigned By    Initials Name  Provider Type    LC Davin Negron, CELINE DPLEON Physical Therapist          Physical Therapy Education     Title: PT OT SLP Therapies (Active)     Topic: Physical Therapy (Active)     Point: Mobility training (Done)    Learning Progress Summary    Learner Readiness Method Response Comment Documented by Status   Patient Acceptance CLARY MCKEON DU safety during transfers  01/07/18 1418 Done    Acceptance CLARY MCKEON DU safety during transfers  01/06/18 1204 Done                      User Key     Initials Effective Dates Name Provider Type Discipline     08/02/16 -  Davin Negron, PT DPT Physical Therapist PT                    PT Recommendation and Plan  Anticipated Discharge Disposition: home with home health, inpatient rehabilitation facility  PT Frequency: daily  Plan of Care Review  Plan Of Care Reviewed With: patient  Progress: progress towards functional goals is fair  Outcome Summary/Follow up Plan: Pt very lethargic and groggy today. Pt transferred supine to sit with min x 1 and use of bed rail. Pt transferred sit to stand with min x 2 and RW. Pt ambulated 5 ft with RW and min x 1 to sit in chair.          Outcome Measures       01/07/18 1400 01/06/18 1200       How much help from another person do you currently need...    Turning from your back to your side while in flat bed without using bedrails? 3  -LC 3  -LC     Moving from lying on back to sitting on the side of a flat bed without bedrails? 2  -LC 2  -LC     Moving to and from a bed to a chair (including a wheelchair)? 2  -LC 2  -LC     Standing up from a chair using your arms (e.g., wheelchair, bedside chair)? 3  -LC 3  -LC     Climbing 3-5 steps with a railing? 1  -LC 1  -LC     To walk in hospital room? 3  -LC 2  -LC     AM-PAC 6 Clicks Score 14  -LC 13  -LC     Functional Assessment    Outcome Measure Options AM-PAC 6 Clicks Basic Mobility (PT)  -LC AM-PAC 6 Clicks Basic Mobility (PT)  -LC       User Key  (r) = Recorded By, (t) = Taken By, (c) = Cosigned By     Initials Name Provider Type    ABHILASH Negron, PT DPT Physical Therapist           Time Calculation:         PT Charges       01/07/18 1419          Time Calculation    Start Time 1405  -LC      Stop Time 1419  -LC      Time Calculation (min) 14 min  -LC      PT Received On 01/07/18  -LC      PT - Next Appointment 01/08/18  -LC      Time Calculation- PT    Total Timed Code Minutes- PT 14 minute(s)  -LC        User Key  (r) = Recorded By, (t) = Taken By, (c) = Cosigned By    Initials Name Provider Type    ABHILASH Negron, PT DPT Physical Therapist          Therapy Charges for Today     Code Description Service Date Service Provider Modifiers Qty    25138856498 HC PT EVAL LOW COMPLEXITY 1 1/6/2018 Davin Negron, PT DPT GP 1    34577758402 HC PT THERAPEUTIC ACT EA 15 MIN 1/7/2018 Davin Negron, PT DPT GP 1          PT G-Codes  Outcome Measure Options: AM-PAC 6 Clicks Basic Mobility (PT)    Davin Negron PT DPT  1/7/2018

## 2018-01-07 NOTE — PROGRESS NOTES
"   LOS: 2 days   Patient Care Team:  Ricky Hoyos III, MD as PCP - General  Anthony Hernández MD as Consulting Physician (Pulmonary Disease)  Christy Jerez MD as Consulting Physician (Dermatology)  Ladonna Mclaughlin MD as Consulting Physician (Cardiology)    Chief Complaint: tired    Subjective     HPI Comments: Feeling tired, but overall a little better today.     Shortness of Breath   Pertinent negatives include no chest pain, fever or vomiting.   Rectal Bleeding   Associated symptoms include weakness. Pertinent negatives include no anorexia, chest pain, coughing, fever, nausea or vomiting.       Subjective:  Symptoms:  Improved.  She reports weakness.  No shortness of breath, malaise, cough, chest pain, headache, chest pressure, anorexia, diarrhea or anxiety.    Diet:  Adequate intake.  No nausea or vomiting.    Activity level: Impaired due to weakness.    Pain:  She reports no pain.        History taken from: patient chart    Objective     Vital Signs  Temp:  [97.3 °F (36.3 °C)-97.6 °F (36.4 °C)] 97.4 °F (36.3 °C)  Heart Rate:  [78-83] 78  Resp:  [18-22] 18  BP: (132-163)/(52-77) 139/63    Objective:  General Appearance:  Comfortable and in no acute distress.    Vital signs: (most recent): Blood pressure 139/63, pulse 78, temperature 97.4 °F (36.3 °C), temperature source Oral, resp. rate 18, height 162.6 cm (64\"), weight 101 kg (222 lb), SpO2 99 %.  Vital signs are normal.  No fever.    Output: Producing urine (barreto to BSD, clear urine in bag) and no stool output.    HEENT: Normal HEENT exam.    Lungs:  Normal respiratory rate and normal effort.  There are rales (bilateral bases).    Heart: Normal rate.  Regular rhythm.    Abdomen: Abdomen is soft.  (Edema of abdomen)Bowel sounds are normal.   There is epigastric tenderness. There is no rebound tenderness.  There is no guarding.     Extremities: There is dependent edema (2-3+).  (Wraps in place BLE)  Neurological: Patient is alert and " oriented to person, place and time.    Pupils:  Pupils are equal, round, and reactive to light.    Skin:  Warm and dry.            I/O last 3 completed shifts:  In: 1440 [P.O.:1440]  Out: 1600 [Urine:1600]  I/O this shift:  In: 480 [P.O.:480]  Out: 500 [Urine:500]      Results Review:     I reviewed the patient's new clinical results.  I reviewed the patient's other test results and agree with the interpretation  I personally viewed and interpreted the patient's EKG/Telemetry data  Discussed with patient    Medication Review: reviewed    Assessment/Plan     Principal Problem:    Acute on chronic combined systolic and diastolic congestive heart failure  Active Problems:    Hyponatremia    Essential hypertension    Gastrointestinal hemorrhage    Paroxysmal atrial fibrillation    Bilateral edema of lower extremity    history of GI AVM (gastrointestinal arteriovenous vascular malformation)    S/P MVR (mitral valve repair)    S/P TVR (tricuspid valve repair)    Chronic kidney disease, stage 3    History of PUD (peptic ulcer disease)    Anemia, multifactorial    AMBER (obstructive sleep apnea)    Nonischemic cardiomyopathy          Plan:   (Appreciate Card/GI/Renal attention to pt  Continue IV Bumex for now, may change to po tomorrow  Cr stable at baseline  Making urine, weight unchanged, Uric Acid up appropriately  EPS to see for possible CRT  EF improved on Echo this admission  No plans for endoscopy at present  Monitor Hgb, continue PPI  No BM today  Continue CPAP  Wean O2 as able  IS).       Baron Torrez MD  01/07/18  4:58 PM    Time: 25min

## 2018-01-07 NOTE — PLAN OF CARE
Problem: Patient Care Overview (Adult)  Goal: Plan of Care Review  Outcome: Ongoing (interventions implemented as appropriate)   01/07/18 1418   Coping/Psychosocial Response Interventions   Plan Of Care Reviewed With patient   Patient Care Overview   Progress progress towards functional goals is fair   Outcome Evaluation   Outcome Summary/Follow up Plan Pt very lethargic and groggy today. Pt transferred supine to sit with min x 1 and use of bed rail. Pt transferred sit to stand with min x 2 and RW. Pt ambulated 5 ft with RW and min x 1 to sit in chair.

## 2018-01-08 NOTE — CONSULTS
Electrophysiology Hospital Consult            Patient Name: Raquel Deluna  Age/Sex: 82 y.o. female  : 1935  MRN: [id number]    Date of Admission: 2018  Date of Encounter Visit: 18  Encounter Provider: DELTA Sharma  Referring Provider: Jeff Li MD  Place of Service: [facility] CARDIOLOGY  Patient Care Team:  Ricky Hoyos III, MD as PCP - General  Anthony Bulmaro Hernández MD as Consulting Physician (Pulmonary Disease)  Christy Jerez MD as Consulting Physician (Dermatology)  Ladonna Mclaughlin MD as Consulting Physician (Cardiology)      Subjective:   EP Consultation for: CHF, evaluate for possible CRT    Chief Complaint: SOA    History of Present Illness:  Raquel Deluna is a 82 y.o. female with a history of severe carotid artery stenosis, COPD, PAF (no AC due to GI bleeding), anemia, GI bleed (gastric ulcers and right sided colonic AVMs), HTN, LBBB, CKD and heart failure. She was hospitalized in 2017 with HF. She had HF and KESHIA due to severe MR. She had a cath on 9/3 with no significant CAD but severe MR. She underwent MV and TV repair on 17. Postoperatively she had a junctional rhythm in the 30's and underwent PPM ([facility]). She then had a right CEA in 2017. She had an echo in 17 showing EF 25% with moderate left atrial dilation>severe NICM. She presented on 18 with dyspnea, wt gain and edema. Repeat echo this admission showed EF 37%, RV dilatation and RVSP 80mmHg.She is currently being aggressively treated for CHF with IV diuretics. She has also complained of dark stools and her Hgb dropped to 7.8- GI is following. EP has been consulted to see for evaluation for possible upgrade to CRT device.       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   • Cellulitis     left leg  hx about a month ago   ALL HEALED   NOT SWELLING OR OOZING NOW  WEARS CURT ACE WRAPS   • Chronic bronchitis    • Chronic kidney disease    • Colon polyp 3/14/2016   • Essential hypertension 8/24/2013    Overview:  2015 IMO UPDATE   • GERD (gastroesophageal reflux disease)    • Hiatal hernia    • History of goiter    • History of transfusion     had reaction to 2nd unit after receiving the 1st   • Hyperlipidemia    • Hypertension    • Hyponatremia    • Intestinal malabsorption 3/31/2016   • Iron deficiency anemia 03/14/2016    receives iron infusion in past   • Migraine    • Osteoporosis    • PONV (postoperative nausea and vomiting)    • Sleep apnea     cpap   • Stress incontinence     wears pads       Past Surgical History:   Procedure Laterality Date   • APPENDECTOMY     • CARDIAC CATHETERIZATION N/A 9/13/2017    Procedure: Left ventriculography;  Surgeon: Ron Moscoso MD;  Location: [facility] CATH [facility]VASIVE LOC[facility];  Service:    • CARDIAC CATHETERIZATION N/A 9/13/2017    Procedure: Left Heart Cath;  Surgeon: Ron Moscoso MD;  Location: [facility] CATH INVASIVE LOCATION;  Service:    • CARDIAC CATHETERIZATION N/A 9/13/2017    Procedure: Coronary angiography;  Surgeon: Ron Moscoso MD;  Location: [facility] CATH INVASIVE LOC[facility];  Service:    • CARDIAC CATHETERIZATION N/A 9/13/2017    Procedure: Right Heart Cath;  Surgeon: Ron Moscoso MD;  Location: [facility] CATH INVASIVE LOCATION;  Service:    • CARDIAC ELECTROPHYSIOLOGY PROCEDURE N/A 9/29/2017    Procedure: Device Implant, [place]   PPM;  Surgeon: Benji Martínez MD;  Location: [facility] CATH INVASIVE LOCATION;  Service:    • CARDIAC SURGERY      MVR/TVR   • CERVICAL DISC SURGERY     • COLONOSCOPY     • COLONOSCOPY N/A 2/3/2017    NBIH, diverticulosis, one 9 mm hyperplastic polyp, multiple non-bleeding colonic angioectasias, Path; neg   • ENDOSCOPY N/A 2/2/2017    normal esophagus, normal examined duodenum, non-bleeding gastric ulcers  w/no sigmata of bleeding   • HYSTERECTOMY     • MITRAL VALVE REPAIR/REPLACEMENT N/A 9/25/2017    Procedure: KOLTON STERNOTOMY MITRAL VALVE REPLACEMENT, TRICUSPID VALVE REPAIR, MAZE PROCEDURE AND PRP;  Surgeon: Yonas Johnson MD;  Location: [facility] OR;  Service:    • PACEMAKER IMPLANTATION       DUAL PACEMAKER ST EV  MODEL  [id number]  SERIES [id number]   • NH THROMBOENDARTECTMY NECK,NECK INCIS Right 12/7/2017    Procedure: RT CAROTID ENDARTERECTOMY;  Surgeon: Delma Templeton Jr., MD;  Location: [facility] OR;  Service: Vascular   • NH TOTAL KNEE ARTHROPLASTY Right 10/25/2016    Procedure: RT TOTAL KNEE ARTHROPLASTY;  Surgeon: Ricky Dsouza MD;  Location: [facility] OR;  Service: Orthopedics   • THYROID SURGERY      for goiter   • TONSILLECTOMY         Home Medications:   Prescriptions Prior to Admission   Medication Sig Dispense Refill Last Dose   • aspirin 81 MG EC tablet Take 1 tablet by mouth Daily. (Patient taking differently: Take 81 mg by mouth Daily. TO STAY ON) 30 tablet 5 12/6/2017 at 0900   • atorvastatin (LIPITOR) 20 MG tablet Take 20 mg by mouth Daily.  4 12/6/2017 at 0900   • B Complex Vitamins (VITAMIN B COMPLEX PO) Take 1 tablet by mouth Every Morning.   12/6/2017 at 0900   • budesonide-formoterol (SYMBICORT) 160-4.5 MCG/ACT inhaler Inhale 2 puffs 2 (Two) Times a Day.   12/6/2017 at 0900   • bumetanide (BUMEX) 2 MG tablet Take 1.5 tablets by mouth 2 (Two) Times a Day. PT IS TAKING 1 AND 1/2 TABLET  tablet 3 12/6/2017 at 2000   • CALCIUM CARBONATE-VITAMIN D PO Take 1 tablet by mouth Every Morning.   12/6/2017 at 0900   • carvedilol (COREG) 12.5 MG tablet Take 2 tablets by mouth Every 12 (Twelve) Hours. 60 tablet 5 12/7/2017 at 0600   • Cholecalciferol (VITAMIN D-3 PO) Take 2,000 Units by mouth Daily.   12/6/2017 at 0900   • citalopram (CeleXA) 40 MG tablet Take 40 mg by mouth Daily.   12/6/2017 at 0900   • esomeprazole (NexIUM) 40 MG capsule Take 40 mg by mouth Daily Before Supper.    12/6/2017 at 0900   • Fe-Succ Ac-C-Thre Ac-B12-FA (FERREX 150 FORTE PLUS)  MG capsule capsule Take 1 capsule by mouth 2 (Two) Times a Day With Meals. 60 each 5 12/4/2017   • hydrALAZINE (APRESOLINE) 25 MG tablet Take 1 tablet by mouth Every 8 (Eight) Hours. 90 tablet 5 12/7/2017 at 0600   • mometasone (NASONEX) 50 MCG/ACT nasal spray 2 sprays into each nostril Every Morning.   12/6/2017 at 0900   • Multiple Vitamins-Minerals (CENTRUM SILVER PO) Take 1 tablet by mouth Daily.   12/6/2017 at 0900   • potassium chloride (MICRO-K) 10 MEQ CR capsule Take 2 capsules by mouth 2 (Two) Times a Day With Meals. 120 capsule 5 12/6/2017 at 2000   • vitamin B-6 (PYRIDOXINE) 100 MG tablet Take 100 mg by mouth Daily.   12/6/2017 at 0900       Allergies:  Allergies   Allergen Reactions   • Sulfa Antibiotics Other (See Comments)     Yeast infections/ RASH       Past Social History:  Social History     Social History   • Marital status:      Spouse name: N/A   • Number of children: N/A   • Years of education: N/A     Occupational History   • Not on file.     Social History Main Topics   • Smoking status: Former Smoker     Packs/day: 4.00     Years: 33.00     Types: Cigarettes     Quit date: 1980   • Smokeless tobacco: Never Used   • Alcohol use Yes      Comment: once a month a glass of wine or mixed drink   • Drug use: No   • Sexual activity: Not on file     Other Topics Concern   • Not on file     Social History Narrative       Past Family History:  Family History   Problem Relation Age of Onset   • Adopted: Yes   • Malig Hyperthermia Neg Hx        Review of Systems: All systems reviewed. Pertinent positives identified in HPI. All other systems are negative.     14 point ROS was performed and is negative except as outlined in HPI.     Objective:     Objective:  Vital Signs (last 24 hours)       01/07 0700  -  01/08 0659 01/08 0700  -  01/08 0925   Most Recent    Temp (°F) 97.4 -  98.4      97.3     97.3 (36.3)    Heart  Rate 77 -  80      80     80    Resp 18 -  22      18     18    /62 -  163/64      134/57     134/57    SpO2 (%) 97 -  100    91 -  93     93        Temp:  [97.3 °F (36.3 °C)-98.4 °F (36.9 °C)] 97.3 °F (36.3 °C)  Heart Rate:  [77-80] 80  Resp:  [18-20] 18  BP: (118-139)/(52-64) 134/57  Body mass index is 36.41 kg/(m^2).        Physical Exam:   Physical Exam   Constitutional: She is oriented to person, place, and time. No distress.   HENT:   Head: Normocephalic and atraumatic.   Eyes: Conjunctivae are normal. No scleral icterus.   Neck: Neck supple.   Cardiovascular: Normal rate and regular rhythm.    Pulmonary/Chest: No respiratory distress.   Abdominal: Soft.   Musculoskeletal: She exhibits edema.   Neurological: She is alert and oriented to person, place, and time.   Skin: Skin is warm and dry.   Lower extremities edematous    Psychiatric: She has a normal mood and affect. Her behavior is normal.   Vitals reviewed.       Labs:   Lab Review:       Results from last 7 days  Lab Units 01/08/18  0447 01/07/18  0427 01/06/18  0541 01/05/18  1546   SODIUM mmol/L 136 134* 133* 131*   POTASSIUM mmol/L 3.9 3.6 4.6 4.9   CHLORIDE mmol/L 96* 96* 97* 95*   CO2 mmol/L 26.9 23.8 24.4 24.6   BUN mg/dL 37* 38* 40* 40*   CREATININE mg/dL 1.64* 1.68* 1.66* 1.61*   GLUCOSE mg/dL 96 103* 108* 137*   CALCIUM mg/dL 8.5* 8.2* 8.4* 9.1   AST (SGOT) U/L  --   --   --  29   ALT (SGPT) U/L  --   --   --  26       Results from last 7 days  Lab Units 01/06/18  0541 01/05/18  1546   TROPONIN T ng/mL 0.023 0.016       Results from last 7 days  Lab Units 01/08/18  0447   WBC 10*3/mm3 7.94   HEMOGLOBIN g/dL 8.4*   HEMATOCRIT % 26.8*   PLATELETS 10*3/mm3 304           Results from last 7 days  Lab Units 01/06/18  0541   CHOLESTEROL mg/dL 99       Results from last 7 days  Lab Units 01/08/18  0447   MAGNESIUM mg/dL 2.0       Results from last 7 days  Lab Units 01/06/18  0541   CHOLESTEROL mg/dL 99   TRIGLYCERIDES mg/dL 46   HDL CHOL mg/dL 32*        Results from last 7 days  Lab Units 18  0541   PROBNP pg/mL 75927.0*                   Imagin/7/2018 Echo:        Interpretation Summary      · The study is technically difficult for diagnosis  · Left ventricular systolic function is moderately decreased. Estimated EF = 37%.  · Right ventricular cavity is dilated.  · Mild tricuspid valve regurgitation is present.  · Calculated right ventricular systolic pressure from tricuspid regurgitation is 80 mmHg.           EKG:             I personally viewed and interpreted the patient's EKG/Telemetry data.    Assessment:     Principal Problem:    Acute on chronic combined systolic and diastolic congestive heart failure  Active Problems:    Hyponatremia    Essential hypertension    Gastrointestinal hemorrhage    Paroxysmal atrial fibrillation    Bilateral edema of lower extremity    history of GI AVM (gastrointestinal arteriovenous vascular malformation)    S/P MVR (mitral valve repair)    S/P TVR (tricuspid valve repair)    Chronic kidney disease, stage 3    History of PUD (peptic ulcer disease)    Anemia, multifactorial    AMBER (obstructive sleep apnea)    Nonischemic cardiomyopathy        Plan:     Dr. Britt and I saw Ms. Deluna. She would probably benefit from an upgrade to a CRT device however she currently has multiple other issues. Will discuss with Dr. Mclaughlin and continue to follow.    Thank you for allowing me to participate in the care of Raquel Deluna. Feel free to contact me directly with any further questions or concerns.    DELTA Sharma  [facility]  18  9:25 AM

## 2018-01-08 NOTE — PROGRESS NOTES
Continued Stay Note  [facility]     Patient Name: Raquel Deluna  MRN: [id number]  Today's Date: 1/8/2018    Admit Date: 1/5/2018          Discharge Plan       01/08/18 1057    Case Management/Social Work Plan    Plan Pending referral to skilled bed at [facility]     Patient/Family In Agreement With Plan yes    Additional Comments CCP met with patient at bedside and discussed PT recommendations. Patient would like referral to [facility]. CCP made referral to [place]. CCP will follow for discharge to skilled bed at [facility]. Brittany ASHFORDW               Discharge Codes     None            DEJON ChongW

## 2018-01-08 NOTE — THERAPY TREATMENT NOTE
Acute Care - Physical Therapy Treatment Note  [facility]     Patient Name: Raquel Deluna  : 1935  MRN: [id number]  Today's Date: 2018  Onset of Illness/Injury or Date of Surgery Date: 18  Date of Referral to PT: 18  Referring Physician: Avila    Admit Date: 2018    Visit Dx:    ICD-10-CM ICD-9-CM   1. Acute combined systolic and diastolic congestive heart failure I50.41 428.41     428.0     Patient Active Problem List   Diagnosis   • OA (osteoarthritis) of knee   • Hyponatremia   • Bella's esophagus   • Hematochezia   • Colon polyp   • Essential hypertension   • Gastroesophageal reflux disease   • Hyperlipidemia   • Intestinal malabsorption   • Adverse effect of iron   • Iron deficiency anemia   • Gastrointestinal hemorrhage   • Paroxysmal atrial fibrillation   • Weakness   • Bilateral edema of lower extremity   • DNR (do not resuscitate)   • KESHIA (acute kidney injury)   • Mitral regurgitation   • Bilateral carotid artery stenosis   • Mitral valve insufficiency   • Absolute anemia   • Polyp of colon   • Iron deficiency anemia due to chronic blood loss   • history of GI AVM (gastrointestinal arteriovenous vascular malformation)   • S/P MVR (mitral valve repair)   • S/P TVR (tricuspid valve repair)   • S/P Maze operation for atrial fibrillation   • Lymphedema   • Carotid stenosis, asymptomatic, right   • Acute on chronic combined systolic and diastolic congestive heart failure   • Chronic kidney disease, stage 3   • History of PUD (peptic ulcer disease)   • Anemia, multifactorial   • AMBER (obstructive sleep apnea)   • Nonischemic cardiomyopathy               Adult Rehabilitation Note       18 1300 18 1400       Rehab Assessment/Intervention    Discipline physical therapist  -LC physical therapist  -LC     Document Type therapy note (daily note)  -LC therapy note (daily note)  -LC     Subjective Information agree to therapy;complains of;weakness;fatigue;pain  -LC agree to  therapy;complains of;fatigue;weakness  -     Patient Effort, Rehab Treatment good  -LC good  -LC     Precautions/Limitations fall precautions  - fall precautions;oxygen therapy device and L/min  -LC     Patient Response to Treatment pain in B knees  -LC pt very lethargic today  -     Recorded by [LC] Davin Negron, PT DPT [LC] Davin Negron, PT DPT     Pain Assessment    Pain Assessment 0-10  - No/denies pain  -     Pain Score 5  -      Pain Type Acute pain  -      Pain Location Leg  -LC      Pain Orientation Right;Left  -LC      Recorded by [LC] Davin Negron, PT DPT [LC] Davin Negron, PT DPT     Vision Assessment/Intervention    Visual Impairment  WNL  -     Recorded by  [LC] Davin Negron, PT DPT     Cognitive Assessment/Intervention    Current Cognitive/Communication Assessment functional  - functional  -     Orientation Status oriented x 4  - oriented x 4  -     Follows Commands/Answers Questions 100% of the time;able to follow multi-step instructions  - 100% of the time;able to follow multi-step instructions  -     Personal Safety WNL/WFL  -LC WNL/WFL  -     Personal Safety Interventions fall prevention program maintained;gait belt;muscle strengthening facilitated;nonskid shoes/slippers when out of bed  - fall prevention program maintained;gait belt;muscle strengthening facilitated;nonskid shoes/slippers when out of bed  -     Recorded by [LC] Davin Negron, PT DPT [LC] Davin Negron, PT DPT     Bed Mobility, Assessment/Treatment    Bed Mobility, Assistive Device  bed rails;draw sheet  -     Bed Mobility, Roll Left, [place]  minimum assist (75% patient effort)  -     Bed Mobility, Scoot/Bridge, [place]  minimum assist (75% patient effort);2 person assist required  -     Bed Mob, Supine to Sit, [place]  moderate assist (50% patient effort);2 person assist required  -     Bed Mobility, Safety Issues  decreased use of legs for bridging/pushing  -      Bed Mobility, Impairments  strength decreased;impaired balance;pain  -LC     Bed Mobility, Comment up in chair  -LC      Recorded by [LC] Davin Negron PT DPT [LC] Davin Negron, PT DPT     Transfer Assessment/Treatment    Transfers, Sit-Stand [place] moderate assist (50% patient effort)  -LC minimum assist (75% patient effort);2 person assist required  -LC     Transfers, Stand-Sit [place] minimum assist (75% patient effort)  -LC minimum assist (75% patient effort);2 person assist required  -LC     Transfers, Sit-Stand-Sit, Assist Device rolling walker  -LC rolling walker  -LC     Transfer, Safety Issues weight-shifting ability decreased  -LC weight-shifting ability decreased  -LC     Transfer, Impairments strength decreased;impaired balance;pain  -LC strength decreased;impaired balance;pain  -LC     Recorded by [LC] Davin Negron PT DPT [LC] Davin Negron, PT DPT     Gait Assessment/Treatment    Gait, [place] Level minimum assist (75% patient effort)  -LC minimum assist (75% patient effort);2 person assist required  -LC     Gait, Assistive Device rolling walker  -LC rolling walker  -LC     Gait, Distance (Feet) 20  -LC 5  -LC     Gait, Gait Deviations  forward flexed posture;step length decreased  -LC     Gait, Safety Issues sequencing ability decreased;step length decreased  -LC sequencing ability decreased;balance decreased during turns  -LC     Gait, Impairments strength decreased;impaired balance;pain  -LC strength decreased;impaired balance;pain  -LC     Recorded by [LC] Davin Negron, PT DPT [LC] Davin Negron, PT DPT     Positioning and Restraints    Pre-Treatment Position sitting in chair/recliner  -LC in bed  -LC     Post Treatment Position chair  -LC chair  -LC     In Chair reclined;sitting;call light within reach;encouraged to call for assist;exit alarm on;legs elevated  - notified nsg;reclined;call light within reach;encouraged to call for assist;exit alarm on;legs elevated   -LC     Recorded by [LC] Davin Negron, PT DPT [LC] Davin Negron, PT DPT       User Key  (r) = Recorded By, (t) = Taken By, (c) = Cosigned By    Initials Name Effective Dates    LC Davin Negron, PT DPT 08/02/16 -                 IP PT Goals       01/06/18 1207          Bed Mobility PT LTG    Bed Mobility PT LTG, Date Established 01/06/18  -LC      Bed Mobility PT LTG, Time to Achieve 1 wk  -LC      Bed Mobility PT LTG, Activity Type all bed mobility  -LC      Bed Mobility PT LTG, [place] Level contact guard assist  -LC      Bed Mobility PT Goal  LTG, Assist Device bed rails  -LC      Transfer Training PT LTG    Transfer Training PT LTG, Date Established 01/06/18  -LC      Transfer Training PT LTG, Time to Achieve 1 wk  -LC      Transfer Training PT LTG, Activity Type all transfers  -LC      Transfer Training PT LTG, [place] Level contact guard assist  -LC      Transfer Training PT LTG, Assist Device walker, rolling  -LC      Gait Training PT LTG    Gait Training Goal PT LTG, Date Established 01/06/18  -LC      Gait Training Goal PT LTG, Time to Achieve 1 wk  -LC      Gait Training Goal PT LTG, [place] Level contact guard assist  -LC      Gait Training Goal PT LTG, Assist Device walker, rolling  -LC      Gait Training Goal PT LTG, Distance to Achieve 100  -LC        User Key  (r) = Recorded By, (t) = Taken By, (c) = Cosigned By    Initials Name Provider Type    LC Davin Negron, PT DPT Physical Therapist          Physical Therapy Education     Title: PT OT SLP Therapies (Active)     Topic: Physical Therapy (Active)     Point: Mobility training (Done)    Learning Progress Summary    Learner Readiness Method Response Comment Documented by Status   Patient Acceptance CLARY MCKEON DU benefits of activity, safety during ambulation  01/08/18 1400 Done    Acceptance CLARY MCKEON DU safety during transfers  01/07/18 1418 Done    Acceptance CLARY MCKEON DU safety during transfers  01/06/18 1204 Done                       User Key     Initials Effective Dates Name Provider Type Discipline     08/02/16 -  Davin Negron, PT DPT Physical Therapist PT                    PT Recommendation and Plan  Anticipated Discharge Disposition: home with home health, inpatient rehabilitation facility  PT Frequency: daily  Plan of Care Review  Plan Of Care Reviewed With: patient  Progress: improving  Outcome Summary/Follow up Plan: Pt up in chair. Pt transferred sit to stand with mod x 1 and RW. Pt ambulated 20 ft with RW and min x 1. Pt returned to sit in chair with min x 1. She reports increased pain in B knees during ambulation.          Outcome Measures       01/07/18 1400 01/06/18 1200       How much help from another person do you currently need...    Turning from your back to your side while in flat bed without using bedrails? 3  -LC 3  -LC     Moving from lying on back to sitting on the side of a flat bed without bedrails? 2  -LC 2  -LC     Moving to and from a bed to a chair (including a wheelchair)? 2  -LC 2  -LC     Standing up from a chair using your arms (e.g., wheelchair, bedside chair)? 3  -LC 3  -LC     Climbing 3-5 steps with a railing? 1  -LC 1  -LC     To walk in hospital room? 3  -LC 2  -LC     AM-PAC 6 Clicks Score 14  -LC 13  -LC     Functional Assessment    Outcome Measure Options AM-PAC 6 Clicks Basic Mobility (PT)  -LC AM-PAC 6 Clicks Basic Mobility (PT)  -       User Key  (r) = Recorded By, (t) = Taken By, (c) = Cosigned By    Initials Name Provider Type     Davin Negron, CELINE DPT Physical Therapist           Time Calculation:         PT Charges       01/08/18 1402          Time Calculation    Start Time 1345  -      Stop Time 1400  -      Time Calculation (min) 15 min  -      PT Received On 01/08/18  -      PT - Next Appointment 01/09/18  -      Time Calculation- PT    Total Timed Code Minutes- PT 15 minute(s)  -        User Key  (r) = Recorded By, (t) = Taken By, (c) = Cosigned By    Initials Name  Provider Type    LC Davin Negron, PT DPT Physical Therapist          Therapy Charges for Today     Code Description Service Date Service Provider Modifiers Qty    37129972011 HC PT THERAPEUTIC ACT EA 15 MIN 1/7/2018 Davin Negron, PT DPT GP 1    71903091513 HC GAIT TRAINING EA 15 MIN 1/8/2018 Davin Negron, PT DPT GP 1          PT G-Codes  Outcome Measure Options: AM-PAC 6 Clicks Basic Mobility (PT)    Davin Negron, PT DPT  1/8/2018

## 2018-01-08 NOTE — PLAN OF CARE
Problem: Patient Care Overview (Adult)  Goal: Plan of Care Review  Outcome: Ongoing (interventions implemented as appropriate)   01/08/18 1401   Coping/Psychosocial Response Interventions   Plan Of Care Reviewed With patient   Patient Care Overview   Progress improving   Outcome Evaluation   Outcome Summary/Follow up Plan Pt up in chair. Pt transferred sit to stand with mod x 1 and RW. Pt ambulated 20 ft with RW and min x 1. Pt returned to sit in chair with min x 1. She reports increased pain in B knees during ambulation.

## 2018-01-08 NOTE — PROGRESS NOTES
"   LOS: 3 days   Patient Care Team:  Ricky Hoyos III, MD as PCP - General  Anthony Hernández MD as Consulting Physician (Pulmonary Disease)  Christy Jerez MD as Consulting Physician (Dermatology)  Ladonna Mclaughlin MD as Consulting Physician (Cardiology)    Chief Complaint: red urine    Subjective     HPI Comments: Feeling okay today. Reports some blood in her urine. Urine in bag is dark yellow. No BM still. Was successfully weaned off O2    Shortness of Breath   Pertinent negatives include no chest pain, fever or vomiting.   Rectal Bleeding   Associated symptoms include weakness. Pertinent negatives include no anorexia, chest pain, coughing, fever, nausea or vomiting.       Subjective:  Symptoms:  Improved.  She reports shortness of breath (just with exertion) and weakness.  No malaise, cough, chest pain, headache, chest pressure, anorexia, diarrhea or anxiety.    Diet:  Adequate intake.  No nausea or vomiting.    Activity level: Impaired due to weakness.    Pain:  She reports no pain.        History taken from: patient chart    Objective     Vital Signs  Temp:  [97.3 °F (36.3 °C)-98.4 °F (36.9 °C)] 97.6 °F (36.4 °C)  Heart Rate:  [77-81] 81  Resp:  [18] 18  BP: (118-139)/(45-64) 124/45    Objective:  General Appearance:  Comfortable and in no acute distress.    Vital signs: (most recent): Blood pressure 124/45, pulse 81, temperature 97.6 °F (36.4 °C), temperature source Oral, resp. rate 18, height 162.6 cm (64\"), weight 96.2 kg (212 lb 1.6 oz), SpO2 90 %.  Vital signs are normal.  No fever.    Output: Producing urine (barreto to BSD, dark yellow urine in bag) and no stool output.    HEENT: Normal HEENT exam.    Lungs:  Normal respiratory rate and normal effort.  There are rales (bilateral bases).    Heart: Normal rate.  Regular rhythm.    Abdomen: Abdomen is soft.  (Edema of abdomen)Bowel sounds are normal.   There is epigastric tenderness. There is no rebound tenderness.  There is no guarding.   "   Extremities: There is dependent edema (2-3+).  (Wraps in place BLE)  Neurological: Patient is alert and oriented to person, place and time.    Pupils:  Pupils are equal, round, and reactive to light.    Skin:  Warm and dry.              Results Review:     I reviewed the patient's new clinical results.  I reviewed the patient's other test results and agree with the interpretation  Discussed with patient     Medication Review: reviewed    Assessment/Plan     Principal Problem:    Acute on chronic combined systolic and diastolic congestive heart failure  Active Problems:    Hyponatremia    Essential hypertension    Gastrointestinal hemorrhage    Paroxysmal atrial fibrillation    Bilateral edema of lower extremity    history of GI AVM (gastrointestinal arteriovenous vascular malformation)    S/P MVR (mitral valve repair)    S/P TVR (tricuspid valve repair)    Chronic kidney disease, stage 3    History of PUD (peptic ulcer disease)    Anemia, multifactorial    AMBER (obstructive sleep apnea)    Nonischemic cardiomyopathy          Plan:   (Appreciate Card/GI/Renal attention to pt  Continue IV Bumex for now, may change to po today, await Renal recs  Cr stable at baseline  Making urine, weight down today, Uric Acid up appropriately  EPS to see for possible CRT  EF improved on Echo this admission  No plans for endoscopy at present  Monitor Hgb, stable today, continue PPI  No BM today  Continue CPAP  Weaned O2  [facility]  [facility] ([facility]       Baron Torrez MD  01/08/18  2:08 PM    Time: 20min

## 2018-01-08 NOTE — NURSING NOTE
Pt seen for lower extremity assessment.  Pt states has had swelling and weeping in her legs and son has been wrapping her legs with ace wraps.  Pt states edema improving since hospitalized on 1/5.  Kerlex and ace wraps intact to bilateral lower extremities.  Removed and left off for now.  Posterior left leg with pin point open area weeping small amt.  Will ask nsg staff to reapply after a few hours as ace wrap had slid down left leg, causing indentation in skin.  No signs of venous insufficiency, fluid management issue  Discussed tx plan with RNTatum and discussed with pt.  Pt states understanding

## 2018-01-08 NOTE — PROGRESS NOTES
"   LOS: 3 days    Patient Care Team:  Ricky Hoyos III, MD as PCP - General  Anthony Hernández MD as Consulting Physician (Pulmonary Disease)  Christy Jerez MD as Consulting Physician (Dermatology)  Ladonna Mclaughlin MD as Consulting Physician (Cardiology)    Chief Complaint:    Chief Complaint   Patient presents with   • Shortness of Breath     carrying a lotof fluid in legs    • Rectal Bleeding     onset last week       Subjective follow-up chronic kidney disease    Interval History:   Patient is feeling better, less short of breath, less lower extremity edema, no nausea or vomiting, no dysuria or gross hematuria.  Landers is in place.    Review of Systems:   As noted above    Objective     Vital Signs  Temp:  [97.3 °F (36.3 °C)-98.4 °F (36.9 °C)] 98 °F (36.7 °C)  Heart Rate:  [77-81] 81  Resp:  [18] 18  BP: (118-134)/(45-64) 122/56    Flowsheet Rows         First Filed Value    Admission Height  162.6 cm (64\") Documented at 01/05/2018 1538    Admission Weight  94.3 kg (208 lb) Documented at 01/05/2018 1538          I/O this shift:  In: 210 [P.O.:210]  Out: -   I/O last 3 completed shifts:  In: 840 [P.O.:840]  Out: 1950 [Urine:1950]    Intake/Output Summary (Last 24 hours) at 01/08/18 1539  Last data filed at 01/08/18 0844   Gross per 24 hour   Intake              450 ml   Output              550 ml   Net             -100 ml       Physical Exam:  General Appearance: alert, oriented x 3, no acute distress, morbidly obese  Skin: warm and dry  HEENT:  nonicteric sclerae, oral mucosa normal,   Neck: supple, increased JVD, trachea midline  Lungs: His breath sounds bilaterally with bilateral rhonchi and crackles, unlabored breathing effort  Heart: Irregularly irregular, no rub  Abdomen: soft, non-tender,  present bowel sounds to auscultation  : no palpable bladder,  Extremities: 2+ lower extremities edema, no cyanosis or clubbing  Neuro: normal speech and mental status   Landers in place.     Results " Review:      Results from last 7 days  Lab Units 01/08/18 0447 01/07/18 0427 01/06/18  0541 01/05/18  1546   SODIUM mmol/L 136 134* 133* 131*   POTASSIUM mmol/L 3.9 3.6 4.6 4.9   CHLORIDE mmol/L 96* 96* 97* 95*   CO2 mmol/L 26.9 23.8 24.4 24.6   BUN mg/dL 37* 38* 40* 40*   CREATININE mg/dL 1.64* 1.68* 1.66* 1.61*   CALCIUM mg/dL 8.5* 8.2* 8.4* 9.1   BILIRUBIN mg/dL  --   --   --  0.6   ALK PHOS U/L  --   --   --  108   ALT (SGPT) U/L  --   --   --  26   AST (SGOT) U/L  --   --   --  29   GLUCOSE mg/dL 96 103* 108* 137*       Estimated Creatinine Clearance: 30.8 mL/min (by C-G formula based on Cr of 1.64).      Results from last 7 days  Lab Units 01/08/18 0447 01/07/18 0427 01/06/18  0541   MAGNESIUM mg/dL 2.0 1.9 2.4   PHOSPHORUS mg/dL 4.1 4.2  --          Results from last 7 days  Lab Units 01/08/18 0447 01/07/18 0427   URIC ACID mg/dL 11.4* 11.3*         Results from last 7 days  Lab Units 01/08/18 0447 01/07/18  0427 01/06/18  0704 01/05/18  1546   WBC 10*3/mm3 7.94 7.53 10.55 10.86*   HEMOGLOBIN g/dL 8.4* 7.8* 8.5* 9.2*   PLATELETS 10*3/mm3 304 308 320 357               Imaging Results (last 24 hours)     Procedure Component Value Units Date/Time    XR Chest 2 View [[id number]] Collected:  01/05/18 1845     Updated:  01/07/18 2012    Narrative:       TWO VIEWS CHEST     HISTORY:  Shortness of air.      Two views of the chest demonstrates the heart to be at the upper limits  of normal size, similar in contour as compared to 11/30/2017.     There is blunting of the costophrenic angle on the right and a small  right-sided pleural effusion on the right which is new versus the prior  examination. There is mild cephalization of the pulmonary vasculature.  There is no evidence of consolidation.     This report was finalized on 1/7/2018 8:08 PM by Dr. Koby Foley MD.             aspirin 81 mg Oral Daily   atorvastatin 20 mg Oral Daily   budesonide-formoterol 2 puff Inhalation BID - RT   bumetanide 4 mg  Intravenous Q8H   carvedilol 37.5 mg Oral Q12H   citalopram 20 mg Oral Daily   losartan 100 mg Oral Q24H   pantoprazole 40 mg Oral Q AM   polyethylene glycol 17 g Oral Daily   sennosides-docusate sodium 1 tablet Oral Nightly   vitamin B-6 100 mg Oral Daily          Medication Review:   Current Facility-Administered Medications   Medication Dose Route Frequency Provider Last Rate Last Dose   • acetaminophen (TYLENOL) tablet 650 mg  650 mg Oral Q6H PRN Jeff Li MD   650 mg at 01/07/18 0140   • aspirin EC tablet 81 mg  81 mg Oral Daily Jeff Li MD   81 mg at 01/08/18 0956   • atorvastatin (LIPITOR) tablet 20 mg  20 mg Oral Daily Jeff Li MD   20 mg at 01/08/18 0956   • budesonide-formoterol (SYMBICORT) 160-4.5 MCG/ACT inhaler 2 puff  2 puff Inhalation BID - RT Jeff Li MD   2 puff at 01/08/18 0900   • bumetanide (BUMEX) injection 4 mg  4 mg Intravenous Q8H Jeff Li MD   4 mg at 01/08/18 1028   • carvedilol (COREG) tablet 37.5 mg  37.5 mg Oral Q12H Ladonna Mclaughlin MD   37.5 mg at 01/08/18 0956   • citalopram (CeleXA) tablet 20 mg  20 mg Oral Daily Jeff Li MD   20 mg at 01/08/18 0957   • losartan (COZAAR) tablet 100 mg  100 mg Oral Q24H Ladonna Mclaughlin MD   100 mg at 01/08/18 0956   • nitroglycerin (NITROSTAT) SL tablet 0.4 mg  0.4 mg Sublingual Q5 Min PRN Jeff Li MD       • ondansetron (ZOFRAN) injection 4 mg  4 mg Intravenous Q6H PRN Jeff Li MD       • pantoprazole (PROTONIX) EC tablet 40 mg  40 mg Oral Q AM Jeff Li MD   40 mg at 01/08/18 0709   • polyethylene glycol (MIRALAX) powder 17 g  17 g Oral Daily Claudine Bentley MD       • sennosides-docusate sodium (SENOKOT-S) 8.6-50 MG tablet 1 tablet  1 tablet Oral Nightly Tonja Hernandez MD   1 tablet at 01/07/18 2038   • sodium chloride 0.9 % flush 1-10 mL  1-10 mL Intravenous PRN Jeff Li MD       • vitamin B-6 (PYRIDOXINE) tablet 100 mg  100 mg Oral Daily Jeff Li MD   100 mg at 01/08/18 1024        Assessment/Plan   1.  Chronic kidney disease stage III associated with probable nephrosclerosis near baseline. Cr is stable.   2.  Fluid excess and congestive heart failure. Will continue IV bumex for another day and re-evaluate.  3.  Chronic anemia associated with iron deficiency and the colonic angiodysplasia, hemoglobin today is 8.4.  4.  Hypertension under better control.  5.  Mild hyponatremia associated with fluid excess, resolved.     Plan:  As above.     Rose Buenrostro MD  01/08/18  3:39 PM

## 2018-01-08 NOTE — PLAN OF CARE
Problem: Patient Care Overview (Adult)  Goal: Plan of Care Review  Outcome: Ongoing (interventions implemented as appropriate)   01/08/18 0162   Coping/Psychosocial Response Interventions   Plan Of Care Reviewed With patient   Outcome Evaluation   Outcome Summary/Follow up Plan VSS. Pt continues on IV bumex, severe edema noted to BLE. Landers catheter remains in place r/t strict I/O. No c/o pain or discomfort this shift. Patient up in chair.      Goal: Adult Individualization and Mutuality  Outcome: Ongoing (interventions implemented as appropriate)      Problem: Cardiac: Heart Failure (Adult)  Goal: Signs and Symptoms of Listed Potential Problems Will be Absent or Manageable (Cardiac: Heart Failure)  Outcome: Ongoing (interventions implemented as appropriate)      Problem: Pain, Acute (Adult)  Goal: Identify Related Risk Factors and Signs and Symptoms  Outcome: Ongoing (interventions implemented as appropriate)    Goal: Acceptable Pain Control/Comfort Level  Outcome: Ongoing (interventions implemented as appropriate)

## 2018-01-08 NOTE — DISCHARGE PLACEMENT REQUEST
"Justyna Weeks (82 y.o. Female)     Date of Birth Social Security Number Address Home Phone MRN    1935  [address] [id number] [phone] [id number]    Sikh Marital Status          Caodaism        Admission Date Admission Type Admitting Provider Attending Provider Department, Room/Bed    1/5/18 Emergency Ray, MD Shan Aguilar John B, MD [address], 549/1    Discharge Date Discharge Disposition Discharge Destination                      Attending Provider: Baron Torrez MD     Allergies:  Sulfa Antibiotics    Isolation:  None   Infection:  None   Code Status:  FULL    Ht:  162.6 cm (64\")   Wt:  96.2 kg (212 lb 1.6 oz)    Admission Cmt:  None   Principal Problem:  Acute on chronic combined systolic and diastolic congestive heart failure [I50.43]                 Active Insurance as of 1/5/2018     Primary Coverage     Payor Plan Insurance Group Employer/Plan Group    MEDICARE MEDICARE A & B      Payor Plan Address Payor Plan Phone Number Effective From Effective To    [address] [phone] 2/1/2002     [place] [address]       Subscriber Name Subscriber Birth Date Member ID       JUSTYNA WEEKS 1935 [id number]           Secondary Coverage     Payor Plan Insurance Group Employer/Plan Group    [facility] SUPP [id number]     Payor Plan Address Payor Plan Phone Number Effective From Effective To    [address]  12/1/2016     [place] [address]       Subscriber Name Subscriber Birth Date Member ID       JUSTYNA WEEKS 1935 [id number]           Tertiary Coverage     Payor Plan Insurance Group Employer/Plan Group    [employer]      Coverage Address Coverage Phone Number Effective From Effective To    [address] [facility] [phone] 10/3/2017     [place] [address]       Subscriber Name Subscriber Birth Date Member ID       JUSTYNA WEEKS 1935 [id number]                 Emergency Contacts  "     (Rel.) Home Phone Work Phone Mobile Phone    Michael Stallings (Son) -- -- [phone]    Allie Mathews (Friend) -- -- [phone]    Sara Zaidi (Daughter) -- -- [phone]

## 2018-01-08 NOTE — PROGRESS NOTES
[facility], operated by [facility]  Inpatient Progress Note    Reason for Follow Up:  Anemia, heme positive stool    Subjective     Interval History:   Patient reports that she has had blood in her stool.  Review of her H&P indicates that she has had heme positive stool in the past.  She says that she has had black stool and brown stool at home as well as here.  Also some bleeding from her catheter.  Her nurse reports that she has had no bowel movements here.  Her hemoglobin remained stable.  She has had no hematemesis, no nausea, no vomiting.  She reports that she has been eating well.  She does have diffuse abdominal tenderness to palpation.    Current Facility-Administered Medications:   •  acetaminophen (TYLENOL) tablet 650 mg, 650 mg, Oral, Q6H PRN, Jeff Li MD, 650 mg at 01/07/18 0140  •  aspirin EC tablet 81 mg, 81 mg, Oral, Daily, Jeff Li MD, 81 mg at 01/07/18 1000  •  atorvastatin (LIPITOR) tablet 20 mg, 20 mg, Oral, Daily, Jeff Li MD, 20 mg at 01/07/18 1000  •  budesonide-formoterol (SYMBICORT) 160-4.5 MCG/ACT inhaler 2 puff, 2 puff, Inhalation, BID - RT, Jeff Li MD, 2 puff at 01/08/18 0900  •  bumetanide (BUMEX) injection 4 mg, 4 mg, Intravenous, Q8H, Jeff Li MD, 4 mg at 01/08/18 0118  •  carvedilol (COREG) tablet 37.5 mg, 37.5 mg, Oral, Q12H, Ladonna Mclaughlin MD, 37.5 mg at 01/07/18 2038  •  citalopram (CeleXA) tablet 20 mg, 20 mg, Oral, Daily, Jeff Li MD, 20 mg at 01/07/18 0959  •  losartan (COZAAR) tablet 100 mg, 100 mg, Oral, Q24H, Ladonna Mclaughlin MD  •  magnesium hydroxide (MILK OF MAGNESIA) suspension 2400 mg/10mL 10 mL, 10 mL, Oral, Daily PRN, Jeff Li MD  •  nitroglycerin (NITROSTAT) SL tablet 0.4 mg, 0.4 mg, Sublingual, Q5 Min PRN, Jeff Li MD  •  ondansetron (ZOFRAN) injection 4 mg, 4 mg, Intravenous, Q6H PRN, Jeff Li MD  •  pantoprazole (PROTONIX) EC tablet 40 mg, 40 mg, Oral, Q AM, Jeff Li MD, 40 mg at 01/08/18 0709  •   sennosides-docusate sodium (SENOKOT-S) 8.6-50 MG tablet 1 tablet, 1 tablet, Oral, Nightly, Tonja Hernandez MD, 1 tablet at 01/07/18 2038  •  sodium chloride 0.9 % flush 1-10 mL, 1-10 mL, Intravenous, PRN, Jeff Li MD  •  vitamin B-6 (PYRIDOXINE) tablet 100 mg, 100 mg, Oral, Daily, Jeff Li MD, 100 mg at 01/06/18 0941  Review of Systems:    All systems were reviewed and negative except for:  Gastrointestinal: postitive for  abdominal pain, questionable reports of blood in her stool-- reports black and brown stool at home    Objective     Vital Signs  Temp:  [97.3 °F (36.3 °C)-98.4 °F (36.9 °C)] 97.3 °F (36.3 °C)  Heart Rate:  [77-80] 80  Resp:  [18-20] 18  BP: (118-139)/(52-64) 134/57  Body mass index is 36.41 kg/(m^2).    Intake/Output Summary (Last 24 hours) at 01/08/18 0919  Last data filed at 01/08/18 0844   Gross per 24 hour   Intake             1050 ml   Output             1050 ml   Net                0 ml     I/O this shift:  In: 210 [P.O.:210]  Out: -      Physical Exam:   General: patient awake, alert and cooperative   Eyes: Normal lids and lashes, no scleral icterus   Neck: supple, normal ROM   Skin: warm and dry, not jaundiced   Cardiovascular: regular rhythm and rate, no murmurs auscultated   Pulm: clear to auscultation bilaterally, regular and unlabored   Abdomen: slightly firm, slight edema, tender diffusely to palpation, nondistended; normal bowel sounds   Rectal: deferred   Extremities: no rash or edema   Psychiatric: Normal mood and behavior; giving varying answers re: blood in her stool and BMs     Results Review:     I reviewed the patient's new clinical results.      Results from last 7 days  Lab Units 01/08/18  0447 01/07/18  0427 01/06/18  0704   WBC 10*3/mm3 7.94 7.53 10.55   HEMOGLOBIN g/dL 8.4* 7.8* 8.5*   HEMATOCRIT % 26.8* 25.0* 26.8*   PLATELETS 10*3/mm3 304 308 320       Results from last 7 days  Lab Units 01/08/18  0447 01/07/18  0427 01/06/18  0541 01/05/18  1546   SODIUM  mmol/L 136 134* 133* 131*   POTASSIUM mmol/L 3.9 3.6 4.6 4.9   CHLORIDE mmol/L 96* 96* 97* 95*   CO2 mmol/L 26.9 23.8 24.4 24.6   BUN mg/dL 37* 38* 40* 40*   CREATININE mg/dL 1.64* 1.68* 1.66* 1.61*   CALCIUM mg/dL 8.5* 8.2* 8.4* 9.1   BILIRUBIN mg/dL  --   --   --  0.6   ALK PHOS U/L  --   --   --  108   ALT (SGPT) U/L  --   --   --  26   AST (SGOT) U/L  --   --   --  29   GLUCOSE mg/dL 96 103* 108* 137*         No results found for: LIPASE    Radiology:  XR Chest 2 View   Final Result          Assessment/Plan     Patient Active Problem List   Diagnosis   • OA (osteoarthritis) of knee   • Hyponatremia   • Bella's esophagus   • Hematochezia   • Colon polyp   • Essential hypertension   • Gastroesophageal reflux disease   • Hyperlipidemia   • Intestinal malabsorption   • Adverse effect of iron   • Iron deficiency anemia   • Gastrointestinal hemorrhage   • Paroxysmal atrial fibrillation   • Weakness   • Bilateral edema of lower extremity   • DNR (do not resuscitate)   • KESHIA (acute kidney injury)   • Mitral regurgitation   • Bilateral carotid artery stenosis   • Mitral valve insufficiency   • Absolute anemia   • Polyp of colon   • Iron deficiency anemia due to chronic blood loss   • history of GI AVM (gastrointestinal arteriovenous vascular malformation)   • S/P MVR (mitral valve repair)   • S/P TVR (tricuspid valve repair)   • S/P Maze operation for atrial fibrillation   • Lymphedema   • Carotid stenosis, asymptomatic, right   • Acute on chronic combined systolic and diastolic congestive heart failure   • Chronic kidney disease, stage 3   • History of PUD (peptic ulcer disease)   • Anemia, multifactorial   • AMBER (obstructive sleep apnea)   • Nonischemic cardiomyopathy       Impression  1. Chronic anemia: mixed picture-- history of colonic AVMs and gastric ulcers one year ago along with anemia of chronic kidney disease.  Recent iron studies in October suggestive more of anemia of chronic disease/chronic kidney  disease.  Hb currently stable, no overt bleeding  2. Heme positive stool: last endoscopy 2/2017 with small gastric ulcers and R colon AVMs  3. CHF: admitted with volume overload  4. Chronic kidney disease: Cr stable    Plan  -follow Hb  -Hb seems to be maintaining currently, discussed with nursing to view next BM, if not frankly bloody and if no decline in her Hb, will not plan for endoscopy given her cardiac comorbidities  -continue oral iron supplementation  -d/c prn milk of magnesia given ckd, use miralax instead    I discussed the patients findings and my recommendations with patient and nursing staff.    Claudine Bentley MD

## 2018-01-09 NOTE — THERAPY TREATMENT NOTE
Acute Care - Physical Therapy Treatment Note  [facility]     Patient Name: Raquel Deluna  : 1935  MRN: [id number]  Today's Date: 2018  Onset of Illness/Injury or Date of Surgery Date: 18  Date of Referral to PT: 18  Referring Physician: Avila    Admit Date: 2018    Visit Dx:    ICD-10-CM ICD-9-CM   1. Acute combined systolic and diastolic congestive heart failure I50.41 428.41     428.0   2. Iron deficiency anemia due to chronic blood loss D50.0 280.0   3. GI AVM (gastrointestinal arteriovenous vascular malformation) Q27.33 747.61     Patient Active Problem List   Diagnosis   • OA (osteoarthritis) of knee   • Hyponatremia   • Bella's esophagus   • Hematochezia   • Colon polyp   • Essential hypertension   • Gastroesophageal reflux disease   • Hyperlipidemia   • Intestinal malabsorption   • Adverse effect of iron   • Iron deficiency anemia   • Gastrointestinal hemorrhage   • Paroxysmal atrial fibrillation   • Weakness   • Bilateral edema of lower extremity   • DNR (do not resuscitate)   • KESHIA (acute kidney injury)   • Mitral regurgitation   • Bilateral carotid artery stenosis   • Mitral valve insufficiency   • Absolute anemia   • Polyp of colon   • Iron deficiency anemia due to chronic blood loss   • history of GI AVM (gastrointestinal arteriovenous vascular malformation)   • S/P MVR (mitral valve repair)   • S/P TVR (tricuspid valve repair)   • S/P Maze operation for atrial fibrillation   • Lymphedema   • Carotid stenosis, asymptomatic, right   • Acute on chronic combined systolic and diastolic congestive heart failure   • Chronic kidney disease, stage 3   • History of PUD (peptic ulcer disease)   • Anemia, multifactorial   • AMBER (obstructive sleep apnea)   • Nonischemic cardiomyopathy               Adult Rehabilitation Note       18 1330 18 1300 18 1400    Rehab Assessment/Intervention    Discipline physical therapist  -EE physical therapist  -LC physical therapist   -LC    Document Type therapy note (daily note)  -EE therapy note (daily note)  -LC therapy note (daily note)  -LC    Subjective Information agree to therapy;complains of;fatigue;pain;weakness  -EE agree to therapy;complains of;weakness;fatigue;pain  -LC agree to therapy;complains of;fatigue;weakness  -LC    Patient Effort, Rehab Treatment adequate  -EE good  -LC good  -LC    Symptoms Noted During/After Treatment fatigue;increased pain  -EE      Symptoms Noted Comment increased knee pain during ambulation  -EE      Precautions/Limitations  fall precautions  -LC fall precautions;oxygen therapy device and L/min  -LC    Patient Response to Treatment  pain in B knees  -LC pt very lethargic today  -LC    Recorded by [EE] Haydee More, PT [LC] Davin Negron, PT DPT [LC] Davin Negron, PT DPT    Vital Signs    Pre SpO2 (%) 91  -EE      O2 Delivery Pre Treatment room air  -EE      Intra SpO2 (%) 88  -EE      O2 Delivery Intra Treatment room air  -EE      Post SpO2 (%) 91  -EE      O2 Delivery Post Treatment room air  -EE      Pre Patient Position Sitting  -EE      Intra Patient Position Standing  -EE      Post Patient Position Sitting  -EE      Recorded by [EE] Haydee More, PT      Pain Assessment    Pain Assessment 0-10  -EE 0-10  -LC No/denies pain  -LC    Pain Score 6  -EE 5  -LC     Pain Type Chronic pain  -EE Acute pain  -LC     Pain Location Knee  -EE Leg  -LC     Pain Orientation Right;Left  -EE Right;Left  -LC     Pain Intervention(s) Repositioned;Rest  -EE      Response to Interventions tolerated  -EE      Recorded by [EE] Haydee More, PT [LC] Davin Negron, PT DPT [LC] Davin Negron, PT DPT    Vision Assessment/Intervention    Visual Impairment   WNL  -LC    Recorded by   [LC] Davin Negron, PT DPT    Cognitive Assessment/Intervention    Current Cognitive/Communication Assessment functional  -EE functional  -LC functional  -LC    Orientation Status oriented x 4  -EE oriented x 4  -LC oriented x 4  -LC     Follows Commands/Answers Questions 100% of the time  -% of the time;able to follow multi-step instructions  - 100% of the time;able to follow multi-step instructions  -LC    Personal Safety WNL/WFL  -EE WNL/WFL  -LC WNL/WFL  -LC    Personal Safety Interventions fall prevention program maintained;gait belt;muscle strengthening facilitated;nonskid shoes/slippers when out of bed;supervised activity  -EE fall prevention program maintained;gait belt;muscle strengthening facilitated;nonskid shoes/slippers when out of bed  -LC fall prevention program maintained;gait belt;muscle strengthening facilitated;nonskid shoes/slippers when out of bed  -LC    Recorded by [EE] Haydee More, PT [LC] Davin Negron, PT DPT [] Davin Negron, PT DPT    Bed Mobility, Assessment/Treatment    Bed Mobility, Assistive Device   bed rails;draw sheet  -    Bed Mobility, Roll Left, [place]   minimum assist (75% patient effort)  -    Bed Mobility, Scoot/Bridge, [place]   minimum assist (75% patient effort);2 person assist required  -    Bed Mob, Supine to Sit, [place] not tested  -EE  moderate assist (50% patient effort);2 person assist required  -    Bed Mobility, Safety Issues   decreased use of legs for bridging/pushing  -    Bed Mobility, Impairments   strength decreased;impaired balance;pain  -LC    Bed Mobility, Comment pt up in chair  -EE up in chair  -     Recorded by [EE] Haydee More, PT [LC] Davin Negron, PT DPT [] Davin Negron, PT DPT    Transfer Assessment/Treatment    Transfers, Sit-Stand [place] moderate assist (50% patient effort);verbal cues required  -EE moderate assist (50% patient effort)  -LC minimum assist (75% patient effort);2 person assist required  -    Transfers, Stand-Sit [place] minimum assist (75% patient effort);verbal cues required  -EE minimum assist (75% patient effort)  -LC minimum assist (75% patient effort);2 person assist required  -LC    Transfers,  Sit-Stand-Sit, Assist Device rolling walker  -EE rolling walker  -LC rolling walker  -LC    Transfer, Safety Issues weight-shifting ability decreased;balance decreased during turns  -EE weight-shifting ability decreased  -LC weight-shifting ability decreased  -LC    Transfer, Impairments strength decreased;impaired balance;pain  -EE strength decreased;impaired balance;pain  -LC strength decreased;impaired balance;pain  -LC    Transfer, Comment verbal cues for hand placement and technique; multiple attempts required to achieve full stand  -EE      Recorded by [EE] Haydee More, PT [LC] Davin Negron PT DPT [LC] CELINE GustafsonT    Gait Assessment/Treatment    Gait, [place] Level contact guard assist;verbal cues required  -EE minimum assist (75% patient effort)  -LC minimum assist (75% patient effort);2 person assist required  -LC    Gait, Assistive Device rolling walker  -EE rolling walker  -LC rolling walker  -LC    Gait, Distance (Feet) 15  -EE 20  -LC 5  -LC    Gait, Gait Deviations forward flexed posture;dorie decreased;decreased heel strike;step length decreased;stride length decreased;weight-shifting ability decreased  -EE  forward flexed posture;step length decreased  -LC    Gait, Safety Issues step length decreased;weight-shifting ability decreased;balance decreased during turns  -EE sequencing ability decreased;step length decreased  -LC sequencing ability decreased;balance decreased during turns  -LC    Gait, Impairments strength decreased;impaired balance;pain  -EE strength decreased;impaired balance;pain  -LC strength decreased;impaired balance;pain  -LC    Gait, Comment B knee pain limiting gait distance  -EE      Recorded by [EE] Haydee More, PT [LC] Davin Negron PT DPT [LC] Davin Negron PT DPT    Therapy Exercises    Bilateral Lower Extremities AROM:;5 reps;ankle pumps/circles;LAQ  -EE      Recorded by [EE] Haydee More PT      Positioning and Restraints    Pre-Treatment Position  sitting in chair/recliner  -EE sitting in chair/recliner  -LC in bed  -LC    Post Treatment Position chair  -EE chair  -LC chair  -LC    In Chair reclined;call light within reach;encouraged to call for assist;exit alarm on;legs elevated  -EE reclined;sitting;call light within reach;encouraged to call for assist;exit alarm on;legs elevated  -LC notified nsg;reclined;call light within reach;encouraged to call for assist;exit alarm on;legs elevated  -LC    Recorded by [EE] Haydee More, PT [LC] Davin Negron, PT DPT [LC] Davin Negron, PT DPT      User Key  (r) = Recorded By, (t) = Taken By, (c) = Cosigned By    Initials Name Effective Dates    EE Haydee More, PT 12/01/15 -     LC Davin Negron, PT DPT 08/02/16 -                 IP PT Goals       01/06/18 1207          Bed Mobility PT LTG    Bed Mobility PT LTG, Date Established 01/06/18  -LC      Bed Mobility PT LTG, Time to Achieve 1 wk  -LC      Bed Mobility PT LTG, Activity Type all bed mobility  -LC      Bed Mobility PT LTG, [place] Level contact guard assist  -LC      Bed Mobility PT Goal  LTG, Assist Device bed rails  -LC      Transfer Training PT LTG    Transfer Training PT LTG, Date Established 01/06/18  -LC      Transfer Training PT LTG, Time to Achieve 1 wk  -LC      Transfer Training PT LTG, Activity Type all transfers  -LC      Transfer Training PT LTG, [place] Level contact guard assist  -LC      Transfer Training PT LTG, Assist Device walker, rolling  -LC      Gait Training PT LTG    Gait Training Goal PT LTG, Date Established 01/06/18  -LC      Gait Training Goal PT LTG, Time to Achieve 1 wk  -LC      Gait Training Goal PT LTG, [place] Level contact guard assist  -LC      Gait Training Goal PT LTG, Assist Device walker, rolling  -LC      Gait Training Goal PT LTG, Distance to Achieve 100  -LC        User Key  (r) = Recorded By, (t) = Taken By, (c) = Cosigned By    Initials Name Provider Type    LC Davin Negron, PT DPT Physical  Therapist          Physical Therapy Education     Title: PT OT SLP Therapies (Active)     Topic: Physical Therapy (Active)     Point: Mobility training (Active)    Learning Progress Summary    Learner Readiness Method Response Comment Documented by Status   Patient Acceptance E,TB NR  EE 01/09/18 1343 Active    Acceptance E,D VU,DU benefits of activity, safety during ambulation  01/08/18 1400 Done    Acceptance E,D VU,DU safety during transfers  01/07/18 1418 Done    Acceptance E,D VU,DU safety during transfers  01/06/18 1204 Done               Point: Home exercise program (Active)    Learning Progress Summary    Learner Readiness Method Response Comment Documented by Status   Patient Acceptance E,TB NR  EE 01/09/18 1343 Active               Point: Body mechanics (Active)    Learning Progress Summary    Learner Readiness Method Response Comment Documented by Status   Patient Acceptance E,TB NR  EE 01/09/18 1343 Active                      User Key     Initials Effective Dates Name Provider Type Discipline     12/01/15 -  Haydee More, PT Physical Therapist PT     08/02/16 -  Davin Negron, PT DPT Physical Therapist PT                    PT Recommendation and Plan  Anticipated Discharge Disposition: home with home health, inpatient rehabilitation facility  PT Frequency: daily  Plan of Care Review  Plan Of Care Reviewed With: patient  Progress: progress toward functional goals is gradual  Outcome Summary/Follow up Plan: Pt continues to demonstrate significant weakness; only able to ambulate 15' due to weakness and B knee pain. Pt fatigues very quickly with upright mobility; will plan to increase activity as tolerated.           Outcome Measures       01/09/18 1300 01/07/18 1400       How much help from another person do you currently need...    Turning from your back to your side while in flat bed without using bedrails? 3  -EE 3  -LC     Moving from lying on back to sitting on the side of a flat bed without  bedrails? 2  -EE 2  -LC     Moving to and from a bed to a chair (including a wheelchair)? 2  -EE 2  -LC     Standing up from a chair using your arms (e.g., wheelchair, bedside chair)? 2  -EE 3  -LC     Climbing 3-5 steps with a railing? 1  -EE 1  -LC     To walk in hospital room? 3  -EE 3  -LC     AM-PAC 6 Clicks Score 13  -EE 14  -LC     Functional Assessment    Outcome Measure Options AM-PAC 6 Clicks Basic Mobility (PT)  -EE AM-PAC 6 Clicks Basic Mobility (PT)  -LC       User Key  (r) = Recorded By, (t) = Taken By, (c) = Cosigned By    Initials Name Provider Type    EE Haydee More, PT Physical Therapist    ABHILASH Negron PT DPT Physical Therapist           Time Calculation:         PT Charges       01/09/18 1344          Time Calculation    Start Time 1330  -EE      Stop Time 1340  -EE      Time Calculation (min) 10 min  -EE      PT Received On 01/09/18  -EE      PT - Next Appointment 01/10/18  -EE        User Key  (r) = Recorded By, (t) = Taken By, (c) = Cosigned By    Initials Name Provider Type    JENY More PT Physical Therapist          Therapy Charges for Today     Code Description Service Date Service Provider Modifiers Qty    41327959984 HC PT THER PROC EA 15 MIN 1/9/2018 Haydee More, PT GP 1          PT G-Codes  Outcome Measure Options: AM-PAC 6 Clicks Basic Mobility (PT)    Haydee More PT  1/9/2018

## 2018-01-09 NOTE — PROGRESS NOTES
[facility], [place], operated by [facility]  Inpatient Progress Note    Reason for Follow Up:  Anemia, heme positive stool    Subjective     Interval History: Hb stable.  Abdominal pain improved.  Eating and drinking well.  Has diuresed and less tightness in her legs, abdomen.      Current Facility-Administered Medications:   •  acetaminophen (TYLENOL) tablet 650 mg, 650 mg, Oral, Q6H PRN, Jeff Li MD, 650 mg at 01/07/18 0140  •  aspirin EC tablet 81 mg, 81 mg, Oral, Daily, Jeff Li MD, 81 mg at 01/09/18 0856  •  budesonide-formoterol (SYMBICORT) 160-4.5 MCG/ACT inhaler 2 puff, 2 puff, Inhalation, BID - RT, Jeff Li MD, 2 puff at 01/09/18 0945  •  bumetanide (BUMEX) injection 4 mg, 4 mg, Intravenous, Q8H, Jeff Li MD, 4 mg at 01/09/18 0856  •  carvedilol (COREG) tablet 37.5 mg, 37.5 mg, Oral, Q12H, Ladonna Mclaughlin MD, 37.5 mg at 01/09/18 0855  •  citalopram (CeleXA) tablet 20 mg, 20 mg, Oral, Daily, Jeff Li MD, 20 mg at 01/09/18 0856  •  losartan (COZAAR) tablet 100 mg, 100 mg, Oral, Q24H, Ladonna Mclaughlin MD, 100 mg at 01/09/18 0855  •  nitroglycerin (NITROSTAT) SL tablet 0.4 mg, 0.4 mg, Sublingual, Q5 Min PRN, Jeff Li MD  •  ondansetron (ZOFRAN) injection 4 mg, 4 mg, Intravenous, Q6H PRN, Jeff Li MD  •  pantoprazole (PROTONIX) EC tablet 40 mg, 40 mg, Oral, Q AM, Jeff Li MD, 40 mg at 01/09/18 0750  •  polyethylene glycol (MIRALAX) powder 17 g, 17 g, Oral, Daily, Claudine Bentley MD  •  sennosides-docusate sodium (SENOKOT-S) 8.6-50 MG tablet 1 tablet, 1 tablet, Oral, Nightly, Tonja Hernandez MD, 1 tablet at 01/08/18 2104  •  sodium chloride 0.9 % flush 1-10 mL, 1-10 mL, Intravenous, PRN, Jeff Li MD  •  vitamin B-6 (PYRIDOXINE) tablet 100 mg, 100 mg, Oral, Daily, Jeff Li MD, 100 mg at 01/09/18 0855  Review of Systems:    The following systems were reviewed and negative;  constitution, respiratory, cardiovascular, gastrointestinal and genitourinary    Objective      Vital Signs  Temp:  [97.5 °F (36.4 °C)-98 °F (36.7 °C)] 98 °F (36.7 °C)  Heart Rate:  [77-81] 77  Resp:  [18] 18  BP: (122-148)/(45-69) 143/69  Body mass index is 38.05 kg/(m^2).    Intake/Output Summary (Last 24 hours) at 01/09/18 1113  Last data filed at 01/09/18 1100   Gross per 24 hour   Intake             1080 ml   Output              575 ml   Net              505 ml     I/O this shift:  In: -   Out: 200 [Urine:200]     Physical Exam:   General: patient awake, alert and cooperative   Eyes: Normal lids and lashes, no scleral icterus   Neck: supple, normal ROM   Skin: warm and dry, not jaundiced   Cardiovascular: regular rhythm and rate, no murmurs auscultated   Pulm: clear to auscultation bilaterally, regular and unlabored   Abdomen: soft, non-tender to palpation, nondistended; normal bowel sounds   Rectal: deferred   Extremities: no rash or edema   Psychiatric: Normal mood and behavior; giving varying answers re: blood in her stool and BMs     Results Review:     I reviewed the patient's new clinical results.      Results from last 7 days  Lab Units 01/09/18 0535 01/08/18 0447 01/07/18 0427   WBC 10*3/mm3 7.18 7.94 7.53   HEMOGLOBIN g/dL 8.4* 8.4* 7.8*   HEMATOCRIT % 26.2* 26.8* 25.0*   PLATELETS 10*3/mm3 305 304 308       Results from last 7 days  Lab Units 01/09/18  0535 01/08/18 0447 01/07/18 0427 01/05/18  1546   SODIUM mmol/L 132* 136 134*  < > 131*   POTASSIUM mmol/L 3.7 3.9 3.6  < > 4.9   CHLORIDE mmol/L 92* 96* 96*  < > 95*   CO2 mmol/L 28.7 26.9 23.8  < > 24.6   BUN mg/dL 43* 37* 38*  < > 40*   CREATININE mg/dL 1.88* 1.64* 1.68*  < > 1.61*   CALCIUM mg/dL 8.3* 8.5* 8.2*  < > 9.1   BILIRUBIN mg/dL  --   --   --   --  0.6   ALK PHOS U/L  --   --   --   --  108   ALT (SGPT) U/L  --   --   --   --  26   AST (SGOT) U/L  --   --   --   --  29   GLUCOSE mg/dL 93 96 103*  < > 137*   < > = values in this interval not displayed.      No results found for: LIPASE    Radiology:  XR Chest 2 View   Final  Result          Assessment/Plan     Patient Active Problem List   Diagnosis   • OA (osteoarthritis) of knee   • Hyponatremia   • Bella's esophagus   • Hematochezia   • Colon polyp   • Essential hypertension   • Gastroesophageal reflux disease   • Hyperlipidemia   • Intestinal malabsorption   • Adverse effect of iron   • Iron deficiency anemia   • Gastrointestinal hemorrhage   • Paroxysmal atrial fibrillation   • Weakness   • Bilateral edema of lower extremity   • DNR (do not resuscitate)   • KESHIA (acute kidney injury)   • Mitral regurgitation   • Bilateral carotid artery stenosis   • Mitral valve insufficiency   • Absolute anemia   • Polyp of colon   • Iron deficiency anemia due to chronic blood loss   • history of GI AVM (gastrointestinal arteriovenous vascular malformation)   • S/P MVR (mitral valve repair)   • S/P TVR (tricuspid valve repair)   • S/P Maze operation for atrial fibrillation   • Lymphedema   • Carotid stenosis, asymptomatic, right   • Acute on chronic combined systolic and diastolic congestive heart failure   • Chronic kidney disease, stage 3   • History of PUD (peptic ulcer disease)   • Anemia, multifactorial   • AMBER (obstructive sleep apnea)   • Nonischemic cardiomyopathy       Impression  1. Chronic anemia: stable this admission with no overt bleeding.  Mixed, KATHLEEN, AOCKD  2. Heme positive stool: no overt bleeding and Hb stable, history of colonic AVMs (endoscopy 2/2017)  3. CHF: improving with diruesis  4. Chronic kidney disease: Cr worsened today, ? Due to diuresis    Plan  -daily Hb  -No indication for endoscopy at this time, would only pursue this if she had active bleeding and continued severe decline in her hemoglobin given her comorbidities.    Given that she has been fairly stable this admission and improving with treatment of CHF, GI will sign off for now.  We remain available as needed her care.    I discussed the patients findings and my recommendations with patient and nursing  staff.    Claudine Bentley MD

## 2018-01-09 NOTE — PROGRESS NOTES
"   LOS: 4 days    Patient Care Team:  Ricky Hoyos III, MD as PCP - General  Anthony Hernández MD as Consulting Physician (Pulmonary Disease)  Christy Jerez MD as Consulting Physician (Dermatology)  Ladonna Mclaughlin MD as Consulting Physician (Cardiology)    Chief Complaint:    Chief Complaint   Patient presents with   • Shortness of Breath     carrying a lotof fluid in legs    • Rectal Bleeding     onset last week       Subjective follow-up chronic kidney disease    Interval History:   Feels a little better.  Does not think she is emptying her bladder.  [facility] 4 hours ago. BM this afternoon loose.  Very soa with exertion.       Review of Systems:   As noted above    Objective     Vital Signs  Temp:  [97.5 °F (36.4 °C)-98 °F (36.7 °C)] 97.5 °F (36.4 °C)  Heart Rate:  [77-79] 79  Resp:  [16-20] 16  BP: (143-152)/(58-69) 152/65    Flowsheet Rows         First Filed Value    Admission Height  162.6 cm (64\") Documented at 01/05/2018 1538    Admission Weight  94.3 kg (208 lb) Documented at 01/05/2018 1538          I/O this shift:  In: 600 [P.O.:600]  Out: 200 [Urine:200]  I/O last 3 completed shifts:  In: 1290 [P.O.:1290]  Out: 925 [Urine:925]    Intake/Output Summary (Last 24 hours) at 01/09/18 1521  Last data filed at 01/09/18 1300   Gross per 24 hour   Intake             1320 ml   Output              575 ml   Net              745 ml       Physical Exam:  General Appearance: alert, oriented x 3, no acute distress, morbidly obese  Skin: warm and dry  HEENT:  nonicteric sclerae, oral mucosa normal,   Neck: supple, increased JVD, trachea midline  Lungs: Breath sounds bilaterally with bilateral rhonchi and crackles  Heart: Irregularly irregular, no rub  Abdomen: soft, non-tender,  +bs  Extremities: 2+ lower extremities edema, no cyanosis or clubbing  Neuro: normal speech and mental status      Results Review:      Results from last 7 days  Lab Units 01/09/18  0535 01/08/18  0447 01/07/18  0427  " 01/05/18  1546   SODIUM mmol/L 132* 136 134*  < > 131*   POTASSIUM mmol/L 3.7 3.9 3.6  < > 4.9   CHLORIDE mmol/L 92* 96* 96*  < > 95*   CO2 mmol/L 28.7 26.9 23.8  < > 24.6   BUN mg/dL 43* 37* 38*  < > 40*   CREATININE mg/dL 1.88* 1.64* 1.68*  < > 1.61*   CALCIUM mg/dL 8.3* 8.5* 8.2*  < > 9.1   BILIRUBIN mg/dL  --   --   --   --  0.6   ALK PHOS U/L  --   --   --   --  108   ALT (SGPT) U/L  --   --   --   --  26   AST (SGOT) U/L  --   --   --   --  29   GLUCOSE mg/dL 93 96 103*  < > 137*   < > = values in this interval not displayed.    Estimated Creatinine Clearance: 27.6 mL/min (by C-G formula based on Cr of 1.88).      Results from last 7 days  Lab Units 01/09/18  0535 01/08/18 0447 01/07/18  0427   MAGNESIUM mg/dL 2.2 2.0 1.9   PHOSPHORUS mg/dL 4.1 4.1 4.2         Results from last 7 days  Lab Units 01/09/18  0535 01/08/18 0447 01/07/18  0427   URIC ACID mg/dL 11.8* 11.4* 11.3*         Results from last 7 days  Lab Units 01/09/18  0535 01/08/18  0447 01/07/18  0427 01/06/18  0704 01/05/18  1546   WBC 10*3/mm3 7.18 7.94 7.53 10.55 10.86*   HEMOGLOBIN g/dL 8.4* 8.4* 7.8* 8.5* 9.2*   PLATELETS 10*3/mm3 305 304 308 320 357               Imaging Results (last 24 hours)     ** No results found for the last 24 hours. **          aspirin 81 mg Oral Daily   budesonide-formoterol 2 puff Inhalation BID - RT   bumetanide 4 mg Intravenous Q8H   carvedilol 37.5 mg Oral Q12H   citalopram 20 mg Oral Daily   losartan 100 mg Oral Q24H   pantoprazole 40 mg Oral Q AM   polyethylene glycol 17 g Oral Daily   sennosides-docusate sodium 1 tablet Oral Nightly   vitamin B-6 100 mg Oral Daily          Medication Review:   Current Facility-Administered Medications   Medication Dose Route Frequency Provider Last Rate Last Dose   • acetaminophen (TYLENOL) tablet 650 mg  650 mg Oral Q6H PRN Jeff Li MD   650 mg at 01/07/18 0140   • aspirin EC tablet 81 mg  81 mg Oral Daily Jeff Li MD   81 mg at 01/09/18 0856   •  budesonide-formoterol (SYMBICORT) 160-4.5 MCG/ACT inhaler 2 puff  2 puff Inhalation BID - RT Jeff Li MD   2 puff at 01/09/18 0945   • bumetanide (BUMEX) injection 4 mg  4 mg Intravenous Q8H Jeff Li MD   4 mg at 01/09/18 0856   • carvedilol (COREG) tablet 37.5 mg  37.5 mg Oral Q12H Ladonna Mclaughlin MD   37.5 mg at 01/09/18 0855   • citalopram (CeleXA) tablet 20 mg  20 mg Oral Daily Jeff Li MD   20 mg at 01/09/18 0856   • losartan (COZAAR) tablet 100 mg  100 mg Oral Q24H Ladonna Mclaughlin MD   100 mg at 01/09/18 0855   • nitroglycerin (NITROSTAT) SL tablet 0.4 mg  0.4 mg Sublingual Q5 Min PRN Jeff Li MD       • ondansetron (ZOFRAN) injection 4 mg  4 mg Intravenous Q6H PRN Jeff Li MD       • pantoprazole (PROTONIX) EC tablet 40 mg  40 mg Oral Q AM Jeff Li MD   40 mg at 01/09/18 0750   • polyethylene glycol (MIRALAX) powder 17 g  17 g Oral Daily Claudine Bentley MD       • sennosides-docusate sodium (SENOKOT-S) 8.6-50 MG tablet 1 tablet  1 tablet Oral Nightly Tonja Hernandez MD   1 tablet at 01/08/18 2109   • sodium chloride 0.9 % flush 1-10 mL  1-10 mL Intravenous PRN Jeff Li MD       • vitamin B-6 (PYRIDOXINE) tablet 100 mg  100 mg Oral Daily Jeff Li MD   100 mg at 01/09/18 0855       Assessment/Plan   1.  Chronic kidney disease stage III . Crt up some today after starting ARB yesterday.   2.  Fluid excess and systolic congestive heart failure EF 30%. Need to continue IV diuretic.   3.  Chronic anemia associated with iron deficiency, colonic angiodysplasia.  Hg 8.4.  4.  Hypertension  Controlled, but need to make med changes. See below.   5.  Mild hyponatremia associated with volume excess, sodium today 132.  6.  Hyperuricemia.       Plan:  1. Dc losartan for now.  2. Norvasc 5 mg daily starting tomorrow.  3. Bladder scan          Cindy Alves MD  01/09/18  3:21 PM

## 2018-01-09 NOTE — PROGRESS NOTES
"   LOS: 4 days   Patient Care Team:  Ricky Hoyos III, MD as PCP - General  Anthony Hernández MD as Consulting Physician (Pulmonary Disease)  Christy Jerez MD as Consulting Physician (Dermatology)  Ladonna Mclaughlin MD as Consulting Physician (Cardiology)    Chief Complaint: tired    Subjective     HPI Comments: Feels better today because she actually got some sleep last PM.     Shortness of Breath   Pertinent negatives include no chest pain, fever or vomiting.   Rectal Bleeding   Associated symptoms include weakness. Pertinent negatives include no anorexia, chest pain, coughing, fever, nausea or vomiting.       Subjective:  Symptoms:  Improved.  She reports weakness.  No shortness of breath, malaise, cough, chest pain, headache, chest pressure, anorexia, diarrhea or anxiety.    Diet:  Adequate intake.  No nausea or vomiting.    Activity level: Impaired due to weakness.    Pain:  She reports no pain.        History taken from: patient chart    Objective     Vital Signs  Temp:  [97.5 °F (36.4 °C)-98 °F (36.7 °C)] 97.7 °F (36.5 °C)  Heart Rate:  [77-79] 78  Resp:  [18-20] 20  BP: (122-148)/(56-69) 148/58    Objective:  General Appearance:  Comfortable and in no acute distress.    Vital signs: (most recent): Blood pressure 148/58, pulse 78, temperature 97.7 °F (36.5 °C), temperature source Oral, resp. rate 20, height 162.6 cm (64\"), weight 101 kg (221 lb 11.2 oz), SpO2 94 %.  Vital signs are normal.  No fever.    Output: Producing urine (barreto to BSD, dark yellow urine in bag) and producing stool.    HEENT: Normal HEENT exam.    Lungs:  Normal respiratory rate and normal effort.  There are decreased breath sounds.  No rales (bilateral bases).    Heart: Normal rate.  Regular rhythm.    Abdomen: Abdomen is soft.  (Edema of abdomen)Bowel sounds are normal.   There is epigastric tenderness. There is no rebound tenderness.  There is no guarding.     Extremities: There is dependent edema (2-3+).  (Wraps in " place BLE)  Neurological: Patient is alert and oriented to person, place and time.    Pupils:  Pupils are equal, round, and reactive to light.    Skin:  Warm and dry.            I/O last 3 completed shifts:  In: 1290 [P.O.:1290]  Out: 925 [Urine:925]  I/O this shift:  In: 240 [P.O.:240]  Out: 200 [Urine:200]      Results Review:     I reviewed the patient's new clinical results.  I reviewed the patient's other test results and agree with the interpretation  Discussed with patient    Medication Review: reviewed    Assessment/Plan     Principal Problem:    Acute on chronic combined systolic and diastolic congestive heart failure  Active Problems:    Hyponatremia    Essential hypertension    Gastrointestinal hemorrhage    Paroxysmal atrial fibrillation    Bilateral edema of lower extremity    history of GI AVM (gastrointestinal arteriovenous vascular malformation)    S/P MVR (mitral valve repair)    S/P TVR (tricuspid valve repair)    Chronic kidney disease, stage 3    History of PUD (peptic ulcer disease)    Anemia, multifactorial    AMBER (obstructive sleep apnea)    Nonischemic cardiomyopathy          Plan:   (Appreciate Card/GI/Renal attention to pt  Continue IV Bumex for now, may change to po today, await Renal recs today  Cr up a little today  Making urine, weight down today, Uric Acid up appropriately  EPS eval and recs noted--sound very reasonable (dc barreto, increase activity, give PRBCs)  EF improved on Echo this admission  No plans for endoscopy at present  Monitor Hgb, stable today, continue PPI  Will give PRBC at suggestion of EPS  Finally had a BM  Continue CPAP  Weaned O2  IS  [facility]NF at dc ([facility])).       Baron Torrez MD  01/09/18  2:14 PM    Time: 20min

## 2018-01-09 NOTE — PLAN OF CARE
Problem: Patient Care Overview (Adult)  Goal: Plan of Care Review   01/09/18 1343   Coping/Psychosocial Response Interventions   Plan Of Care Reviewed With patient   Patient Care Overview   Progress progress toward functional goals is gradual   Outcome Evaluation   Outcome Summary/Follow up Plan Pt continues to demonstrate significant weakness; only able to ambulate 15' due to weakness and B knee pain. Pt fatigues very quickly with upright mobility; will plan to increase activity as tolerated.

## 2018-01-09 NOTE — PROGRESS NOTES
S - Feels a bit stronger but still weak  O -  /90 Hr 75 rr 20   Looks pale  No JVD  Decreased in bases bilat  rrr pos S3   abd neg  Ext - poor muscle tone   Edema less    Imp   Systolic dysfunction - severe dys-synchrony   LBBB  Anemia   CKD - creat worse    Wow. On paper she is a great cand for CRT-P -- she is female, w non-ischemic cm, perfect LBBB, clear dysschrony on echo, but   This is a big but  She is totally debilitated now. The surgeries she's had have beat her down.   She has a barreto  Is immobile   Weak  Anemic  In renal failure   Could hardly sit up in bed.       Why don't we get her barreto out,   Give her blood  And let's try to get her stronger and I could consider insert of an lv lead

## 2018-01-09 NOTE — PLAN OF CARE
Problem: Cardiac: Heart Failure (Adult)  Goal: Signs and Symptoms of Listed Potential Problems Will be Absent or Manageable (Cardiac: Heart Failure)  Outcome: Ongoing (interventions implemented as appropriate)      Problem: Pain, Acute (Adult)  Goal: Identify Related Risk Factors and Signs and Symptoms  Outcome: Ongoing (interventions implemented as appropriate)    Goal: Acceptable Pain Control/Comfort Level  Outcome: Ongoing (interventions implemented as appropriate)

## 2018-01-10 NOTE — PLAN OF CARE
Problem: Patient Care Overview (Adult)  Goal: Plan of Care Review  Outcome: Ongoing (interventions implemented as appropriate)   01/10/18 3620   Coping/Psychosocial Response Interventions   Plan Of Care Reviewed With patient   Patient Care Overview   Progress no change   Outcome Evaluation   Outcome Summary/Follow up Plan Pt feeling worse than yesterday, difficult time getting out of bed, Bumex drip started, pt edematous, q2 turn, VSS, will continue to monitor.        Problem: Cardiac: Heart Failure (Adult)  Goal: Signs and Symptoms of Listed Potential Problems Will be Absent or Manageable (Cardiac: Heart Failure)  Outcome: Ongoing (interventions implemented as appropriate)      Problem: Pain, Acute (Adult)  Goal: Acceptable Pain Control/Comfort Level  Outcome: Ongoing (interventions implemented as appropriate)      Problem: Fall Risk (Adult)  Goal: Identify Related Risk Factors and Signs and Symptoms  Outcome: Outcome(s) achieved Date Met: 01/10/18    Goal: Absence of Falls  Outcome: Ongoing (interventions implemented as appropriate)      Problem: Fluid Volume Excess (Adult,Obstetrics,Pediatric)  Goal: Identify Related Risk Factors and Signs and Symptoms  Outcome: Outcome(s) achieved Date Met: 01/10/18    Goal: Stable Weight  Outcome: Ongoing (interventions implemented as appropriate)    Goal: Balanced Intake/Output  Outcome: Ongoing (interventions implemented as appropriate)

## 2018-01-10 NOTE — PLAN OF CARE
Problem: Patient Care Overview (Adult)  Goal: Plan of Care Review  Outcome: Ongoing (interventions implemented as appropriate)   01/09/18 3416   Coping/Psychosocial Response Interventions   Plan Of Care Reviewed With patient   Patient Care Overview   Progress improving   Outcome Evaluation   Outcome Summary/Follow up Plan pt transfered from [address] [place] [address] pt is gettiing 1 unit of PRBC for Hgb 8.4 pt has a extensive cardiac history pt is up with 2 x assist will cont to monitor.

## 2018-01-10 NOTE — PROGRESS NOTES
"   LOS: 5 days   Patient Care Team:  Ricky Hoyos III, MD as PCP - General  Anthony Hernández MD as Consulting Physician (Pulmonary Disease)  Christy Jerez MD as Consulting Physician (Dermatology)  Ladonna Mclaughlin MD as Consulting Physician (Cardiology)    Chief Complaint: SOA    Subjective     HPI Comments: Feeling a little worse today. More SOA with exertion. Tired. Voiding a little better per her report    Shortness of Breath   Pertinent negatives include no chest pain, fever or vomiting.   Rectal Bleeding   Associated symptoms include weakness. Pertinent negatives include no anorexia, chest pain, coughing, fever, nausea or vomiting.       Subjective:  Symptoms:  Improved.  She reports shortness of breath and weakness.  No malaise, cough, chest pain, headache, chest pressure, anorexia, diarrhea or anxiety.    Diet:  Adequate intake.  No nausea or vomiting.    Activity level: Impaired due to weakness.    Pain:  She reports no pain.        History taken from: patient chart    Objective     Vital Signs  Temp:  [97.3 °F (36.3 °C)-98.3 °F (36.8 °C)] 97.5 °F (36.4 °C)  Heart Rate:  [77-93] 89  Resp:  [16-18] 16  BP: (139-165)/(59-82) 139/60    Objective:  General Appearance:  Comfortable and in no acute distress.    Vital signs: (most recent): Blood pressure 143/57, pulse 72, temperature 97.7 °F (36.5 °C), temperature source Oral, resp. rate 18, height 162.6 cm (64\"), weight 98.4 kg (216 lb 14.4 oz), SpO2 98 %.  Vital signs are normal.  No fever.    Output: Producing urine and producing stool.    HEENT: Normal HEENT exam.    Lungs:  Normal respiratory rate and normal effort.  There are decreased breath sounds.  No rales (bilateral bases).    Heart: Normal rate.  Regular rhythm.    Abdomen: Abdomen is soft.  (Edema of abdomen)Bowel sounds are normal.   There is no abdominal tenderness.     Extremities: There is dependent edema (2-3+).  (Wraps in place BLE)  Neurological: Patient is alert and " oriented to person, place and time.    Pupils:  Pupils are equal, round, and reactive to light.    Skin:  Warm and dry.            I/O last 3 completed shifts:  In: 1260 [P.O.:960; Blood:300]  Out: 500 [Urine:500]         Results Review:     I reviewed the patient's new clinical results.  I reviewed the patient's new imaging results and agree with the interpretation.  I reviewed the patient's other test results and agree with the interpretation  Discussed with patient    Medication Review: reviewed    Assessment/Plan     Principal Problem:    Acute on chronic combined systolic and diastolic congestive heart failure  Active Problems:    Hyponatremia    Essential hypertension    Gastrointestinal hemorrhage    Paroxysmal atrial fibrillation    Bilateral edema of lower extremity    history of GI AVM (gastrointestinal arteriovenous vascular malformation)    S/P MVR (mitral valve repair)    S/P TVR (tricuspid valve repair)    Chronic kidney disease, stage 3    History of PUD (peptic ulcer disease)    Anemia, multifactorial    AMBER (obstructive sleep apnea)    Nonischemic cardiomyopathy          Plan:   (Appreciate Card/GI/Renal attention to pt  Continue IV Bumex for now  CXR shows mild worsening of CHF when compared to admission CXR  Cr stable today  Making small amt urine, weight down today, Uric Acid up appropriately  Amlodipine changed to hydralazine  EPS eval and recs noted--sound very reasonable (MS barreto, increase activity, give PRBCs)  EF improved on Echo this admission  No plans for endoscopy at present  Monitor Hgb, stable today, continue PPI  Given PRBC on 1/9 at suggestion of EPS  Finally had a BM  Continue CPAP  Weaned O2  IS  SNF at [place] ([facility]?)).       Baron Torrez MD  01/10/18  2:30 PM    Time: 20min

## 2018-01-10 NOTE — PLAN OF CARE
Problem: Cardiac: Heart Failure (Adult)  Intervention: Promote Functional Ability   01/10/18 0004   Musculoskeletal Interventions   Self-Care Promotion independence encouraged;BADL personal objects within reach;safe use of adaptive equipment encouraged   Activity   Activity Type activity adjusted per tolerance   Activity Level of Assistance assistance, 1 person   Coping/Psychosocial Interventions   Environmental Support calm environment promoted;caregiver consistency promoted;distractions minimized;environmental consistency promoted;rest periods encouraged     Intervention: Provide Oxygenation/Ventilation/Perfusion Support   01/09/18 2047 01/10/18 0004 01/10/18 0416   Activity   Activity Type --  activity adjusted per tolerance --    Respiratory Interventions   Airway/Ventilation Management --  --  --    Safety Interventions   Medication Review/Management medications reviewed --  --    Positioning   Head Of Bed (HOB) Position --  --  HOB at 20-30 degrees    01/10/18 0527   Activity   Activity Type --    Respiratory Interventions   Airway/Ventilation Management airway patency maintained   Safety Interventions   Medication Review/Management --    Positioning   Head Of Bed (HOB) Position --      Intervention: Support Psychosocial Response to Heart Failure   01/09/18 1400 01/10/18 0004   Coping Strategies   Family/Support System Care support provided;presence promoted --    Supportive Measures --  relaxation techniques promoted     Intervention: Gradually Correct Positive Fluid Balance   01/10/18 0004 01/10/18 0416 01/10/18 0527   Nutrition Interventions   Fluid/Electrolyte Management --  --  fluids provided   Positioning   Positioning --  semi-Fowlers --    Skin Interventions   Skin Protection adhesive use limited;incontinence pads utilized;tubing/devices free from skin contact --  --      Intervention: Prevent/Manage DVT/VTE Risk   01/10/18 0004   Support Surgical/Anesthesia Recovery   Venous Thromboembolism  Prevent/Manage ROM (passive) performed     Intervention: Monitor/Manage Cardiac Rhythm Disturbance   01/10/18 0527   Cardiac Interventions   Dysrhythmia Management other (see comments)  (dobutamine gtt)       Goal: Signs and Symptoms of Listed Potential Problems Will be Absent or Manageable (Cardiac: Heart Failure)  Outcome: Ongoing (interventions implemented as appropriate)   01/10/18 0527   Cardiac: Heart Failure   Problems Assessed (Heart Failure) all   Problems Present (Heart Failure) decreased quality of life;cardiac pump dysfunction;fluid/electrolyte imbalance;situational response       Problem: Pain, Acute (Adult)  Intervention: Monitor/Manage Analgesia   01/10/18 0527   Manage Acute Burn Pain   Bowel Intervention ambulation promoted   Pain Management Interventions relaxation techniques promoted;pillow support   Safety Interventions   Medication Review/Management medications reviewed     Intervention: Mutually Develop/Implement Acute Pain Management Plan   01/10/18 0004 01/10/18 0527   Manage Acute Burn Pain   Pain Management Interventions --  relaxation techniques promoted;pillow support   Cognitive Interventions   Sensory Stimulation Regulation auditory stimulation minimized;care clustered;lighting decreased;music/television provided for relaxation;quiet environment promoted --      Intervention: Support/Optimize Psychosocial Response to Acute Pain   01/09/18 1400 01/10/18 0004   Coping Strategies   Trust Relationship/Rapport --  care explained;questions answered   Diversional Activities --  smartphone;television   Family/Support System Care support provided;presence promoted --    Supportive Measures --  relaxation techniques promoted       Goal: Identify Related Risk Factors and Signs and Symptoms  Outcome: Ongoing (interventions implemented as appropriate)   01/10/18 0527   Pain, Acute   Related Risk Factors (Acute Pain) disease process;patient perception;knowledge deficit   Signs and Symptoms (Acute  Pain) verbalization of pain descriptors;fatigue/weakness     Goal: Acceptable Pain Control/Comfort Level  Outcome: Ongoing (interventions implemented as appropriate)   01/10/18 0527   Pain, Acute (Adult)   Acceptable Pain Control/Comfort Level making progress toward outcome       Problem: Fall Risk (Adult)  Intervention: Monitor/Assist with Self Care   01/10/18 0004   Musculoskeletal Interventions   Self-Care Promotion independence encouraged;BADL personal objects within reach;safe use of adaptive equipment encouraged   Activity   Activity Type activity adjusted per tolerance   Activity Level of Assistance assistance, 1 person   Monitor/Assist with Self Care   Ambulation 2-->assistive person   Transferring 2-->assistive person   Toileting 2-->assistive person   Bathing 2-->assistive person   Dressing 2-->assistive person   Eating 0-->independent   Communication 0-->understands/communicates without difficulty   Swallowing (if score 2 or more for any item, consult Rehab Services) 0-->swallows foods/liquids without difficulty     Intervention: Reduce Risk/Promote Restraint Free Environment   01/10/18 0416   Safety Interventions   Safety/Security Measures bed alarm set   Environmental Safety Modification assistive device/personal items within reach;clutter free environment maintained;lighting adjusted;room near unit station   Restraint Interventions   Safety Promotion/Fall Prevention safety round/check completed;fall prevention program maintained;nonskid shoes/slippers when out of bed;supervised activity     Intervention: Review Medications/Identify Contributors to Fall Risk   01/10/18 0527   Safety Interventions   Medication Review/Management medications reviewed       Goal: Identify Related Risk Factors and Signs and Symptoms  Outcome: Ongoing (interventions implemented as appropriate)   01/10/18 0527   Fall Risk   Fall Risk: Related Risk Factors age-related changes;bladder function altered;fatigue/slow  reaction;environment unfamiliar   Fall Risk: Signs and Symptoms presence of risk factors     Goal: Absence of Falls  Outcome: Ongoing (interventions implemented as appropriate)   01/10/18 0554   Fall Risk (Adult)   Absence of Falls making progress toward outcome

## 2018-01-10 NOTE — PLAN OF CARE
Problem: Patient Care Overview (Adult)  Goal: Plan of Care Review   01/10/18 3944   Coping/Psychosocial Response Interventions   Plan Of Care Reviewed With patient   Outcome Evaluation   Outcome Summary/Follow up Plan Declined activity tolerance during PT today; limited by fatigue, weakness, SOB. Able to stand at EOB a take few steps towards HOB, L knee buckling. Recommend DC to SNU.

## 2018-01-10 NOTE — PROGRESS NOTES
LOS: 5 days   Patient Care Team:  Ricky Hoyos III, MD as PCP - General  Anthony Hernández MD as Consulting Physician (Pulmonary Disease)  Christy Jerez MD as Consulting Physician (Dermatology)  Ladonna Mclaughlin MD as Consulting Physician (Cardiology)    Chief Complaint:   f/u dyspnea and chf    Interval History:   She still very short winded.  She still feels very weak but did walk physical therapy yesterday.  Her Landers catheter is out.  Her urine output is dropped.  She doesn't feel like she's diuresing.  When asked her she feels any better than she did when she first came in, the answer is no.  She's not having any chest pain.  She doesn't feel her heart racing or skipping.  She has noted nausea or dizziness.    Objective   Vital Signs  Temp:  [97.3 °F (36.3 °C)-98.3 °F (36.8 °C)] 97.5 °F (36.4 °C)  Heart Rate:  [77-93] 78  Resp:  [16-20] 18  BP: (140-165)/(58-82) 152/64    Intake/Output Summary (Last 24 hours) at 01/10/18 0831  Last data filed at 01/09/18 2138   Gross per 24 hour   Intake              660 ml   Output              400 ml   Net              260 ml       Comfortable NAD  Neck supple, no JVD or thyromegaly appreciated  S1/S2 RRR, no m/r/g  Lungs Decreased throughout normal effort  Abdomen S/NT/ND (+) BS, no HSM appreciated  Extremities warm, no clubbing, cyanosis, 2+ le edema and rubor  No visible or palpable skin lesions  A/Ox4, mood and affect appropriate    Results Review:        Results from last 7 days  Lab Units 01/10/18  0707 01/09/18  0535 01/08/18  0447   SODIUM mmol/L 132* 132* 136   POTASSIUM mmol/L 3.7 3.7 3.9   CHLORIDE mmol/L 93* 92* 96*   CO2 mmol/L 26.8 28.7 26.9   BUN mg/dL 45* 43* 37*   CREATININE mg/dL 1.84* 1.88* 1.64*   GLUCOSE mg/dL 95 93 96   CALCIUM mg/dL 8.3* 8.3* 8.5*       Results from last 7 days  Lab Units 01/06/18  0541 01/05/18  1546   TROPONIN T ng/mL 0.023 0.016       Results from last 7 days  Lab Units 01/10/18  0707 01/09/18  0535  01/08/18  0447   WBC 10*3/mm3 7.92 7.18 7.94   HEMOGLOBIN g/dL 9.1* 8.4* 8.4*   HEMATOCRIT % 28.8* 26.2* 26.8*   PLATELETS 10*3/mm3 255 305 304           Results from last 7 days  Lab Units 01/06/18  0541   CHOLESTEROL mg/dL 99       Results from last 7 days  Lab Units 01/09/18  0535   MAGNESIUM mg/dL 2.2       Results from last 7 days  Lab Units 01/06/18  0541   CHOLESTEROL mg/dL 99   TRIGLYCERIDES mg/dL 46   HDL CHOL mg/dL 32*       I reviewed the patient's new clinical results.  I personally viewed and interpreted the patient's EKG/Telemetry data        Medication Review:     allopurinol 100 mg Oral Daily   amLODIPine 5 mg Oral Q24H   aspirin 81 mg Oral Daily   budesonide-formoterol 2 puff Inhalation BID - RT   bumetanide 4 mg Intravenous Q8H   carvedilol 37.5 mg Oral Q12H   citalopram 20 mg Oral Daily   pantoprazole 40 mg Oral Q AM   sennosides-docusate sodium 1 tablet Oral Nightly   vitamin B-6 100 mg Oral Daily            Assessment/Plan     Principal Problem:    Acute on chronic combined systolic and diastolic congestive heart failure  Active Problems:    Hyponatremia    Essential hypertension    Gastrointestinal hemorrhage    Paroxysmal atrial fibrillation    Bilateral edema of lower extremity    history of GI AVM (gastrointestinal arteriovenous vascular malformation)    S/P MVR (mitral valve repair)    S/P TVR (tricuspid valve repair)    Chronic kidney disease, stage 3    History of PUD (peptic ulcer disease)    Anemia, multifactorial    AMBER (obstructive sleep apnea)    Nonischemic cardiomyopathy    1. Dyspnea with volume overload - may be due to anemia and hypertension with CMP.  Diurese. Try losartan and may try to change to entresto but need to watch renal function.   2. Severe cardiomyopathy, EF 37%,  nonischemic  3. S/p R CEA 12/2017  4. S/p MV and TV repair 9/2017  5. Pacer for AV block after surgery.  She may need to be upgraded to CRT to help improve EF.  Part of her CHF may be due to ventricular  pacing.   6. H/o GI bleeds with anemia. Has anemia now. Stool positive for blood. Hemoglobin stable  7. PAF. No AC due to GI bleeding. GI to see.   8. CKD. Nephrology seeing.      Check chest x-ray.  If this shows pulmonary edema, consider Bumex drip.  Would try to avoid amlodipine given her cardiomyopathy.  Maybe consider hydralazine instead if we can't use losartan.  Plan to talk to nephrology.      Ladonna Mclaughlin MD  01/10/18  8:31 AM

## 2018-01-10 NOTE — PROGRESS NOTES
Continued Stay Note  [facility]     Patient Name: Raquel Deluna  MRN: [id number]  Today's Date: 1/10/2018    Admit Date: 1/5/2018          Discharge Plan       01/10/18 1322    Case Management/Social Work Plan    Plan Pt has been approved for bed @ [facility] pending bed aval.               Discharge Codes     None            Cindy Castellanos RN

## 2018-01-10 NOTE — THERAPY TREATMENT NOTE
Acute Care - Physical Therapy Treatment Note  [facility]     Patient Name: Raquel Deluna  : 1935  MRN: [id number]  Today's Date: 1/10/2018  Onset of Illness/Injury or Date of Surgery Date: 18  Date of Referral to PT: 18  Referring Physician: Avila    Admit Date: 2018    Visit Dx:    ICD-10-CM ICD-9-CM   1. Acute combined systolic and diastolic congestive heart failure I50.41 428.41     428.0   2. Iron deficiency anemia due to chronic blood loss D50.0 280.0   3. GI AVM (gastrointestinal arteriovenous vascular malformation) Q27.33 747.61     Patient Active Problem List   Diagnosis   • OA (osteoarthritis) of knee   • Hyponatremia   • Bella's esophagus   • Hematochezia   • Colon polyp   • Essential hypertension   • Gastroesophageal reflux disease   • Hyperlipidemia   • Intestinal malabsorption   • Adverse effect of iron   • Iron deficiency anemia   • Gastrointestinal hemorrhage   • Paroxysmal atrial fibrillation   • Weakness   • Bilateral edema of lower extremity   • DNR (do not resuscitate)   • KESHIA (acute kidney injury)   • Mitral regurgitation   • Bilateral carotid artery stenosis   • Mitral valve insufficiency   • Absolute anemia   • Polyp of colon   • Iron deficiency anemia due to chronic blood loss   • history of GI AVM (gastrointestinal arteriovenous vascular malformation)   • S/P MVR (mitral valve repair)   • S/P TVR (tricuspid valve repair)   • S/P Maze operation for atrial fibrillation   • Lymphedema   • Carotid stenosis, asymptomatic, right   • Acute on chronic combined systolic and diastolic congestive heart failure   • Chronic kidney disease, stage 3   • History of PUD (peptic ulcer disease)   • Anemia, multifactorial   • AMBER (obstructive sleep apnea)   • Nonischemic cardiomyopathy               Adult Rehabilitation Note       01/10/18 1402 18 1330 18 1300    Rehab Assessment/Intervention    Discipline physical therapist  -AR physical therapist  -EE physical  therapist  -LC    Document Type therapy note (daily note)  -AR therapy note (daily note)  -EE therapy note (daily note)  -LC    Subjective Information agree to therapy;complains of;weakness;fatigue  -AR agree to therapy;complains of;fatigue;pain;weakness  -EE agree to therapy;complains of;weakness;fatigue;pain  -LC    Patient Effort, Rehab Treatment good  -AR adequate  -EE good  -LC    Symptoms Noted During/After Treatment  fatigue;increased pain  -EE     Symptoms Noted Comment  increased knee pain during ambulation  -EE     Precautions/Limitations fall precautions;oxygen therapy device and L/min  -AR  fall precautions  -LC    Patient Response to Treatment   pain in B knees  -LC    Recorded by [AR] Fanny Hough, PT [EE] Haydee More, PT [LC] Davin Negron, PT DPT    Vital Signs    Pre SpO2 (%)  91  -EE     O2 Delivery Pre Treatment supplemental O2  -AR room air  -EE     Intra SpO2 (%)  88  -EE     O2 Delivery Intra Treatment  room air  -EE     Post SpO2 (%)  91  -EE     O2 Delivery Post Treatment  room air  -EE     Pre Patient Position  Sitting  -EE     Intra Patient Position  Standing  -EE     Post Patient Position  Sitting  -EE     Recorded by [AR] Fanny Hough, PT [EE] Haydee More, PT     Pain Assessment    Pain Assessment  0-10  -EE 0-10  -LC    Pain Score  6  -EE 5  -LC    Pain Type  Chronic pain  -EE Acute pain  -LC    Pain Location  Knee  -EE Leg  -LC    Pain Orientation  Right;Left  -EE Right;Left  -LC    Pain Intervention(s)  Repositioned;Rest  -EE     Response to Interventions  tolerated  -EE     Recorded by  [EE] Haydee More, PT [LC] Davin Negron, PT DPT    Cognitive Assessment/Intervention    Current Cognitive/Communication Assessment impaired  -AR functional  -EE functional  -LC    Orientation Status oriented to;person;place  -AR oriented x 4  -EE oriented x 4  -LC    Follows Commands/Answers Questions 100% of the time;able to follow single-step instructions;needs cueing;needs increased  time;needs repetition  -% of the time  -% of the time;able to follow multi-step instructions  -LC    Personal Safety mild impairment  -AR WNL/WFL  -EE WNL/WFL  -LC    Personal Safety Interventions fall prevention program maintained;gait belt;muscle strengthening facilitated  -AR fall prevention program maintained;gait belt;muscle strengthening facilitated;nonskid shoes/slippers when out of bed;supervised activity  -EE fall prevention program maintained;gait belt;muscle strengthening facilitated;nonskid shoes/slippers when out of bed  -LC    Recorded by [AR] Fanny Hough, PT [EE] Haydee More, PT [LC] Davin Negron, PT DPT    Bed Mobility, Assessment/Treatment    Bed Mobility, Assistive Device bed rails;head of bed elevated;draw sheet  -AR      Bed Mob, Supine to Sit, [place] moderate assist (50% patient effort)  -AR not tested  -EE     Bed Mob, Sit to Supine, [place] moderate assist (50% patient effort)  -AR      Bed Mobility, Comment  pt up in chair  -EE up in chair  -LC    Recorded by [AR] Fanny Hough, PT [EE] Haydee More, PT [LC] Davin Negron, PT DPT    Transfer Assessment/Treatment    Transfers, Sit-Stand [place] moderate assist (50% patient effort)  -AR moderate assist (50% patient effort);verbal cues required  -EE moderate assist (50% patient effort)  -LC    Transfers, Stand-Sit [place] minimum assist (75% patient effort)  -AR minimum assist (75% patient effort);verbal cues required  -EE minimum assist (75% patient effort)  -LC    Transfers, Sit-Stand-Sit, Assist Device rolling walker  -AR rolling walker  -EE rolling walker  -LC    Transfer, Safety Issues  weight-shifting ability decreased;balance decreased during turns  -EE weight-shifting ability decreased  -LC    Transfer, Impairments strength decreased;impaired balance  -AR strength decreased;impaired balance;pain  -EE strength decreased;impaired balance;pain  -LC    Transfer, Comment  verbal cues for hand placement  and technique; multiple attempts required to achieve full stand  -EE     Recorded by [AR] Fanny Hough, PT [EE] Haydee More, PT [LC] Davin Negron, PT DPT    Gait Assessment/Treatment    Gait, [place] Level minimum assist (75% patient effort)  -AR contact guard assist;verbal cues required  -EE minimum assist (75% patient effort)  -LC    Gait, Assistive Device rolling walker  -AR rolling walker  -EE rolling walker  -LC    Gait, Distance (Feet) 1  -AR 15  -EE 20  -LC    Gait, Gait Deviations knee buckling;dorie decreased;decreased heel strike;forward flexed posture  -AR forward flexed posture;dorie decreased;decreased heel strike;step length decreased;stride length decreased;weight-shifting ability decreased  -EE     Gait, Safety Issues step length decreased;weight-shifting ability decreased  -AR step length decreased;weight-shifting ability decreased;balance decreased during turns  -EE sequencing ability decreased;step length decreased  -LC    Gait, Impairments  strength decreased;impaired balance;pain  -EE strength decreased;impaired balance;pain  -LC    Gait, Comment few steps towards HOB, limited by fatigue/weakness, L LE giving out  -AR B knee pain limiting gait distance  -EE     Recorded by [AR] Fanny Hough, PT [EE] Haydee More, PT [LC] Davin Negron, PT DPT    Therapy Exercises    Bilateral Lower Extremities  AROM:;5 reps;ankle pumps/circles;LAQ  -EE     Recorded by  [EE] Haydee More PT     Positioning and Restraints    Pre-Treatment Position in bed  -AR sitting in chair/recliner  -EE sitting in chair/recliner  -    Post Treatment Position bed  -AR chair  -EE chair  -LC    In Bed notified nsg;supine;call light within reach;encouraged to call for assist;exit alarm on  -AR      In Chair  reclined;call light within reach;encouraged to call for assist;exit alarm on;legs elevated  -EE reclined;sitting;call light within reach;encouraged to call for assist;exit alarm on;legs elevated  -     Recorded by [AR] Fanny Hough, PT [EE] Haydee More, PT [LC] Davin Negron, PT DPT      User Key  (r) = Recorded By, (t) = Taken By, (c) = Cosigned By    Initials Name Effective Dates    EE Haydee Derik, PT 12/01/15 -     AR Fanny Hough, PT 06/27/16 -     LC Davin Negron, PT DPT 08/02/16 -                 IP PT Goals       01/06/18 1207          Bed Mobility PT LTG    Bed Mobility PT LTG, Date Established 01/06/18  -LC      Bed Mobility PT LTG, Time to Achieve 1 wk  -LC      Bed Mobility PT LTG, Activity Type all bed mobility  -LC      Bed Mobility PT LTG, [place] Level contact guard assist  -LC      Bed Mobility PT Goal  LTG, Assist Device bed rails  -LC      Transfer Training PT LTG    Transfer Training PT LTG, Date Established 01/06/18  -LC      Transfer Training PT LTG, Time to Achieve 1 wk  -LC      Transfer Training PT LTG, Activity Type all transfers  -LC      Transfer Training PT LTG, [place] Level contact guard assist  -LC      Transfer Training PT LTG, Assist Device walker, rolling  -LC      Gait Training PT LTG    Gait Training Goal PT LTG, Date Established 01/06/18  -LC      Gait Training Goal PT LTG, Time to Achieve 1 wk  -LC      Gait Training Goal PT LTG, [place] Level contact guard assist  -LC      Gait Training Goal PT LTG, Assist Device walker, rolling  -LC      Gait Training Goal PT LTG, Distance to Achieve 100  -LC        User Key  (r) = Recorded By, (t) = Taken By, (c) = Cosigned By    Initials Name Provider Type    LC Davin Negron, PT DPT Physical Therapist          Physical Therapy Education     Title: PT OT SLP Therapies (Active)     Topic: Physical Therapy (Active)     Point: Mobility training (Active)    Learning Progress Summary    Learner Readiness Method Response Comment Documented by Status   Patient Acceptance E NR  AR 01/10/18 1450 Active    Acceptance E,TB NR  EE 01/09/18 1343 Active    Acceptance E,D JEAN ARORA benefits of activity, safety during ambulation LC  01/08/18 1400 Done    Acceptance E,D VU,DU safety during transfers LC 01/07/18 1418 Done    Acceptance E,D VU,DU safety during transfers LC 01/06/18 1204 Done               Point: Home exercise program (Active)    Learning Progress Summary    Learner Readiness Method Response Comment Documented by Status   Patient Acceptance E NR  AR 01/10/18 1450 Active    Acceptance E,TB NR  EE 01/09/18 1343 Active               Point: Body mechanics (Active)    Learning Progress Summary    Learner Readiness Method Response Comment Documented by Status   Patient Acceptance E NR  AR 01/10/18 1450 Active    Acceptance E,TB NR  EE 01/09/18 1343 Active               Point: Precautions (Active)    Learning Progress Summary    Learner Readiness Method Response Comment Documented by Status   Patient Acceptance E NR  AR 01/10/18 1450 Active                      User Key     Initials Effective Dates Name Provider Type Discipline     12/01/15 -  Haydee More, PT Physical Therapist PT    AR 06/27/16 -  Fanny Hough, PT Physical Therapist PT     08/02/16 -  Davin Negron, PT DPT Physical Therapist PT                    PT Recommendation and Plan  Anticipated Discharge Disposition: skilled nursing facility  PT Frequency: daily  Plan of Care Review  Plan Of Care Reviewed With: patient  Outcome Summary/Follow up Plan: Declined activity tolerance during PT today; limited by fatigue, weakness, SOB.  Able to stand at EOB a take few steps towards HOB, L knee buckling.  Recommend DC to SNU.            Outcome Measures       01/10/18 1400 01/09/18 1300       How much help from another person do you currently need...    Turning from your back to your side while in flat bed without using bedrails? 3  -AR 3  -EE     Moving from lying on back to sitting on the side of a flat bed without bedrails? 2  -AR 2  -EE     Moving to and from a bed to a chair (including a wheelchair)? 2  -AR 2  -EE     Standing up from a chair using your arms (e.g.,  wheelchair, bedside chair)? 2  -AR 2  -EE     Climbing 3-5 steps with a railing? 1  -AR 1  -EE     To walk in hospital room? 2  -AR 3  -EE     AM-PAC 6 Clicks Score 12  -AR 13  -EE     Functional Assessment    Outcome Measure Options  AM-PAC 6 Clicks Basic Mobility (PT)  -EE       User Key  (r) = Recorded By, (t) = Taken By, (c) = Cosigned By    Initials Name Provider Type    EE Haydee More, PT Physical Therapist    AR Fanny Hough PT Physical Therapist           Time Calculation:         PT Charges       01/10/18 1451          Time Calculation    Start Time 1402  -AR      Stop Time 1415  -AR      Time Calculation (min) 13 min  -AR      PT Received On 01/10/18  -AR      PT - Next Appointment 01/11/18  -AR        User Key  (r) = Recorded By, (t) = Taken By, (c) = Cosigned By    Initials Name Provider Type    OMAR Hough PT Physical Therapist          Therapy Charges for Today     Code Description Service Date Service Provider Modifiers Qty    28638593540 HC PT THER PROC EA 15 MIN 1/10/2018 Fanny Hough, PT GP 1          PT G-Codes  Outcome Measure Options: AM-PAC 6 Clicks Basic Mobility (PT)    Fanny Hough PT  1/10/2018

## 2018-01-10 NOTE — PROGRESS NOTES
"   LOS: 5 days    Patient Care Team:  Ricky Hoyos III, MD as PCP - General  Anthony Hernández MD as Consulting Physician (Pulmonary Disease)  Christy Jerez MD as Consulting Physician (Dermatology)  Ladonna Mclaughlin MD as Consulting Physician (Cardiology)    Chief Complaint:    Chief Complaint   Patient presents with   • Shortness of Breath     carrying a lotof fluid in legs    • Rectal Bleeding     onset last week       Subjective follow-up chronic kidney disease    Interval History:   Does not feel better. Purse lip breathing sitting on side of bed. 02 sats. 98% on 2L.  No bm today. Urine output not as expected with intermittent Bumex.        Review of Systems:   As noted above    Objective     Vital Signs  Temp:  [97.3 °F (36.3 °C)-98.3 °F (36.8 °C)] 97.5 °F (36.4 °C)  Heart Rate:  [77-93] 89  Resp:  [16-18] 16  BP: (139-165)/(59-82) 139/60    Flowsheet Rows         First Filed Value    Admission Height  162.6 cm (64\") Documented at 01/05/2018 1538    Admission Weight  94.3 kg (208 lb) Documented at 01/05/2018 1538             I/O last 3 completed shifts:  In: 1260 [P.O.:960; Blood:300]  Out: 500 [Urine:500]    Intake/Output Summary (Last 24 hours) at 01/10/18 1411  Last data filed at 01/09/18 2138   Gross per 24 hour   Intake              300 ml   Output              200 ml   Net              100 ml       Physical Exam:  General Appearance: lethargic.  no acute distress, morbidly obese. Purse lip breathing. Not using accessory muscles.   Skin: warm and dry  HEENT:  nonicteric sclerae, oral mucosa normal,   Neck: supple, increased JVD, trachea midline  Lungs: Breath sounds bilaterally with bilateral rhonchi and crackles  Heart: Irregularly irregular, no rub  Abdomen: soft, non-tender,  +bs.  Increased flank edema.   Extremities: 2+ lower extremities edema, no cyanosis or clubbing  Some asterixis.      Results Review:      Results from last 7 days  Lab Units 01/10/18  0707 01/09/18  0535 " 01/08/18  0447  01/05/18  1546   SODIUM mmol/L 132* 132* 136  < > 131*   POTASSIUM mmol/L 3.7 3.7 3.9  < > 4.9   CHLORIDE mmol/L 93* 92* 96*  < > 95*   CO2 mmol/L 26.8 28.7 26.9  < > 24.6   BUN mg/dL 45* 43* 37*  < > 40*   CREATININE mg/dL 1.84* 1.88* 1.64*  < > 1.61*   CALCIUM mg/dL 8.3* 8.3* 8.5*  < > 9.1   BILIRUBIN mg/dL  --   --   --   --  0.6   ALK PHOS U/L  --   --   --   --  108   ALT (SGPT) U/L  --   --   --   --  26   AST (SGOT) U/L  --   --   --   --  29   GLUCOSE mg/dL 95 93 96  < > 137*   < > = values in this interval not displayed.    Estimated Creatinine Clearance: 27.8 mL/min (by C-G formula based on Cr of 1.84).      Results from last 7 days  Lab Units 01/09/18  0535 01/08/18 0447 01/07/18  0427   MAGNESIUM mg/dL 2.2 2.0 1.9   PHOSPHORUS mg/dL 4.1 4.1 4.2         Results from last 7 days  Lab Units 01/10/18  0707 01/09/18  0535 01/08/18 0447 01/07/18  0427   URIC ACID mg/dL 11.8* 11.8* 11.4* 11.3*         Results from last 7 days  Lab Units 01/10/18  0707 01/09/18  0535 01/08/18  0447 01/07/18  0427 01/06/18  0704   WBC 10*3/mm3 7.92 7.18 7.94 7.53 10.55   HEMOGLOBIN g/dL 9.1* 8.4* 8.4* 7.8* 8.5*   PLATELETS 10*3/mm3 255 305 304 308 320               Imaging Results (last 24 hours)     Procedure Component Value Units Date/Time    XR Chest PA & Lateral [[id number]] Collected:  01/10/18 1148     Updated:  01/10/18 1402    Narrative:       TWO-VIEW CHEST     HISTORY: Atrial fibrillation. Shortness of breath.     FINDINGS:  There is cardiomegaly with a pacemaker in place and there is  mild vascular congestion associated with small bilateral pleural  effusions most consistent with changes of mild congestive heart failure  showing slight worsening from 5 days ago.     This report was finalized on 1/10/2018 1:59 PM by Dr. Kevin Alvarado MD.             allopurinol 100 mg Oral Daily   aspirin 81 mg Oral Daily   budesonide-formoterol 2 puff Inhalation BID - RT   carvedilol 37.5 mg Oral Q12H   citalopram 20  mg Oral Daily   hydrALAZINE 10 mg Oral Q8H   pantoprazole 40 mg Oral Q AM   sennosides-docusate sodium 1 tablet Oral Nightly   vitamin B-6 100 mg Oral Daily       bumetanide (BUMEX) infusion 2 mg/hr       Medication Review:   Current Facility-Administered Medications   Medication Dose Route Frequency Provider Last Rate Last Dose   • acetaminophen (TYLENOL) tablet 650 mg  650 mg Oral Q6H PRN Jeff Li MD   650 mg at 01/07/18 0140   • allopurinol (ZYLOPRIM) tablet 100 mg  100 mg Oral Daily Cindy Alves MD   100 mg at 01/10/18 0913   • aspirin EC tablet 81 mg  81 mg Oral Daily Jeff Li MD   81 mg at 01/10/18 0913   • budesonide-formoterol (SYMBICORT) 160-4.5 MCG/ACT inhaler 2 puff  2 puff Inhalation BID - RT Jeff Li MD   2 puff at 01/10/18 0955   • bumetanide (BUMEX) 10 mg in sodium chloride 0.9 % 100 mL (0.1 mg/mL) infusion  2 mg/hr Intravenous Continuous Cindy Alves MD       • carvedilol (COREG) tablet 37.5 mg  37.5 mg Oral Q12H Ladonna Mclaughlin MD   37.5 mg at 01/10/18 0913   • citalopram (CeleXA) tablet 20 mg  20 mg Oral Daily Jeff Li MD   20 mg at 01/10/18 0913   • hydrALAZINE (APRESOLINE) tablet 10 mg  10 mg Oral Q8H Cindy Alves MD       • nitroglycerin (NITROSTAT) SL tablet 0.4 mg  0.4 mg Sublingual Q5 Min PRN Jeff Li MD       • ondansetron (ZOFRAN) injection 4 mg  4 mg Intravenous Q6H PRN Jeff Li MD       • pantoprazole (PROTONIX) EC tablet 40 mg  40 mg Oral Q AM Jeff Li MD   40 mg at 01/10/18 0553   • polyethylene glycol (MIRALAX) powder 17 g  17 g Oral Daily PRN Cindy Alves MD       • sennosides-docusate sodium (SENOKOT-S) 8.6-50 MG tablet 1 tablet  1 tablet Oral Nightly Tonja Hernandez MD   1 tablet at 01/09/18 2058   • sodium chloride 0.9 % flush 1-10 mL  1-10 mL Intravenous PRN Jeff Li MD       • vitamin B-6 (PYRIDOXINE) tablet 100 mg  100 mg Oral Daily Jeff Li MD   100 mg at 01/10/18 6931       Assessment/Plan   1.   Chronic kidney disease stage III . Crt unchanged.  Losartan dc.  Insignificant diuresis with intermittent bumex.   2.  Fluid excess and systolic congestive heart failure EF 30%. Need to get more aggressive diuresis.  Looks worse today.   3.  Chronic anemia associated with iron deficiency, colonic angiodysplasia.  Hg 9.1.  4.  Hypertension .  Change Norvasc to hydralazine.    5.  Mild hyponatremia associated with volume excess, sodium today 132.  6.  Hyperuricemia.       Plan:  1. Bumex drip today.  2. Dc norvasc and use hydralazine instead.  3. DW Dr. Mclaughlin.   4. Check ABG.            Cindy Alves MD  01/10/18  2:11 PM

## 2018-01-11 NOTE — PROGRESS NOTES
"   LOS: 6 days    Patient Care Team:  Ricky Hoyos III, MD as PCP - General  Anthony Hernández MD as Consulting Physician (Pulmonary Disease)  Christy Jerez MD as Consulting Physician (Dermatology)  Ladonna Mclaughlin MD as Consulting Physician (Cardiology)    Chief Complaint:    Chief Complaint   Patient presents with   • Shortness of Breath     carrying a lotof fluid in legs    • Rectal Bleeding     onset last week       Subjective follow-up chronic kidney disease 3    Interval History:   Does not feel better. Able to converse, but mild dyspnea.  Oxygen is out of her nose.  Sat 91%.  Small bm. SOA no better. Landers in .  Muscles achy on bumex drip.  Eating.     Review of Systems:   As noted above    Objective     Vital Signs  Temp:  [97.3 °F (36.3 °C)-97.8 °F (36.6 °C)] 97.7 °F (36.5 °C)  Heart Rate:  [72-81] 79  Resp:  [16-18] 18  BP: (141-160)/(57-70) 142/58    Flowsheet Rows         First Filed Value    Admission Height  162.6 cm (64\") Documented at 01/05/2018 1538    Admission Weight  94.3 kg (208 lb) Documented at 01/05/2018 1538          I/O this shift:  In: -   Out: 300 [Urine:300]  I/O last 3 completed shifts:  In: 300 [Blood:300]  Out: 450 [Urine:450]    Intake/Output Summary (Last 24 hours) at 01/11/18 1302  Last data filed at 01/11/18 0726   Gross per 24 hour   Intake                0 ml   Output              750 ml   Net             -750 ml       Physical Exam:  General Appearance:   no acute distress, morbidly obese. Purse lip breathing with conversation. . Not using accessory muscles.   Skin: warm and dry  HEENT:  nonicteric sclerae, oral mucosa normal,   Neck: supple, increased JVD, trachea midline  Lungs: Breath sounds bilaterally with bilateral rhonchi and crackles  Heart: Irregularly irregular, no rub  Abdomen: soft, non-tender,  +bs.  Increased flank edema.   Extremities: 3+ lower extremities edema, RUE edema >> LUE.  Some asterixis.      Results Review:      Results from last 7 " days  Lab Units 01/11/18  0641 01/10/18  0707 01/09/18  0535  01/05/18  1546   SODIUM mmol/L 130* 132* 132*  < > 131*   POTASSIUM mmol/L 3.8 3.7 3.7  < > 4.9   CHLORIDE mmol/L 92* 93* 92*  < > 95*   CO2 mmol/L 25.9 26.8 28.7  < > 24.6   BUN mg/dL 51* 45* 43*  < > 40*   CREATININE mg/dL 2.05* 1.84* 1.88*  < > 1.61*   CALCIUM mg/dL 8.1* 8.3* 8.3*  < > 9.1   BILIRUBIN mg/dL 0.6  --   --   --  0.6   ALK PHOS U/L 79  --   --   --  108   ALT (SGPT) U/L 22  --   --   --  26   AST (SGOT) U/L 23  --   --   --  29   GLUCOSE mg/dL 87 95 93  < > 137*   < > = values in this interval not displayed.    Estimated Creatinine Clearance: 25.4 mL/min (by C-G formula based on Cr of 2.05).      Results from last 7 days  Lab Units 01/09/18  0535 01/08/18 0447 01/07/18  0427   MAGNESIUM mg/dL 2.2 2.0 1.9   PHOSPHORUS mg/dL 4.1 4.1 4.2         Results from last 7 days  Lab Units 01/11/18  0641 01/10/18  0707 01/09/18  0535 01/08/18  0447 01/07/18  0427   URIC ACID mg/dL 11.1* 11.8* 11.8* 11.4* 11.3*         Results from last 7 days  Lab Units 01/11/18  0641 01/10/18  0707 01/09/18  0535 01/08/18 0447 01/07/18  0427   WBC 10*3/mm3 8.79 7.92 7.18 7.94 7.53   HEMOGLOBIN g/dL 8.7* 9.1* 8.4* 8.4* 7.8*   PLATELETS 10*3/mm3 253 255 305 304 308               Imaging Results (last 24 hours)     Procedure Component Value Units Date/Time    XR Chest PA & Lateral [[id number]] Collected:  01/10/18 1148     Updated:  01/10/18 1402    Narrative:       TWO-VIEW CHEST     HISTORY: Atrial fibrillation. Shortness of breath.     FINDINGS:  There is cardiomegaly with a pacemaker in place and there is  mild vascular congestion associated with small bilateral pleural  effusions most consistent with changes of mild congestive heart failure  showing slight worsening from 5 days ago.     This report was finalized on 1/10/2018 1:59 PM by Dr. Kevin Alvarado MD.             allopurinol 100 mg Oral Daily   aspirin 81 mg Oral Daily   budesonide-formoterol 2 puff  Inhalation BID - RT   carvedilol 37.5 mg Oral Q12H   citalopram 20 mg Oral Daily   fluticasone 2 spray Each Nare Daily   hydrALAZINE 25 mg Oral Q8H   metOLazone 5 mg Oral Daily   pantoprazole 40 mg Oral Q AM   sennosides-docusate sodium 1 tablet Oral Nightly   vitamin B-6 100 mg Oral Daily       bumetanide (BUMEX) infusion 2 mg/hr Last Rate: 2 mg/hr (01/11/18 1034)       Medication Review:   Current Facility-Administered Medications   Medication Dose Route Frequency Provider Last Rate Last Dose   • acetaminophen (TYLENOL) tablet 650 mg  650 mg Oral Q6H PRN Jeff Li MD   650 mg at 01/11/18 0236   • allopurinol (ZYLOPRIM) tablet 100 mg  100 mg Oral Daily Cindy Alves MD   100 mg at 01/11/18 1006   • aspirin EC tablet 81 mg  81 mg Oral Daily Jeff Li MD   81 mg at 01/11/18 1005   • budesonide-formoterol (SYMBICORT) 160-4.5 MCG/ACT inhaler 2 puff  2 puff Inhalation BID - RT Jeff Li MD   2 puff at 01/11/18 0910   • bumetanide (BUMEX) 10 mg in sodium chloride 0.9 % 100 mL (0.1 mg/mL) infusion  2 mg/hr Intravenous Continuous Cindy Alves MD 20 mL/hr at 01/11/18 1034 2 mg/hr at 01/11/18 1034   • carvedilol (COREG) tablet 37.5 mg  37.5 mg Oral Q12H Ladonna Mclaughlin MD   37.5 mg at 01/11/18 1005   • citalopram (CeleXA) tablet 20 mg  20 mg Oral Daily Jeff Li MD   20 mg at 01/11/18 1005   • fluticasone (FLONASE) 50 MCG/ACT nasal spray 2 spray  2 spray Each Nare Daily Genna Gramajo MD       • hydrALAZINE (APRESOLINE) tablet 25 mg  25 mg Oral Q8H Ladonna Mclaughlin MD       • metOLazone (ZAROXOLYN) tablet 5 mg  5 mg Oral Daily Cindy Alves MD   5 mg at 01/11/18 1034   • nitroglycerin (NITROSTAT) SL tablet 0.4 mg  0.4 mg Sublingual Q5 Min PRN Jeff Li MD       • ondansetron (ZOFRAN) injection 4 mg  4 mg Intravenous Q6H PRN Jeff Li MD       • pantoprazole (PROTONIX) EC tablet 40 mg  40 mg Oral Q AM Jeff Li MD   40 mg at 01/11/18 8133   • polyethylene glycol  (MIRALAX) powder 17 g  17 g Oral Daily PRN Cindy Alves MD       • sennosides-docusate sodium (SENOKOT-S) 8.6-50 MG tablet 1 tablet  1 tablet Oral Nightly Tonja Hernandez MD   1 tablet at 01/10/18 2133   • sodium chloride 0.9 % flush 1-10 mL  1-10 mL Intravenous PRN Jeff Li MD       • vitamin B-6 (PYRIDOXINE) tablet 100 mg  100 mg Oral Daily Jeff Li MD   100 mg at 01/11/18 1005       Assessment/Plan   1.  Chronic kidney disease stage III . Crt rising. Suspect cardiorenal syndrome.  Not responsive to bumex drip.  I do not think that adding samsca will be of much benefit.  She may need dialysis.  We discussed this possibility. She wants to think about it.    2.  Fluid excess and systolic and diastolic congestive heart failure.  EF 30%.  Pulm HTN with RV pressure 80.  Insufficient   diuresis despite bumex drip.  Looks worse today. Biventricular failure.   3.  Chronic anemia associated with iron deficiency, colonic angiodysplasia.  Hg 9.1.  4.  Hypertension .  Change Norvasc to hydralazine.    5.  Mild hyponatremia associated with volume excess, sodium today 132.  6.  Hyperuricemia.       Plan:  1. Continue bumex drip  2. Add metolazone today  3. Discussed possibility of dialysis with her.  Explained that her cardiac function is main issue with refractory heart failure, pulm HTN ( worsening).              Cindy Alves MD  01/11/18  1:02 PM

## 2018-01-11 NOTE — PLAN OF CARE
Problem: Patient Care Overview (Adult)  Goal: Plan of Care Review  Outcome: Ongoing (interventions implemented as appropriate)   01/11/18 8262   Coping/Psychosocial Response Interventions   Plan Of Care Reviewed With patient   Patient Care Overview   Progress improving   Outcome Evaluation   Outcome Summary/Follow up Plan Pt complains of leg pain, Q2 turn, FC still in place. Pt complains of anxiety and shortness of breath, PO PRN Ativan ontained. Surgery planned for tomorrow to adjust pace maker. VSS, will continue to monitor.       Problem: Cardiac: Heart Failure (Adult)  Goal: Signs and Symptoms of Listed Potential Problems Will be Absent or Manageable (Cardiac: Heart Failure)  Outcome: Ongoing (interventions implemented as appropriate)      Problem: Pain, Acute (Adult)  Goal: Identify Related Risk Factors and Signs and Symptoms  Outcome: Outcome(s) achieved Date Met: 01/11/18    Goal: Acceptable Pain Control/Comfort Level  Outcome: Ongoing (interventions implemented as appropriate)      Problem: Fall Risk (Adult)  Goal: Absence of Falls  Outcome: Ongoing (interventions implemented as appropriate)      Problem: Fluid Volume Excess (Adult,Obstetrics,Pediatric)  Goal: Balanced Intake/Output  Outcome: Ongoing (interventions implemented as appropriate)      Problem: Pressure Ulcer Risk (Wily Scale) (Adult,Obstetrics,Pediatric)  Goal: Identify Related Risk Factors and Signs and Symptoms  Outcome: Outcome(s) achieved Date Met: 01/11/18    Goal: Skin Integrity  Outcome: Ongoing (interventions implemented as appropriate)

## 2018-01-11 NOTE — PLAN OF CARE
Problem: Patient Care Overview (Adult)  Goal: Plan of Care Review  Outcome: Ongoing (interventions implemented as appropriate)   01/11/18 0522   Coping/Psychosocial Response Interventions   Plan Of Care Reviewed With patient   Patient Care Overview   Progress improving   Outcome Evaluation   Outcome Summary/Follow up Plan pt c/o leg pain once that was treated with prn tylenol. Bumex drip continues. edema continues. pt states she feels weak but wants to try walking with PT today again. q2 turn. lower kris ext dry and flaky, elevated heels on pillows. redness on innder buttuck, no open areas , applied barrier cr. VSS, will cont to monitor.      Goal: Adult Individualization and Mutuality  Outcome: Ongoing (interventions implemented as appropriate)    Goal: Discharge Needs Assessment  Outcome: Ongoing (interventions implemented as appropriate)      Problem: Cardiac: Heart Failure (Adult)  Goal: Signs and Symptoms of Listed Potential Problems Will be Absent or Manageable (Cardiac: Heart Failure)  Outcome: Ongoing (interventions implemented as appropriate)      Problem: Pain, Acute (Adult)  Goal: Identify Related Risk Factors and Signs and Symptoms  Outcome: Ongoing (interventions implemented as appropriate)    Goal: Acceptable Pain Control/Comfort Level  Outcome: Ongoing (interventions implemented as appropriate)      Problem: Fall Risk (Adult)  Goal: Absence of Falls  Outcome: Ongoing (interventions implemented as appropriate)      Problem: Fluid Volume Excess (Adult,Obstetrics,Pediatric)  Goal: Stable Weight  Outcome: Ongoing (interventions implemented as appropriate)    Goal: Balanced Intake/Output  Outcome: Ongoing (interventions implemented as appropriate)

## 2018-01-11 NOTE — PROGRESS NOTES
LOS: 6 days   Patient Care Team:  Ricky Hoyos III, MD as PCP - General  Anthony Bulmaro Hernández MD as Consulting Physician (Pulmonary Disease)  Christy Jerez MD as Consulting Physician (Dermatology)  Ladonna Mclaughlin MD as Consulting Physician (Cardiology)    Chief Complaint:       Interval History:   She is still very short winded.  She has no chest pain.  Her right arm is very swollen.  She feels like she's not urinating much.  She doesn't like she's getting better.  She doesn't feel her heart racing or skipping.    Objective   Vital Signs  Temp:  [97.3 °F (36.3 °C)-97.8 °F (36.6 °C)] 97.8 °F (36.6 °C)  Heart Rate:  [72-89] 81  Resp:  [16-18] 18  BP: (139-160)/(57-70) 160/70    Intake/Output Summary (Last 24 hours) at 01/11/18 0748  Last data filed at 01/11/18 0726   Gross per 24 hour   Intake                0 ml   Output              750 ml   Net             -750 ml       Comfortable NAD  Neck supple, no JVD or thyromegaly appreciated  S1/S2 RRR, no m/r/g  Lungs CTA B Mid and upper, normal effort, decreased bilateral bases  Abdomen S/NT/ND (+) BS, no HSM appreciated  Extremities warm, no clubbing, cyanosis, 2+ LEedema  No visible or palpable skin lesions  A/Ox4, mood and affect appropriate    Results Review:        Results from last 7 days  Lab Units 01/11/18  0641 01/10/18  0707 01/09/18  0535   SODIUM mmol/L 130* 132* 132*   POTASSIUM mmol/L 3.8 3.7 3.7   CHLORIDE mmol/L 92* 93* 92*   CO2 mmol/L 25.9 26.8 28.7   BUN mg/dL 51* 45* 43*   CREATININE mg/dL 2.05* 1.84* 1.88*   GLUCOSE mg/dL 87 95 93   CALCIUM mg/dL 8.1* 8.3* 8.3*       Results from last 7 days  Lab Units 01/06/18  0541 01/05/18  1546   TROPONIN T ng/mL 0.023 0.016       Results from last 7 days  Lab Units 01/11/18  0641 01/10/18  0707 01/09/18  0535   WBC 10*3/mm3 8.79 7.92 7.18   HEMOGLOBIN g/dL 8.7* 9.1* 8.4*   HEMATOCRIT % 26.0* 28.8* 26.2*   PLATELETS 10*3/mm3 253 255 305           Results from last 7 days  Lab Units  01/06/18  0541   CHOLESTEROL mg/dL 99       Results from last 7 days  Lab Units 01/09/18  0535   MAGNESIUM mg/dL 2.2       Results from last 7 days  Lab Units 01/06/18  0541   CHOLESTEROL mg/dL 99   TRIGLYCERIDES mg/dL 46   HDL CHOL mg/dL 32*       I reviewed the patient's new clinical results.  I personally viewed and interpreted the patient's EKG/Telemetry data        Chest x-ray 1/10/18-mild pulmonary edema with small bilateral pleural effusions    Medication Review:     allopurinol 100 mg Oral Daily   aspirin 81 mg Oral Daily   budesonide-formoterol 2 puff Inhalation BID - RT   carvedilol 37.5 mg Oral Q12H   citalopram 20 mg Oral Daily   hydrALAZINE 10 mg Oral Q8H   pantoprazole 40 mg Oral Q AM   sennosides-docusate sodium 1 tablet Oral Nightly   vitamin B-6 100 mg Oral Daily         bumetanide (BUMEX) infusion 2 mg/hr Last Rate: 2 mg/hr (01/11/18 0457)       Assessment/Plan     Principal Problem:    Acute on chronic combined systolic and diastolic congestive heart failure  Active Problems:    Hyponatremia    Essential hypertension    Gastrointestinal hemorrhage    Paroxysmal atrial fibrillation    Bilateral edema of lower extremity    history of GI AVM (gastrointestinal arteriovenous vascular malformation)    S/P MVR (mitral valve repair)    S/P TVR (tricuspid valve repair)    Chronic kidney disease, stage 3    History of PUD (peptic ulcer disease)    Anemia, multifactorial    AMBER (obstructive sleep apnea)    Nonischemic cardiomyopathy    1. Dyspnea with volume overload - may be due to anemia and hypertension with CMP.  Diurese. Try losartan and may try to change to entresto but need to watch renal function.   2. Severe cardiomyopathy, EF 37%,  nonischemic  3. S/p R CEA 12/2017  4. S/p MV and TV repair 9/2017  5. Pacer for AV block after surgery.  She may need to be upgraded to CRT to help improve EF.  Part of her CHF may be due to ventricular pacing.   6. H/o GI bleeds with anemia. Has anemia now. Stool  positive for blood. Hemoglobin stable  7. PAF. No AC due to GI bleeding. GI to see.   8. CKD. Nephrology seeing.    9. Hyponatremia.  Would be Samsca be appropriate here?     Her sodium is dropping.  She is really not diuresing.  Her chest x-ray does show pulmonary edema.  She's very short winded so.  I'm wondering at this point if we consider Samsca.  She feels like she is not making any progress.      Ladonna Mclaughlin MD  01/11/18  7:48 AM

## 2018-01-11 NOTE — PROGRESS NOTES
"     LOS: 6 days   Primary Care Physician: Ricky Hoyos III, MD     Subjective   Doing okay now.  Was very anxious earlier when dialysis was mentioned.  No chest pain.  Breathing is okay.  No nausea or vomiting    Vital Signs  Body mass index is 38.57 kg/(m^2).  Temp:  [97.3 °F (36.3 °C)-97.8 °F (36.6 °C)] 97.8 °F (36.6 °C)  Heart Rate:  [78-82] 82  Resp:  [16-18] 16  BP: (142-160)/(56-70) 157/56    On 1 L of oxygen    Objective:  General Appearance:  In no acute distress.    Vital signs: (most recent): Blood pressure 157/56, pulse 82, temperature 97.8 °F (36.6 °C), temperature source Oral, resp. rate 16, height 162.6 cm (64\"), weight 102 kg (224 lb 11.2 oz), SpO2 95 %.    HEENT: (No JVD.  Neck supple.  No lymphadenopathy.  Trachea midline)    Lungs:  There are decreased breath sounds.  No wheezes, rales or rhonchi.    Abdomen: Abdomen is soft and distended.  (Obese)Bowel sounds are normal.   There is generalized tenderness.   There is no mass. There is no splenomegaly. There is no hepatomegaly.   Extremities: There is dependent edema.  (3+ edema)  Neurological: Patient is alert.          Results Review:    I reviewed the patient's new clinical results.      Results from last 7 days  Lab Units 01/11/18  0641 01/10/18  0707   WBC 10*3/mm3 8.79 7.92   HEMOGLOBIN g/dL 8.7* 9.1*   PLATELETS 10*3/mm3 253 255       Results from last 7 days  Lab Units 01/11/18  0641 01/10/18  0707   SODIUM mmol/L 130* 132*   POTASSIUM mmol/L 3.8 3.7   CHLORIDE mmol/L 92* 93*   CO2 mmol/L 25.9 26.8   BUN mg/dL 51* 45*   CREATININE mg/dL 2.05* 1.84*   CALCIUM mg/dL 8.1* 8.3*   GLUCOSE mg/dL 87 95         Hemoglobin A1C:  Lab Results   Component Value Date    HGBA1C 4.90 01/06/2018       Glucose Range:No results found for: POCGLU    No results found for: SHOOAXUY83    Lab Results   Component Value Date    TSH 1.790 09/06/2017       Assessment & Plan      Medication Review: Yes    Active Hospital Problems (** Indicates Principal Problem)    " Diagnosis Date Noted   • **Acute on chronic combined systolic and diastolic congestive heart failure [I50.43] 01/05/2018   • History of PUD (peptic ulcer disease) [K27.9] 01/07/2018   • Anemia, multifactorial [D64.9] 01/07/2018   • AMBER (obstructive sleep apnea) [G47.33] 01/07/2018   • Nonischemic cardiomyopathy [I42.8] 01/07/2018   • Chronic kidney disease, stage 3 [N18.3] 01/06/2018   • 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       Assessment/Plan  1.  Acute and chronic systolic and diastolic congestive heart failure.  Discussed earlier with Dr. Alves.  May need dialysis.  Cardiology plans to adjust pacemaker tomorrow.  2.  Chronic kidney disease stage III with acute kidney injury and probable cardiorenal syndrome.  Still on Bumex drip.  Recheck labs tomorrow.  Follow urine output  3.  Hypertension.  She is not on any medicines currently for blood pressure  4.  Anemia.  Some dilutional component.  Recheck in a.m.  Previous history GI bleed      Genna Gramajo MD  01/11/18  5:14 PM

## 2018-01-11 NOTE — PROGRESS NOTES
elizabeth graham and pt    She is failing all therapies    Has terrible dysschrony    Will plan CRT upgrade ratna    Risks disc    No other choice

## 2018-01-11 NOTE — PROGRESS NOTES
Continued Stay Note  [facility]     Patient Name: Raquel Deluna  MRN: [id number]  Today's Date: 1/11/2018    Admit Date: 1/5/2018          Discharge Plan       01/11/18 1247    Case Management/Social Work Plan    Plan [facility] has approved pending bed, but will not be able to accept if on Samsca.    Additional Comments [facility] has accepted pending bed, but they will not be able to accept patient if she is on Samsca.  CCP will follow              Discharge Codes     None            Alicia Hogan RN

## 2018-01-11 NOTE — SIGNIFICANT NOTE
01/11/18 1346   Rehab Treatment   Discipline physical therapist   Treatment Not Performed patient/family decline treatment, pt not feeling well  (Pt states she is having trouble with her legs and feet today, asked PT to let her rest this afternoon. Discussed w/RN, Nehal; will follow up tomorrow. )   Recommendation   PT - Next Appointment 01/12/18

## 2018-01-12 NOTE — PLAN OF CARE
Problem: Patient Care Overview (Adult)  Goal: Plan of Care Review  Outcome: Ongoing (interventions implemented as appropriate)   01/12/18 2916   Coping/Psychosocial Response Interventions   Plan Of Care Reviewed With patient   Patient Care Overview   Progress improving   Outcome Evaluation   Outcome Summary/Follow up Plan pt c/o leg discomfort but said she didnt need pain med, one dose of ativan given per pt request for anxiety, cont on O2 at 1L, barreto cath in place, Fluid restrictions maintained untill 0000 and NPO after. gral edema and +3 edema on R arm cont. cont on IV bumex, surgery planned for today, consent signed and in chart . VSS, will cont to monitor.      Goal: Adult Individualization and Mutuality  Outcome: Ongoing (interventions implemented as appropriate)    Goal: Discharge Needs Assessment  Outcome: Ongoing (interventions implemented as appropriate)      Problem: Cardiac: Heart Failure (Adult)  Goal: Signs and Symptoms of Listed Potential Problems Will be Absent or Manageable (Cardiac: Heart Failure)  Outcome: Ongoing (interventions implemented as appropriate)      Problem: Pain, Acute (Adult)  Goal: Acceptable Pain Control/Comfort Level  Outcome: Ongoing (interventions implemented as appropriate)      Problem: Fall Risk (Adult)  Goal: Absence of Falls  Outcome: Ongoing (interventions implemented as appropriate)      Problem: Fluid Volume Excess (Adult,Obstetrics,Pediatric)  Goal: Stable Weight  Outcome: Ongoing (interventions implemented as appropriate)    Goal: Balanced Intake/Output  Outcome: Ongoing (interventions implemented as appropriate)      Problem: Pressure Ulcer Risk (Wily Scale) (Adult,Obstetrics,Pediatric)  Goal: Skin Integrity  Outcome: Ongoing (interventions implemented as appropriate)

## 2018-01-12 NOTE — SIGNIFICANT NOTE
01/12/18 1357   Rehab Treatment   Discipline physical therapist   Treatment Not Performed patient unavailable for treatment  (pt in cath lab. Will follow up tomorrow )   Recommendation   PT - Next Appointment 01/13/18

## 2018-01-12 NOTE — PROGRESS NOTES
"     LOS: 7 days   Primary Care Physician: Ricky Hoyos III, MD     Subjective   Now in CDU.  Very sleepy.  She would mumble and then fall back asleep    Vital Signs  Body mass index is 37.66 kg/(m^2).  Temp:  [97.6 °F (36.4 °C)-97.9 °F (36.6 °C)] 97.9 °F (36.6 °C)  Heart Rate:  [75-79] 79  Resp:  [16-20] 16  BP: (145-170)/(53-69) 152/53      Objective:  General Appearance:  In no acute distress.    Vital signs: (most recent): Blood pressure 152/53, pulse 79, temperature 97.9 °F (36.6 °C), temperature source Oral, resp. rate 16, height 162.6 cm (64\"), weight 99.5 kg (219 lb 6.4 oz), SpO2 97 %.    Lungs:  There are decreased breath sounds.  No wheezes, rales or rhonchi.    Heart: Normal rate.  Regular rhythm.  No murmur.   Abdomen: Abdomen is soft and non-distended.  (Obese)Bowel sounds are normal.   There is no abdominal tenderness.   There is no splenomegaly. There is no hepatomegaly.   Extremities: There is dependent edema.  (2-3+ edema.  Right upper extremity swollen greater than left)  Neurological: (Sedated).    Skin:  (Incision upper lateral left chest)        Results Review:    I reviewed the patient's new clinical results.      Results from last 7 days  Lab Units 01/12/18  1727 01/12/18  0542 01/11/18  0641   WBC 10*3/mm3  --  9.23 8.79   HEMOGLOBIN g/dL 8.4* 8.0* 8.7*   PLATELETS 10*3/mm3  --  263 253       Results from last 7 days  Lab Units 01/12/18  1727 01/12/18  0542   SODIUM mmol/L 134* 132*   POTASSIUM mmol/L 3.1* 3.2*   CHLORIDE mmol/L 92* 92*   CO2 mmol/L 29.0 26.7   BUN mg/dL 57* 57*   CREATININE mg/dL 1.60* 1.83*   CALCIUM mg/dL 8.3* 8.0*   GLUCOSE mg/dL 121* 99         Hemoglobin A1C:  Lab Results   Component Value Date    HGBA1C 4.90 01/06/2018       Glucose Range:No results found for: POCGLU    No results found for: [id number]    Lab Results   Component Value Date    TSH 1.790 09/06/2017       Assessment & Plan      Medication Review: Yes    Active Hospital Problems (** Indicates Principal " Problem)    Diagnosis Date Noted   • **Acute on chronic combined systolic and diastolic congestive heart failure [I50.43] 01/05/2018   • History of PUD (peptic ulcer disease) [K27.9] 01/07/2018   • Anemia, multifactorial [D64.9] 01/07/2018   • AMBER (obstructive sleep apnea) [G47.33] 01/07/2018   • Nonischemic cardiomyopathy [I42.8] 01/07/2018   • Chronic kidney disease, stage 3 [N18.3] 01/06/2018   • 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       Assessment/Plan  1.  Acute and chronic combined systolic and diastolic congestive heart failure.  Volume status little bit better.  On IV Bumex every 12 hours and metolazone daily; dobutamine drip has been started.  Had adjustment of pacemaker earlier today.  2.  Acute kidney injury and chronic kidney disease stage III.  Creatinine has improved.  Recheck in a.m.  3.  Hypertension.  Systolic in the 150s this afternoon and evening.  Now on hydralazine, Coreg and diuretics.  Follow  4.  Anemia.  Stable today.  Repeat labs in am.  History of GI bleed  5.  Hyponatremia.  Improved    Genna Gramajo MD  01/12/18  6:09 PM

## 2018-01-12 NOTE — PLAN OF CARE
Problem: Patient Care Overview (Adult)  Goal: Plan of Care Review  Outcome: Ongoing (interventions implemented as appropriate)   01/12/18 6890   Coping/Psychosocial Response Interventions   Plan Of Care Reviewed With patient   Patient Care Overview   Progress no change   Outcome Evaluation   Outcome Summary/Follow up Plan patient transfer from cath lab. s/p pacer to L upper chest, site is CDI. IV dobut infusing. patient remains in SR, Bed alarm on will continue to monitor.

## 2018-01-12 NOTE — PROGRESS NOTES
Continued Stay Note  [facility]     Patient Name: Raquel Deluna  MRN: [id number]  Today's Date: 1/12/2018    Admit Date: 1/5/2018          Discharge Plan       01/12/18 0945    Case Management/Social Work Plan    Plan [facility] has approved pending bed, but will not be able to accept if on [facility].     Additional Comments Plan is for patient to have pacemaker changed today.  DC plan remains for patient to go to [facility].  DC not anticipated over the weekend.  CCP will follow up on Monday. Alicia Hogan RN              Discharge Codes     None            Alicia Hogan RN

## 2018-01-12 NOTE — PROGRESS NOTES
"   LOS: 7 days    Patient Care Team:  Ricky Hoyos III, MD as PCP - General  Anthony Hernández MD as Consulting Physician (Pulmonary Disease)  Christy Jerez MD as Consulting Physician (Dermatology)  Ladonna Mclaughlin MD as Consulting Physician (Cardiology)    Chief Complaint:    Chief Complaint   Patient presents with   • Shortness of Breath     carrying a lotof fluid in legs    • Rectal Bleeding     onset last week       Subjective follow-up chronic kidney disease 3    Interval History:   Pt is feeling ok. No major change since yesterday.   Metolazone was added yesterday. On bumex drip.  SOA is no different.   Waiting for cardiac procedure.       Objective     Vital Signs  Temp:  [97.6 °F (36.4 °C)-97.9 °F (36.6 °C)] 97.9 °F (36.6 °C)  Heart Rate:  [78] 78  Resp:  [16-20] 20  BP: (145-170)/(54-69) 170/69    Flowsheet Rows         First Filed Value    Admission Height  162.6 cm (64\") Documented at 01/05/2018 1538    Admission Weight  94.3 kg (208 lb) Documented at 01/05/2018 1538          I/O this shift:  In: 200 [IV Piggyback:200]  Out: 500 [Urine:500]  I/O last 3 completed shifts:  In: 1197 [P.O.:897; I.V.:200; IV Piggyback:100]  Out: 2375 [Urine:2375]    Intake/Output Summary (Last 24 hours) at 01/12/18 1446  Last data filed at 01/12/18 1333   Gross per 24 hour   Intake             1397 ml   Output             1900 ml   Net             -503 ml       Physical Exam:  General Appearance:   no acute distress, morbidly obese. Purse lip breathing with conversation. . Not using accessory muscles.   Skin: warm and dry  HEENT:  nonicteric sclerae, oral mucosa normal,   Neck: supple, increased JVD, trachea midline  Lungs: Breath sounds bilaterally with bilateral rhonchi and crackles  Heart: Irregularly irregular, no rub  Abdomen: soft, non-tender,  +bs.  Increased flank edema.   Extremities: 3+ lower extremities edema, RUE edema >> LUE.  Some asterixis.      Results Review:      Results from last 7 " days  Lab Units 01/12/18  0542 01/11/18  0641 01/10/18  0707  01/05/18  1546   SODIUM mmol/L 132* 130* 132*  < > 131*   POTASSIUM mmol/L 3.2* 3.8 3.7  < > 4.9   CHLORIDE mmol/L 92* 92* 93*  < > 95*   CO2 mmol/L 26.7 25.9 26.8  < > 24.6   BUN mg/dL 57* 51* 45*  < > 40*   CREATININE mg/dL 1.83* 2.05* 1.84*  < > 1.61*   CALCIUM mg/dL 8.0* 8.1* 8.3*  < > 9.1   BILIRUBIN mg/dL 0.4 0.6  --   --  0.6   ALK PHOS U/L 74 79  --   --  108   ALT (SGPT) U/L 20 22  --   --  26   AST (SGOT) U/L 19 23  --   --  29   GLUCOSE mg/dL 99 87 95  < > 137*   < > = values in this interval not displayed.    Estimated Creatinine Clearance: 28.1 mL/min (by C-G formula based on Cr of 1.83).      Results from last 7 days  Lab Units 01/09/18  0535 01/08/18  0447 01/07/18  0427   MAGNESIUM mg/dL 2.2 2.0 1.9   PHOSPHORUS mg/dL 4.1 4.1 4.2         Results from last 7 days  Lab Units 01/12/18  0542 01/11/18  0641 01/10/18  0707 01/09/18  0535 01/08/18  0447 01/07/18  0427   URIC ACID mg/dL 10.8* 11.1* 11.8* 11.8* 11.4* 11.3*         Results from last 7 days  Lab Units 01/12/18  0542 01/11/18  0641 01/10/18  0707 01/09/18  0535 01/08/18  0447   WBC 10*3/mm3 9.23 8.79 7.92 7.18 7.94   HEMOGLOBIN g/dL 8.0* 8.7* 9.1* 8.4* 8.4*   PLATELETS 10*3/mm3 263 253 255 305 304               Imaging Results (last 24 hours)     ** No results found for the last 24 hours. **          [MAR Hold] allopurinol 100 mg Oral Daily   [MAR Hold] aspirin 81 mg Oral Daily   [MAR Hold] budesonide-formoterol 2 puff Inhalation BID - RT   carvedilol 37.5 mg Oral Q12H   [MAR Hold] citalopram 20 mg Oral Daily   [MAR Hold] fluticasone 2 spray Each Nare Daily   hydrALAZINE 25 mg Oral Q8H   [MAR Hold] metOLazone 5 mg Oral Daily   [MAR Hold] pantoprazole 40 mg Oral Q AM   potassium chloride 10 mEq Intravenous Q1H   [MAR Hold] sennosides-docusate sodium 1 tablet Oral Nightly   [MAR Hold] vitamin B-6 100 mg Oral Daily       bumetanide (BUMEX) infusion 2 mg/hr Last Rate: 2 mg/hr (01/12/18  1153)       Medication Review:   Current Facility-Administered Medications   Medication Dose Route Frequency Provider Last Rate Last Dose   • [MAR Hold] acetaminophen (TYLENOL) tablet 650 mg  650 mg Oral Q6H PRN Jeff Li MD   650 mg at 01/11/18 1439   • [MAR Hold] allopurinol (ZYLOPRIM) tablet 100 mg  100 mg Oral Daily Cindy Alves MD   Stopped at 01/12/18 1150   • [MAR Hold] aspirin EC tablet 81 mg  81 mg Oral Daily Jeff Li MD   Stopped at 01/12/18 1150   • [MAR Hold] budesonide-formoterol (SYMBICORT) 160-4.5 MCG/ACT inhaler 2 puff  2 puff Inhalation BID - RT Jeff Li MD   2 puff at 01/12/18 1116   • bumetanide (BUMEX) 10 mg in sodium chloride 0.9 % 100 mL (0.1 mg/mL) infusion  2 mg/hr Intravenous Continuous Cindy Alves MD 20 mL/hr at 01/12/18 1153 2 mg/hr at 01/12/18 1153   • carvedilol (COREG) tablet 37.5 mg  37.5 mg Oral Q12H Ladonna Mclaughlin MD   37.5 mg at 01/12/18 1133   • [MAR Hold] citalopram (CeleXA) tablet 20 mg  20 mg Oral Daily Jeff Li MD   Stopped at 01/12/18 1134   • fentaNYL citrate (PF) (SUBLIMAZE) injection    PRN Baron Britt MD   25 mcg at 01/12/18 1429   • [MAR Hold] fluticasone (FLONASE) 50 MCG/ACT nasal spray 2 spray  2 spray Each Nare Daily Genna Gramajo MD   Stopped at 01/12/18 1405   • hydrALAZINE (APRESOLINE) tablet 25 mg  25 mg Oral Q8H Ladonna Mclaughlin MD   25 mg at 01/12/18 0542   • lidocaine (XYLOCAINE) 1 % injection    PRN Baron Britt MD   10 mL at 01/12/18 1436   • [MAR Hold] LORazepam (ATIVAN) tablet 0.25 mg  0.25 mg Oral Q6H PRN Genna Gramajo MD   0.25 mg at 01/12/18 0133   • [MAR Hold] metOLazone (ZAROXOLYN) tablet 5 mg  5 mg Oral Daily Cindy Alves MD   5 mg at 01/12/18 1151   • midazolam (VERSED) injection    PRN Baron Britt MD   0.5 mg at 01/12/18 1429   • [MAR Hold] nitroglycerin (NITROSTAT) SL tablet 0.4 mg  0.4 mg Sublingual Q5 Min PRN Jeff Li MD       • [MAR Hold] ondansetron (ZOFRAN) injection 4 mg  4 mg  Intravenous Q6H PRN Jeff Li MD       • [MAR Hold] pantoprazole (PROTONIX) EC tablet 40 mg  40 mg Oral Q AM Jeff Li MD   40 mg at 01/12/18 0542   • [MAR Hold] polyethylene glycol (MIRALAX) powder 17 g  17 g Oral Daily PRN Cindy Alves MD       • potassium chloride 10 mEq in 100 mL IVPB  10 mEq Intravenous Q1H Rose Buenrostro  mL/hr at 01/12/18 1333 10 mEq at 01/12/18 1333   • [MAR Hold] sennosides-docusate sodium (SENOKOT-S) 8.6-50 MG tablet 1 tablet  1 tablet Oral Nightly Tonja Hernandez MD   1 tablet at 01/11/18 4822   • [MAR Hold] sodium chloride 0.9 % flush 1-10 mL  1-10 mL Intravenous PRN Jeff Li MD       • [MAR Hold] vitamin B-6 (PYRIDOXINE) tablet 100 mg  100 mg Oral Daily Jeff Li MD   Stopped at 01/12/18 1151       Assessment/Plan   1.  Chronic kidney disease stage III . Crt is stable. With some evidence of better diuresis. Suspect cardiorenal syndrome. Continue current  bumex drip and metolazone. Restoration of cardiac synchrony may help. Dialysis as last effort if needed.   2.  Fluid excess and systolic and diastolic congestive heart failure.  EF 30%.  Pulm HTN with RV pressure 80.   3.  Chronic anemia associated with iron deficiency, colonic angiodysplasia. Hg 9.1.  4.  Hypertension. bp meds may promote fluid retention.   5.  Mild hyponatremia associated with volume excess. Stable.   6.  Hyperuricemia.   Will follow.     Rose Buenrostro MD  01/12/18  2:46 PM    Hypokalemia, will replace po and IV and recheck in am.

## 2018-01-13 NOTE — PLAN OF CARE
Problem: Patient Care Overview (Adult)  Goal: Plan of Care Review   01/13/18 1330   Coping/Psychosocial Response Interventions   Plan Of Care Reviewed With patient   Outcome Evaluation   Outcome Summary/Follow up Plan Pt progress impaired due to L knee pain and buckling as well as increased generalized weakness. As of 1/9 pt was ambulating 15` w contact gaurd assistance and a RWx and now pt is unable to take even 1 step to the head of the bed. Nsg notified of pts changes in performance w PT.       Problem: Inpatient Physical Therapy  Goal: Bed Mobility Goal LTG- PT  Outcome: Revised   01/06/18 1207 01/13/18 1330   Bed Mobility PT LTG   Bed Mobility PT LTG, Date Established 01/06/18 --    Bed Mobility PT LTG, Time to Achieve 1 wk --    Bed Mobility PT LTG, Activity Type all bed mobility --    Bed Mobility PT LTG, [place] Level --  minimum assist (75% patient effort)   Bed Mobility PT Goal LTG, Assist Device bed rails --    Bed Mobility PT LTG, Date Goal Reviewed --  01/13/18   Bed Mobility PT LTG, Outcome --  goal revised   Bed Mobility PT LTG, Reason Goal Not Met --  goals revised this date     Goal: Transfer Training Goal 1 LTG- PT  Outcome: Revised   01/06/18 1207 01/13/18 1330   Transfer Training PT LTG   Transfer Training PT LTG, Date Established 01/06/18 --    Transfer Training PT LTG, Time to Achieve 1 wk --    Transfer Training PT LTG, Activity Type all transfers --    Transfer Training PT LTG, [place] Level --  minimum assist (75% patient effort)   Transfer Training PT LTG, Assist Device walker, rolling --    Transfer Training PT LTG, Date Goal Reviewed --  01/13/18   Transfer Training PT LTG, Outcome --  goal revised   Transfer Training PT LTG, Reason Goal Not Met --  goals revised this date     Goal: Gait Training Goal LTG- PT  Outcome: Revised   01/13/18 1330   Gait Training PT LTG   Gait Training Goal PT LTG, [place] Level minimum assist (75% patient effort)   Gait Training Goal PT LTG,  Distance to Achieve 50   Gait Training Goal PT LTG, Date Goal Reviewed 01/13/18   Gait Training Goal PT LTG, Outcome goal revised   Gait Training Goal PT LTG, Reason Goal Not Met goals revised this date

## 2018-01-13 NOTE — PLAN OF CARE
Problem: Patient Care Overview (Adult)  Goal: Plan of Care Review  Outcome: Ongoing (interventions implemented as appropriate)   01/13/18 0528   Coping/Psychosocial Response Interventions   Plan Of Care Reviewed With patient   Patient Care Overview   Progress no change   Outcome Evaluation   Outcome Summary/Follow up Plan s/p pacemaker to L chest. IV dobutamine infusing. Q2 turn.     Goal: Adult Individualization and Mutuality  Outcome: Ongoing (interventions implemented as appropriate)    Goal: Discharge Needs Assessment  Outcome: Ongoing (interventions implemented as appropriate)      Problem: Cardiac: Heart Failure (Adult)  Goal: Signs and Symptoms of Listed Potential Problems Will be Absent or Manageable (Cardiac: Heart Failure)  Outcome: Ongoing (interventions implemented as appropriate)      Problem: Pain, Acute (Adult)  Goal: Acceptable Pain Control/Comfort Level  Outcome: Ongoing (interventions implemented as appropriate)      Problem: Fall Risk (Adult)  Goal: Absence of Falls  Outcome: Ongoing (interventions implemented as appropriate)      Problem: Fluid Volume Excess (Adult,Obstetrics,Pediatric)  Goal: Stable Weight  Outcome: Ongoing (interventions implemented as appropriate)    Goal: Balanced Intake/Output  Outcome: Ongoing (interventions implemented as appropriate)      Problem: Pressure Ulcer Risk (Wily Scale) (Adult,Obstetrics,Pediatric)  Goal: Skin Integrity  Outcome: Ongoing (interventions implemented as appropriate)    Goal: Identify Related Risk Factors and Signs and Symptoms  Outcome: Ongoing (interventions implemented as appropriate)    Goal: Skin Integrity  Outcome: Ongoing (interventions implemented as appropriate)

## 2018-01-13 NOTE — PROGRESS NOTES
"     LOS: 8 days   Primary Care Physician: Ricky Hoyos III, MD     Subjective   Feels a little better.  Breathing is easier.  Leg swelling seems less    Vital Signs  Body mass index is 37.66 kg/(m^2).  Temp:  [97.3 °F (36.3 °C)-97.9 °F (36.6 °C)] 97.9 °F (36.6 °C)  Heart Rate:  [73-93] 93  Resp:  [16-20] 18  BP: (132-162)/(50-70) 132/55      Objective:  General Appearance:  In no acute distress.    Vital signs: (most recent): Blood pressure 132/55, pulse 93, temperature 97.9 °F (36.6 °C), temperature source Oral, resp. rate 18, height 162.6 cm (64\"), weight 99.5 kg (219 lb 6.4 oz), SpO2 98 %.    HEENT: (Neck supple.  No lymphadenopathy.  Trachea midline)    Lungs:  There are rales and decreased breath sounds.  No wheezes or rhonchi.  (Crackles a third up)  Heart: Normal rate.  Regular rhythm.  No murmur.   Abdomen: Abdomen is soft and non-distended.  Bowel sounds are normal.   There is no abdominal tenderness.   There is no splenomegaly. There is no hepatomegaly.   Extremities: There is dependent edema.  (2-3+ at ankles, slightly improved.  Right upper extremity swollen and tender.  Ecchymoses present.)  Neurological: Patient is alert and oriented to person, place and time.    Skin:  Warm and dry.  There is ecchymosis.        Results Review:    I reviewed the patient's new clinical results.      Results from last 7 days  Lab Units 01/13/18 0427 01/12/18 1727 01/12/18  0542   WBC 10*3/mm3 8.82  --  9.23   HEMOGLOBIN g/dL 8.0* 8.4* 8.0*   PLATELETS 10*3/mm3 263  --  263       Results from last 7 days  Lab Units 01/13/18 0427 01/12/18  2218 01/12/18  1727   SODIUM mmol/L 131*  --  134*   POTASSIUM mmol/L 3.7 3.3* 3.1*   CHLORIDE mmol/L 91*  --  92*   CO2 mmol/L 28.8  --  29.0   BUN mg/dL 55*  --  57*   CREATININE mg/dL 1.37*  --  1.60*   CALCIUM mg/dL 8.7  --  8.3*   GLUCOSE mg/dL 103*  --  121*         Hemoglobin A1C:  Lab Results   Component Value Date    HGBA1C 4.90 01/06/2018       Glucose Range:No results " found for: POCGLU    No results found for: [id number]    Lab Results   Component Value Date    TSH 1.790 09/06/2017       Assessment & Plan      Medication Review: Yes    Active Hospital Problems (** Indicates Principal Problem)    Diagnosis Date Noted   • **Acute on chronic combined systolic and diastolic congestive heart failure [I50.43] 01/05/2018   • History of PUD (peptic ulcer disease) [K27.9] 01/07/2018   • Anemia, multifactorial [D64.9] 01/07/2018   • AMBER (obstructive sleep apnea) [G47.33] 01/07/2018   • Nonischemic cardiomyopathy [I42.8] 01/07/2018   • Chronic kidney disease, stage 3 [N18.3] 01/06/2018   • 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       Assessment/Plan  1.  Acute and chronic combined systolic and diastolic congestive heart failure.  Some improvement.  Now off dobutamine.  Continue Bumex and metolazone.  Renal function has improved.  2.  Acute kidney injury and chronic kidney disease stage III, improved as above.  Recheck labs in a.m.  3.  Hypertension.  Blood pressures are improving.  Continue current medications  4.  Anemia with history of GI bleed.  Hemoglobin fairly stable.  Continue to monitor.  5.  Hyponatremia, worse today.  Fluid restriction.  Will repeat TSH in a.m.  6.  Right upper extremity swelling, thrombophlebitis versus thrombosis.  Venous Doppler ordered.  IV removed    Genna Gramajo MD  01/13/18  5:49 PM

## 2018-01-13 NOTE — PROGRESS NOTES
"   LOS: 8 days    Patient Care Team:  Ricky Hoyos III, MD as PCP - General  Anthony Hernández MD as Consulting Physician (Pulmonary Disease)  Christy Jerez MD as Consulting Physician (Dermatology)  Ladonna Mclaughlin MD as Consulting Physician (Cardiology)    Chief Complaint:    Chief Complaint   Patient presents with   • Shortness of Breath     carrying a lotof fluid in legs    • Rectal Bleeding     onset last week       Subjective follow-up chronic kidney disease 3    Interval History:   Pt is feeling ok. No major change since yesterday.   Metolazone was added yesterday. On bumex drip.  SOA is no different.   Waiting for cardiac procedure.       Objective     Vital Signs  Temp:  [97.3 °F (36.3 °C)-97.9 °F (36.6 °C)] 97.3 °F (36.3 °C)  Heart Rate:  [73-79] 73  Resp:  [16-20] 16  BP: (140-162)/(50-70) 141/55    Flowsheet Rows         First Filed Value    Admission Height  162.6 cm (64\") Documented at 01/05/2018 1538    Admission Weight  94.3 kg (208 lb) Documented at 01/05/2018 1538          I/O this shift:  In: 360 [P.O.:360]  Out: -   I/O last 3 completed shifts:  In: 480 [P.O.:180; IV Piggyback:300]  Out: 4750 [Urine:4750]    Intake/Output Summary (Last 24 hours) at 01/13/18 1120  Last data filed at 01/13/18 0937   Gross per 24 hour   Intake              560 ml   Output             3150 ml   Net            -2590 ml       Physical Exam:  General Appearance:   no acute distress, morbidly obese. Purse lip breathing with conversation. . Not using accessory muscles.   Skin: warm and dry  HEENT:  nonicteric sclerae, oral mucosa normal,   Neck: supple, increased JVD, trachea midline  Lungs: Breath sounds bilaterally with bilateral rhonchi and crackles  Heart: Irregularly irregular, no rub  Abdomen: soft, non-tender,  +bs.  Increased flank edema.   Extremities: 2+ lower extremities edema, RUE edema >> LUE.  Some asterixis.      Results Review:      Results from last 7 days  Lab Units 01/13/18  0427 " 01/12/18 2218 01/12/18 1727 01/12/18  0542 01/11/18  0641   SODIUM mmol/L 131*  --  134* 132* 130*   POTASSIUM mmol/L 3.7 3.3* 3.1* 3.2* 3.8   CHLORIDE mmol/L 91*  --  92* 92* 92*   CO2 mmol/L 28.8  --  29.0 26.7 25.9   BUN mg/dL 55*  --  57* 57* 51*   CREATININE mg/dL 1.37*  --  1.60* 1.83* 2.05*   CALCIUM mg/dL 8.7  --  8.3* 8.0* 8.1*   BILIRUBIN mg/dL  --   --   --  0.4 0.6   ALK PHOS U/L  --   --   --  74 79   ALT (SGPT) U/L  --   --   --  20 22   AST (SGOT) U/L  --   --   --  19 23   GLUCOSE mg/dL 103*  --  121* 99 87       Estimated Creatinine Clearance: 37.5 mL/min (by C-G formula based on Cr of 1.37).      Results from last 7 days  Lab Units 01/13/18 0427 01/09/18  0535 01/08/18  0447   MAGNESIUM mg/dL 2.1 2.2 2.0   PHOSPHORUS mg/dL 3.6 4.1 4.1         Results from last 7 days  Lab Units 01/13/18 0427 01/12/18  0542 01/11/18  0641 01/10/18  0707 01/09/18  0535 01/08/18  0447 01/07/18  0427   URIC ACID mg/dL 11.2* 10.8* 11.1* 11.8* 11.8* 11.4* 11.3*         Results from last 7 days  Lab Units 01/13/18 0427 01/12/18 1727 01/12/18  0542 01/11/18  0641 01/10/18  0707 01/09/18  0535   WBC 10*3/mm3 8.82  --  9.23 8.79 7.92 7.18   HEMOGLOBIN g/dL 8.0* 8.4* 8.0* 8.7* 9.1* 8.4*   PLATELETS 10*3/mm3 263  --  263 253 255 305               Imaging Results (last 24 hours)     Procedure Component Value Units Date/Time    XR Chest 1 View [[id number]] Collected:  01/12/18 1813     Updated:  01/12/18 1817    Narrative:       EMERGENCY PORTABLE CHEST SINGLE VIEW     HISTORY: Postop pacemaker placement     COMPARISON: 01/10/2018 at 10:27     FINDINGS:  1. Interval pacemaker exchange without evidence of pneumothorax nor  other change since prior study.  2. Mild congestive heart failure with bilateral effusions, cardiac  enlargement and interstitial edema.  3. Limited imaging due to body habitus, portable technique and low lung  volumes     This report was finalized on 1/12/2018 6:14 PM by Dr. Kervin Nogueira MD.              allopurinol 100 mg Oral Daily   aspirin 81 mg Oral Daily   budesonide-formoterol 2 puff Inhalation BID - RT   bumetanide 4 mg Intravenous Q12H   carvedilol 37.5 mg Oral Q12H   citalopram 20 mg Oral Daily   fluticasone 2 spray Each Nare Daily   hydrALAZINE 25 mg Oral Q8H   metOLazone 5 mg Oral Daily   pantoprazole 40 mg Oral Q AM   potassium chloride 40 mEq Oral Q8H   sennosides-docusate sodium 1 tablet Oral Nightly   vancomycin 1,000 mg Intravenous 60 Min Pre-Op   vitamin B-6 100 mg Oral Daily          Medication Review:   Current Facility-Administered Medications   Medication Dose Route Frequency Provider Last Rate Last Dose   • acetaminophen (TYLENOL) tablet 650 mg  650 mg Oral Q6H PRN Jeff Li MD   650 mg at 01/11/18 1439   • allopurinol (ZYLOPRIM) tablet 100 mg  100 mg Oral Daily Cindy Alves MD   100 mg at 01/13/18 0917   • aspirin EC tablet 81 mg  81 mg Oral Daily Jeff Li MD   81 mg at 01/13/18 0917   • budesonide-formoterol (SYMBICORT) 160-4.5 MCG/ACT inhaler 2 puff  2 puff Inhalation BID - RT Jeff Li MD   2 puff at 01/13/18 0726   • bumetanide (BUMEX) injection 4 mg  4 mg Intravenous Q12H DELTA Sharma   4 mg at 01/13/18 0503   • carvedilol (COREG) tablet 37.5 mg  37.5 mg Oral Q12H Ladonna Mclaughlin MD   37.5 mg at 01/13/18 0917   • citalopram (CeleXA) tablet 20 mg  20 mg Oral Daily Jeff Li MD   20 mg at 01/13/18 0920   • fluticasone (FLONASE) 50 MCG/ACT nasal spray 2 spray  2 spray Each Nare Daily Genna Gramajo MD   Stopped at 01/12/18 1405   • hydrALAZINE (APRESOLINE) tablet 25 mg  25 mg Oral Q8H Ladonna Mclaughlin MD   25 mg at 01/13/18 0627   • HYDROcodone-acetaminophen (NORCO)  MG per tablet 1 tablet  1 tablet Oral Q4H PRN DELTA Sharma       • HYDROcodone-acetaminophen (NORCO) 7.5-325 MG per tablet 1 tablet  1 tablet Oral Q4H PRN Raven Gannon APRN       • HYDROmorphone (DILAUDID) injection 0.5 mg  0.5 mg Intravenous Q2H PRN Raven Gannon,  APRN        And   • naloxone (NARCAN) injection 0.4 mg  0.4 mg Intravenous Q5 Min PRN DELTA Sharma       • LORazepam (ATIVAN) tablet 0.25 mg  0.25 mg Oral Q6H PRN Genna Gramajo MD   0.25 mg at 01/12/18 0133   • metOLazone (ZAROXOLYN) tablet 5 mg  5 mg Oral Daily Cindy Alves MD   5 mg at 01/13/18 0917   • nitroglycerin (NITROSTAT) SL tablet 0.4 mg  0.4 mg Sublingual Q5 Min PRN Jeff Li MD       • ondansetron (ZOFRAN) injection 4 mg  4 mg Intravenous Q6H PRN Jeff Li MD       • ondansetron (ZOFRAN) injection 4 mg  4 mg Intravenous Q6H PRN DELTA Sharma       • pantoprazole (PROTONIX) EC tablet 40 mg  40 mg Oral Q AM Jeff Li MD   40 mg at 01/13/18 0627   • polyethylene glycol (MIRALAX) powder 17 g  17 g Oral Daily PRN Cindy Alves MD       • potassium chloride (MICRO-K) CR capsule 40 mEq  40 mEq Oral Q8H DELTA Sharma   40 mEq at 01/13/18 0917   • sennosides-docusate sodium (SENOKOT-S) 8.6-50 MG tablet 1 tablet  1 tablet Oral Nightly Tonja Hernandez MD   1 tablet at 01/11/18 2158   • sodium chloride 0.9 % flush 1-10 mL  1-10 mL Intravenous PRN Jeff Li MD       • sodium chloride 0.9 % flush 1-10 mL  1-10 mL Intravenous PRN DELTA Sharma       • vancomycin (VANCOCIN) in iso-osmotic dextrose IVPB 1 g (premix) 200 mL  1,000 mg Intravenous 60 Min Pre-Op DELTA Shrama       • vitamin B-6 (PYRIDOXINE) tablet 100 mg  100 mg Oral Daily Jeff Li MD   100 mg at 01/13/18 0920       Assessment/Plan   1.  Chronic kidney disease stage III . Crt is stable. With some evidence of better diuresis. Suspect cardiorenal syndrome. Continue current  bumex and metolazone. Restoration of cardiac synchrony may help. Dialysis as last effort if needed.   2.  Fluid excess and systolic and diastolic congestive heart failure.  EF 30%.  Pulm HTN with RV pressure 80.   3.  Chronic anemia associated with iron deficiency, colonic angiodysplasia. Hg 9.1.  4.  Hypertension. bp  meds may promote fluid retention.   5.  Mild hyponatremia associated with volume excess. Down some today, will add mild fluid restriction.  6.  Hyperuricemia.       Felice Alaniz MD  01/13/18  11:20 AM

## 2018-01-13 NOTE — PROGRESS NOTES
Electrophysiology Follow-Up Note      Patient Name: Raquel Deluna  Age/Sex: 82 y.o. female  : 1935  MRN: [id number]      Day of Service: 18       Chief Complaint/Follow-up: Dyspnea/CHF    S:  She says she does feel better today, her breathing is better. Incision sore.       Temp:  [97.3 °F (36.3 °C)-97.9 °F (36.6 °C)] 97.3 °F (36.3 °C)  Heart Rate:  [73-79] 73  Resp:  [16-20] 16  BP: (140-162)/(50-70) 141/55     PHYSICAL EXAM:    General Appearance: No acute distress.  Eyes: Conjunctiva and lids: No erythema, swelling, or discharge. Sclera non-icteric.   HENT: Atraumatic, normocephalic. External eyes, ears, and nose normal.  Respiratory: No signs of respiratory distress. Respiration rhythm and depth normal.   Lungs sound clear with decreased bases.  Cardiovascular:  Heart Rate and Rhythm: Normal, Heart Sounds: Normal S1 and S2. No S3 or S4 noted.  Murmurs: No murmurs noted. No rubs, thrills, or gallops.   Lower Extremities: Bilateral lower extremity edema.   Gastrointestinal:  Abdomen soft, non-distended, non-tender.   Musculoskeletal: BAHENA.  Skin: Warm and dry. Incision site wnl.    Psychiatric: Patient alert and oriented to person, place, and time. Speech and behavior appropriate. Normal mood and affect.       ECG/TELE:                 Results from last 7 days  Lab Units 18  0542   SODIUM mmol/L 131*  --  134* 132*   POTASSIUM mmol/L 3.7 3.3* 3.1* 3.2*   CHLORIDE mmol/L 91*  --  92* 92*   CO2 mmol/L 28.8  --  29.0 26.7   BUN mg/dL 55*  --  57* 57*   CREATININE mg/dL 1.37*  --  1.60* 1.83*   GLUCOSE mg/dL 103*  --  121* 99   CALCIUM mg/dL 8.7  --  8.3* 8.0*       Results from last 7 days  Lab Units 18/12/18  0542 18  0641   WBC 10*3/mm3 8.82  --  9.23 8.79   HEMOGLOBIN g/dL 8.0* 8.4* 8.0* 8.7*   HEMATOCRIT % 25.3* 26.7* 24.8* 26.0*   PLATELETS 10*3/mm3 263  --  263 253                 Current Medications:    Scheduled Meds:  allopurinol 100 mg Oral Daily   aspirin 81 mg Oral Daily   budesonide-formoterol 2 puff Inhalation BID - RT   bumetanide 4 mg Intravenous Q12H   carvedilol 37.5 mg Oral Q12H   citalopram 20 mg Oral Daily   fluticasone 2 spray Each Nare Daily   hydrALAZINE 25 mg Oral Q8H   metOLazone 5 mg Oral Daily   pantoprazole 40 mg Oral Q AM   potassium chloride 40 mEq Oral Q8H   sennosides-docusate sodium 1 tablet Oral Nightly   vancomycin 1,000 mg Intravenous 60 Min Pre-Op   vitamin B-6 100 mg Oral Daily         Principal Problem:    Acute on chronic combined systolic and diastolic congestive heart failure  Active Problems:    Hyponatremia    Essential hypertension    Gastrointestinal hemorrhage    Paroxysmal atrial fibrillation    Bilateral edema of lower extremity    history of GI AVM (gastrointestinal arteriovenous vascular malformation)    S/P MVR (mitral valve repair)    S/P TVR (tricuspid valve repair)    Chronic kidney disease, stage 3    History of PUD (peptic ulcer disease)    Anemia, multifactorial    AMBER (obstructive sleep apnea)    Nonischemic cardiomyopathy       Plan:     1. Dyspnea with volume overload -was not diuresing despite diuretics, s/p upgrade to CRT-P yesterday. She is diuresing now, she has put out 2 liters in last 12 hrs -placed on . Gtt during CRT upgrade. Will dc . Today.   2. Severe cardiomyopathy, nonischemic> upgraded to CRT-P 18  3. S/p R CEA 2017  4. S/p MV and TV repair 2017  5. Pacer for AV block after surgery. She was upgraded to CRT-P 18  6. H/o GI bleeds with chronic anemia. Stable H/H but RN reported black tarry stools-Gi has seen, she has a hx of EGD/C/S in 2017 with 3 small gastric ulcers and right sided colonic AVM's. GI saw and have signed off, will continue to monitor, may need them to resee.  7. PAF. No AC due to GI bleeding.   8. CKD III, creat. Improved 1.33 today-  9. HTN    She is better today. Will dc dobutamine gtt. Creat. Is better  and she is diuresing. Will defer diuretic adjustment to nephrology. Continue to monitor H/H with report of black tarry stools and electrolytes.              Raven Gannon, APRN  01/13/18  8:32 AM

## 2018-01-14 NOTE — PLAN OF CARE
Problem: Patient Care Overview (Adult)  Goal: Plan of Care Review  Outcome: Ongoing (interventions implemented as appropriate)   01/14/18 1131   Outcome Evaluation   Outcome Summary/Follow up Plan Pt needs extra time and good deal of encouragement to be as independent as able with bed mobility and transfers and gait. Pt was able to tolerate standing at bedside with CG for guarding of L knee without buckling, and to take 3 steps to walk to recliner using roll wx. Pt was encouraged to perform LE ex (QS, GS, AP) in bed or chair today to increase stability L knee and attempt walking further at next PT treatment.

## 2018-01-14 NOTE — PROGRESS NOTES
"     LOS: 9 days   Primary Care Physician: Ricky Hoyos III, MD     Subjective   Feels a little better.  Tired.  Can't tell if the leg swelling is any better.    Vital Signs  Body mass index is 38.3 kg/(m^2).  Temp:  [97.3 °F (36.3 °C)-98.4 °F (36.9 °C)] 97.3 °F (36.3 °C)  Heart Rate:  [74-79] 79  Resp:  [18-20] 18  BP: (122-142)/(49-61) 142/50      Objective:  General Appearance:  In no acute distress (Morbidly obese.  Chronically ill-appearing).    Vital signs: (most recent): Blood pressure 142/50, pulse 79, temperature 97.3 °F (36.3 °C), temperature source Oral, resp. rate 18, height 162.6 cm (64\"), weight 101 kg (223 lb 1.6 oz), SpO2 98 %.    HEENT: (Neck supple.  Trachea midline.  No lymphadenopathy.  Unable to assess for JVD as she is sitting up in the chair.  Healing incision right anterior neck)    Lungs:  There are rales and decreased breath sounds.  No wheezes or rhonchi.  (Crackles at bases although less than yesterday)  Heart: Normal rate.  Regular rhythm.  No murmur.   Abdomen: Abdomen is soft.  (Obese)Bowel sounds are normal.   There is no abdominal tenderness.   There is no splenomegaly. There is no hepatomegaly.   Extremities: There is dependent edema.  (3+ edema at the ankles.  Right arm swelling is a little bit better.)  Neurological: Patient is alert.    Skin:  Warm.  There is ecchymosis.          Results Review:    I reviewed the patient's new clinical results.      Results from last 7 days  Lab Units 01/14/18  0352 01/13/18 0427   WBC 10*3/mm3 7.87 8.82   HEMOGLOBIN g/dL 7.5* 8.0*   PLATELETS 10*3/mm3 232 263       Results from last 7 days  Lab Units 01/14/18  0352 01/13/18  0427   SODIUM mmol/L 132* 131*   POTASSIUM mmol/L 4.2 3.7   CHLORIDE mmol/L 92* 91*   CO2 mmol/L 28.8 28.8   BUN mg/dL 60* 55*   CREATININE mg/dL 1.47* 1.37*   CALCIUM mg/dL 8.7 8.7   GLUCOSE mg/dL 102* 103*         Hemoglobin A1C:  Lab Results   Component Value Date    HGBA1C 4.90 01/06/2018       Glucose Range:No " results found for: POCGLU    No results found for: HDWOCBNT70    Lab Results   Component Value Date    TSH 3.210 01/14/2018       Assessment & Plan      Medication Review: Yes    Active Hospital Problems (** Indicates Principal Problem)    Diagnosis Date Noted   • **Acute on chronic combined systolic and diastolic congestive heart failure [I50.43] 01/05/2018   • History of PUD (peptic ulcer disease) [K27.9] 01/07/2018   • Anemia, multifactorial [D64.9] 01/07/2018   • AMBER (obstructive sleep apnea) [G47.33] 01/07/2018   • Nonischemic cardiomyopathy [I42.8] 01/07/2018   • Chronic kidney disease, stage 3 [N18.3] 01/06/2018   • 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       Assessment/Plan  1.  Acute blood loss anemia and heme positive stool.  I ordered 1 unit blood transfusion earlier today.  Change Protonix to IV.  Ask GI to see.  2.  Acute on chronic combined systolic and diastolic congestive heart failure.  1400 cc urine output so far today.  On twice daily IV Bumex with daily metolazone.  Creatinine up today.  I can't tell that her swelling is any less today.  3.  Acute kidney injury and chronic kidney disease stage III, as above.  Renal following.  Recheck labs in a.m.  4.  Hyponatremia, improved today.  Continue fluid restriction.  Diuretics as noted.  5.  Chronic right upper extremity superficial thrombophlebitis.  Heat and elevation.    Genna Gramajo MD  01/14/18  5:49 PM

## 2018-01-14 NOTE — PROGRESS NOTES
Cardiology Follow-Up Note      Patient Name: Raquel Deluna  Age/Sex: 82 y.o. female  : 1935  MRN: [id number]      Day of Service: 18       Chief Complaint/Follow-up: Dyspnea/CHF    S: Breathing okay, says she does feel better      Temp:  [97.7 °F (36.5 °C)-98.2 °F (36.8 °C)] 97.8 °F (36.6 °C)  Heart Rate:  [74-93] 78  Resp:  [18-20] 18  BP: (125-143)/(49-60) 136/58     PHYSICAL EXAM:    General Appearance: No acute distress,..   Eyes: Conjunctiva and lids: No erythema, swelling, or discharge. Sclera non-icteric.   HENT: Atraumatic, normocephalic. External eyes, ears, and nose normal.   Respiratory: No signs of respiratory distress. Respiration rhythm and depth normal.   Clear to auscultation. No rales, crackles, rhonchi, or wheezing auscultated.   Cardiovascular:  Heart Rate and Rhythm: Normal, Heart Sounds: Normal S1 and S2. No S3 or S4 noted.  Murmurs: No murmurs noted. No rubs, thrills, or gallops.   Arterial Pulses: Posterior tibialis and dorsalis pedis pulses normal.   Lower Extremities: Lower extremity edema  Gastrointestinal:  Abdomen soft, non-distended, non-tender.  Musculoskeletal: Normal movement of extremities  Skin: Warm and dry.   Psychiatric: Patient alert and oriented to person, place, and time. Speech and behavior appropriate. Normal mood and affect.       ECG/TELE: Paced         Results from last 7 days  Lab Units 18  0352 18  0427 18  2218 18  1727   SODIUM mmol/L 132* 131*  --  134*   POTASSIUM mmol/L 4.2 3.7 3.3* 3.1*   CHLORIDE mmol/L 92* 91*  --  92*   CO2 mmol/L 28.8 28.8  --  29.0   BUN mg/dL 60* 55*  --  57*   CREATININE mg/dL 1.47* 1.37*  --  1.60*   GLUCOSE mg/dL 102* 103*  --  121*   CALCIUM mg/dL 8.7 8.7  --  8.3*       Results from last 7 days  Lab Units 18  0352 18  0427 18  1727 18  0542   WBC 10*3/mm3 7.87 8.82  --  9.23   HEMOGLOBIN g/dL 7.5* 8.0* 8.4* 8.0*   HEMATOCRIT % 23.6* 25.3* 26.7* 24.8*   PLATELETS  10*3/mm3 232 263  --  263               Results from last 7 days  Lab Units 01/14/18  0352   TSH mIU/mL 3.210         Current Medications:   Scheduled Meds:  allopurinol 100 mg Oral Daily   aspirin 81 mg Oral Daily   budesonide-formoterol 2 puff Inhalation BID - RT   bumetanide 4 mg Intravenous Q12H   carvedilol 37.5 mg Oral Q12H   citalopram 20 mg Oral Daily   fluticasone 2 spray Each Nare Daily   hydrALAZINE 25 mg Oral Q8H   metOLazone 5 mg Oral Daily   pantoprazole 40 mg Oral Q AM   potassium chloride 40 mEq Oral Q8H   sennosides-docusate sodium 1 tablet Oral Nightly   vitamin B-6 100 mg Oral Daily         Principal Problem:    Acute on chronic combined systolic and diastolic congestive heart failure  Active Problems:    Hyponatremia    Essential hypertension    Gastrointestinal hemorrhage    Paroxysmal atrial fibrillation    Bilateral edema of lower extremity    history of GI AVM (gastrointestinal arteriovenous vascular malformation)    S/P MVR (mitral valve repair)    S/P TVR (tricuspid valve repair)    Chronic kidney disease, stage 3    History of PUD (peptic ulcer disease)    Anemia, multifactorial    AMBER (obstructive sleep apnea)    Nonischemic cardiomyopathy       Plan:      1. Dyspnea with volume overload -was not diuresing despite diuretics, s/p upgrade to CRT-P 1/12/18. She is diuresing better since device placed. Diuretics per renal.    2. Severe cardiomyopathy, nonischemic> upgraded to CRT-P 1/12/18  3. S/p R CEA 12/2017  4. S/p MV and TV repair 9/2017  5. Pacer for AV block after surgery. She was upgraded to CRT-P 1/12/18  6. H/o GI bleeds with chronic anemia.-Gi has seen, she has a hx of EGD/C/S in 2/2017 with 3 small gastric ulcers and right sided colonic AVM's. H/H down 7.5/23.6-stool + OB (?whether she always has +stools), RN reported black tarry stools-GI signed off, may need to reconsult   7. PAF. No AC due to GI bleeding.   8. CKD III, creat. 1.47 today-renal following   9. HTN, stable     She  is better since CRT device, diuresing, edema and breathing better. H/H down-agree with transfusion-will see how she does.Try to increase activity.     Raven Gannon, DELTA  01/14/18  7:42 AM

## 2018-01-14 NOTE — PLAN OF CARE
Problem: Patient Care Overview (Adult)  Goal: Plan of Care Review  Outcome: Ongoing (interventions implemented as appropriate)   01/14/18 3552   Outcome Evaluation   Outcome Summary/Follow up Plan VSS, CONTINUING TO DIURESE, GETTING PRBC. WILL CONTINUE TO MONITOR.      Goal: Adult Individualization and Mutuality  Outcome: Ongoing (interventions implemented as appropriate)    Goal: Discharge Needs Assessment  Outcome: Ongoing (interventions implemented as appropriate)      Problem: Cardiac: Heart Failure (Adult)  Goal: Signs and Symptoms of Listed Potential Problems Will be Absent or Manageable (Cardiac: Heart Failure)  Outcome: Ongoing (interventions implemented as appropriate)      Problem: Pain, Acute (Adult)  Goal: Acceptable Pain Control/Comfort Level  Outcome: Ongoing (interventions implemented as appropriate)      Problem: Fall Risk (Adult)  Goal: Absence of Falls  Outcome: Ongoing (interventions implemented as appropriate)      Problem: Fluid Volume Excess (Adult,Obstetrics,Pediatric)  Goal: Stable Weight  Outcome: Ongoing (interventions implemented as appropriate)    Goal: Balanced Intake/Output  Outcome: Ongoing (interventions implemented as appropriate)      Problem: Pressure Ulcer Risk (Wily Scale) (Adult,Obstetrics,Pediatric)  Goal: Skin Integrity  Outcome: Ongoing (interventions implemented as appropriate)    Goal: Identify Related Risk Factors and Signs and Symptoms  Outcome: Ongoing (interventions implemented as appropriate)    Goal: Skin Integrity  Outcome: Ongoing (interventions implemented as appropriate)

## 2018-01-14 NOTE — PROGRESS NOTES
RUE Venous doppler study complete. 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

## 2018-01-14 NOTE — PROGRESS NOTES
GI Daily Progress Note    Assessment/Plan:    Principal Problem:    Acute on chronic combined systolic and diastolic congestive heart failure  Active Problems:    Hyponatremia    Essential hypertension    Gastrointestinal hemorrhage    Paroxysmal atrial fibrillation    Bilateral edema of lower extremity    history of GI AVM (gastrointestinal arteriovenous vascular malformation)    S/P MVR (mitral valve repair)    S/P TVR (tricuspid valve repair)    Chronic kidney disease, stage 3    History of PUD (peptic ulcer disease)    Anemia, multifactorial    AMBER (obstructive sleep apnea)    Nonischemic cardiomyopathy       LOS: 9 days     Raquel Deluna is a 82 y.o. female who was admitted with Acute on chronic combined systolic and diastolic congestive heart failure. She reports his symptoms are improving with treatment. Recalledto see per aides Pt with melena yesterday into today despite diureseis she has dropped her HGB slightly form 8.7 to 7.5 and her BUN persists. To review she has had her pacer upgraded and has diuresed better and overall feels better but also is a poor historian and cannot comment on the change in stool but does have vague epigastric discomfort worse with coughing or sitting up suggestive of abd wall pain. Her EGD a year ago did reveal small non bleeding ulcers.    Subjective:    Patient expresses abdominal pain and bloody stools  Patient denies vomiting, diarrhea and difficulty swallowing    Objective:    Vital signs in last 24 hours:  Temp:  [97.3 °F (36.3 °C)-98.4 °F (36.9 °C)] 97.8 °F (36.6 °C)  Heart Rate:  [74-80] 80  Resp:  [18-20] 18  BP: (122-142)/(41-61) 134/41    Intake/Output last 3 shifts:  I/O last 3 completed shifts:  In: 840 [P.O.:840]  Out: 3675 [Urine:3675]  Intake/Output this shift:  I/O this shift:  In: 1140 [P.O.:840; Blood:300]  Out: 800 [Urine:800]    Results from last 7 days  Lab Units 01/14/18  0352 01/13/18  0427 01/12/18  1727 01/12/18  0542   WBC 10*3/mm3 7.87 8.82  --  9.23    HEMOGLOBIN g/dL 7.5* 8.0* 8.4* 8.0*   HEMATOCRIT % 23.6* 25.3* 26.7* 24.8*   PLATELETS 10*3/mm3 232 263  --  263       Results from last 7 days  Lab Units 01/14/18  0352 01/13/18  0427 01/12/18  2218 01/12/18  1727   SODIUM mmol/L 132* 131*  --  134*   POTASSIUM mmol/L 4.2 3.7 3.3* 3.1*   CHLORIDE mmol/L 92* 91*  --  92*   CO2 mmol/L 28.8 28.8  --  29.0   BUN mg/dL 60* 55*  --  57*   CREATININE mg/dL 1.47* 1.37*  --  1.60*   GLUCOSE mg/dL 102* 103*  --  121*   CALCIUM mg/dL 8.7 8.7  --  8.3*       Physical Exam:Abdomen  Sounds Normal Active Bowel Sounds   Distension Soft   Tenderness Mildly Tender     Melena  Anemia  CHF    Agreee this would indicate time for EGD at least will reassess in the AM afte transfusion wrt to her HGB as well as her CHF but if all feel she is stable will proceed to EGD. Will put back on BGA's list.

## 2018-01-14 NOTE — PLAN OF CARE
Problem: Patient Care Overview (Adult)  Goal: Plan of Care Review  Outcome: Ongoing (interventions implemented as appropriate)    Goal: Adult Individualization and Mutuality  Outcome: Ongoing (interventions implemented as appropriate)    Goal: Discharge Needs Assessment  Outcome: Ongoing (interventions implemented as appropriate)      Problem: Cardiac: Heart Failure (Adult)  Goal: Signs and Symptoms of Listed Potential Problems Will be Absent or Manageable (Cardiac: Heart Failure)  Outcome: Ongoing (interventions implemented as appropriate)      Problem: Pain, Acute (Adult)  Goal: Acceptable Pain Control/Comfort Level  Outcome: Ongoing (interventions implemented as appropriate)      Problem: Fall Risk (Adult)  Goal: Absence of Falls  Outcome: Ongoing (interventions implemented as appropriate)      Problem: Fluid Volume Excess (Adult,Obstetrics,Pediatric)  Goal: Stable Weight  Outcome: Ongoing (interventions implemented as appropriate)    Goal: Balanced Intake/Output  Outcome: Ongoing (interventions implemented as appropriate)      Problem: Pressure Ulcer Risk (Wily Scale) (Adult,Obstetrics,Pediatric)  Goal: Skin Integrity  Outcome: Ongoing (interventions implemented as appropriate)    Goal: Identify Related Risk Factors and Signs and Symptoms  Outcome: Ongoing (interventions implemented as appropriate)    Goal: Skin Integrity  Outcome: Ongoing (interventions implemented as appropriate)

## 2018-01-14 NOTE — THERAPY TREATMENT NOTE
Acute Care - Physical Therapy Treatment Note  [facility]     Patient Name: Raquel Deluna  : 1935  MRN: [id number]  Today's Date: 2018  Onset of Illness/Injury or Date of Surgery Date: 18  Date of Referral to PT: 18  Referring Physician: Avila    Admit Date: 2018    Visit Dx:    ICD-10-CM ICD-9-CM   1. Acute combined systolic and diastolic congestive heart failure I50.41 428.41     428.0   2. Iron deficiency anemia due to chronic blood loss D50.0 280.0   3. GI AVM (gastrointestinal arteriovenous vascular malformation) Q27.33 747.61     Patient Active Problem List   Diagnosis   • OA (osteoarthritis) of knee   • Hyponatremia   • Bella's esophagus   • Hematochezia   • Colon polyp   • Essential hypertension   • Gastroesophageal reflux disease   • Hyperlipidemia   • Intestinal malabsorption   • Adverse effect of iron   • Iron deficiency anemia   • Gastrointestinal hemorrhage   • Paroxysmal atrial fibrillation   • Weakness   • Bilateral edema of lower extremity   • DNR (do not resuscitate)   • KESHIA (acute kidney injury)   • Mitral regurgitation   • Bilateral carotid artery stenosis   • Mitral valve insufficiency   • Absolute anemia   • Polyp of colon   • Iron deficiency anemia due to chronic blood loss   • history of GI AVM (gastrointestinal arteriovenous vascular malformation)   • S/P MVR (mitral valve repair)   • S/P TVR (tricuspid valve repair)   • S/P Maze operation for atrial fibrillation   • Lymphedema   • Carotid stenosis, asymptomatic, right   • Acute on chronic combined systolic and diastolic congestive heart failure   • Chronic kidney disease, stage 3   • History of PUD (peptic ulcer disease)   • Anemia, multifactorial   • AMBER (obstructive sleep apnea)   • Nonischemic cardiomyopathy               Adult Rehabilitation Note       18 1126 18 1326       Rehab Assessment/Intervention    Discipline physical therapist  -CRYSTAL physical therapist  -MD     Document Type therapy  note (daily note)  -JK therapy note (daily note)  -MD     Subjective Information agree to therapy;no complaints  -JK agree to therapy;complains of;weakness;fatigue  -MD     Patient Effort, Rehab Treatment good  -JK fair  -MD     Symptoms Noted During/After Treatment none  -JK none  -MD     Precautions/Limitations fall precautions  -JK fall precautions;pacemaker  -MD     Equipment Issued to Patient  gait belt  -MD     Recorded by [JK] Brittany Colbert PT [MD] Liliya Nunes PT     Vital Signs    Intratreatment Heart Rate (beats/min)  74  -MD     Intra SpO2 (%)  98  -MD     O2 Delivery Intra Treatment  supplemental O2  -MD     Recorded by  [MD] Liliya Nunes PT     Pain Assessment    Pain Assessment No/denies pain  -JK No/denies pain  -MD     Recorded by [JK] Brittany Colbert PT [MD] Liliya Nunes PT     Vision Assessment/Intervention    Visual Impairment WFL with corrective lenses  -JK      Recorded by [JK] Brittany Colbert PT      Cognitive Assessment/Intervention    Current Cognitive/Communication Assessment functional  -JK impaired  -MD     Orientation Status oriented to;person;place;situation  -JK oriented to;person  -MD     Follows Commands/Answers Questions 100% of the time;able to follow single-step instructions  -JK 75% of the time;needs cueing;needs increased time;needs repetition  -MD     Personal Safety mild impairment  -JK decreased insight to deficits  -MD     Personal Safety Interventions fall prevention program maintained  -JK fall prevention program maintained;gait belt;muscle strengthening facilitated;nonskid shoes/slippers when out of bed;supervised activity  -MD     Recorded by [JK] Brittany Colbert PT [MD] Liliya Nunes, PT     Bed Mobility, Assessment/Treatment    Bed Mob, Supine to Sit, [place] verbal cues required;moderate assist (50% patient effort)  -JK verbal cues required;moderate assist (50% patient effort);2 person assist required  -MD     Bed Mob, Sit to Supine, [place]  verbal cues  required;moderate assist (50% patient effort);maximum assist (25% patient effort);2 person assist required  -MD     Bed Mobility, Safety Issues decreased use of arms for pushing/pulling;decreased use of legs for bridging/pushing  -JK decreased use of arms for pushing/pulling;decreased use of legs for bridging/pushing  -MD     Bed Mobility, Impairments strength decreased;impaired balance  -JK strength decreased;impaired balance;pain  -MD     Bed Mobility, Comment pt needed extra time to complete task as independently as able  -JK      Recorded by [JK] Brittany Colbert PT [MD] Liliya Nunes PT     Transfer Assessment/Treatment    Transfers, Sit-Stand [place] minimum assist (75% patient effort);moderate assist (50% patient effort);verbal cues required  -JK verbal cues required;moderate assist (50% patient effort);2 person assist required  -MD     Transfers, Stand-Sit [place] verbal cues required;contact guard assist  -JK verbal cues required;moderate assist (50% patient effort);2 person assist required  -MD     Transfers, Sit-Stand-Sit, Assist Device rolling walker  -JK other (see comments)   HHA due to recent pacemaker  -MD     Transfer, Safety Issues weight-shifting ability decreased;balance decreased during turns  -JK weight-shifting ability decreased;balance decreased during turns  -MD     Transfer, Impairments strength decreased;impaired balance  -JK strength decreased;impaired balance  -MD     Recorded by [JK] Brittany Colbert, PT [MD] Liliya Nunes PT     Gait Assessment/Treatment    Gait, [place] Level minimum assist (75% patient effort);verbal cues required  -JK unable to perform  -MD     Gait, Assistive Device rolling walker  -JK      Gait, Distance (Feet) 3  -JK      Gait, Gait Deviations dorie decreased;forward flexed posture;step length decreased  -JK      Gait, Safety Issues step length decreased;weight-shifting ability decreased;balance decreased during turns  -JK      Gait, Impairments strength  decreased;impaired balance  -JK      Gait, Comment  attempted taking steps foward and up EOB but pt unable to advance R LE due to L knee buckling  -MD     Recorded by [JNOAH] Brittany Colbert PT [MD] Liliya Nunes PT     Motor Skills/Interventions    Additional Documentation  Balance Skills Training (Group)  -MD     Recorded by  [MD] Liliya Nunes PT     Balance Skills Training    Standing-Level of Assistance  Minimum assistance;Moderate assistance;x2  -MD     Static Standing Balance Support  Right upper extremity supported;Left upper extremity supported   HHA  -MD     Standing-Balance Activities  Weight Shift R-L  -MD     Recorded by  [MD] Liliya Nunes PT     Therapy Exercises    Bilateral Lower Extremities AROM:;10 reps;ankle pumps/circles;glut sets;quad sets  -JK      Recorded by [JNOAH] Brittany Colbert PT      Positioning and Restraints    Pre-Treatment Position in bed  -JK in bed  -MD     Post Treatment Position chair  -JK bed  -MD     In Bed  supine;call light within reach;exit alarm on;RLE elevated;LLE elevated;RUE elevated;LUE elevated  -MD     In Chair sitting;call light within reach;notified nsg;heels elevated  -JK      Recorded by [CRYSTAL] Brittany Colbert, PT [MD] Liliya Nunes PT       User Key  (r) = Recorded By, (t) = Taken By, (c) = Cosigned By    Initials Name Effective Dates    JK Brittany Colbert, PT 12/01/15 -     MD Liliya Nunes, PT 12/01/15 -                 IP PT Goals       01/13/18 1330 01/06/18 1207       Bed Mobility PT LTG    Bed Mobility PT LTG, Date Established  01/06/18  -LC     Bed Mobility PT LTG, Time to Achieve  1 wk  -LC     Bed Mobility PT LTG, Activity Type  all bed mobility  -LC     Bed Mobility PT LTG, [place] Level minimum assist (75% patient effort)  -MD contact guard assist  -LC     Bed Mobility PT Goal  LTG, Assist Device  bed rails  -LC     Bed Mobility PT LTG, Date Goal Reviewed 01/13/18  -MD      Bed Mobility PT LTG, Outcome goal revised  -MD      Bed Mobility PT LTG, Reason Goal Not Met goals  revised this date  -MD      Transfer Training PT LTG    Transfer Training PT LTG, Date Established  01/06/18  -LC     Transfer Training PT LTG, Time to Achieve  1 wk  -LC     Transfer Training PT LTG, Activity Type  all transfers  -LC     Transfer Training PT LTG, [place] Level minimum assist (75% patient effort)  -MD contact guard assist  -LC     Transfer Training PT LTG, Assist Device  walker, rolling  -LC     Transfer Training PT  LTG, Date Goal Reviewed 01/13/18  -MD      Transfer Training PT LTG, Outcome goal revised  -MD      Transfer Training PT LTG, Reason Goal Not Met goals revised this date  -MD      Gait Training PT LTG    Gait Training Goal PT LTG, Date Established  01/06/18  -LC     Gait Training Goal PT LTG, Time to Achieve  1 wk  -LC     Gait Training Goal PT LTG, [place] Level minimum assist (75% patient effort)  -MD contact guard assist  -LC     Gait Training Goal PT LTG, Assist Device  walker, rolling  -LC     Gait Training Goal PT LTG, Distance to Achieve 50  -  -LC     Gait Training Goal PT LTG, Date Goal Reviewed 01/13/18  -MD      Gait Training Goal PT LTG, Outcome goal revised  -MD      Gait Training Goal PT LTG, Reason Goal Not Met goals revised this date  -MD        User Key  (r) = Recorded By, (t) = Taken By, (c) = Cosigned By    Initials Name Provider Type    MD Liliya Nunes, PT Physical Therapist    ABHILASH Negron, PT DPT Physical Therapist          Physical Therapy Education     Title: PT OT SLP Therapies (Active)     Topic: Physical Therapy (Active)     Point: Mobility training (Done)    Learning Progress Summary    Learner Readiness Method Response Comment Documented by Status   Patient Acceptance CLARY MCKEON DU,NR Instructed pt to complete QS, GS, AP throughout the day to help improve L knee stability. JK 01/14/18 1130 Done    Acceptance E NR  AR 01/10/18 1450 Active    Acceptance E,TB NR  EE 01/09/18 1343 Active    Acceptance CLARY MCKEON DU benefits of activity, safety during  ambulation  01/08/18 1400 Done    Acceptance E,D VU,DU safety during transfers  01/07/18 1418 Done    Acceptance E,D VU,DU safety during transfers  01/06/18 1204 Done               Point: Home exercise program (Done)    Learning Progress Summary    Learner Readiness Method Response Comment Documented by Status   Patient Acceptance E,D VU,DU,NR Instructed pt to complete QS, GS, AP throughout the day to help improve L knee stability. JK 01/14/18 1130 Done    Acceptance E NR  AR 01/10/18 1450 Active    Acceptance E,TB NR  EE 01/09/18 1343 Active               Point: Body mechanics (Active)    Learning Progress Summary    Learner Readiness Method Response Comment Documented by Status   Patient Acceptance E NR  AR 01/10/18 1450 Active    Acceptance E,TB NR  EE 01/09/18 1343 Active               Point: Precautions (Active)    Learning Progress Summary    Learner Readiness Method Response Comment Documented by Status   Patient Acceptance CLARY MCKEON MD 01/13/18 1329 Active    Acceptance E NR  AR 01/10/18 1450 Active                      User Key     Initials Effective Dates Name Provider Type Discipline     12/01/15 -  Haydee More, PT Physical Therapist PT     12/01/15 -  Brittany Colbert, PT Physical Therapist PT    MD 12/01/15 -  Liliya Nunes, PT Physical Therapist PT    AR 06/27/16 -  Fanny Hough, PT Physical Therapist PT     08/02/16 -  Davin Negron, PT DPT Physical Therapist PT                    PT Recommendation and Plan  Anticipated Discharge Disposition: skilled nursing facility  PT Frequency: daily  Plan of Care Review  Outcome Summary/Follow up Plan: Pt needs extra time and good deal of encouragement to be as independent as able with bed mobility and transfers and gait. Pt was able to tolerate standing at bedside with CG for guarding of L knee without buckling, and to take 3 steps to walk to recliner using roll wx. Pt was encouraged to perform LE ex (QS, GS, AP) in bed or chair today to increase  stability L knee and attempt walking further at next PT treatment.           Outcome Measures       01/14/18 1100 01/13/18 1300       How much help from another person do you currently need...    Turning from your back to your side while in flat bed without using bedrails? 2  -KIRSTIEK 2  -MD     Moving from lying on back to sitting on the side of a flat bed without bedrails? 2  -KIRSTIEK 2  -MD     Moving to and from a bed to a chair (including a wheelchair)? 3  -CRYSTAL 2  -MD     Standing up from a chair using your arms (e.g., wheelchair, bedside chair)? 3  -CRYSTAL 2  -MD     Climbing 3-5 steps with a railing? 1  -CRYSTAL 1  -MD     To walk in hospital room? 2  -CRYSTAL 1  -MD     AM-PAC 6 Clicks Score 13  -CRYSTAL 10  -MD     Functional Assessment    Outcome Measure Options  AM-PAC 6 Clicks Basic Mobility (PT)  -MD       User Key  (r) = Recorded By, (t) = Taken By, (c) = Cosigned By    Initials Name Provider Type    CRYSTAL Colbert, PT Physical Therapist    MD Liliya Nunes, PT Physical Therapist           Time Calculation:         PT Charges       01/14/18 1134          Time Calculation    Start Time 1105  -JK      Stop Time 1125  -JK      Time Calculation (min) 20 min  -KIRSTIEK        User Key  (r) = Recorded By, (t) = Taken By, (c) = Cosigned By    Initials Name Provider Type    CRYSTAL Colbert PT Physical Therapist          Therapy Charges for Today     Code Description Service Date Service Provider Modifiers Qty    22492799919 HC PT THER PROC EA 15 MIN 1/14/2018 Brittany Colbert, PT GP 1          PT G-Codes  Outcome Measure Options: AM-PAC 6 Clicks Basic Mobility (PT)    Brittany Colbert PT  1/14/2018

## 2018-01-14 NOTE — PROGRESS NOTES
"   LOS: 9 days    Patient Care Team:  Ricky Hoyos III, MD as PCP - General  Anthony Hernández MD as Consulting Physician (Pulmonary Disease)  Christy Jerez MD as Consulting Physician (Dermatology)  Ladonna Mclaughlin MD as Consulting Physician (Cardiology)    Chief Complaint:    Chief Complaint   Patient presents with   • Shortness of Breath     carrying a lotof fluid in legs    • Rectal Bleeding     onset last week       Subjective follow-up chronic kidney disease 3    Interval History:   Pt is feeling ok. No major change since yesterday.   Up in chair today.        Objective     Vital Signs  Temp:  [97.7 °F (36.5 °C)-98.2 °F (36.8 °C)] 97.8 °F (36.6 °C)  Heart Rate:  [74-93] 78  Resp:  [18-20] 18  BP: (125-143)/(49-60) 136/58    Flowsheet Rows         First Filed Value    Admission Height  162.6 cm (64\") Documented at 01/05/2018 1538    Admission Weight  94.3 kg (208 lb) Documented at 01/05/2018 1538             I/O last 3 completed shifts:  In: 840 [P.O.:840]  Out: 3675 [Urine:3675]    Intake/Output Summary (Last 24 hours) at 01/14/18 1120  Last data filed at 01/13/18 2213   Gross per 24 hour   Intake              480 ml   Output             1675 ml   Net            -1195 ml       Physical Exam:  General Appearance:   no acute distress, morbidly obese.   Skin: warm and dry  HEENT:  nonicteric sclerae, oral mucosa normal,   Neck: supple, increased JVD, trachea midline  Lungs: Breath sounds bilaterally with bilateral rhonchi and crackles  Heart: Irregularly irregular, no rub  Abdomen: soft, non-tender,  +bs.  Increased flank edema.   Extremities: 2+ lower extremities edema, RUE edema >> LUE.  Some asterixis.      Results Review:      Results from last 7 days  Lab Units 01/14/18  0352 01/13/18  0427 01/12/18  2218 01/12/18  1727 01/12/18  0542 01/11/18  0641   SODIUM mmol/L 132* 131*  --  134* 132* 130*   POTASSIUM mmol/L 4.2 3.7 3.3* 3.1* 3.2* 3.8   CHLORIDE mmol/L 92* 91*  --  92* 92* 92*   CO2 " mmol/L 28.8 28.8  --  29.0 26.7 25.9   BUN mg/dL 60* 55*  --  57* 57* 51*   CREATININE mg/dL 1.47* 1.37*  --  1.60* 1.83* 2.05*   CALCIUM mg/dL 8.7 8.7  --  8.3* 8.0* 8.1*   BILIRUBIN mg/dL  --   --   --   --  0.4 0.6   ALK PHOS U/L  --   --   --   --  74 79   ALT (SGPT) U/L  --   --   --   --  20 22   AST (SGOT) U/L  --   --   --   --  19 23   GLUCOSE mg/dL 102* 103*  --  121* 99 87       Estimated Creatinine Clearance: 35.3 mL/min (by C-G formula based on Cr of 1.47).      Results from last 7 days  Lab Units 01/13/18  0427 01/09/18  0535 01/08/18  0447   MAGNESIUM mg/dL 2.1 2.2 2.0   PHOSPHORUS mg/dL 3.6 4.1 4.1         Results from last 7 days  Lab Units 01/14/18  0352 01/13/18  0427 01/12/18  0542 01/11/18  0641 01/10/18  0707 01/09/18  0535 01/08/18  0447   URIC ACID mg/dL 11.0* 11.2* 10.8* 11.1* 11.8* 11.8* 11.4*         Results from last 7 days  Lab Units 01/14/18  0352 01/13/18  0427 01/12/18  1727 01/12/18  0542 01/11/18  0641 01/10/18  0707   WBC 10*3/mm3 7.87 8.82  --  9.23 8.79 7.92   HEMOGLOBIN g/dL 7.5* 8.0* 8.4* 8.0* 8.7* 9.1*   PLATELETS 10*3/mm3 232 263  --  263 253 255               Imaging Results (last 24 hours)     ** No results found for the last 24 hours. **          allopurinol 100 mg Oral Daily   aspirin 81 mg Oral Daily   budesonide-formoterol 2 puff Inhalation BID - RT   bumetanide 4 mg Intravenous Q12H   carvedilol 37.5 mg Oral Q12H   citalopram 20 mg Oral Daily   fluticasone 2 spray Each Nare Daily   hydrALAZINE 25 mg Oral Q8H   metOLazone 5 mg Oral Daily   pantoprazole 40 mg Oral Q AM   potassium chloride 40 mEq Oral Q8H   sennosides-docusate sodium 1 tablet Oral Nightly   vitamin B-6 100 mg Oral Daily          Medication Review:   Current Facility-Administered Medications   Medication Dose Route Frequency Provider Last Rate Last Dose   • acetaminophen (TYLENOL) tablet 650 mg  650 mg Oral Q6H PRN Jeff Li MD   650 mg at 01/11/18 1439   • allopurinol (ZYLOPRIM) tablet 100 mg  100 mg  Oral Daily Cindy Alves MD   100 mg at 01/14/18 1023   • aspirin EC tablet 81 mg  81 mg Oral Daily Jeff Li MD   81 mg at 01/14/18 1023   • budesonide-formoterol (SYMBICORT) 160-4.5 MCG/ACT inhaler 2 puff  2 puff Inhalation BID - RT Jeff Li MD   2 puff at 01/14/18 0729   • bumetanide (BUMEX) injection 4 mg  4 mg Intravenous Q12H Raven Gannon APRN   4 mg at 01/14/18 0510   • carvedilol (COREG) tablet 37.5 mg  37.5 mg Oral Q12H Ladonna Mclaughlin MD   37.5 mg at 01/14/18 1023   • citalopram (CeleXA) tablet 20 mg  20 mg Oral Daily Jeff Li MD   20 mg at 01/14/18 1023   • fluticasone (FLONASE) 50 MCG/ACT nasal spray 2 spray  2 spray Each Nare Daily Genna Gramajo MD   2 spray at 01/14/18 1024   • hydrALAZINE (APRESOLINE) tablet 25 mg  25 mg Oral Q8H Ladonna Mclaughlin MD   25 mg at 01/14/18 1023   • HYDROcodone-acetaminophen (NORCO)  MG per tablet 1 tablet  1 tablet Oral Q4H PRN Raven Gannon, APRN       • HYDROcodone-acetaminophen (NORCO) 7.5-325 MG per tablet 1 tablet  1 tablet Oral Q4H PRN Raven Gannon APRN       • HYDROmorphone (DILAUDID) injection 0.5 mg  0.5 mg Intravenous Q2H PRN Raven Gannon APRN        And   • naloxone (NARCAN) injection 0.4 mg  0.4 mg Intravenous Q5 Min PRN DELTA Sharma       • LORazepam (ATIVAN) tablet 0.25 mg  0.25 mg Oral Q6H PRN Genna Gramajo MD   0.25 mg at 01/12/18 0133   • metOLazone (ZAROXOLYN) tablet 5 mg  5 mg Oral Daily Cindy Alves MD   5 mg at 01/14/18 1023   • nitroglycerin (NITROSTAT) SL tablet 0.4 mg  0.4 mg Sublingual Q5 Min PRN Jeff Li MD       • ondansetron (ZOFRAN) injection 4 mg  4 mg Intravenous Q6H PRN Jeff Li MD       • ondansetron (ZOFRAN) injection 4 mg  4 mg Intravenous Q6H PRN DELTA Sharma       • pantoprazole (PROTONIX) EC tablet 40 mg  40 mg Oral Q AM Jeff Li MD   40 mg at 01/14/18 0652   • polyethylene glycol (MIRALAX) powder 17 g  17 g Oral Daily PRN Cindy Alves MD       •  potassium chloride (MICRO-K) CR capsule 40 mEq  40 mEq Oral Q8H Raven Gannon, APRN   40 mEq at 01/14/18 1023   • sennosides-docusate sodium (SENOKOT-S) 8.6-50 MG tablet 1 tablet  1 tablet Oral Nightly Tonja Hernandez MD   1 tablet at 01/13/18 7756   • sodium chloride 0.9 % flush 1-10 mL  1-10 mL Intravenous PRN Jeff Li MD       • sodium chloride 0.9 % flush 1-10 mL  1-10 mL Intravenous PRN Raven Gannon, APRN       • vitamin B-6 (PYRIDOXINE) tablet 100 mg  100 mg Oral Daily Jeff Li MD   100 mg at 01/14/18 1023       Assessment/Plan   1.  Chronic kidney disease stage III . Crt is stable. With some evidence of better diuresis. Suspect cardiorenal syndrome. Continue current  bumex and metolazone.  Monitor off gtt.  2.  Fluid excess and systolic and diastolic congestive heart failure.  EF 30%.  Pulm HTN with RV pressure 80.   3.  Chronic anemia associated with iron deficiency, colonic angiodysplasia. Hg 9.1.  4.  Hypertension. bp meds may promote fluid retention.   5.  Mild hyponatremia associated with volume excess. Down some today, will add mild fluid restriction.  6.  Hyperuricemia.       Felice Alaniz MD  01/14/18  11:20 AM

## 2018-01-15 PROBLEM — K26.4 GASTROINTESTINAL HEMORRHAGE ASSOCIATED WITH DUODENAL ULCER: Status: ACTIVE | Noted: 2018-01-01

## 2018-01-15 NOTE — PROGRESS NOTES
[facility]  Inpatient Progress Note    Reason for Follow Up:  Anemia    Subjective     Interval History:   She does endorse a black stool recently.  She is anemic.  She has history of AVMs of the colon, peptic ulcer disease    Current Facility-Administered Medications:   •  acetaminophen (TYLENOL) tablet 650 mg, 650 mg, Oral, Q6H PRN, Jeff Li MD, 650 mg at 01/11/18 1439  •  allopurinol (ZYLOPRIM) tablet 100 mg, 100 mg, Oral, Daily, Cindy Alves MD, 100 mg at 01/15/18 0942  •  budesonide-formoterol (SYMBICORT) 160-4.5 MCG/ACT inhaler 2 puff, 2 puff, Inhalation, BID - RT, Jeff Li MD, 2 puff at 01/15/18 0818  •  bumetanide (BUMEX) injection 4 mg, 4 mg, Intravenous, Q12H, Raven Gannon, APRN, 4 mg at 01/15/18 0522  •  carvedilol (COREG) tablet 37.5 mg, 37.5 mg, Oral, Q12H, Ladonna Mclaughlin MD, 37.5 mg at 01/15/18 0942  •  citalopram (CeleXA) tablet 20 mg, 20 mg, Oral, Daily, Jeff Li MD, 20 mg at 01/15/18 0943  •  fluticasone (FLONASE) 50 MCG/ACT nasal spray 2 spray, 2 spray, Each Nare, Daily, Genna Gramajo MD, 2 spray at 01/14/18 1024  •  hydrALAZINE (APRESOLINE) tablet 25 mg, 25 mg, Oral, Q8H, Ladonna Mclaughlin MD, 25 mg at 01/15/18 0943  •  HYDROcodone-acetaminophen (NORCO)  MG per tablet 1 tablet, 1 tablet, Oral, Q4H PRN, DELTA Sharma  •  HYDROcodone-acetaminophen (NORCO) 7.5-325 MG per tablet 1 tablet, 1 tablet, Oral, Q4H PRN, Raven Gannon APRN  •  HYDROmorphone (DILAUDID) injection 0.5 mg, 0.5 mg, Intravenous, Q2H PRN **AND** naloxone (NARCAN) injection 0.4 mg, 0.4 mg, Intravenous, Q5 Min PRN, DELTA Sharma  •  LORazepam (ATIVAN) tablet 0.25 mg, 0.25 mg, Oral, Q6H PRN, Genna Gramajo MD, 0.25 mg at 01/12/18 0133  •  metOLazone (ZAROXOLYN) tablet 5 mg, 5 mg, Oral, Daily, Cindy Alves MD, 5 mg at 01/15/18 0942  •  nitroglycerin (NITROSTAT) SL tablet 0.4 mg, 0.4 mg, Sublingual, Q5 Min PRN, Jeff Li MD  •  ondansetron (ZOFRAN)  injection 4 mg, 4 mg, Intravenous, Q6H PRN, Jeff Li MD  •  ondansetron (ZOFRAN) injection 4 mg, 4 mg, Intravenous, Q6H PRN, Raven Gannon APRN  •  pantoprazole (PROTONIX) injection 40 mg, 40 mg, Intravenous, Q12H, Genna Gramajo MD, 40 mg at 01/15/18 0942  •  polyethylene glycol (MIRALAX) powder 17 g, 17 g, Oral, Daily PRN, Cindy Alves MD  •  potassium chloride (MICRO-K) CR capsule 40 mEq, 40 mEq, Oral, Q8H, Raven Gannon APRN, 40 mEq at 01/15/18 0943  •  sennosides-docusate sodium (SENOKOT-S) 8.6-50 MG tablet 1 tablet, 1 tablet, Oral, Nightly, Tonja Hernandez MD, 1 tablet at 01/14/18 2107  •  sodium chloride 0.9 % flush 1-10 mL, 1-10 mL, Intravenous, PRN, Jeff Li MD  •  sodium chloride 0.9 % flush 1-10 mL, 1-10 mL, Intravenous, PRN, Raven Gannon APRN  •  vitamin B-6 (PYRIDOXINE) tablet 100 mg, 100 mg, Oral, Daily, Jeff Li MD, 100 mg at 01/15/18 0942  Review of Systems:    She has weakness and fatigue all other systems reviewed and negative, she has eaten breakfast this morning upper endoscopy will not be possible today    Objective     Vital Signs  Temp:  [97.3 °F (36.3 °C)-98 °F (36.7 °C)] 97.9 °F (36.6 °C)  Heart Rate:  [78-89] 89  Resp:  [18-20] 20  BP: (119-143)/(41-51) 133/51  Body mass index is 38.11 kg/(m^2).    Intake/Output Summary (Last 24 hours) at 01/15/18 1106  Last data filed at 01/15/18 0500   Gross per 24 hour   Intake             1830 ml   Output             1375 ml   Net              455 ml           Physical Exam:   General: patient awake, alert and cooperative   Eyes: Normal lids and lashes, no scleral icterus   Neck: supple, normal ROM   Skin: warm and dry, not jaundiced   Cardiovascular: regular rhythm and rate, no murmurs auscultated   Pulm: clear to auscultation bilaterally, regular and unlabored   Abdomen: soft, nontender, nondistended; normal bowel sounds   Rectal: deferred   Extremities: no rash or edema   Psychiatric: Normal mood and behavior; memory  intact     Results Review:     I reviewed the patient's new clinical results.      Results from last 7 days  Lab Units 01/15/18  0407 01/14/18  0352 01/13/18  0427   WBC 10*3/mm3 8.30 7.87 8.82   HEMOGLOBIN g/dL 7.7* 7.5* 8.0*   HEMATOCRIT % 24.3* 23.6* 25.3*   PLATELETS 10*3/mm3 235 232 263       Results from last 7 days  Lab Units 01/15/18  0407 01/14/18  0352 01/13/18  0427  01/12/18  0542 01/11/18  0641   SODIUM mmol/L 137 132* 131*  < > 132* 130*   POTASSIUM mmol/L 3.9 4.2 3.7  < > 3.2* 3.8   CHLORIDE mmol/L 94* 92* 91*  < > 92* 92*   CO2 mmol/L 30.2* 28.8 28.8  < > 26.7 25.9   BUN mg/dL 69* 60* 55*  < > 57* 51*   CREATININE mg/dL 1.40* 1.47* 1.37*  < > 1.83* 2.05*   CALCIUM mg/dL 8.6 8.7 8.7  < > 8.0* 8.1*   BILIRUBIN mg/dL  --   --   --   --  0.4 0.6   ALK PHOS U/L  --   --   --   --  74 79   ALT (SGPT) U/L  --   --   --   --  20 22   AST (SGOT) U/L  --   --   --   --  19 23   GLUCOSE mg/dL 106* 102* 103*  < > 99 87   < > = values in this interval not displayed.      No results found for: LIPASE    Radiology:  XR Chest 1 View   Final Result      XR Chest PA & Lateral   Final Result      XR Chest 2 View   Final Result          Assessment/Plan     Patient Active Problem List   Diagnosis   • OA (osteoarthritis) of knee   • Hyponatremia   • Bella's esophagus   • Hematochezia   • Colon polyp   • Essential hypertension   • Gastroesophageal reflux disease   • Hyperlipidemia   • Intestinal malabsorption   • Adverse effect of iron   • Iron deficiency anemia   • Gastrointestinal hemorrhage   • Paroxysmal atrial fibrillation   • Weakness   • Bilateral edema of lower extremity   • DNR (do not resuscitate)   • KESHIA (acute kidney injury)   • Mitral regurgitation   • Bilateral carotid artery stenosis   • Mitral valve insufficiency   • Absolute anemia   • Polyp of colon   • Iron deficiency anemia due to chronic blood loss   • history of GI AVM (gastrointestinal arteriovenous vascular malformation)   • S/P MVR (mitral valve  repair)   • S/P TVR (tricuspid valve repair)   • S/P Maze operation for atrial fibrillation   • Lymphedema   • Carotid stenosis, asymptomatic, right   • Acute on chronic combined systolic and diastolic congestive heart failure   • Chronic kidney disease, stage 3   • History of PUD (peptic ulcer disease)   • Anemia, multifactorial   • AMBER (obstructive sleep apnea)   • Nonischemic cardiomyopathy       History of AVMs of the colon  Anemia  Iron deficiency  History of peptic ulcer disease    Plan:    She ate breakfast today so upper endoscopic evaluation not possible  Plan for small bowel enteroscopy tomorrow with possible cautery of AVMs if some are found, and to rule out peptic ulcer disease  Hold on repeat colonoscopy for now  Transfuse to keep hemoglobin greater than 8.0  Serial hemoglobin  Pantoprazole every 12 is fine, no need for drip    I discussed the patients findings and my recommendations with patient and nursing staff.    Josef Mccoy MD

## 2018-01-15 NOTE — PROGRESS NOTES
Continued Stay Note  [facility]     Patient Name: Raquel Deluna  MRN: [id number]  Today's Date: 1/15/2018    Admit Date: 1/5/2018          Discharge Plan       01/15/18 1621    Case Management/Social Work Plan    Plan Skilled bed at [facility]     Patient/Family In Agreement With Plan yes    Additional Comments CCP met with patient who confirmed plan is still [facility], patient was unsure of transportation, patient may need ambulance. CCP explained no gurantee that isnurance would cover ambulance transportation. CCP spoke with Anh/Irina who confirmed they can still accept her. Per Anh, if patient needs outpatient dialysis, will need to know schedule and to have transportaiton (Janellen can assist with transportation arrangments if family can not transport. CCP will follow for discharge to skilled bed at [facility] and follow for outpatient dialysis needs. Brittany Norwood CSW               Discharge Codes     None            Laurie Norwood, DEJONW

## 2018-01-15 NOTE — CONSULTS
"Thank you for the referral.  However, patient is s/p upgrade to BiV PPM which is not a coverable diagnosis for Phase II Cardiac Rehab. Also of note, she was admitted with HF symptoms.  Unfortunately according to the Medicare/Medicaid (CMS) guidelines in order to be eligible for Cardiac Rehab pts have to be \"stable, chronic HF as defined as pts with LVEF of 35% or less and NYHA Class II to IV symptoms despite being on optimal heart failure therapy for at least six weeks.  Stable pts are defined as pts who have not had recent (< or = 6 wks) or planned (<or = 6 mnths) major cardiovascular hospitalizations or procedures.\"  This means that we cannot enroll a HF pt in cardiac rehab if they are currently or recently hospitalized with a HF diagnosis.   Please refer this pt in the future when she meets the guidelines.      "

## 2018-01-15 NOTE — THERAPY TREATMENT NOTE
Acute Care - Physical Therapy Treatment Note  [facility]     Patient Name: Raquel Deluna  : 1935  MRN: [id number]  Today's Date: 1/15/2018  Onset of Illness/Injury or Date of Surgery Date: 18  Date of Referral to PT: 18  Referring Physician: Avila    Admit Date: 2018    Visit Dx:    ICD-10-CM ICD-9-CM   1. Acute combined systolic and diastolic congestive heart failure I50.41 428.41     428.0   2. Iron deficiency anemia due to chronic blood loss D50.0 280.0   3. GI AVM (gastrointestinal arteriovenous vascular malformation) Q27.33 747.61   4. Gastrointestinal hemorrhage associated with duodenal ulcer K26.4 532.40     Patient Active Problem List   Diagnosis   • OA (osteoarthritis) of knee   • Hyponatremia   • Bella's esophagus   • Hematochezia   • Colon polyp   • Essential hypertension   • Gastroesophageal reflux disease   • Hyperlipidemia   • Intestinal malabsorption   • Adverse effect of iron   • Iron deficiency anemia   • Gastrointestinal hemorrhage   • Paroxysmal atrial fibrillation   • Weakness   • Bilateral edema of lower extremity   • DNR (do not resuscitate)   • KESHIA (acute kidney injury)   • Mitral regurgitation   • Bilateral carotid artery stenosis   • Mitral valve insufficiency   • Absolute anemia   • Polyp of colon   • Iron deficiency anemia due to chronic blood loss   • history of GI AVM (gastrointestinal arteriovenous vascular malformation)   • S/P MVR (mitral valve repair)   • S/P TVR (tricuspid valve repair)   • S/P Maze operation for atrial fibrillation   • Lymphedema   • Carotid stenosis, asymptomatic, right   • Acute on chronic combined systolic and diastolic congestive heart failure   • Chronic kidney disease, stage 3   • History of PUD (peptic ulcer disease)   • Anemia, multifactorial   • AMBER (obstructive sleep apnea)   • Nonischemic cardiomyopathy               Adult Rehabilitation Note       01/15/18 1000 18 1126 18 1326    Rehab Assessment/Intervention     Discipline physical therapist  -LH physical therapist  -JK physical therapist  -MD    Document Type therapy note (daily note)  - therapy note (daily note)  -JK therapy note (daily note)  -MD    Subjective Information agree to therapy  - agree to therapy;no complaints  -JK agree to therapy;complains of;weakness;fatigue  -MD    Patient Effort, Rehab Treatment good  -LH good  -JK fair  -MD    Symptoms Noted During/After Treatment  none  -JK none  -MD    Precautions/Limitations fall precautions;pacemaker  - fall precautions  -JK fall precautions;pacemaker  -MD    Equipment Issued to Patient   gait belt  -MD    Recorded by [] Kandi Buckner, PT [JK] Brittany Colbert, PT [MD] Liliya Nunes, PT    Vital Signs    Intratreatment Heart Rate (beats/min)   74  -MD    Intra SpO2 (%) 98  -  98  -MD    O2 Delivery Intra Treatment supplemental O2  -  supplemental O2  -MD    Recorded by [] Kandi Buckner, PT  [MD] Liliya Nunes, PT    Pain Assessment    Pain Assessment No/denies pain  - No/denies pain  -JK No/denies pain  -MD    Recorded by [] Kandi Bcukner, PT [JK] Brittany Colbert, PT [MD] Liliya Nunes, PT    Vision Assessment/Intervention    Visual Impairment WFL with corrective lenses  - WFL with corrective lenses  -JK     Recorded by [] Kandi Buckner, PT [JK] Brittany Colbert, PT     Cognitive Assessment/Intervention    Current Cognitive/Communication Assessment impaired  - functional  -JK impaired  -MD    Orientation Status oriented x 4  - oriented to;person;place;situation  -JK oriented to;person  -MD    Follows Commands/Answers Questions 100% of the time  -% of the time;able to follow single-step instructions  -JK 75% of the time;needs cueing;needs increased time;needs repetition  -MD    Personal Safety mild impairment  -LH mild impairment  -JK decreased insight to deficits  -MD    Personal Safety Interventions fall prevention program maintained;gait belt;nonskid shoes/slippers when out of bed;supervised activity   -LH fall prevention program maintained  -JK fall prevention program maintained;gait belt;muscle strengthening facilitated;nonskid shoes/slippers when out of bed;supervised activity  -MD    Recorded by [] Kandi Buckner PT [JK] Brittany Colbert, PT [MD] Liliya Nunes, PT    Bed Mobility, Assessment/Treatment    Bed Mobility, Assistive Device bed rails;draw sheet  -      Bed Mob, Supine to Sit, [place] moderate assist (50% patient effort);verbal cues required;nonverbal cues required (demo/gesture)  -LH verbal cues required;moderate assist (50% patient effort)  -JK verbal cues required;moderate assist (50% patient effort);2 person assist required  -MD    Bed Mob, Sit to Supine, [place] not tested  -  verbal cues required;moderate assist (50% patient effort);maximum assist (25% patient effort);2 person assist required  -MD    Bed Mobility, Safety Issues decreased use of arms for pushing/pulling;decreased use of legs for bridging/pushing  - decreased use of arms for pushing/pulling;decreased use of legs for bridging/pushing  -JK decreased use of arms for pushing/pulling;decreased use of legs for bridging/pushing  -MD    Bed Mobility, Impairments strength decreased;impaired balance  - strength decreased;impaired balance  -JK strength decreased;impaired balance;pain  -MD    Bed Mobility, Comment  pt needed extra time to complete task as independently as able  -JK     Recorded by [] Kandi Buckner PT [JK] Brittany Colbert, PT [MD] Liliya Nunes, PT    Transfer Assessment/Treatment    Transfers, Bed-Chair [place] moderate assist (50% patient effort);verbal cues required;nonverbal cues required (demo/gesture);1 person + 1 person to manage equipment  -LH      Transfers, Chair-Bed [place] moderate assist (50% patient effort);verbal cues required;nonverbal cues required (demo/gesture);1 person + 1 person to manage equipment  -LH      Transfers, Bed-Chair-Bed, Assist Device --   HHA pivot OOB to chair  -LH       Transfers, Sit-Stand [place] moderate assist (50% patient effort);verbal cues required;nonverbal cues required (demo/gesture);1 person + 1 person to manage equipment  -LH minimum assist (75% patient effort);moderate assist (50% patient effort);verbal cues required  -JK verbal cues required;moderate assist (50% patient effort);2 person assist required  -MD    Transfers, Stand-Sit [place] moderate assist (50% patient effort);verbal cues required;nonverbal cues required (demo/gesture);1 person + 1 person to manage equipment  -LH verbal cues required;contact guard assist  -JK verbal cues required;moderate assist (50% patient effort);2 person assist required  -MD    Transfers, Sit-Stand-Sit, Assist Device other (see comments)   used HHA   -LH rolling walker  -JK other (see comments)   HHA due to recent pacemaker  -MD    Transfer, Safety Issues step length decreased;weight-shifting ability decreased;sequencing ability decreased  -LH weight-shifting ability decreased;balance decreased during turns  -JK weight-shifting ability decreased;balance decreased during turns  -MD    Transfer, Impairments sensation decreased;strength decreased  -LH strength decreased;impaired balance  -JK strength decreased;impaired balance  -MD    Recorded by [LH] Kandi Buckner, PT [JK] Brittany Colbert PT [MD] Liliya Nunes PT    Gait Assessment/Treatment    Gait, [place] Level  minimum assist (75% patient effort);verbal cues required  -JK unable to perform  -MD    Gait, Assistive Device  rolling walker  -JK     Gait, Distance (Feet)  3  -JK     Gait, Gait Deviations  dorie decreased;forward flexed posture;step length decreased  -JK     Gait, Safety Issues  step length decreased;weight-shifting ability decreased;balance decreased during turns  -JK     Gait, Impairments  strength decreased;impaired balance  -JK     Gait, Comment shuffled steps bed to chair, blocking LLE to prevent buckling  -LH  attempted taking steps foward and up EOB  but pt unable to advance R LE due to L knee buckling  -MD    Recorded by [] Kandi Buckner, PT [JK] Brittany Colbert, PT [MD] Liliya Nunes PT    Motor Skills/Interventions    Additional Documentation   Balance Skills Training (Group)  -MD    Recorded by   [MD] Liliya Nunes PT    Balance Skills Training    Standing-Level of Assistance   Minimum assistance;Moderate assistance;x2  -MD    Static Standing Balance Support   Right upper extremity supported;Left upper extremity supported   HHA  -MD    Standing-Balance Activities   Weight Shift R-L  -MD    Recorded by   [MD] Liliya Nunes PT    Therapy Exercises    Bilateral Lower Extremities AROM:;15 reps;sitting;ankle pumps/circles  - AROM:;10 reps;ankle pumps/circles;glut sets;quad sets  -     Recorded by [] Kandi Buckner, PT [JK] Brittany Colbert PT     Positioning and Restraints    Pre-Treatment Position in bed  - in bed  -JK in bed  -MD    Post Treatment Position chair  - chair  -K bed  -MD    In Bed   supine;call light within reach;exit alarm on;RLE elevated;LLE elevated;RUE elevated;LUE elevated  -MD    In Chair sitting;call light within reach;exit alarm on;encouraged to call for assist   seated with tray infront of pt for breakfast  - sitting;call light within reach;notified nsg;heels elevated  -JK     Recorded by [] Kandi Buckner, PT [JK] Brittany Colbert, PT [MD] Liliya Nunes, PT      User Key  (r) = Recorded By, (t) = Taken By, (c) = Cosigned By    Initials Name Effective Dates     Kandi Buckner, PT 02/07/17 -     CRYSTAL Colbert, PT 12/01/15 -     MD Liliya Nunes, PT 12/01/15 -                 IP PT Goals       01/13/18 1330 01/06/18 1207       Bed Mobility PT LTG    Bed Mobility PT LTG, Date Established  01/06/18  -     Bed Mobility PT LTG, Time to Achieve  1 wk  -LC     Bed Mobility PT LTG, Activity Type  all bed mobility  -LC     Bed Mobility PT LTG, [place] minimum assist (75% patient effort)  -MD contact guard assist  -     Bed Mobility PT Goal   LTG, Assist Device  bed rails  -LC     Bed Mobility PT LTG, Date Goal Reviewed 01/13/18  -MD      Bed Mobility PT LTG, Outcome goal revised  -MD      Bed Mobility PT LTG, Reason Goal Not Met goals revised this date  -MD      Transfer Training PT LTG    Transfer Training PT LTG, Date Established  01/06/18  -LC     Transfer Training PT LTG, Time to Achieve  1 wk  -LC     Transfer Training PT LTG, Activity Type  all transfers  -LC     Transfer Training PT LTG, [place] Level minimum assist (75% patient effort)  -MD contact guard assist  -LC     Transfer Training PT LTG, Assist Device  walker, rolling  -LC     Transfer Training PT  LTG, Date Goal Reviewed 01/13/18  -MD      Transfer Training PT LTG, Outcome goal revised  -MD      Transfer Training PT LTG, Reason Goal Not Met goals revised this date  -MD      Gait Training PT LTG    Gait Training Goal PT LTG, Date Established  01/06/18  -LC     Gait Training Goal PT LTG, Time to Achieve  1 wk  -LC     Gait Training Goal PT LTG, [place] Level minimum assist (75% patient effort)  -MD contact guard assist  -LC     Gait Training Goal PT LTG, Assist Device  walker, rolling  -LC     Gait Training Goal PT LTG, Distance to Achieve 50  -  -LC     Gait Training Goal PT LTG, Date Goal Reviewed 01/13/18  -MD      Gait Training Goal PT LTG, Outcome goal revised  -MD      Gait Training Goal PT LTG, Reason Goal Not Met goals revised this date  -MD        User Key  (r) = Recorded By, (t) = Taken By, (c) = Cosigned By    Initials Name Provider Type    MD Liliya Nunes, PT Physical Therapist    ABHILASH Negron, PT DPT Physical Therapist          Physical Therapy Education     Title: PT OT SLP Therapies (Done)     Topic: Physical Therapy (Done)     Point: Mobility training (Done)    Learning Progress Summary    Learner Readiness Method Response Comment Documented by Status   Patient Acceptance BLACK DE   01/15/18 1058 Done    Acceptance LINDA,JEAN LAI,NR Instructed pt to complete  QS, GS, AP throughout the day to help improve L knee stability. JK 01/14/18 1130 Done    Acceptance E NR  AR 01/10/18 1450 Active    Acceptance E,TB NR  EE 01/09/18 1343 Active    Acceptance E,D VU,DU benefits of activity, safety during ambulation  01/08/18 1400 Done    Acceptance E,D VU,DU safety during transfers  01/07/18 1418 Done    Acceptance E,D VU,DU safety during transfers  01/06/18 1204 Done               Point: Home exercise program (Done)    Learning Progress Summary    Learner Readiness Method Response Comment Documented by Status   Patient Acceptance E VU,NR   01/15/18 1058 Done    Acceptance E,D VU,DU,NR Instructed pt to complete QS, GS, AP throughout the day to help improve L knee stability. JK 01/14/18 1130 Done    Acceptance E NR  AR 01/10/18 1450 Active    Acceptance E,TB NR  EE 01/09/18 1343 Active               Point: Body mechanics (Done)    Learning Progress Summary    Learner Readiness Method Response Comment Documented by Status   Patient Acceptance E VU,NR   01/15/18 1058 Done    Acceptance E NR  AR 01/10/18 1450 Active    Acceptance E,TB NR  EE 01/09/18 1343 Active               Point: Precautions (Done)    Learning Progress Summary    Learner Readiness Method Response Comment Documented by Status   Patient Acceptance E VU,NR   01/15/18 1058 Done    Acceptance E,D BLACK MELENDEZ 01/13/18 1329 Active    Acceptance E NR  AR 01/10/18 1450 Active                      User Key     Initials Effective Dates Name Provider Type Discipline     02/07/17 -  Kandi Buckner, PT Physical Therapist PT     12/01/15 -  Haydee More, PT Physical Therapist PT     12/01/15 -  Brittany Colbert, PT Physical Therapist PT    MD 12/01/15 -  Liliya Nunes, PT Physical Therapist PT    AR 06/27/16 -  Fanny Hough, PT Physical Therapist PT     08/02/16 -  Davin Negron, PT DPT Physical Therapist PT                    PT Recommendation and Plan  Anticipated Discharge Disposition: skilled nursing facility  PT  Frequency: daily  Plan of Care Review  Plan Of Care Reviewed With: patient  Outcome Summary/Follow up Plan: pt agreeable to get OOB to chair this AM assist x 2, LLE weakness. seated therex performed. noted hgb and blood orders. will cont to progress mobility as tiara.           Outcome Measures       01/15/18 1100 01/14/18 1100 01/13/18 1300    How much help from another person do you currently need...    Turning from your back to your side while in flat bed without using bedrails? 2  - 2  -JK 2  -MD    Moving from lying on back to sitting on the side of a flat bed without bedrails? 2  - 2  -JK 2  -MD    Moving to and from a bed to a chair (including a wheelchair)? 2  - 3  -JK 2  -MD    Standing up from a chair using your arms (e.g., wheelchair, bedside chair)? 2  - 3  -JK 2  -MD    Climbing 3-5 steps with a railing? 1  - 1  -JK 1  -MD    To walk in hospital room? 2  - 2  -JK 1  -MD    AM-PAC 6 Clicks Score 11  - 13  -JK 10  -MD    Functional Assessment    Outcome Measure Options AM-PAC 6 Clicks Basic Mobility (PT)  -  AM-PAC 6 Clicks Basic Mobility (PT)  -MD      User Key  (r) = Recorded By, (t) = Taken By, (c) = Cosigned By    Initials Name Provider Type     Kandi Buckner, PT Physical Therapist    CRYSTAL Colbert, PT Physical Therapist    MD Liliya Nunes, PT Physical Therapist           Time Calculation:         PT Charges       01/15/18 1100          Time Calculation    Start Time 0838  -      Stop Time 0850  -      Time Calculation (min) 12 min  -      PT Received On 01/15/18  -      PT - Next Appointment 01/16/18  -        User Key  (r) = Recorded By, (t) = Taken By, (c) = Cosigned By    Initials Name Provider Type     Kandi Buckner, CELINE Physical Therapist          Therapy Charges for Today     Code Description Service Date Service Provider Modifiers Qty    27734882671 HC PT THER PROC EA 15 MIN 1/15/2018 Kandi Buckner, PT GP 1    87804568659 HC PT THER SUPP EA 15 MIN 1/15/2018 Kandi ZAMORA  Dudley, PT GP 1          PT G-Codes  Outcome Measure Options: AM-PAC 6 Clicks Basic Mobility (PT)    Kandi Buckner, PT  1/15/2018

## 2018-01-15 NOTE — PROGRESS NOTES
"   LOS: 10 days    Patient Care Team:  Ricky Hoyos III, MD as PCP - General  Anthony Hernández MD as Consulting Physician (Pulmonary Disease)  Christy Jerez MD as Consulting Physician (Dermatology)  Ladonna Mclaughlin MD as Consulting Physician (Cardiology)    Chief Complaint:    Chief Complaint   Patient presents with   • Shortness of Breath     carrying a lotof fluid in legs    • Rectal Bleeding     onset last week       Subjective follow-up chronic kidney    Interval History:   He is feeling better, denies any chest pain or shortness of air, no orthopnea or PND, no nausea or vomiting.  She has a Landers catheter anchored in place, continue to have lower extremity edema.    Review of Systems:   As noted above    Objective     Vital Signs  Temp:  [97.3 °F (36.3 °C)-98 °F (36.7 °C)] 98 °F (36.7 °C)  Heart Rate:  [78-89] 80  Resp:  [18-20] 18  BP: (119-149)/(41-58) 141/58    Flowsheet Rows         First Filed Value    Admission Height  162.6 cm (64\") Documented at 01/05/2018 1538    Admission Weight  94.3 kg (208 lb) Documented at 01/05/2018 1538             I/O last 3 completed shifts:  In: 2190 [P.O.:840; Blood:600]  Out: 2650 [Urine:2650]    Intake/Output Summary (Last 24 hours) at 01/15/18 1133  Last data filed at 01/15/18 0500   Gross per 24 hour   Intake             1830 ml   Output             1375 ml   Net              455 ml       Physical Exam:  General Appearance:   no acute distress, morbidly obese.   Skin: warm and dry  HEENT:  nonicteric sclerae, oral mucosa normal,   Neck: supple, increased JVD, trachea midline  Lungs: Breath sounds bilaterally with bilateral rhonchi and crackles  Heart: Irregularly irregular, no rub  Abdomen: soft, non-tender,  +bs.  Increased flank edema.   : Landers catheter anchored in  Extremities: 2+ lower extremities edema, RUE edema >> LUE.  Neuro: Normal speech and mental status.      Results Review:      Results from last 7 days  Lab Units 01/15/18  0407 " 01/14/18 0352 01/13/18 0427  01/12/18  0542 01/11/18  0641   SODIUM mmol/L 137 132* 131*  < > 132* 130*   POTASSIUM mmol/L 3.9 4.2 3.7  < > 3.2* 3.8   CHLORIDE mmol/L 94* 92* 91*  < > 92* 92*   CO2 mmol/L 30.2* 28.8 28.8  < > 26.7 25.9   BUN mg/dL 69* 60* 55*  < > 57* 51*   CREATININE mg/dL 1.40* 1.47* 1.37*  < > 1.83* 2.05*   CALCIUM mg/dL 8.6 8.7 8.7  < > 8.0* 8.1*   BILIRUBIN mg/dL  --   --   --   --  0.4 0.6   ALK PHOS U/L  --   --   --   --  74 79   ALT (SGPT) U/L  --   --   --   --  20 22   AST (SGOT) U/L  --   --   --   --  19 23   GLUCOSE mg/dL 106* 102* 103*  < > 99 87   < > = values in this interval not displayed.    Estimated Creatinine Clearance: 37 mL/min (by C-G formula based on Cr of 1.4).      Results from last 7 days  Lab Units 01/13/18 0427 01/09/18  0535   MAGNESIUM mg/dL 2.1 2.2   PHOSPHORUS mg/dL 3.6 4.1         Results from last 7 days  Lab Units 01/15/18  0407 01/14/18  0352 01/13/18  0427 01/12/18  0542 01/11/18  0641 01/10/18  0707 01/09/18  0535   URIC ACID mg/dL 11.5* 11.0* 11.2* 10.8* 11.1* 11.8* 11.8*         Results from last 7 days  Lab Units 01/15/18  0407 01/14/18  0352 01/13/18  0427 01/12/18  1727 01/12/18  0542 01/11/18  0641   WBC 10*3/mm3 8.30 7.87 8.82  --  9.23 8.79   HEMOGLOBIN g/dL 7.7* 7.5* 8.0* 8.4* 8.0* 8.7*   PLATELETS 10*3/mm3 235 232 263  --  263 253               Imaging Results (last 24 hours)     ** No results found for the last 24 hours. **          allopurinol 100 mg Oral Daily   budesonide-formoterol 2 puff Inhalation BID - RT   bumetanide 4 mg Intravenous Q12H   carvedilol 37.5 mg Oral Q12H   citalopram 20 mg Oral Daily   fluticasone 2 spray Each Nare Daily   hydrALAZINE 25 mg Oral Q8H   metOLazone 5 mg Oral Daily   pantoprazole 40 mg Intravenous Q12H   potassium chloride 40 mEq Oral Q8H   sennosides-docusate sodium 1 tablet Oral Nightly   vitamin B-6 100 mg Oral Daily          Medication Review:   Current Facility-Administered Medications   Medication Dose  Route Frequency Provider Last Rate Last Dose   • acetaminophen (TYLENOL) tablet 650 mg  650 mg Oral Q6H PRN Jeff Li MD   650 mg at 01/11/18 1439   • allopurinol (ZYLOPRIM) tablet 100 mg  100 mg Oral Daily Cindy Alves MD   100 mg at 01/15/18 0942   • budesonide-formoterol (SYMBICORT) 160-4.5 MCG/ACT inhaler 2 puff  2 puff Inhalation BID - RT Jeff Li MD   2 puff at 01/15/18 0818   • bumetanide (BUMEX) injection 4 mg  4 mg Intravenous Q12H Raven Gannon APRN   4 mg at 01/15/18 0522   • carvedilol (COREG) tablet 37.5 mg  37.5 mg Oral Q12H Ladonna Mclaughlin MD   37.5 mg at 01/15/18 0942   • citalopram (CeleXA) tablet 20 mg  20 mg Oral Daily Jeff Li MD   20 mg at 01/15/18 0943   • fluticasone (FLONASE) 50 MCG/ACT nasal spray 2 spray  2 spray Each Nare Daily Genna Gramajo MD   2 spray at 01/14/18 1024   • hydrALAZINE (APRESOLINE) tablet 25 mg  25 mg Oral Q8H Ladonna Mclaughlin MD   25 mg at 01/15/18 0943   • HYDROcodone-acetaminophen (NORCO)  MG per tablet 1 tablet  1 tablet Oral Q4H PRN Raven Gannon, APRN       • HYDROcodone-acetaminophen (NORCO) 7.5-325 MG per tablet 1 tablet  1 tablet Oral Q4H PRN Raven Gannon, APRN       • HYDROmorphone (DILAUDID) injection 0.5 mg  0.5 mg Intravenous Q2H PRN Raven Gannon APRN        And   • naloxone (NARCAN) injection 0.4 mg  0.4 mg Intravenous Q5 Min PRN Raven Gannon APRN       • LORazepam (ATIVAN) tablet 0.25 mg  0.25 mg Oral Q6H PRN Genna Gramajo MD   0.25 mg at 01/12/18 0133   • metOLazone (ZAROXOLYN) tablet 5 mg  5 mg Oral Daily Cindy Alves MD   5 mg at 01/15/18 0942   • nitroglycerin (NITROSTAT) SL tablet 0.4 mg  0.4 mg Sublingual Q5 Min PRN Jeff Li MD       • ondansetron (ZOFRAN) injection 4 mg  4 mg Intravenous Q6H PRN Jeff Li MD       • ondansetron (ZOFRAN) injection 4 mg  4 mg Intravenous Q6H PRN DELTA Sharma       • pantoprazole (PROTONIX) injection 40 mg  40 mg Intravenous Q12H Genna Gramajo MD    40 mg at 01/15/18 0942   • polyethylene glycol (MIRALAX) powder 17 g  17 g Oral Daily PRN Cindy Alves MD       • potassium chloride (MICRO-K) CR capsule 40 mEq  40 mEq Oral Q8H DELTA Sharma   40 mEq at 01/15/18 0943   • sennosides-docusate sodium (SENOKOT-S) 8.6-50 MG tablet 1 tablet  1 tablet Oral Nightly Tonja Hernandez MD   1 tablet at 01/14/18 2107   • sodium chloride 0.9 % flush 1-10 mL  1-10 mL Intravenous PRN Jeff Li MD       • sodium chloride 0.9 % flush 1-10 mL  1-10 mL Intravenous PRN DELTA Sharma       • vitamin B-6 (PYRIDOXINE) tablet 100 mg  100 mg Oral Daily Jeff Li MD   100 mg at 01/15/18 0942       Assessment/Plan   1.  Chronic kidney disease stage III . Crt is stable.Today is 1.4.  Picture suggestive of cardiorenal syndrome.    2.  Fluid excess and systolic and diastolic congestive heart failure.  EF 30%.  Pulm HTN with RV pressure 80.   3.  Chronic anemia associated with iron deficiency, colonic angiodysplasia.  Hemoglobin today 7.7.  4.  Hypertension.  Well controlled  5.  Mild hyponatremia associated with volume excess.  Resolved, sodium 137.    6.  Hyperuricemia.  Uric acid 11.5 today.    Plan:  1.  Since the patient has improved I will switch to oral diuretics torsemide 100 mg twice a day and will continue the oral metolazone.  2.  Surveillance labs        Emile Ornelas MD  01/15/18  11:33 AM

## 2018-01-15 NOTE — PLAN OF CARE
Problem: Patient Care Overview (Adult)  Goal: Plan of Care Review   01/15/18 1058   Coping/Psychosocial Response Interventions   Plan Of Care Reviewed With patient   Outcome Evaluation   Outcome Summary/Follow up Plan pt agreeable to get OOB to chair this AM assist x 2, LLE weakness. seated therex performed. noted hgb and blood orders. will cont to progress mobility as tiara.

## 2018-01-15 NOTE — PROGRESS NOTES
"     LOS: 10 days   Primary Care Physician: Ricky Hoyos III, MD     Subjective   Feels about the same.  Leg swelling is worse    Vital Signs  Body mass index is 38.11 kg/(m^2).  Temp:  [97.3 °F (36.3 °C)-98 °F (36.7 °C)] 97.8 °F (36.6 °C)  Heart Rate:  [78-89] 84  Resp:  [18-20] 18  BP: (119-149)/(41-59) 141/57      Objective:  General Appearance:  In no acute distress (Morbidly obese).    Vital signs: (most recent): Blood pressure 141/57, pulse 84, temperature 97.8 °F (36.6 °C), temperature source Oral, resp. rate 18, height 162.6 cm (64\"), weight 101 kg (222 lb), SpO2 98 %.    HEENT: (Neck supple.  No lymphadenopathy.  Trachea midline)    Lungs:  There are rales and decreased breath sounds.  No wheezes or rhonchi.  (Crackles right base greater than left)  Heart: Normal rate.  No murmur. (Regular with occasional ectopy)  Abdomen: Abdomen is soft.  (Obese)Bowel sounds are normal.   There is no abdominal tenderness.   There is no splenomegaly. There is no hepatomegaly.   Extremities: There is dependent edema.  (Legs wrapped.  3+ edema.  Right upper extremity is swollen but better than yesterday.)  Neurological: Patient is alert.    Skin:  Warm and dry.  (Thin skin.  Ecchymoses with mild erythema along the ventral aspect right upper arm)        Results Review:    I reviewed the patient's new clinical results.      Results from last 7 days  Lab Units 01/15/18  0407 01/14/18  0352   WBC 10*3/mm3 8.30 7.87   HEMOGLOBIN g/dL 7.7* 7.5*   PLATELETS 10*3/mm3 235 232       Results from last 7 days  Lab Units 01/15/18  0407 01/14/18  0352   SODIUM mmol/L 137 132*   POTASSIUM mmol/L 3.9 4.2   CHLORIDE mmol/L 94* 92*   CO2 mmol/L 30.2* 28.8   BUN mg/dL 69* 60*   CREATININE mg/dL 1.40* 1.47*   CALCIUM mg/dL 8.6 8.7   GLUCOSE mg/dL 106* 102*         Hemoglobin A1C:  Lab Results   Component Value Date    HGBA1C 4.90 01/06/2018       Glucose Range:No results found for: POCGLU    No results found for: VILDHNXT79    Lab Results "   Component Value Date    TSH 3.210 01/14/2018       Assessment & Plan      Medication Review: Yes    Active Hospital Problems (** Indicates Principal Problem)    Diagnosis Date Noted   • **Gastrointestinal hemorrhage associated with duodenal ulcer [K26.4] 01/05/2018   • History of PUD (peptic ulcer disease) [K27.9] 01/07/2018   • Anemia, multifactorial [D64.9] 01/07/2018   • AMBER (obstructive sleep apnea) [G47.33] 01/07/2018   • Nonischemic cardiomyopathy [I42.8] 01/07/2018   • Chronic kidney disease, stage 3 [N18.3] 01/06/2018   • Acute on chronic combined systolic and diastolic congestive heart failure [I50.43] 01/05/2018   • 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       Assessment/Plan  1.  Anasarca with cardiorenal syndrome, acute and chronic combined diastolic and systolic congestive heart failure.  Now on oral diuretics.  Continue beta blocker.  Renal and cardiology following.  Continue fluid restriction.  Had upgraded pacer 1/12/18  2.  Acute blood loss anemia.  I ordered another unit of blood today which just now completed.  Recheck hemoglobin in a.m.  Continue IV Protonix.  Possible EGD tomorrow if stable per cardiology.  History of AVMs  3.  Chronic kidney disease stage III, at baseline.  Diuretics changed as above.  4.  Right upper extremity superficial thrombophlebitis, improving.    Genna Gramajo MD  01/15/18  2:13 PM

## 2018-01-15 NOTE — PROGRESS NOTES
"Adult Nutrition  Assessment/PES    Patient Name:  Raquel Deluna  YOB: 1935  MRN: [id number]  Admit Date:  1/5/2018    Assessment Date:  1/15/2018    Comments:    LOS day 10 + new skin breakdown indicated (stage 1 coccyx).   Pt currently has Ensure Enlive ordered TID. Intake 50% or less most meals.   Will continue to monitor.           Reason for Assessment       01/15/18 1118    Reason for Assessment    Reason For Assessment/Visit length of stay;skin risk    Cardiac CHF;Cardiomyopathy    Gastrointestinal PUD    Hematological Anemia    Skin Pressure ulcer              Nutrition/Diet History       01/15/18 1121    Nutrition/Diet History    Reported/Observed By RD/Tech;RN    Appetite Fair    Mental State/Condition Weakness;Other (comment)   tired, fatigued            Anthropometrics       01/15/18 1122    Anthropometrics    RD Documented Current Weight  101 kg (222 lb)    RD Documented Weight on Admission 99.3 kg (219 lb)    Anthropometrics (Special Considerations)    Height Used for Calculations 1.626 m (5' 4\")    RD Calculated BMI (kg/m2) 38    Body Mass Index (BMI)    BMI Grade 35 - 39.9 - obesity - grade II            Labs/Tests/Procedures/Meds       01/15/18 1122    Labs/Tests/Procedures/Meds    Diagnostic Test/Procedure Review reviewed;other (see comments)   plan to have SB enteroscopy tomorrow d/t recent black stool    Labs/Tests Review Reviewed;Cl-;Glucose;BUN;Creat;Alb;Hgb Hct    Medication Review Reviewed, pertinent;Diuretic;Antacid    Significant Vitals reviewed            Physical Findings       01/15/18 1125    Physical Findings/Assessment    Additional Documentation Physical Appearance (Group)    Physical Appearance    Overall Physical Appearance obese;edematous    Skin pressure ulcer(s)   coccyx stage 1 (reddened skin - evaluated by [facility])            Estimated/Assessed Needs       01/15/18 1126    Calculation Measurements    Weight Used For Calculations 54.4 kg (120 lb) IBW    " Estimated/Assessed Energy Needs    Energy Need Method Kcal/kg    kcal/kg 30-35    35 Kcal/Kg (kcal) 1905.12    Estimated Kcal Range  1388-4659 kcal    Estimated/Assessed Protein Needs    Weight Used for Protein Calculation 54.4 kg (120 lb)    Protein (gm/kg) 1.2    1.2 Gm Protein (gm) 65.32    Estimated/Assessed Fluid Needs    Fluid Need Method RDA method    RDA Method (mL)  1632            Nutrition Prescription Ordered       01/15/18 1126    Nutrition Prescription PO    Current PO Diet Regular    Fluid Consistency Thin    Supplement Ensure Enlive    Supplement Frequency 3 times a day    Common Modifiers Cardiac            Evaluation of Received Nutrient/Fluid Intake       01/15/18 1126    PO Evaluation    Number of Days PO Intake Evaluated 3 days    % PO Intake 50% or less            Problem/Interventions:        Problem 1       01/15/18 1128    Nutrition Diagnoses Problem 1    Problem 1 Overweight/Obesity    Signs/Symptoms (evidenced by) BMI    BMI 35 - 39.9                    Intervention Goal       01/15/18 1128    Intervention Goal    General Maintain nutrition;Disease management/therapy;Reduce/improve symptoms    PO Increase intake;PO intake (%)    PO Intake % 75 %            Nutrition Intervention       01/15/18 1128    Nutrition Intervention    RD/Tech Action Follow Tx progress;Care plan reviewd;Encourage intake;Interview for preference              Education/Evaluation       01/15/18 1128    Education    Education Will Instruct as appropriate    Monitor/Evaluation    Monitor Per protocol        Electronically signed by:  Alexia Khan RD  01/15/18 11:28 AM

## 2018-01-15 NOTE — PLAN OF CARE
Problem: Patient Care Overview (Adult)  Goal: Plan of Care Review  Outcome: Ongoing (interventions implemented as appropriate)   01/15/18 1811   Coping/Psychosocial Response Interventions   Plan Of Care Reviewed With patient   Patient Care Overview   Progress improving   Outcome Evaluation   Outcome Summary/Follow up Plan pt reports feeling better. pt was up to chairs for all meals and was soa with the minimal activity. still on 1 L O2 for comfort although sats >95%. Received PRBCs x1 unit pt npo after midnight for small bowel scope.      Goal: Adult Individualization and Mutuality  Outcome: Ongoing (interventions implemented as appropriate)    Goal: Discharge Needs Assessment  Outcome: Ongoing (interventions implemented as appropriate)

## 2018-01-15 NOTE — PROGRESS NOTES
"          Electrophysiology Follow-Up Note      Patient Name: Raquel Deluna  Age/Sex: 82 y.o. female  : 1935  MRN: [id number]      Day of Service: 01/15/18       Chief Complaint/Follow-up: Dyspnea/CHF    S: \"I'm doing okay\" Breathing is better. No chest pain      Temp:  [97.3 °F (36.3 °C)-98 °F (36.7 °C)] 97.8 °F (36.6 °C)  Heart Rate:  [78-89] 84  Resp:  [18-20] 18  BP: (119-149)/(41-59) 141/57     PHYSICAL EXAM:    General Appearance: No acute distress,..   Eyes: Conjunctiva and lids: No erythema, swelling, or discharge. Sclera non-icteric.   HENT: Atraumatic, normocephalic. External eyes, ears, and nose normal.   Respiratory: No signs of respiratory distress. Respiration rhythm and depth normal.   Clear anteriorly. Decreased breath sounds.    Cardiovascular:  Heart Rate and Rhythm: Normal, Heart Sounds: Normal S1 and S2. No S3 or S4 noted.  Murmurs: No murmurs noted. No rubs, thrills, or gallops.   Arterial Pulses: Posterior tibialis and dorsalis pedis pulses normal.   Lower Extremities: Lower extremity edema  Gastrointestinal:  Abdomen soft, non-distended, non-tender.  Musculoskeletal: Normal movement of extremities  Skin: Warm and dry.   Psychiatric: Patient alert and oriented to person, place, and time. Speech and behavior appropriate. Normal mood and affect.       ECG/TELE: Paced        Results from last 7 days  Lab Units 01/15/18  0407 01/14/18  0352 01/13/18  0427   SODIUM mmol/L 137 132* 131*   POTASSIUM mmol/L 3.9 4.2 3.7   CHLORIDE mmol/L 94* 92* 91*   CO2 mmol/L 30.2* 28.8 28.8   BUN mg/dL 69* 60* 55*   CREATININE mg/dL 1.40* 1.47* 1.37*   GLUCOSE mg/dL 106* 102* 103*   CALCIUM mg/dL 8.6 8.7 8.7       Results from last 7 days  Lab Units 01/15/18  0407 01/14/18  0352 01/13/18  0427   WBC 10*3/mm3 8.30 7.87 8.82   HEMOGLOBIN g/dL 7.7* 7.5* 8.0*   HEMATOCRIT % 24.3* 23.6* 25.3*   PLATELETS 10*3/mm3 235 232 263               Results from last 7 days  Lab Units 18  0352   TSH mIU/mL 3.210 "         Current Medications:   Scheduled Meds:  allopurinol 100 mg Oral Daily   budesonide-formoterol 2 puff Inhalation BID - RT   carvedilol 37.5 mg Oral Q12H   citalopram 20 mg Oral Daily   fluticasone 2 spray Each Nare Daily   hydrALAZINE 25 mg Oral Q8H   metOLazone 5 mg Oral Daily   miconazole  Topical Q12H   pantoprazole 40 mg Intravenous Q12H   potassium chloride 40 mEq Oral Q8H   sennosides-docusate sodium 1 tablet Oral Nightly   torsemide 100 mg Oral BID   vitamin B-6 100 mg Oral Daily         Principal Problem:    Gastrointestinal hemorrhage associated with duodenal ulcer  Active Problems:    Hyponatremia    Essential hypertension    Gastrointestinal hemorrhage    Paroxysmal atrial fibrillation    Bilateral edema of lower extremity    history of GI AVM (gastrointestinal arteriovenous vascular malformation)    S/P MVR (mitral valve repair)    S/P TVR (tricuspid valve repair)    Acute on chronic combined systolic and diastolic congestive heart failure    Chronic kidney disease, stage 3    History of PUD (peptic ulcer disease)    Anemia, multifactorial    AMBER (obstructive sleep apnea)    Nonischemic cardiomyopathy       Plan:     1. Dyspnea with volume overload -was not diuresing despite diuretics, s/p upgrade to CRT-P 1/12/18. She is diuresing better since device placed. Diuretics per renal-changed to oral diuretics today.   2. Severe cardiomyopathy, nonischemic> upgraded to CRT-P 1/12/18  3. S/p R CEA 12/2017  4. S/p MV and TV repair 9/2017  5. Pacer for AV block after surgery. She was upgraded to CRT-P 1/12/18  6. H/o GI bleeds with chronic anemia.-Gi has seen, she has a hx of EGD/C/S in 2/2017 with 3 small gastric ulcers and right sided colonic AVM's. H/H down 7.5/23.6-stool + OB on 1/14-transfused 1 unit, H/H only up to 7.7/24.3, GI re-consulted and plan for EGD, more blood today.   7. PAF. No AC due to GI bleeding.   8. CKD III, creat. Stable, nephrology following.   9. HTN, stable      She is better  since CRT device, diuresing, edema and breathing better. H/H down, receiving blood, plan for EGD tomorrow.    Raven Gannon, APRN  01/15/18  2:25 PM

## 2018-01-15 NOTE — NURSING NOTE
01/15/18 1017   Wound 01/14/18     Date first assessed: 01/14/18   Location: (c)   Type: (c)    Wound WDL WDL   Dressing Appearance (open to air)   Base reddened   Periwound Area redness   Wound Interventions (continue moisture barrier cream)   Patient has had loose stools. Waffle cushion in place. Plan to limit time up in chair.  Excoriation blanchable

## 2018-01-15 NOTE — PLAN OF CARE
Problem: Patient Care Overview (Adult)  Goal: Plan of Care Review  Outcome: Ongoing (interventions implemented as appropriate)   01/15/18 0338   Outcome Evaluation   Outcome Summary/Follow up Plan VSS. On 1L of O2 via NC with humidification. BLE dressing completed per order. Turned Q2h throughout shift. Barrier cream applied to coccyx. One large black tarry BM. Landers with clear yellow urine. Landers care completed. Fall precautions in place. Pt disoriented to place and time. Bed alarm on. Will continue to monitor.     Goal: Adult Individualization and Mutuality  Outcome: Ongoing (interventions implemented as appropriate)    Goal: Discharge Needs Assessment  Outcome: Ongoing (interventions implemented as appropriate)      Problem: Cardiac: Heart Failure (Adult)  Goal: Signs and Symptoms of Listed Potential Problems Will be Absent or Manageable (Cardiac: Heart Failure)  Outcome: Ongoing (interventions implemented as appropriate)      Problem: Pain, Acute (Adult)  Goal: Acceptable Pain Control/Comfort Level  Outcome: Ongoing (interventions implemented as appropriate)      Problem: Fall Risk (Adult)  Goal: Absence of Falls  Outcome: Ongoing (interventions implemented as appropriate)      Problem: Fluid Volume Excess (Adult,Obstetrics,Pediatric)  Goal: Stable Weight  Outcome: Ongoing (interventions implemented as appropriate)    Goal: Balanced Intake/Output  Outcome: Ongoing (interventions implemented as appropriate)      Problem: Pressure Ulcer Risk (Wily Scale) (Adult,Obstetrics,Pediatric)  Goal: Skin Integrity  Outcome: Ongoing (interventions implemented as appropriate)    Goal: Identify Related Risk Factors and Signs and Symptoms  Outcome: Ongoing (interventions implemented as appropriate)    Goal: Skin Integrity  Outcome: Ongoing (interventions implemented as appropriate)

## 2018-01-16 PROBLEM — J96.01 ACUTE RESPIRATORY FAILURE WITH HYPOXIA (HCC): Status: ACTIVE | Noted: 2018-01-01

## 2018-01-16 NOTE — PLAN OF CARE
Problem: Patient Care Overview (Adult)  Goal: Plan of Care Review  Outcome: Ongoing (interventions implemented as appropriate)   01/16/18 0420   Outcome Evaluation   Outcome Summary/Follow up Plan VSS. On 1L O2 via NC. NPO. Plan for small bowel enteroscopy today. Dressing change to BLE done. Turned Q2h throughout shift. Barrier cream applied to coccyx and buttocks. Will continue to monitor.      Goal: Adult Individualization and Mutuality  Outcome: Ongoing (interventions implemented as appropriate)    Goal: Discharge Needs Assessment  Outcome: Ongoing (interventions implemented as appropriate)      Problem: Cardiac: Heart Failure (Adult)  Goal: Signs and Symptoms of Listed Potential Problems Will be Absent or Manageable (Cardiac: Heart Failure)  Outcome: Ongoing (interventions implemented as appropriate)      Problem: Pain, Acute (Adult)  Goal: Acceptable Pain Control/Comfort Level  Outcome: Ongoing (interventions implemented as appropriate)      Problem: Fall Risk (Adult)  Goal: Absence of Falls  Outcome: Ongoing (interventions implemented as appropriate)      Problem: Fluid Volume Excess (Adult,Obstetrics,Pediatric)  Goal: Stable Weight  Outcome: Ongoing (interventions implemented as appropriate)    Goal: Balanced Intake/Output  Outcome: Ongoing (interventions implemented as appropriate)      Problem: Pressure Ulcer Risk (Wily Scale) (Adult,Obstetrics,Pediatric)  Goal: Skin Integrity  Outcome: Ongoing (interventions implemented as appropriate)    Goal: Identify Related Risk Factors and Signs and Symptoms  Outcome: Ongoing (interventions implemented as appropriate)    Goal: Skin Integrity  Outcome: Ongoing (interventions implemented as appropriate)

## 2018-01-16 NOTE — ANESTHESIA PREPROCEDURE EVALUATION
Anesthesia Evaluation     history of anesthetic complications:         Airway   Mallampati: I  TM distance: <3 FB  Neck ROM: full  no difficulty expected  Dental - normal exam     Pulmonary - normal exam   (+) asthma, sleep apnea,   Cardiovascular - normal exam    (+) pacemaker pacemaker, hypertension, valvular problems/murmurs, dysrhythmias, CHF, PVD, hyperlipidemia      Neuro/Psych  (+) headaches, weakness, psychiatric history,     GI/Hepatic/Renal/Endo    (+)  hiatal hernia, GERD, PUD,     Musculoskeletal     Abdominal  - normal exam    Bowel sounds: normal.   Substance History      OB/GYN          Other   (+) arthritis                                           Anesthesia Plan    ASA 3     MAC     Anesthetic plan and risks discussed with patient.

## 2018-01-16 NOTE — CONSULTS
Group: [facility]         PULMONARY CONSULT NOTE    Patient Identification:  Raquel Deluna  82 y.o.  female  1935  [id number]            Requesting physician: Dr. Baron Torrez    Reason for Consultation:  Worsening hypoxic respiratory failure after upper endoscopy    CC: Shortness of breath    History of Present Illness:  82-year-old female who underwent upper endoscopy earlier today, small bowel enteroscopy.  Since the procedure she has been more short of breath.  Apparently there was witnessed vomiting and aspiration, possibly bile containing vomitus.  She is feeling short of breath and is unable to take deep breaths.  She says it's only alleviated by increased oxygen flow.  It's worsened by any exertion.  She is also complaining of coughing and wheezing.  She is denying any hemoptysis.  She denies any chest pain.  No specific treatment has yet been given.    I reviewed procedure note by Dr. Mccoy dictated earlier today.    Review of Systems   Constitutional: Negative for diaphoresis and fever.   HENT: Negative for ear discharge and sore throat.    Eyes: Negative for pain and visual disturbance.   Respiratory: Positive for cough, shortness of breath and wheezing.    Cardiovascular: Negative for chest pain and leg swelling.   Gastrointestinal: Negative for abdominal pain and diarrhea.   Endocrine: Negative for cold intolerance and polyuria.   Genitourinary: Negative for dysuria and hematuria.   Musculoskeletal: Negative for joint swelling and myalgias.   Skin: Negative for rash and wound.   Neurological: Negative for speech difficulty and numbness.   Hematological: Negative for adenopathy. Does not bruise/bleed easily.   Psychiatric/Behavioral: Negative for agitation and confusion.       Past Medical History:  Past Medical History:   Diagnosis Date   • Angiodysplasia of colon    • Anxiety    • Arthritis    • Asthma    • Atrial fibrillation 02/2017   • Bella's esophagus 3/14/2016   • Cellulitis      left leg hx about a month ago   ALL HEALED   NOT SWELLING OR OOZING NOW  WEARS CURT ACE WRAPS   • Chronic bronchitis    • Chronic kidney disease    • Colon polyp 3/14/2016   • Essential hypertension 8/24/2013    Overview:  2015 IMO UPDATE   • GERD (gastroesophageal reflux disease)    • Hiatal hernia    • History of goiter    • History of transfusion     had reaction to 2nd unit after receiving the 1st   • Hyperlipidemia    • Hypertension    • Hyponatremia    • Intestinal malabsorption 3/31/2016   • Iron deficiency anemia 03/14/2016    receives iron infusion in past   • Migraine    • Osteoporosis    • PONV (postoperative nausea and vomiting)    • Sleep apnea     cpap   • Stress incontinence     wears pads       Past Surgical History:  Past Surgical History:   Procedure Laterality Date   • APPENDECTOMY     • CARDIAC CATHETERIZATION N/A 9/13/2017    Procedure: Left ventriculography;  Surgeon: Ron Moscoso MD;  Location: [facility] CATH INVASIVE LOCATION;  Service:    • CARDIAC CATHETERIZATION N/A 9/13/2017    Procedure: Left Heart Cath;  Surgeon: Ron Moscoso MD;  Location: [facility] CATH INVASIVE LOCATION;  Service:    • CARDIAC CATHETERIZATION N/A 9/13/2017    Procedure: Coronary angiography;  Surgeon: Ron Moscoso MD;  Location: [facility] CATH INVASIVE LOCATION;  Service:    • CARDIAC CATHETERIZATION N/A 9/13/2017    Procedure: Right Heart Cath;  Surgeon: Ron Moscoso MD;  Location: [facility] CATH INVASIVE LOCATION;  Service:    • CARDIAC ELECTROPHYSIOLOGY PROCEDURE N/A 9/29/2017    Procedure: Device Implant, [place]   PPM;  Surgeon: Benji Martínez MD;  Location: [facility] CATH INVASIVE LOCATION;  Service:    • CARDIAC ELECTROPHYSIOLOGY PROCEDURE N/A 1/12/2018    Procedure: Lead Revision st ronny ;  Surgeon: Baron Britt MD;  Location: [facility] CATH INVASIVE LOCATION;  Service:    • CARDIAC ELECTROPHYSIOLOGY PROCEDURE N/A 1/12/2018    Procedure: Biventricular Device Upgrade- Pacemaker  upgrade to BIV PPM- [place] device;  Surgeon: Baron Britt MD;  Location: [facility] CATH INVASIVE LOCATION;  Service:    • CARDIAC SURGERY      MVR/TVR   • CERVICAL DISC SURGERY     • COLONOSCOPY     • COLONOSCOPY N/A 2/3/2017    NBIH, diverticulosis, one 9 mm hyperplastic polyp, multiple non-bleeding colonic angioectasias, Path; neg   • ENDOSCOPY N/A 2/2/2017    normal esophagus, normal examined duodenum, non-bleeding gastric ulcers w/no sigmata of bleeding   • HYSTERECTOMY     • MITRAL VALVE REPAIR/REPLACEMENT N/A 9/25/2017    Procedure: KOLTON STERNOTOMY MITRAL VALVE REPLACEMENT, TRICUSPID VALVE REPAIR, MAZE PROCEDURE AND PRP;  Surgeon: Yonas Johnson MD;  Location: [facility] OR;  Service:    • PACEMAKER IMPLANTATION       DUAL PACEMAKER ST RONNY  MODEL  [id number]  SERIES [id number]   • CO THROMBOENDARTECTMY NECK,NECK INCIS Right 12/7/2017    Procedure: RT CAROTID ENDARTERECTOMY;  Surgeon: Delma Templeton Jr., MD;  Location: [facility] MAIN OR;  Service: Vascular   • CO TOTAL KNEE ARTHROPLASTY Right 10/25/2016    Procedure: RT TOTAL KNEE ARTHROPLASTY;  Surgeon: Ricky Dsouza MD;  Location: [facility] OR;  Service: Orthopedics   • THYROID SURGERY      for goiter   • TONSILLECTOMY          Home Meds:  Prescriptions Prior to Admission   Medication Sig Dispense Refill Last Dose   • aspirin 81 MG EC tablet Take 1 tablet by mouth Daily. (Patient taking differently: Take 81 mg by mouth Daily. TO STAY ON) 30 tablet 5 12/6/2017 at 0900   • atorvastatin (LIPITOR) 20 MG tablet Take 20 mg by mouth Daily.  4 12/6/2017 at 0900   • B Complex Vitamins (VITAMIN B COMPLEX PO) Take 1 tablet by mouth Every Morning.   12/6/2017 at 0900   • budesonide-formoterol (SYMBICORT) 160-4.5 MCG/ACT inhaler Inhale 2 puffs 2 (Two) Times a Day.   12/6/2017 at 0900   • bumetanide (BUMEX) 2 MG tablet Take 1.5 tablets by mouth 2 (Two) Times a Day. PT IS TAKING 1 AND 1/2 TABLET  tablet 3 12/6/2017 at 2000   • CALCIUM CARBONATE-VITAMIN D PO  Take 1 tablet by mouth Every Morning.   12/6/2017 at 0900   • carvedilol (COREG) 12.5 MG tablet Take 2 tablets by mouth Every 12 (Twelve) Hours. 60 tablet 5 12/7/2017 at 0600   • Cholecalciferol (VITAMIN D-3 PO) Take 2,000 Units by mouth Daily.   12/6/2017 at 0900   • citalopram (CeleXA) 40 MG tablet Take 40 mg by mouth Daily.   12/6/2017 at 0900   • esomeprazole (NexIUM) 40 MG capsule Take 40 mg by mouth Daily Before Supper.   12/6/2017 at 0900   • Fe-Succ Ac-C-Thre Ac-B12-FA (FERREX 150 FORTE PLUS)  MG capsule capsule Take 1 capsule by mouth 2 (Two) Times a Day With Meals. 60 each 5 12/4/2017   • hydrALAZINE (APRESOLINE) 25 MG tablet Take 1 tablet by mouth Every 8 (Eight) Hours. 90 tablet 5 12/7/2017 at 0600   • mometasone (NASONEX) 50 MCG/ACT nasal spray 2 sprays into each nostril Every Morning.   12/6/2017 at 0900   • Multiple Vitamins-Minerals (CENTRUM SILVER PO) Take 1 tablet by mouth Daily.   12/6/2017 at 0900   • potassium chloride (MICRO-K) 10 MEQ CR capsule Take 2 capsules by mouth 2 (Two) Times a Day With Meals. 120 capsule 5 12/6/2017 at 2000   • vitamin B-6 (PYRIDOXINE) 100 MG tablet Take 100 mg by mouth Daily.   12/6/2017 at 0900       Allergies:  Allergies   Allergen Reactions   • Sulfa Antibiotics Other (See Comments)     Yeast infections/ RASH       Social History:   Social History     Social History   • Marital status:      Spouse name: N/A   • Number of children: N/A   • Years of education: N/A     Occupational History   • Not on file.     Social History Main Topics   • Smoking status: Former Smoker     Packs/day: 4.00     Years: 33.00     Types: Cigarettes     Quit date: 1980   • Smokeless tobacco: Never Used   • Alcohol use Yes      Comment: once a month a glass of wine or mixed drink   • Drug use: No   • Sexual activity: Not on file     Other Topics Concern   • Not on file     Social History Narrative       Family History:  Family History   Problem Relation Age of Onset   •  "Adopted: Yes   • Malig Hyperthermia Neg Hx        Physical Exam:  /66 (BP Location: Right arm, Patient Position: Lying)  Pulse 89  Temp 98.6 °F (37 °C) (Oral)   Resp 18  Ht 162.6 cm (64\")  Wt 93.4 kg (205 lb 12.8 oz)  SpO2 99%  BMI 35.33 kg/m2 Body mass index is 35.33 kg/(m^2). 99% 93.4 kg (205 lb 12.8 oz)  Physical Exam   Constitutional: No distress.   Awake, alert, breathing rapidly, but able to speak full sentences   HENT:   Head: Normocephalic.   Mouth/Throat: Oropharynx is clear and moist.   Eyes: Conjunctivae and EOM are normal. No scleral icterus.   Neck: No tracheal deviation present. No thyromegaly present.   Cardiovascular: Normal rate, regular rhythm and normal heart sounds.    3+ massive lower extremity edema bilaterally   Pulmonary/Chest: No respiratory distress. She has wheezes. She exhibits no tenderness.   Abdominal: She exhibits no distension and no mass. There is no tenderness.   Musculoskeletal: Normal range of motion. She exhibits no deformity.   Neurological: She is alert. No cranial nerve deficit. She exhibits normal muscle tone.   Skin: She is diaphoretic. There is erythema.   Both lower extremities are wrapped around the ankle with Ace bandage and she has erythema over both lower extremities with edema   Psychiatric:   She is a little anxious, and short of breath, her thought content appears normal       LABS:  Lab Results   Component Value Date    CALCIUM 8.9 01/16/2018    PHOS 3.1 01/16/2018     Results from last 7 days  Lab Units 01/16/18  0410 01/15/18  0407 01/14/18  0352 01/13/18  0427  01/12/18  0542 01/11/18  0641   MAGNESIUM mg/dL  --   --   --  2.1  --   --   --    SODIUM mmol/L 138 137 132* 131*  < > 132* 130*   POTASSIUM mmol/L 3.0* 3.9 4.2 3.7  < > 3.2* 3.8   CHLORIDE mmol/L 93* 94* 92* 91*  < > 92* 92*   CO2 mmol/L 33.6* 30.2* 28.8 28.8  < > 26.7 25.9   BUN mg/dL 77* 69* 60* 55*  < > 57* 51*   CREATININE mg/dL 1.45* 1.40* 1.47* 1.37*  < > 1.83* 2.05*   GLUCOSE mg/dL " 105* 106* 102* 103*  < > 99 87   CALCIUM mg/dL 8.9 8.6 8.7 8.7  < > 8.0* 8.1*   WBC 10*3/mm3 7.30 8.30 7.87 8.82  --  9.23 8.79   HEMOGLOBIN g/dL 8.2* 7.7* 7.5* 8.0*  < > 8.0* 8.7*   PLATELETS 10*3/mm3 201 235 232 263  --  263 253   ALT (SGPT) U/L 18  --   --   --   --  20 22   AST (SGOT) U/L 22  --   --   --   --  19 23   < > = values in this interval not displayed.  Lab Results   Component Value Date    CKTOTAL 36 09/07/2017    TROPONINT 0.023 01/06/2018                   Results from last 7 days  Lab Units 01/16/18  1812 01/10/18  1704   PH, ARTERIAL pH units 7.371 7.418   PCO2, ARTERIAL mm Hg 68.6* 43.9   PO2 ART mm Hg 70.7* 96.0   FLOW RATE lpm 4 1   MODALITY  Cannula Cannula   O2 SATURATION CALC % 92.5 97.5           Results from last 7 days  Lab Units 01/16/18  0410   INR  1.36*         Lab Results   Component Value Date    TSH 3.210 01/14/2018     Estimated Creatinine Clearance: 34.3 mL/min (by C-G formula based on Cr of 1.45).         Imaging: I personally visualized the images chest x-ray performed after upper endoscopy.  It shows clearly a new left lung infiltrate suggesting aspiration pneumonia.  An old left subclavian AICD is in place.  There are some old sternotomy wires in place.      Assessment:  Gastrointestinal hemorrhage associated with duodenal ulcer  Acute hypoxic respiratory failure  Aspiration pneumonia  Acute bronchospasm  Hypokalemia  Chronic kidney disease  Anemia    Recommendations:  We will start intravenous Rocephin as antibiotic for aspiration pneumonia.  We will give intravenous Lasix due to her significant volume overload in her lower extremities.  I will give her 1 dose of steroids due to the bronchospasm as well as start her on scheduled nebulized bronchodilators.  We'll continue to increase her oxygen as needed.  We will repeat chest x-ray in the morning.  The patient has the potential for rapid deterioration and has to be monitored closely tonight.          Héctor Real,  MD  1/16/2018  6:36 PM      Much of this encounter note is an electronic transcription/translation of spoken language to printed text using Dragon Software.

## 2018-01-16 NOTE — PROGRESS NOTES
"   LOS: 11 days   Patient Care Team:  Ricky Hoyos III, MD as PCP - General  Anthony Hernández MD as Consulting Physician (Pulmonary Disease)  Christy Jerez MD as Consulting Physician (Dermatology)  Ladonna Mclaughlin MD as Consulting Physician (Cardiology)    Chief Complaint: SOA    Subjective     HPI Comments: SOA since EGD earlier today. She is laboring to breathe. No chest pain or palp.     Shortness of Breath   Pertinent negatives include no chest pain, fever or vomiting.   Rectal Bleeding   Pertinent negatives include no anorexia, chest pain, coughing, fever, nausea, vomiting or weakness.       Subjective:  Symptoms:  Stable.  She reports shortness of breath and anxiety.  No malaise, cough, chest pain, weakness, headache, chest pressure, anorexia or diarrhea.    Diet:  Adequate intake.  No nausea or vomiting.    Activity level: Impaired due to weakness.    Pain:  She reports no pain.        History taken from: patient chart RN    Objective     Vital Signs  Temp:  [97.7 °F (36.5 °C)-98.6 °F (37 °C)] 98.6 °F (37 °C)  Heart Rate:  [77-89] 89  Resp:  [16-18] 18  BP: (116-171)/(47-67) 125/66    Objective:  General Appearance:  Uncomfortable and in distress (MIldly labored breathing, but can speak to me, sentences not shortened).    Vital signs: (most recent): Blood pressure 125/66, pulse 89, temperature 98.6 °F (37 °C), temperature source Oral, resp. rate 18, height 162.6 cm (64\"), weight 93.4 kg (205 lb 12.8 oz), SpO2 99 %.  Vital signs are normal.  No fever.  (O2 sats down).    Output: Producing urine and no stool output.    HEENT: Normal HEENT exam.    Lungs:  Tachypnea and increased effort.  She is in respiratory distress (mild).  There are wheezes (global expiratory) and rales (bibasilar).  (Prolonged exp phase)  Heart: Normal rate.  Regular rhythm.    Abdomen: Abdomen is soft.  (Edema of abd wall)Bowel sounds are normal.   There is no abdominal tenderness.     Extremities: There is dependent " edema.  (Wraps in place)  Pulses: Distal pulses are intact.    Neurological: Patient is alert and oriented to person, place and time.    Pupils:  Pupils are equal, round, and reactive to light.    Skin:  Warm and dry.              Results Review:     I reviewed the patient's new clinical results.  I reviewed the patient's new imaging results and agree with the interpretation.  I reviewed the patient's other test results and agree with the interpretation  I personally viewed and interpreted the patient's EKG/Telemetry data  Discussed with patient and RN at bedside    Medication Review: reviewed and adjusted    Assessment/Plan     Principal Problem:    Gastrointestinal hemorrhage associated with duodenal ulcer  Active Problems:    Hyponatremia    Essential hypertension    Gastrointestinal hemorrhage    Paroxysmal atrial fibrillation    Bilateral edema of lower extremity    history of GI AVM (gastrointestinal arteriovenous vascular malformation)    S/P MVR (mitral valve repair)    S/P TVR (tricuspid valve repair)    Acute on chronic combined systolic and diastolic congestive heart failure    Chronic kidney disease, stage 3    History of PUD (peptic ulcer disease)    Anemia, multifactorial    AMBER (obstructive sleep apnea)    Nonischemic cardiomyopathy          Plan:   (Appreciate Card/GI/Renal attention to pt  Currently on PO Demadex per Renal  Cr stable today, weight down  S/p upgrade to CRT-P on 1/12  EF improved on Echo this admission  S/p SB enteroscopy today per Dr. Mccoy, small angioectasia clipped in proximal jejunum  Monitor Hgb, better today, continue PPI  Given PRBC on 1/9, 1/14, and 1/15  S[facility] at [facility]?  Just now back from Endoscopy  Per report of Endo staff she aspirated during procedure and sats dropped, she had to be bagged for short period  She received 300ml of LR during procedure  She is now laboring to breathe and requiring 4L by nasal cannula to maintain sats >90%  Will check stat ABG and CXR  Give  DuoNeb and one extra dose IV diuretic  Will ask her Pulmonologist to see this evening).       Baron Torrez MD  01/16/18  5:31 PM    Time: Critical care time 38min

## 2018-01-16 NOTE — PROGRESS NOTES
"   LOS: 11 days    Patient Care Team:  Ricky Hoyos III, MD as PCP - General  Anthony Hernández MD as Consulting Physician (Pulmonary Disease)  Christy Jerez MD as Consulting Physician (Dermatology)  Ladonna Mclaughlin MD as Consulting Physician (Cardiology)    Chief Complaint:    Chief Complaint   Patient presents with   • Shortness of Breath     carrying a lotof fluid in legs    • Rectal Bleeding     onset last week       Subjective follow-up KESHIA on CKD3.     Interval History:   Feeling better.  Eating ok.  NPO for EGD now. Not weighed today. BM yesterday. SOA, but not like last week.     Review of Systems:   As noted above    Objective     Vital Signs  Temp:  [97.7 °F (36.5 °C)-98 °F (36.7 °C)] 97.8 °F (36.6 °C)  Heart Rate:  [77-84] 77  Resp:  [16-20] 16  BP: (141-171)/(47-67) 147/47    Flowsheet Rows         First Filed Value    Admission Height  162.6 cm (64\") Documented at 01/05/2018 1538    Admission Weight  94.3 kg (208 lb) Documented at 01/05/2018 1538             I/O last 3 completed shifts:  In: 900 [Blood:900]  Out: 3125 [Urine:3125]    Intake/Output Summary (Last 24 hours) at 01/16/18 1138  Last data filed at 01/15/18 1739   Gross per 24 hour   Intake              300 ml   Output             1750 ml   Net            -1450 ml       Physical Exam:  General Appearance:   no acute distress, morbidly obese. Sitting in  chair.   Skin: warm and dry  HEENT:  nonicteric sclerae, oral mucosa normal   Neck: supple, increased JVD, trachea midline  Lungs: bilateral rhonchi and crackles  Heart: Irregularly irregular, no rub  Abdomen: soft, non-tender,  +bs.  Increased flank edema.   : Landers catheter   Extremities: 2+ lower extremity edema, legs wrapped to mid shin.  RUE edema >> LUE.  Neuro: Normal speech and mental status.      Results Review:      Results from last 7 days  Lab Units 01/16/18  0410 01/15/18  0407 01/14/18  0352  01/12/18  0542 01/11/18  0641   SODIUM mmol/L 138 137 132*  < > " 132* 130*   POTASSIUM mmol/L 3.0* 3.9 4.2  < > 3.2* 3.8   CHLORIDE mmol/L 93* 94* 92*  < > 92* 92*   CO2 mmol/L 33.6* 30.2* 28.8  < > 26.7 25.9   BUN mg/dL 77* 69* 60*  < > 57* 51*   CREATININE mg/dL 1.45* 1.40* 1.47*  < > 1.83* 2.05*   CALCIUM mg/dL 8.9 8.6 8.7  < > 8.0* 8.1*   BILIRUBIN mg/dL 0.6  --   --   --  0.4 0.6   ALK PHOS U/L 63  --   --   --  74 79   ALT (SGPT) U/L 18  --   --   --  20 22   AST (SGOT) U/L 22  --   --   --  19 23   GLUCOSE mg/dL 105* 106* 102*  < > 99 87   < > = values in this interval not displayed.    Estimated Creatinine Clearance: 35.8 mL/min (by C-G formula based on Cr of 1.45).      Results from last 7 days  Lab Units 01/16/18 0410 01/13/18  0427   MAGNESIUM mg/dL  --  2.1   PHOSPHORUS mg/dL 3.1 3.6         Results from last 7 days  Lab Units 01/16/18 0410 01/15/18  0407 01/14/18  0352 01/13/18  0427 01/12/18  0542 01/11/18  0641 01/10/18  0707   URIC ACID mg/dL 11.8* 11.5* 11.0* 11.2* 10.8* 11.1* 11.8*         Results from last 7 days  Lab Units 01/16/18  0410 01/15/18  0407 01/14/18  0352 01/13/18  0427 01/12/18  1727 01/12/18  0542   WBC 10*3/mm3 7.30 8.30 7.87 8.82  --  9.23   HEMOGLOBIN g/dL 8.2* 7.7* 7.5* 8.0* 8.4* 8.0*   PLATELETS 10*3/mm3 201 235 232 263  --  263         Results from last 7 days  Lab Units 01/16/18  0410   INR  1.36*         Imaging Results (last 24 hours)     ** No results found for the last 24 hours. **          allopurinol 100 mg Oral Daily   budesonide-formoterol 2 puff Inhalation BID - RT   carvedilol 50 mg Oral Q12H   citalopram 20 mg Oral Daily   fluticasone 2 spray Each Nare Daily   hydrALAZINE 50 mg Oral Q8H   metOLazone 5 mg Oral Daily   miconazole  Topical Q12H   pantoprazole 40 mg Intravenous Q12H   potassium chloride 40 mEq Oral Q8H   sennosides-docusate sodium 1 tablet Oral Nightly   torsemide 100 mg Oral BID   vitamin B-6 100 mg Oral Daily          Medication Review:   Current Facility-Administered Medications   Medication Dose Route  Frequency Provider Last Rate Last Dose   • acetaminophen (TYLENOL) tablet 650 mg  650 mg Oral Q6H PRN Jeff Li MD   650 mg at 01/11/18 1439   • allopurinol (ZYLOPRIM) tablet 100 mg  100 mg Oral Daily Cindy Alves MD   100 mg at 01/15/18 0942   • budesonide-formoterol (SYMBICORT) 160-4.5 MCG/ACT inhaler 2 puff  2 puff Inhalation BID - RT Jeff Li MD   2 puff at 01/16/18 0703   • carvedilol (COREG) tablet 50 mg  50 mg Oral Q12H Ladonna Mclaughlin MD   50 mg at 01/16/18 0923   • citalopram (CeleXA) tablet 20 mg  20 mg Oral Daily Jeff Li MD   20 mg at 01/15/18 0943   • fluticasone (FLONASE) 50 MCG/ACT nasal spray 2 spray  2 spray Each Nare Daily Genna Gramajo MD   2 spray at 01/16/18 0923   • hydrALAZINE (APRESOLINE) tablet 50 mg  50 mg Oral Q8H Ladonna Mclaughlin MD       • HYDROcodone-acetaminophen (NORCO)  MG per tablet 1 tablet  1 tablet Oral Q4H PRN Raven Gannon, APRN       • HYDROcodone-acetaminophen (NORCO) 7.5-325 MG per tablet 1 tablet  1 tablet Oral Q4H PRN Raven [place], APRN       • HYDROmorphone (DILAUDID) injection 0.5 mg  0.5 mg Intravenous Q2H PRN Raven Gannon, APRN        And   • naloxone (NARCAN) injection 0.4 mg  0.4 mg Intravenous Q5 Min PRN Raven Gannon, APRN       • LORazepam (ATIVAN) tablet 0.25 mg  0.25 mg Oral Q6H PRN Genna Gramajo MD   0.25 mg at 01/12/18 0133   • metOLazone (ZAROXOLYN) tablet 5 mg  5 mg Oral Daily Cindy Alves MD   5 mg at 01/15/18 0942   • miconazole (MICOTIN) 2 % powder   Topical Q12H Baron Britt MD       • nitroglycerin (NITROSTAT) SL tablet 0.4 mg  0.4 mg Sublingual Q5 Min PRN Jeff Li MD       • ondansetron (ZOFRAN) injection 4 mg  4 mg Intravenous Q6H PRN Jeff Li MD       • ondansetron (ZOFRAN) injection 4 mg  4 mg Intravenous Q6H PRN EDLTA Sharma       • pantoprazole (PROTONIX) injection 40 mg  40 mg Intravenous Q12H Genna Gramajo MD   40 mg at 01/16/18 0923   • polyethylene glycol (MIRALAX) powder 17  g  17 g Oral Daily PRN Cindy Alves MD       • potassium chloride (MICRO-K) CR capsule 40 mEq  40 mEq Oral PRN Arun De MD   40 mEq at 01/16/18 0545    Or   • potassium chloride (KLOR-CON) packet 40 mEq  40 mEq Oral PRN Arun De MD        Or   • potassium chloride 10 mEq in 100 mL IVPB  10 mEq Intravenous Q1H PRN Arun De MD       • potassium chloride (MICRO-K) CR capsule 40 mEq  40 mEq Oral Q8H Raven Gannon, APRN   40 mEq at 01/16/18 0113   • sennosides-docusate sodium (SENOKOT-S) 8.6-50 MG tablet 1 tablet  1 tablet Oral Nightly Tonja Hernandez MD   1 tablet at 01/15/18 2110   • sodium chloride 0.9 % flush 1-10 mL  1-10 mL Intravenous PRN Jeff Li MD       • sodium chloride 0.9 % flush 1-10 mL  1-10 mL Intravenous PRN Raven Gannon, APRN       • torsemide (DEMADEX) tablet 100 mg  100 mg Oral BID Emile Ornelas MD   100 mg at 01/15/18 2326   • vitamin B-6 (PYRIDOXINE) tablet 100 mg  100 mg Oral Daily Jeff Li MD   100 mg at 01/15/18 0942       Assessment/Plan   1.  Chronic kidney disease stage III . Crt is stable. Picture suggestive of cardiorenal syndrome.  Replace K. Diuresing, but scales not accurate.  I just weighed her on standing scale  205.8. Pre-renal azotemia multifact including CHF and blood in GI tract.   2.  Fluid excess and systolic and diastolic congestive heart failure.  EF 30%.  Pulm HTN with RV pressure 80. SP upgrade to CRT-pacer 1/12.   3.  Chronic anemia, iron deficiency, colonic angiodysplasia.  Hemoglobin 8.2. EGD today.   4.  Hypertension. Hydralazine increased today.   5.  Mild hyponatremia associated with volume excess.  Resolved, sodium 138.    6.  Hyperuricemia.  Uric acid 11.5 today.    Plan:  1.  Dc metolazone.         Cindy Alves MD  01/16/18  11:38 AM

## 2018-01-16 NOTE — ANESTHESIA POSTPROCEDURE EVALUATION
"Patient: Raquel Deluna    Procedure Summary     Date Anesthesia Start Anesthesia Stop Room / Location    01/16/18 1611 1645  [facility] /  [facility]       Procedure Diagnosis Surgeon Provider    ENTEROSCOPY SMALL BOWEL  with clip placed (N/A Esophagus) Gastrointestinal hemorrhage associated with duodenal ulcer  (Gastrointestinal hemorrhage associated with duodenal ulcer [K26.4]) MD Dagoberto Scruggs MD          Anesthesia Type: MAC  Last vitals  BP   125/66 (01/16/18 1709)   Temp   37 °C (98.6 °F) (01/16/18 1657)   Pulse   89 (01/16/18 1709)   Resp   18 (01/16/18 1420)     SpO2   99 % (01/16/18 1709)     Post Anesthesia Care and Evaluation    Patient location during evaluation: bedside  Patient participation: complete - patient participated  Level of consciousness: awake and alert  Pain management: adequate  Airway patency: patent  Anesthetic complications: No anesthetic complications    Cardiovascular status: acceptable  Respiratory status: acceptable  Hydration status: acceptable    Comments: /66 (BP Location: Right arm, Patient Position: Lying)  Pulse 89  Temp 37 °C (98.6 °F) (Oral)   Resp 18  Ht 162.6 cm (64\")  Wt 93.4 kg (205 lb 12.8 oz)  SpO2 99%  BMI 35.33 kg/m2      "

## 2018-01-16 NOTE — PLAN OF CARE
Problem: GI Endoscopy (Adult)  Goal: Signs and Symptoms of Listed Potential Problems Will be Absent or Manageable (GI Endoscopy)  Outcome: Ongoing (interventions implemented as appropriate)   01/16/18 1414   GI Endoscopy   Problems Assessed (GI Endoscopy) all

## 2018-01-17 ENCOUNTER — APPOINTMENT (OUTPATIENT)
Dept: GENERAL RADIOLOGY | Facility: HOSPITAL | Age: 83
End: 2018-01-17

## 2018-01-17 NOTE — ED PROVIDER NOTES
Per RN, the pt was found to be apneic after aspirating bile in endoscopy. Per RN, the pt's HR was in the 70's but was in a paced rhythm.     2038- Epi x1 given    2042- Intubated pt.    2043- Compressions stopped. Pt remains in a paced rhythm with no palpable pulse. Compressions resumed.    2044- Amp of bicarb given    2045- Epix1 given. ETCO2=39    2045- Dr. Latif (anesthesia) assumed control ofcode.     Intubation  Date/Time: 1/16/2018 8:42 PM  Performed by: VINEET PILLAI  Authorized by: VINEET PILLAI     Consent:     Consent obtained:  Emergent situation  Pre-procedure details:     Patient status:  Unresponsive    Pretreatment medications:  None    Paralytics:  None  Procedure details:     Preoxygenation:  Bag valve mask    CPR in progress: yes      Intubation method:  Oral    Oral intubation technique:  Direct    Tube size (mm):  7.5    Tube type:  Cuffed    Number of attempts:  1  Placement assessment:     ETT to lip:  22    Tube secured with:  ETT camacho    Breath sounds:  Equal    Placement verification: chest rise and ETCO2 detector    Post-procedure details:     Patient tolerance of procedure:  Tolerated well, no immediate complications              Documentation assistance provided by doug Stewart for Dr. Pillai.  Information recorded by the scribe was done at my direction and has been verified and validated by me.       Sommer Stewart  01/16/18 2120       Vineet Pillai MD  01/16/18 3354

## 2018-01-17 NOTE — NURSING NOTE
At approx 2030 RT called nurse to the pts room where pt was discovered non-responsive, a code was called and CPR was initiated, see notes from bishop CISSE RN

## 2018-01-17 NOTE — DISCHARGE SUMMARY
Name: Raquel Deluna  Age: 82 y.o.  Sex: female  :  1935  MRN: [id number]         Primary Care Physician: Ricky Hoyos III, MD      Date of Admission:  2018  Date of Discharge:  2017 (date of death)    CHIEF COMPLAINT  Shortness of Breath (carrying a lotof fluid in legs ) and Rectal Bleeding (onset last week)      DISCHARGE DIAGNOSIS  Active Hospital Problems (** Indicates Principal Problem)    Diagnosis Date Noted   • **Gastrointestinal hemorrhage associated with duodenal ulcer [K26.4] 2018   • Acute respiratory failure with hypoxia [J96.01] 2018   • History of PUD (peptic ulcer disease) [K27.9] 2018   • Anemia, multifactorial [D64.9] 2018   • AMBER (obstructive sleep apnea) [G47.33] 2018   • Nonischemic cardiomyopathy [I42.8] 2018   • Chronic kidney disease, stage 3 [N18.3] 2018   • Acute on chronic combined systolic and diastolic congestive heart failure [I50.43] 2018   • S/P TVR (tricuspid valve repair) [Z98.890] 11/15/2017   • S/P MVR (mitral valve repair) [Z98.890] 11/15/2017   • 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   • Cellulitis     left leg hx about a month ago   ALL HEALED   NOT SWELLING OR OOZING NOW  WEARS CURT ACE WRAPS   • Chronic bronchitis    • Chronic kidney disease    • Colon polyp 3/14/2016   • Essential hypertension 2013     Overview:  2015 IMO UPDATE   • GERD (gastroesophageal reflux disease)    • Hiatal hernia    • History of goiter    • History of transfusion     had reaction to 2nd unit after receiving the 1st   • Hyperlipidemia    • Hypertension    • Hyponatremia    • Intestinal malabsorption 3/31/2016   • Iron deficiency anemia 03/14/2016    receives iron infusion in past   • Migraine    • Osteoporosis    • PONV (postoperative nausea and vomiting)    • Sleep apnea     cpap   • Stress incontinence     wears pads       CONSULTS   Consult Orders (all)     Start     Ordered    01/16/18 1748  Inpatient Consult to Pulmonology  Once     Specialty:  Pulmonary Disease  Provider:  Héctor Real MD    01/16/18 1747    01/14/18 1203  Inpatient Consult to Gastroenterology  Once,   Status:  Canceled     Specialty:  Gastroenterology  Provider:  Koby Huerta MD    01/14/18 1202    01/12/18 1553  Cardiac Rehab Evaluation and Enrollment  Once     Provider:  (Not yet assigned)    01/12/18 1559    01/07/18 1215  Inpatient Consult to Gastroenterology  Once,   Status:  Canceled     Specialty:  Gastroenterology  Provider:  Darin Galaviz MD    01/07/18 1214    01/07/18 0952  Inpatient Consult to Cardiac Electrophysiologist  Once,   Status:  Canceled     Specialty:  Cardiac Electrophysiology  Provider:  Baron Britt MD    01/07/18 0952    01/06/18 0042  Inpatient Consult to Cardiology  Once     Specialty:  Cardiology  Provider:  Ladonna Mclaughlin MD    01/06/18 0042    01/06/18 0042  Inpatient Consult to Nephrology  Once     Specialty:  Nephrology  Provider:  Ivan Herring MD    01/06/18 0042    01/05/18 2259  Inpatient Consult to Case Management   Once     Provider:  (Not yet assigned)    01/05/18 2300    01/05/18 1951  [facility] (on-call MD unless specified)  Once     Specialty:  Internal Medicine  Provider:  (Not yet assigned)    01/05/18 1950          PROCEDURES PERFORMED  Upgrade of PPM to CRT-P  Small bowel  enteroscopy        HOSPITAL COURSE  Mrs. Deluna was a very pleasant 81yo woman with significant CHF history, who was admitted on the 5th of January with complaints of dyspnea, weight gain, and lower extremity edema. She had been taking her Bumex at home with no relief. Swelling had extended from her legs into her abdomen. She was also complaining of recurrent, intermittent blood in stool. She had been worked up in past for this and no source had ever been identified. Hgb was 9.2 on admission. Cardiology, Nephrology, and GI were all consulted and diuresis was initiated.   Pt was treated for her acute on chronic combined systolic and diastolic CHF and even underwent upgrade of her pacer to CRT-P. Renal function was stable. She continued to have evidence of GI bleeding and required 3 units total of PRBCs while here. When more stable from a cardiac standpoint she underwent EGD with small bowel enteroscopy. Unfortunately during the procedure she suffered an aspiration event. Upon returning to the floor she was noted to be in respiratory distress. ABG and CXR were ordered as well as IV Bumex and DuoNeb treatment. Pulmonology was asked to see. Dr. Real attended to pt and noted a left-sided pulmonary infiltrate on CXR. This was most likely due to aspiration during procedure. Rocephin was started. A short time later respiratory therapist entered pt's room and found the pt gray in color and without a pulse. A code was called. Strangely the monitor was showing a paced rhythm with good QRSs throughout resuscitation attempt. This was unfortunately a pulseless rhythm. Pulse was not regained throughout resuscitation. Prior to code pt was not noted to be hypoxic by monitoring staff either. At 2057 Dr. Latif (anesthesiology) stopped resuscitation efforts and pt was pronounced. Pts son was notified at initiation of code and was able to come to hospital. I was able to discuss the case with pt's son today on the phone.       PHYSICAL  EXAM  Temp:  [97.7 °F (36.5 °C)-98.6 °F (37 °C)] 97.7 °F (36.5 °C)  Heart Rate:  [63-96] 63  Resp:  [15] 15  BP: (105-162)/(48-66) 105/55  Body mass index is 35.33 kg/(m^2).  Physical Exam (on )  General Appearance:  Uncomfortable and in distress (MIldly labored breathing, but can speak to me, sentences not shortened).  .    Output: Producing urine and no stool output.    HEENT: Normal HEENT exam.    Lungs:  Tachypnea and increased effort.  She is in respiratory distress (mild).  There are wheezes (global expiratory) and rales (bibasilar).  (Prolonged exp phase)  Heart: Normal rate.  Regular rhythm.    Abdomen: Abdomen is soft.  (Edema of abd wall)Bowel sounds are normal.   There is no abdominal tenderness.     Extremities: There is dependent edema.  (Wraps in place)  Pulses: Distal pulses are intact.    Neurological: Patient is alert and oriented to person, place and time.    Pupils:  Pupils are equal, round, and reactive to light.    Skin:  Warm and dry    CONDITION ON DISCHARGE        DISCHARGE DISPOSITION    home      ALLERGIES  Allergies   Allergen Reactions   • Sulfa Antibiotics Other (See Comments)     Yeast infections/ RASH       TEST  RESULTS PENDING AT DISCHARGE  None     CODE STATUS  Full        Baron Torrez MD  [facility]  18  4:39 PM      Time: greater than 30 minutes.

## 2018-01-18 NOTE — PROGRESS NOTES
Case Management Discharge Note         Discharge Placement     Facility/Agency Request Status Selected? Address Phone Number Fax Number    [facility] Accepted     [address], [facility] [address] [phone]        Rehan Burnham RN 2018 17:04    Vmm left ([phone]) to follow Rehan Burnham RN                 [facility] Accepted     [address] , [facility] [address] [phone]             Discharge Codes: 20

## 2020-10-06 NOTE — NURSING NOTE
Notified DERIK of pts death. Spoke to Jihan MENDOZA Number [id number]   no edema, no murmurs, regular rate and rhythm

## 2022-08-19 NOTE — THERAPY TREATMENT NOTE
Acute Care - Physical Therapy Treatment Note  [facility]     Patient Name: Raquel Deluna  : 1935  MRN: [id number]  Today's Date: 2018  Onset of Illness/Injury or Date of Surgery Date: 18  Date of Referral to PT: 18  Referring Physician: Avila    Admit Date: 2018    Visit Dx:    ICD-10-CM ICD-9-CM   1. Acute combined systolic and diastolic congestive heart failure I50.41 428.41     428.0   2. Iron deficiency anemia due to chronic blood loss D50.0 280.0   3. GI AVM (gastrointestinal arteriovenous vascular malformation) Q27.33 747.61     Patient Active Problem List   Diagnosis   • OA (osteoarthritis) of knee   • Hyponatremia   • Bella's esophagus   • Hematochezia   • Colon polyp   • Essential hypertension   • Gastroesophageal reflux disease   • Hyperlipidemia   • Intestinal malabsorption   • Adverse effect of iron   • Iron deficiency anemia   • Gastrointestinal hemorrhage   • Paroxysmal atrial fibrillation   • Weakness   • Bilateral edema of lower extremity   • DNR (do not resuscitate)   • KESHIA (acute kidney injury)   • Mitral regurgitation   • Bilateral carotid artery stenosis   • Mitral valve insufficiency   • Absolute anemia   • Polyp of colon   • Iron deficiency anemia due to chronic blood loss   • history of GI AVM (gastrointestinal arteriovenous vascular malformation)   • S/P MVR (mitral valve repair)   • S/P TVR (tricuspid valve repair)   • S/P Maze operation for atrial fibrillation   • Lymphedema   • Carotid stenosis, asymptomatic, right   • Acute on chronic combined systolic and diastolic congestive heart failure   • Chronic kidney disease, stage 3   • History of PUD (peptic ulcer disease)   • Anemia, multifactorial   • AMBER (obstructive sleep apnea)   • Nonischemic cardiomyopathy               Adult Rehabilitation Note       18 1326 01/10/18 1402       Rehab Assessment/Intervention    Discipline physical therapist  -MD physical therapist  -AR     Document Type therapy  HR=92 bpm, OWVY=989/85 mmhg, SpO2=98.0 %, Resp=0 B/min, EtCO2=0 mmHg, Hvsif=410 Seconds note (daily note)  -MD therapy note (daily note)  -AR     Subjective Information agree to therapy;complains of;weakness;fatigue  -MD agree to therapy;complains of;weakness;fatigue  -AR     Patient Effort, Rehab Treatment fair  -MD good  -AR     Symptoms Noted During/After Treatment none  -MD      Precautions/Limitations fall precautions;pacemaker  -MD fall precautions;oxygen therapy device and L/min  -AR     Equipment Issued to Patient gait belt  -MD      Recorded by [MD] Liliya Nunes, PT [AR] Fanny Hough PT     Vital Signs    Intratreatment Heart Rate (beats/min) 74  -MD      O2 Delivery Pre Treatment  supplemental O2  -AR     Intra SpO2 (%) 98  -MD      O2 Delivery Intra Treatment supplemental O2  -MD      Recorded by [MD] Liliya Nunes, PT [AR] Fanny Hough PT     Pain Assessment    Pain Assessment No/denies pain  -MD      Recorded by [MD] Liliya Nunes PT      Cognitive Assessment/Intervention    Current Cognitive/Communication Assessment impaired  -MD impaired  -AR     Orientation Status oriented to;person  -MD oriented to;person;place  -AR     Follows Commands/Answers Questions 75% of the time;needs cueing;needs increased time;needs repetition  -% of the time;able to follow single-step instructions;needs cueing;needs increased time;needs repetition  -AR     Personal Safety decreased insight to deficits  -MD mild impairment  -AR     Personal Safety Interventions fall prevention program maintained;gait belt;muscle strengthening facilitated;nonskid shoes/slippers when out of bed;supervised activity  -MD fall prevention program maintained;gait belt;muscle strengthening facilitated  -AR     Recorded by [MD] Liliya Nunes, PT [AR] Fanny Hough PT     Bed Mobility, Assessment/Treatment    Bed Mobility, Assistive Device  bed rails;head of bed elevated;draw sheet  -AR     Bed Mob, Supine to Sit, [place] verbal cues required;moderate assist (50% patient effort);2 person assist required  -MD moderate assist  (50% patient effort)  -AR     Bed Mob, Sit to Supine, [place] verbal cues required;moderate assist (50% patient effort);maximum assist (25% patient effort);2 person assist required  -MD moderate assist (50% patient effort)  -AR     Bed Mobility, Safety Issues decreased use of arms for pushing/pulling;decreased use of legs for bridging/pushing  -MD      Bed Mobility, Impairments strength decreased;impaired balance;pain  -MD      Recorded by [MD] Liliya Nunes, PT [AR] Fanny Hough PT     Transfer Assessment/Treatment    Transfers, Sit-Stand [place] verbal cues required;moderate assist (50% patient effort);2 person assist required  -MD moderate assist (50% patient effort)  -AR     Transfers, Stand-Sit [place] verbal cues required;moderate assist (50% patient effort);2 person assist required  -MD minimum assist (75% patient effort)  -AR     Transfers, Sit-Stand-Sit, Assist Device other (see comments)   HHA due to recent pacemaker  -MD rolling walker  -AR     Transfer, Safety Issues weight-shifting ability decreased;balance decreased during turns  -MD      Transfer, Impairments strength decreased;impaired balance  -MD strength decreased;impaired balance  -AR     Recorded by [MD] Liliya Nunes, PT [AR] Fanny Hough PT     Gait Assessment/Treatment    Gait, [place] Level unable to perform  -MD minimum assist (75% patient effort)  -AR     Gait, Assistive Device  rolling walker  -AR     Gait, Distance (Feet)  1  -AR     Gait, Gait Deviations  knee buckling;dorie decreased;decreased heel strike;forward flexed posture  -AR     Gait, Safety Issues  step length decreased;weight-shifting ability decreased  -AR     Gait, Comment attempted taking steps foward and up EOB but pt unable to advance R LE due to L knee buckling  -MD few steps towards HOB, limited by fatigue/weakness, L LE giving out  -AR     Recorded by [MD] Liliya Nunes, PT [AR] Fanny Hough PT     Motor Skills/Interventions    Additional  Documentation Balance Skills Training (Group)  -MD      Recorded by [MD] Liliya Nunes, PT      Balance Skills Training    Standing-Level of Assistance Minimum assistance;Moderate assistance;x2  -MD      Static Standing Balance Support Right upper extremity supported;Left upper extremity supported   HHA  -MD      Standing-Balance Activities Weight Shift R-L  -MD      Recorded by [MD] Liliya Nunes, PT      Positioning and Restraints    Pre-Treatment Position in bed  -MD in bed  -AR     Post Treatment Position bed  -MD bed  -AR     In Bed supine;call light within reach;exit alarm on;RLE elevated;LLE elevated;RUE elevated;LUE elevated  -MD notified nsg;supine;call light within reach;encouraged to call for assist;exit alarm on  -AR     Recorded by [MD] Liliya Nunes, PT [AR] Fanny Hough PT       User Key  (r) = Recorded By, (t) = Taken By, (c) = Cosigned By    Initials Name Effective Dates    MD Liliya Nunes, PT 12/01/15 -     AR Fanny Hough, PT 06/27/16 -                 IP PT Goals       01/13/18 1330 01/06/18 1207       Bed Mobility PT LTG    Bed Mobility PT LTG, Date Established  01/06/18  -LC     Bed Mobility PT LTG, Time to Achieve  1 wk  -LC     Bed Mobility PT LTG, Activity Type  all bed mobility  -LC     Bed Mobility PT LTG, [place] Level minimum assist (75% patient effort)  -MD contact guard assist  -LC     Bed Mobility PT Goal  LTG, Assist Device  bed rails  -LC     Bed Mobility PT LTG, Date Goal Reviewed 01/13/18  -MD      Bed Mobility PT LTG, Outcome goal revised  -MD      Bed Mobility PT LTG, Reason Goal Not Met goals revised this date  -MD      Transfer Training PT LTG    Transfer Training PT LTG, Date Established  01/06/18  -LC     Transfer Training PT LTG, Time to Achieve  1 wk  -LC     Transfer Training PT LTG, Activity Type  all transfers  -LC     Transfer Training PT LTG, [place] Level minimum assist (75% patient effort)  -MD contact guard assist  -LC     Transfer Training PT LTG, Assist Device   walker, rolling  -LC     Transfer Training PT  LTG, Date Goal Reviewed 01/13/18  -MD      Transfer Training PT LTG, Outcome goal revised  -MD      Transfer Training PT LTG, Reason Goal Not Met goals revised this date  -MD      Gait Training PT LTG    Gait Training Goal PT LTG, Date Established  01/06/18  -LC     Gait Training Goal PT LTG, Time to Achieve  1 wk  -LC     Gait Training Goal PT LTG, [place] Level minimum assist (75% patient effort)  -MD contact guard assist  -LC     Gait Training Goal PT LTG, Assist Device  walker, rolling  -LC     Gait Training Goal PT LTG, Distance to Achieve 50  -  -LC     Gait Training Goal PT LTG, Date Goal Reviewed 01/13/18  -MD      Gait Training Goal PT LTG, Outcome goal revised  -MD      Gait Training Goal PT LTG, Reason Goal Not Met goals revised this date  -MD        User Key  (r) = Recorded By, (t) = Taken By, (c) = Cosigned By    Initials Name Provider Type    MD Liliya Nunes, PT Physical Therapist    LC Davin Negron, PT DPT Physical Therapist          Physical Therapy Education     Title: PT OT SLP Therapies (Active)     Topic: Physical Therapy (Active)     Point: Mobility training (Active)    Learning Progress Summary    Learner Readiness Method Response Comment Documented by Status   Patient Acceptance E NR  AR 01/10/18 1450 Active    Acceptance E,TB NR  EE 01/09/18 1343 Active    Acceptance E,D VU,DU benefits of activity, safety during ambulation  01/08/18 1400 Done    Acceptance E,D VU,DU safety during transfers  01/07/18 1418 Done    Acceptance E,D VU,DU safety during transfers  01/06/18 1204 Done               Point: Home exercise program (Active)    Learning Progress Summary    Learner Readiness Method Response Comment Documented by Status   Patient Acceptance E NR  AR 01/10/18 1450 Active    Acceptance E,TB NR  EE 01/09/18 1343 Active               Point: Body mechanics (Active)    Learning Progress Summary    Learner Readiness Method Response  Comment Documented by Status   Patient Acceptance E NR  AR 01/10/18 1450 Active    Acceptance E,TB NR  EE 01/09/18 1343 Active               Point: Precautions (Active)    Learning Progress Summary    Learner Readiness Method Response Comment Documented by Status   Patient Acceptance CLARY MCKEON MD 01/13/18 1329 Active    Acceptance E NR  AR 01/10/18 1450 Active                      User Key     Initials Effective Dates Name Provider Type Discipline     12/01/15 -  Haydee More, PT Physical Therapist PT    MD 12/01/15 -  Liliya Nunes, PT Physical Therapist PT    AR 06/27/16 -  Fanny Hough, PT Physical Therapist PT     08/02/16 -  Davin Negron, PT DPT Physical Therapist PT                    PT Recommendation and Plan  Anticipated Discharge Disposition: skilled nursing facility  PT Frequency: daily  Plan of Care Review  Plan Of Care Reviewed With: patient  Outcome Summary/Follow up Plan: Pt progress impaired due to L knee pain and buckling as well as increased generalized weakness.  As of 1/9 pt was ambulating 15` w contact gaurd assistance and a RWx and now pt is unable to take even 1 step to the head of the bed.  Nsg notified of pts changes in performance w PT.          Outcome Measures       01/13/18 1300 01/10/18 1400       How much help from another person do you currently need...    Turning from your back to your side while in flat bed without using bedrails? 2  -MD 3  -AR     Moving from lying on back to sitting on the side of a flat bed without bedrails? 2  -MD 2  -AR     Moving to and from a bed to a chair (including a wheelchair)? 2  -MD 2  -AR     Standing up from a chair using your arms (e.g., wheelchair, bedside chair)? 2  -MD 2  -AR     Climbing 3-5 steps with a railing? 1  -MD 1  -AR     To walk in hospital room? 1  -MD 2  -AR     AM-PAC 6 Clicks Score 10  -MD 12  -AR     Functional Assessment    Outcome Measure Options AM-PAC 6 Clicks Basic Mobility (PT)  -MD        User Key  (r) = Recorded By, (t)  = Taken By, (c) = Cosigned By    Initials Name Provider Type    MD Liliya Nunes, PT Physical Therapist    OMAR Hough PT Physical Therapist           Time Calculation:         PT Charges       01/13/18 1325          Time Calculation    Start Time 1305  -MD      Stop Time 1323  -MD      Time Calculation (min) 18 min  -MD      PT Received On 01/13/18  -MD      PT - Next Appointment 01/14/18  -MD      PT Goal Re-Cert Due Date 01/20/18  -MD        User Key  (r) = Recorded By, (t) = Taken By, (c) = Cosigned By    Initials Name Provider Type    MD Liliya Nunes, PT Physical Therapist          Therapy Charges for Today     Code Description Service Date Service Provider Modifiers Qty    50255786360 HC PT THER PROC EA 15 MIN 1/13/2018 Liliya Nunes, PT GP 1    21528327699 HC PT THER SUPP EA 15 MIN 1/13/2018 Liliya Nunes, PT GP 1          PT G-Codes  Outcome Measure Options: AM-PAC 6 Clicks Basic Mobility (PT)    Liliya Nunes PT  1/13/2018

## 2022-10-26 NOTE — PROGRESS NOTES
LOS: 11 days   Patient Care Team:  Ricky Hoyos III, MD as PCP - General  Anthony Hernández MD as Consulting Physician (Pulmonary Disease)  Christy Jerez MD as Consulting Physician (Dermatology)  Ladonna Mclaughlin MD as Consulting Physician (Cardiology)    Chief Complaint:   f/u chf    Interval History:   She feels better today.  She feels like she's diuresing more and breathing better.  She felt better after she got another unit of blood.  She is scheduled for EGD today.  She has no chest pain.  She still bringing up some phlegm and has a mild wheeze.    Objective   Vital Signs  Temp:  [97.8 °F (36.6 °C)-98 °F (36.7 °C)] 97.8 °F (36.6 °C)  Heart Rate:  [77-89] 77  Resp:  [16-20] 16  BP: (141-171)/(54-67) 162/63    Intake/Output Summary (Last 24 hours) at 01/16/18 0742  Last data filed at 01/15/18 1739   Gross per 24 hour   Intake              600 ml   Output             1750 ml   Net            -1150 ml       Comfortable NAD  Neck supple, no JVD or thyromegaly appreciated  S1/S2 RRR, no m/r/g  Lungs exp wheeze, normal effort  Abdomen S/NT/ND (+) BS, no HSM appreciated  Extremities warm, no clubbing, cyanosis, legs wrapped and elevated.   No visible or palpable skin lesions  A/Ox4, mood and affect appropriate    Results Review:        Results from last 7 days  Lab Units 01/16/18  0410 01/15/18  0407 01/14/18  0352   SODIUM mmol/L 138 137 132*   POTASSIUM mmol/L 3.0* 3.9 4.2   CHLORIDE mmol/L 93* 94* 92*   CO2 mmol/L 33.6* 30.2* 28.8   BUN mg/dL 77* 69* 60*   CREATININE mg/dL 1.45* 1.40* 1.47*   GLUCOSE mg/dL 105* 106* 102*   CALCIUM mg/dL 8.9 8.6 8.7           Results from last 7 days  Lab Units 01/16/18  0410 01/15/18  0407 01/14/18  0352   WBC 10*3/mm3 7.30 8.30 7.87   HEMOGLOBIN g/dL 8.2* 7.7* 7.5*   HEMATOCRIT % 25.5* 24.3* 23.6*   PLATELETS 10*3/mm3 201 235 232       Results from last 7 days  Lab Units 01/16/18  0410   INR  1.36*           Results from last 7 days  Lab Units 01/13/18  0427  Patient informed to see cardiology   MAGNESIUM mg/dL 2.1           I reviewed the patient's new clinical results.  I personally viewed and interpreted the patient's EKG/Telemetry data        Medication Review:     allopurinol 100 mg Oral Daily   budesonide-formoterol 2 puff Inhalation BID - RT   carvedilol 37.5 mg Oral Q12H   citalopram 20 mg Oral Daily   fluticasone 2 spray Each Nare Daily   hydrALAZINE 25 mg Oral Q8H   metOLazone 5 mg Oral Daily   miconazole  Topical Q12H   pantoprazole 40 mg Intravenous Q12H   potassium chloride 40 mEq Oral Q8H   sennosides-docusate sodium 1 tablet Oral Nightly   torsemide 100 mg Oral BID   vitamin B-6 100 mg Oral Daily            Assessment/Plan     Principal Problem:    Gastrointestinal hemorrhage associated with duodenal ulcer  Active Problems:    Hyponatremia    Essential hypertension    Gastrointestinal hemorrhage    Paroxysmal atrial fibrillation    Bilateral edema of lower extremity    history of GI AVM (gastrointestinal arteriovenous vascular malformation)    S/P MVR (mitral valve repair)    S/P TVR (tricuspid valve repair)    Acute on chronic combined systolic and diastolic congestive heart failure    Chronic kidney disease, stage 3    History of PUD (peptic ulcer disease)    Anemia, multifactorial    AMBER (obstructive sleep apnea)    Nonischemic cardiomyopathy    1. Decompensated systolic chf -was not diuresing despite diuretics, s/p upgrade to CRT-P 1/12/18. She is diuresing better since device placed. Diuretics per renal-changed to oral diuretics.   2. Severe cardiomyopathy, nonischemic> upgraded to CRT-P 1/12/18  3. S/p R CEA 12/2017  4. S/p MV and TV repair 9/2017  5. Pacer for AV block after surgery. She was upgraded to CRT-P 1/12/18  6. H/o GI bleeds with chronic anemia.-Gi has seen, she has a hx of EGD/C/S in 2/2017 with 3 small gastric ulcers and right sided colonic AVM's. H/H down 7.5/23.6-stool + OB on 1/14-transfused 1 unit, H/H only up to 7.7/24.3, GI re-consulted and plan for EGD, more blood  1/15/18, now 8.2.   7. PAF. No AC due to GI bleeding.   8. CKD III, creat. Stable, nephrology following.   9. HTN, bp still high, increase meds.  10. Hypokalemia, K+ replacing.     Await egd today.  Increase BP meds.     Ladonna Mclaughlin MD  01/16/18  7:42 AM

## 2023-08-28 NOTE — PLAN OF CARE
Problem: GI Endoscopy (Adult)  Goal: Signs and Symptoms of Listed Potential Problems Will be Absent or Manageable (GI Endoscopy)  Outcome: Ongoing (interventions implemented as appropriate)       as evidenced by meeting < 75% est needs > 1 mo, 15lb(7.7%) weight loss x 1month, NFPE

## (undated) DEVICE — BALN CORNRY SINUS 6F 1X80CM

## (undated) DEVICE — SUT SILK 3/0 SH CR5 18IN C0135

## (undated) DEVICE — BITEBLOCK OMNI BLOC

## (undated) DEVICE — CANN NASL CO2 TRULINK W/O2 A/

## (undated) DEVICE — GLV SURG BIOGEL LTX PF 7

## (undated) DEVICE — GW INQWIRE FC PTFE J/3MM .035 180

## (undated) DEVICE — HEMOCONCENTRATOR PERFUS LPS06

## (undated) DEVICE — CATH COURNARD DECCA CSL 6F120CM

## (undated) DEVICE — IRRIGATOR BULB ASEPTO 60CC STRL

## (undated) DEVICE — GOWN,PREVENTION PLUS,XLONG/XLARGE,STRL: Brand: MEDLINE

## (undated) DEVICE — BLAKE SILICONE DRAINS CARDIO CONNECTOR 2:1: Brand: BLAKE

## (undated) DEVICE — CANN VENI DLP SGL/STAGE DLP RT/ANGL EXT TP 24F

## (undated) DEVICE — SUT PROLN 6/0 C1 D/A 30IN 8706H

## (undated) DEVICE — DRSNG WND GEL FIBR OPTICELL AG PLS W/SLV LF 4X5IN  STRL

## (undated) DEVICE — Device

## (undated) DEVICE — CANN ART DLP STR/TIP 18F7IN

## (undated) DEVICE — LIMB HOLDER, WRIST/ANKLE: Brand: DEROYAL

## (undated) DEVICE — PK ENDART CARTOID 40

## (undated) DEVICE — RESUSCITATOR,MANUAL,ADLT,MASK,TUBE RES: Brand: MEDLINE INDUSTRIES, INC.

## (undated) DEVICE — SPNG GZ WOVN 4X4IN 12PLY 10/BX STRL

## (undated) DEVICE — STERILE COTTON TIP 6IN 10PK: Brand: MEDLINE

## (undated) DEVICE — GLIDESHEATH BASIC HYDROPHILIC COATED INTRODUCER SHEATH: Brand: GLIDESHEATH

## (undated) DEVICE — ANTIBACTERIAL UNDYED BRAIDED (POLYGLACTIN 910), SYNTHETIC ABSORBABLE SUTURE: Brand: COATED VICRYL

## (undated) DEVICE — BIOPATCH™ ANTIMICROBIAL DRESSING WITH CHLORHEXIDINE GLUCONATE IS A HYDROPHILLIC POLYURETHANE ABSORPTIVE FOAM WITH CHLORHEXIDINE GLUCONATE (CHG) WHICH INHIBITS BACTERIAL GROWTH UNDER THE DRESSING. THE DRESSING IS INTENDED TO BE USED TO ABSORB EXUDATE, COVER A WOUND CAUSED BY VASCULAR AND NONVASCULAR PERCUTANEOUS MEDICAL DEVICES DURING SURGERY, AS WELL AS REDUCE LOCAL INFECTION AND COLONIZATION OF MICROORGANISMS.: Brand: BIOPATCH

## (undated) DEVICE — CATHETER,URETHRAL,REDRUBBER,STERILE,20FR: Brand: MEDLINE

## (undated) DEVICE — KT MANIFLD CARDIAC

## (undated) DEVICE — GW EMR FIX EXCHG J STD .035 3MM 260CM

## (undated) DEVICE — FRCP BX RADJAW4 NDL 2.8 240CM LG OG BX40

## (undated) DEVICE — LOU PACE DEFIB: Brand: MEDLINE INDUSTRIES, INC.

## (undated) DEVICE — CATH DIAG IMPULSE FR5 5F 100CM

## (undated) DEVICE — 28 FR RIGHT ANGLE – SOFT PVC CATHETER: Brand: PVC THORACIC CATHETERS

## (undated) DEVICE — ELECTRD ECG CARBON/SNP RL FM A/ 5PK

## (undated) DEVICE — PK HEART OPN 40

## (undated) DEVICE — ST TOURNI COMPL A/ 7IN

## (undated) DEVICE — TP UMB COTN 1/8X36 U12T

## (undated) DEVICE — CATH DIAG IMPULSE FL3.5 5F 100CM

## (undated) DEVICE — 3M™ MEDIPORE™ H SOFT CLOTH SURGICAL TAPE 2862, 2 INCH X 10 YARD (5CM X 9,1M), 12 ROLLS/CASE: Brand: 3M™ MEDIPORE™

## (undated) DEVICE — ST. SORBAVIEW ULTIMATE IJ SYSTEM A,C: Brand: CENTURION

## (undated) DEVICE — TBG 02 CRUSH RESIST LF CLR 7FT

## (undated) DEVICE — INTRO TEAR AWAY/LVD W/SD PRT 7F 13CM

## (undated) DEVICE — GUIDE WIRE WITH HYDROPHILIC COATING: Brand: ACUITY WHISPER VIEW™

## (undated) DEVICE — SENSR CERBRL O2 PK/2

## (undated) DEVICE — SUT VIC 2 TP 1MS/4 27IN DYED J649G

## (undated) DEVICE — PAD GRND REM POLYHESIVE A/ DISP

## (undated) DEVICE — RADIFOCUS GLIDEWIRE: Brand: GLIDEWIRE

## (undated) DEVICE — TUBING, SUCTION, 1/4" X 10', STRAIGHT: Brand: MEDLINE

## (undated) DEVICE — DRAIN,WOUND,ROUND,24FR,5/16",FULL-FLUTED: Brand: MEDLINE

## (undated) DEVICE — THE SINGLE USE ETRAP – POLYP TRAP IS USED FOR SUCTION RETRIEVAL OF ENDOSCOPICALLY REMOVED POLYPS.: Brand: ETRAP

## (undated) DEVICE — SUT SILK 3/0 TIES 18IN A184H

## (undated) DEVICE — NDL HYPO PRECISIONGLIDE REG 25G 1 1/2

## (undated) DEVICE — GLV SURG TRIUMPH CLASSIC PF LTX 8 STRL

## (undated) DEVICE — HI-TORQUE BALANCE MIDDLEWEIGHT GUIDE WIRE .014 STRAIGHT TIP 3.0 CM X 190 CM: Brand: HI-TORQUE BALANCE MIDDLEWEIGHT

## (undated) DEVICE — INTRO TEAR AWAY/LVD W/SD PRT 9F 13CM

## (undated) DEVICE — SUT PROLN 5/0 RB1 D/A 36IN 8556H

## (undated) DEVICE — PROB CRYOABL CARD CARDIOBLATE/CRYOFLEX 25TO100MM 10CM 1P/U

## (undated) DEVICE — SUT SILK 2/0 TIES 18IN A185H

## (undated) DEVICE — PK CATH CARD 40

## (undated) DEVICE — ADHS SKIN DERMABOND

## (undated) DEVICE — LOU OPEN HEART DR POLLOCK: Brand: MEDLINE INDUSTRIES, INC.

## (undated) DEVICE — SNAR POLYP SENSATION STDOVL 27 240 BX40

## (undated) DEVICE — ORGANIZER SUT SHELIGH 3T 213013

## (undated) DEVICE — SOL ISO/ALC RUB 70PCT 4OZ

## (undated) DEVICE — Device: Brand: DEFENDO AIR/WATER/SUCTION AND BIOPSY VALVE

## (undated) DEVICE — CANNULA,ADULT,SOFT-TOUCH,7TUBE,SC: Brand: MEDLINE

## (undated) DEVICE — PK PERFUS CUST W/CARDIOPLEGIA

## (undated) DEVICE — DRP SLUSH WARMR MACH 52X66IN OM-ORS-301

## (undated) DEVICE — CANN VENI DLP SGL/STAGE RT/ANGL MTL/TP 28F

## (undated) DEVICE — SUT SILK 4/0 TIES 18IN A183H

## (undated) DEVICE — GLV SURG BIOGEL LTX PF 6 1/2

## (undated) DEVICE — GLV SURG BIOGEL M LTX PF 7 1/2

## (undated) DEVICE — STPLR SKIN VISISTAT WD 35CT

## (undated) DEVICE — DRSNG SURESITE WNDW 2.38X2.75

## (undated) DEVICE — SOL NS 500ML

## (undated) DEVICE — OASIS DRAIN, DUAL, IN-LINE, ATS COMPATIBLE: Brand: OASIS

## (undated) DEVICE — FLEXCEL CAROTID SHUNT (8F, 10F, 12F, 14F): Brand: FLEXCEL CAROTID SHUNT

## (undated) DEVICE — CATH IV INSYTE AUTOGARD 14G 1 1/2IN ORNG

## (undated) DEVICE — 12 FOOT DISPOSABLE EXTENSION CABLE WITH SAFE CONNECT / SCREW-DOWN

## (undated) DEVICE — SUT PROLN 4/0 BB D/A 36IN 8581H

## (undated) DEVICE — BALN PRESS WEDGE 5F 110CM

## (undated) DEVICE — PK ATS CUST W CARDIOTOMY RESEVOIR

## (undated) DEVICE — CLAMP INSERT: Brand: STEALTH® CLAMP INSERT

## (undated) DEVICE — THE TORRENT IRRIGATION SCOPE CONNECTOR IS USED WITH THE TORRENT IRRIGATION TUBING TO PROVIDE IRRIGATION FLUIDS SUCH AS STERILE WATER DURING GASTROINTESTINAL ENDOSCOPIC PROCEDURES WHEN USED IN CONJUNCTION WITH AN IRRIGATION PUMP (OR ELECTROSURGICAL UNIT).: Brand: TORRENT

## (undated) DEVICE — CATH DIAG IMPULSE PIG 5F 100CM

## (undated) DEVICE — EXTENSION SET, MALE LUER LOCK ADAPTER WITH RETRACTABLE COLLAR

## (undated) DEVICE — SUT PROLN 3/0 SH D/A 36IN 8522H

## (undated) DEVICE — SYS PERFUS SEP PLATLT W TIPS CUST

## (undated) DEVICE — SUT PROLN 6/0 BV1 D/A 30IN 8709H